# Patient Record
Sex: FEMALE | Race: WHITE | NOT HISPANIC OR LATINO | URBAN - METROPOLITAN AREA
[De-identification: names, ages, dates, MRNs, and addresses within clinical notes are randomized per-mention and may not be internally consistent; named-entity substitution may affect disease eponyms.]

---

## 2019-03-10 ENCOUNTER — INPATIENT HOSPITAL (AMBULATORY)
Dept: URBAN - METROPOLITAN AREA HOSPITAL 84 | Facility: HOSPITAL | Age: 69
End: 2019-03-10

## 2019-03-10 DIAGNOSIS — K62.5 HEMORRHAGE OF ANUS AND RECTUM: ICD-10-CM

## 2019-03-10 DIAGNOSIS — R11.2 NAUSEA WITH VOMITING, UNSPECIFIED: ICD-10-CM

## 2019-03-10 DIAGNOSIS — R19.7 DIARRHEA, UNSPECIFIED: ICD-10-CM

## 2019-03-10 DIAGNOSIS — R55 SYNCOPE AND COLLAPSE: ICD-10-CM

## 2019-03-10 DIAGNOSIS — R10.84 GENERALIZED ABDOMINAL PAIN: ICD-10-CM

## 2019-03-10 PROCEDURE — 99222 1ST HOSP IP/OBS MODERATE 55: CPT | Performed by: NURSE PRACTITIONER

## 2019-03-11 ENCOUNTER — INPATIENT HOSPITAL (AMBULATORY)
Dept: URBAN - METROPOLITAN AREA HOSPITAL 84 | Facility: HOSPITAL | Age: 69
End: 2019-03-11

## 2019-03-11 DIAGNOSIS — K58.8 OTHER IRRITABLE BOWEL SYNDROME: ICD-10-CM

## 2019-03-11 DIAGNOSIS — R11.2 NAUSEA WITH VOMITING, UNSPECIFIED: ICD-10-CM

## 2019-03-11 DIAGNOSIS — K62.5 HEMORRHAGE OF ANUS AND RECTUM: ICD-10-CM

## 2019-03-11 DIAGNOSIS — R52 PAIN, UNSPECIFIED: ICD-10-CM

## 2019-03-11 PROCEDURE — 99231 SBSQ HOSP IP/OBS SF/LOW 25: CPT | Performed by: NURSE PRACTITIONER

## 2019-04-17 ENCOUNTER — INPATIENT HOSPITAL (AMBULATORY)
Dept: URBAN - METROPOLITAN AREA HOSPITAL 84 | Facility: HOSPITAL | Age: 69
End: 2019-04-17

## 2019-04-17 DIAGNOSIS — R10.11 RIGHT UPPER QUADRANT PAIN: ICD-10-CM

## 2019-04-17 DIAGNOSIS — R07.9 CHEST PAIN, UNSPECIFIED: ICD-10-CM

## 2019-04-17 DIAGNOSIS — J44.9 CHRONIC OBSTRUCTIVE PULMONARY DISEASE, UNSPECIFIED: ICD-10-CM

## 2019-04-17 DIAGNOSIS — R52 PAIN, UNSPECIFIED: ICD-10-CM

## 2019-04-17 PROCEDURE — 99222 1ST HOSP IP/OBS MODERATE 55: CPT | Performed by: NURSE PRACTITIONER

## 2020-06-30 ENCOUNTER — APPOINTMENT (OUTPATIENT)
Dept: CT IMAGING | Facility: HOSPITAL | Age: 70
End: 2020-06-30

## 2020-06-30 ENCOUNTER — APPOINTMENT (OUTPATIENT)
Dept: MRI IMAGING | Facility: HOSPITAL | Age: 70
End: 2020-06-30

## 2020-06-30 ENCOUNTER — HOSPITAL ENCOUNTER (OUTPATIENT)
Facility: HOSPITAL | Age: 70
Discharge: NURSING, REHAB OR LONG-TERM CARE | End: 2020-07-06
Attending: HOSPITALIST | Admitting: HOSPITALIST

## 2020-06-30 DIAGNOSIS — M54.2 CERVICAL PAIN: ICD-10-CM

## 2020-06-30 DIAGNOSIS — G43.A1 INTRACTABLE CYCLICAL VOMITING ASSOCIATED WITH MIGRAINE: ICD-10-CM

## 2020-06-30 DIAGNOSIS — R11.2 INTRACTABLE VOMITING WITH NAUSEA, UNSPECIFIED VOMITING TYPE: Primary | ICD-10-CM

## 2020-06-30 DIAGNOSIS — G43.001 MIGRAINE WITHOUT AURA AND WITH STATUS MIGRAINOSUS, NOT INTRACTABLE: Chronic | ICD-10-CM

## 2020-06-30 PROBLEM — R11.10 INTRACTABLE VOMITING: Status: ACTIVE | Noted: 2020-06-30

## 2020-06-30 LAB
ALBUMIN SERPL-MCNC: 4.1 G/DL (ref 3.5–5.2)
ALBUMIN/GLOB SERPL: 1.6 G/DL
ALP SERPL-CCNC: 132 U/L (ref 39–117)
ALT SERPL W P-5'-P-CCNC: 16 U/L (ref 1–33)
ANION GAP SERPL CALCULATED.3IONS-SCNC: 14 MMOL/L (ref 5–15)
AST SERPL-CCNC: 27 U/L (ref 1–32)
BACTERIA UR QL AUTO: ABNORMAL /HPF
BASOPHILS # BLD AUTO: 0.1 10*3/MM3 (ref 0–0.2)
BASOPHILS NFR BLD AUTO: 1.1 % (ref 0–1.5)
BILIRUB SERPL-MCNC: 0.2 MG/DL (ref 0.2–1.2)
BILIRUB UR QL STRIP: ABNORMAL
BUN SERPL-MCNC: 15 MG/DL (ref 8–23)
BUN SERPL-MCNC: ABNORMAL MG/DL
BUN/CREAT SERPL: ABNORMAL
CALCIUM SPEC-SCNC: 9.5 MG/DL (ref 8.6–10.5)
CHLORIDE SERPL-SCNC: 107 MMOL/L (ref 98–107)
CLARITY UR: CLEAR
CO2 SERPL-SCNC: 24 MMOL/L (ref 22–29)
COLOR UR: ABNORMAL
CREAT SERPL-MCNC: 0.68 MG/DL (ref 0.57–1)
DEPRECATED RDW RBC AUTO: 43.3 FL (ref 37–54)
EOSINOPHIL # BLD AUTO: 0 10*3/MM3 (ref 0–0.4)
EOSINOPHIL NFR BLD AUTO: 0.4 % (ref 0.3–6.2)
ERYTHROCYTE [DISTWIDTH] IN BLOOD BY AUTOMATED COUNT: 12.9 % (ref 12.3–15.4)
GFR SERPL CREATININE-BSD FRML MDRD: 86 ML/MIN/1.73
GLOBULIN UR ELPH-MCNC: 2.6 GM/DL
GLUCOSE SERPL-MCNC: 137 MG/DL (ref 65–99)
GLUCOSE UR STRIP-MCNC: NEGATIVE MG/DL
HCT VFR BLD AUTO: 45 % (ref 34–46.6)
HGB BLD-MCNC: 15.3 G/DL (ref 12–15.9)
HGB UR QL STRIP.AUTO: ABNORMAL
HYALINE CASTS UR QL AUTO: ABNORMAL /LPF
KETONES UR QL STRIP: ABNORMAL
LEUKOCYTE ESTERASE UR QL STRIP.AUTO: NEGATIVE
LIPASE SERPL-CCNC: 29 U/L (ref 13–60)
LYMPHOCYTES # BLD AUTO: 1.5 10*3/MM3 (ref 0.7–3.1)
LYMPHOCYTES NFR BLD AUTO: 20.2 % (ref 19.6–45.3)
MCH RBC QN AUTO: 32.5 PG (ref 26.6–33)
MCHC RBC AUTO-ENTMCNC: 34 G/DL (ref 31.5–35.7)
MCV RBC AUTO: 95.6 FL (ref 79–97)
MONOCYTES # BLD AUTO: 0.6 10*3/MM3 (ref 0.1–0.9)
MONOCYTES NFR BLD AUTO: 8.6 % (ref 5–12)
NEUTROPHILS NFR BLD AUTO: 5 10*3/MM3 (ref 1.7–7)
NEUTROPHILS NFR BLD AUTO: 69.7 % (ref 42.7–76)
NITRITE UR QL STRIP: NEGATIVE
NRBC BLD AUTO-RTO: 0 /100 WBC (ref 0–0.2)
PH UR STRIP.AUTO: 5.5 [PH] (ref 5–8)
PLATELET # BLD AUTO: 303 10*3/MM3 (ref 140–450)
PMV BLD AUTO: 7.4 FL (ref 6–12)
POTASSIUM SERPL-SCNC: 4 MMOL/L (ref 3.5–5.2)
PROT SERPL-MCNC: 6.7 G/DL (ref 6–8.5)
PROT UR QL STRIP: ABNORMAL
RBC # BLD AUTO: 4.71 10*6/MM3 (ref 3.77–5.28)
RBC # UR: ABNORMAL /HPF
REF LAB TEST METHOD: ABNORMAL
SODIUM SERPL-SCNC: 145 MMOL/L (ref 136–145)
SP GR UR STRIP: >=1.03 (ref 1–1.03)
SQUAMOUS #/AREA URNS HPF: ABNORMAL /HPF
UROBILINOGEN UR QL STRIP: ABNORMAL
WBC # BLD AUTO: 7.2 10*3/MM3 (ref 3.4–10.8)
WBC UR QL AUTO: ABNORMAL /HPF

## 2020-06-30 PROCEDURE — 80053 COMPREHEN METABOLIC PANEL: CPT | Performed by: PHYSICIAN ASSISTANT

## 2020-06-30 PROCEDURE — 94799 UNLISTED PULMONARY SVC/PX: CPT

## 2020-06-30 PROCEDURE — P9612 CATHETERIZE FOR URINE SPEC: HCPCS

## 2020-06-30 PROCEDURE — 25010000002 ONDANSETRON PER 1 MG: Performed by: PHYSICIAN ASSISTANT

## 2020-06-30 PROCEDURE — 0 IOPAMIDOL PER 1 ML: Performed by: PHYSICIAN ASSISTANT

## 2020-06-30 PROCEDURE — 74177 CT ABD & PELVIS W/CONTRAST: CPT

## 2020-06-30 PROCEDURE — G0378 HOSPITAL OBSERVATION PER HR: HCPCS

## 2020-06-30 PROCEDURE — 63710000001 PROMETHAZINE PER 25 MG: Performed by: PHYSICIAN ASSISTANT

## 2020-06-30 PROCEDURE — 72141 MRI NECK SPINE W/O DYE: CPT

## 2020-06-30 PROCEDURE — 99285 EMERGENCY DEPT VISIT HI MDM: CPT

## 2020-06-30 PROCEDURE — 72125 CT NECK SPINE W/O DYE: CPT

## 2020-06-30 PROCEDURE — 81001 URINALYSIS AUTO W/SCOPE: CPT | Performed by: PHYSICIAN ASSISTANT

## 2020-06-30 PROCEDURE — 25010000002 HYDROMORPHONE PER 4 MG: Performed by: EMERGENCY MEDICINE

## 2020-06-30 PROCEDURE — 83690 ASSAY OF LIPASE: CPT | Performed by: PHYSICIAN ASSISTANT

## 2020-06-30 PROCEDURE — 96375 TX/PRO/DX INJ NEW DRUG ADDON: CPT

## 2020-06-30 PROCEDURE — 85025 COMPLETE CBC W/AUTO DIFF WBC: CPT | Performed by: PHYSICIAN ASSISTANT

## 2020-06-30 PROCEDURE — 94640 AIRWAY INHALATION TREATMENT: CPT

## 2020-06-30 PROCEDURE — 96374 THER/PROPH/DIAG INJ IV PUSH: CPT

## 2020-06-30 RX ORDER — PROMETHAZINE HYDROCHLORIDE 25 MG/1
50 SUPPOSITORY RECTAL EVERY 6 HOURS PRN
COMMUNITY
End: 2020-07-06 | Stop reason: HOSPADM

## 2020-06-30 RX ORDER — HYDROCODONE BITARTRATE AND ACETAMINOPHEN 5; 325 MG/1; MG/1
1 TABLET ORAL EVERY 8 HOURS PRN
Status: DISCONTINUED | OUTPATIENT
Start: 2020-06-30 | End: 2020-07-06 | Stop reason: HOSPADM

## 2020-06-30 RX ORDER — PROMETHAZINE HYDROCHLORIDE 25 MG/1
25 TABLET ORAL EVERY 6 HOURS PRN
COMMUNITY
End: 2020-07-06 | Stop reason: HOSPADM

## 2020-06-30 RX ORDER — OXCARBAZEPINE 150 MG/1
300 TABLET, FILM COATED ORAL 3 TIMES DAILY
Status: DISCONTINUED | OUTPATIENT
Start: 2020-06-30 | End: 2020-07-06 | Stop reason: HOSPADM

## 2020-06-30 RX ORDER — LEVOTHYROXINE SODIUM 0.05 MG/1
50 TABLET ORAL
COMMUNITY

## 2020-06-30 RX ORDER — ALUMINA, MAGNESIA, AND SIMETHICONE 2400; 2400; 240 MG/30ML; MG/30ML; MG/30ML
15 SUSPENSION ORAL EVERY 6 HOURS PRN
Status: DISCONTINUED | OUTPATIENT
Start: 2020-06-30 | End: 2020-07-06 | Stop reason: HOSPADM

## 2020-06-30 RX ORDER — SUMATRIPTAN 50 MG/1
50 TABLET, FILM COATED ORAL DAILY PRN
Status: DISCONTINUED | OUTPATIENT
Start: 2020-06-30 | End: 2020-07-06 | Stop reason: HOSPADM

## 2020-06-30 RX ORDER — AMITRIPTYLINE HYDROCHLORIDE 25 MG/1
25 TABLET, FILM COATED ORAL NIGHTLY
COMMUNITY
End: 2020-07-06 | Stop reason: HOSPADM

## 2020-06-30 RX ORDER — CYCLOSPORINE 0.5 MG/ML
1 EMULSION OPHTHALMIC 2 TIMES DAILY
Status: DISCONTINUED | OUTPATIENT
Start: 2020-06-30 | End: 2020-07-06 | Stop reason: HOSPADM

## 2020-06-30 RX ORDER — SODIUM CHLORIDE 9 MG/ML
100 INJECTION, SOLUTION INTRAVENOUS CONTINUOUS
Status: DISCONTINUED | OUTPATIENT
Start: 2020-06-30 | End: 2020-07-06

## 2020-06-30 RX ORDER — TOPIRAMATE 100 MG/1
100 TABLET, FILM COATED ORAL 3 TIMES DAILY
Status: ON HOLD | COMMUNITY
End: 2022-09-30 | Stop reason: SDUPTHER

## 2020-06-30 RX ORDER — MONTELUKAST SODIUM 10 MG/1
10 TABLET ORAL NIGHTLY
COMMUNITY
End: 2020-07-06 | Stop reason: HOSPADM

## 2020-06-30 RX ORDER — GABAPENTIN 800 MG/1
800 TABLET ORAL 4 TIMES DAILY
Status: ON HOLD | COMMUNITY
End: 2020-07-03 | Stop reason: SDUPTHER

## 2020-06-30 RX ORDER — PROPRANOLOL HYDROCHLORIDE 40 MG/1
40 TABLET ORAL 2 TIMES DAILY
COMMUNITY
End: 2020-07-06 | Stop reason: HOSPADM

## 2020-06-30 RX ORDER — ACETAMINOPHEN 160 MG/5ML
650 SOLUTION ORAL EVERY 4 HOURS PRN
Status: DISCONTINUED | OUTPATIENT
Start: 2020-06-30 | End: 2020-07-06 | Stop reason: HOSPADM

## 2020-06-30 RX ORDER — HYDROMORPHONE HCL 110MG/55ML
0.5 PATIENT CONTROLLED ANALGESIA SYRINGE INTRAVENOUS ONCE
Status: COMPLETED | OUTPATIENT
Start: 2020-06-30 | End: 2020-06-30

## 2020-06-30 RX ORDER — BUDESONIDE 0.5 MG/2ML
0.5 INHALANT ORAL
Status: DISCONTINUED | OUTPATIENT
Start: 2020-06-30 | End: 2020-07-06 | Stop reason: HOSPADM

## 2020-06-30 RX ORDER — SUCRALFATE 1 G/1
1 TABLET ORAL 2 TIMES DAILY
COMMUNITY

## 2020-06-30 RX ORDER — BISACODYL 10 MG
10 SUPPOSITORY, RECTAL RECTAL DAILY PRN
Status: DISCONTINUED | OUTPATIENT
Start: 2020-06-30 | End: 2020-07-06 | Stop reason: HOSPADM

## 2020-06-30 RX ORDER — NITROGLYCERIN 0.4 MG/1
0.4 TABLET SUBLINGUAL
Status: DISCONTINUED | OUTPATIENT
Start: 2020-06-30 | End: 2020-07-06 | Stop reason: HOSPADM

## 2020-06-30 RX ORDER — MONTELUKAST SODIUM 10 MG/1
10 TABLET ORAL NIGHTLY
Status: DISCONTINUED | OUTPATIENT
Start: 2020-06-30 | End: 2020-07-06 | Stop reason: HOSPADM

## 2020-06-30 RX ORDER — CHOLECALCIFEROL (VITAMIN D3) 125 MCG
5 CAPSULE ORAL NIGHTLY PRN
Status: DISCONTINUED | OUTPATIENT
Start: 2020-06-30 | End: 2020-07-06 | Stop reason: HOSPADM

## 2020-06-30 RX ORDER — ESTRADIOL 1 MG/1
1.5 TABLET ORAL DAILY
Status: DISCONTINUED | OUTPATIENT
Start: 2020-07-01 | End: 2020-07-06 | Stop reason: HOSPADM

## 2020-06-30 RX ORDER — ACETAMINOPHEN 650 MG/1
650 SUPPOSITORY RECTAL EVERY 4 HOURS PRN
Status: DISCONTINUED | OUTPATIENT
Start: 2020-06-30 | End: 2020-07-06 | Stop reason: HOSPADM

## 2020-06-30 RX ORDER — OXYCODONE HYDROCHLORIDE 5 MG/1
10 TABLET ORAL EVERY 4 HOURS PRN
Status: DISCONTINUED | OUTPATIENT
Start: 2020-06-30 | End: 2020-07-06 | Stop reason: HOSPADM

## 2020-06-30 RX ORDER — KETOTIFEN FUMARATE 0.35 MG/ML
1 SOLUTION/ DROPS OPHTHALMIC 2 TIMES DAILY
Status: DISCONTINUED | OUTPATIENT
Start: 2020-06-30 | End: 2020-07-06 | Stop reason: HOSPADM

## 2020-06-30 RX ORDER — ESCITALOPRAM OXALATE 20 MG/1
20 TABLET ORAL DAILY
COMMUNITY
End: 2020-07-06 | Stop reason: HOSPADM

## 2020-06-30 RX ORDER — ALBUTEROL SULFATE 2.5 MG/3ML
2.5 SOLUTION RESPIRATORY (INHALATION) EVERY 4 HOURS PRN
Status: DISCONTINUED | OUTPATIENT
Start: 2020-06-30 | End: 2020-07-06 | Stop reason: HOSPADM

## 2020-06-30 RX ORDER — ESTRADIOL 1 MG/1
1 TABLET ORAL DAILY
COMMUNITY

## 2020-06-30 RX ORDER — SODIUM CHLORIDE 0.9 % (FLUSH) 0.9 %
10 SYRINGE (ML) INJECTION EVERY 12 HOURS SCHEDULED
Status: DISCONTINUED | OUTPATIENT
Start: 2020-06-30 | End: 2020-07-06 | Stop reason: HOSPADM

## 2020-06-30 RX ORDER — MAGNESIUM SULFATE HEPTAHYDRATE 40 MG/ML
2 INJECTION, SOLUTION INTRAVENOUS AS NEEDED
Status: DISCONTINUED | OUTPATIENT
Start: 2020-06-30 | End: 2020-07-06 | Stop reason: HOSPADM

## 2020-06-30 RX ORDER — RIZATRIPTAN BENZOATE 10 MG/1
10 TABLET, ORALLY DISINTEGRATING ORAL ONCE AS NEEDED
COMMUNITY

## 2020-06-30 RX ORDER — SUCRALFATE 1 G/1
1 TABLET ORAL
Status: DISCONTINUED | OUTPATIENT
Start: 2020-06-30 | End: 2020-07-06 | Stop reason: HOSPADM

## 2020-06-30 RX ORDER — MAGNESIUM SULFATE 1 G/100ML
1 INJECTION INTRAVENOUS AS NEEDED
Status: DISCONTINUED | OUTPATIENT
Start: 2020-06-30 | End: 2020-07-06 | Stop reason: HOSPADM

## 2020-06-30 RX ORDER — BUTALBITAL, ACETAMINOPHEN AND CAFFEINE 50; 325; 40 MG/1; MG/1; MG/1
1-2 TABLET ORAL EVERY 4 HOURS PRN
COMMUNITY

## 2020-06-30 RX ORDER — HYDROCODONE BITARTRATE AND ACETAMINOPHEN 5; 325 MG/1; MG/1
1 TABLET ORAL EVERY 8 HOURS PRN
COMMUNITY
End: 2020-07-06 | Stop reason: HOSPADM

## 2020-06-30 RX ORDER — ONDANSETRON 2 MG/ML
4 INJECTION INTRAMUSCULAR; INTRAVENOUS EVERY 6 HOURS PRN
Status: DISCONTINUED | OUTPATIENT
Start: 2020-06-30 | End: 2020-07-06 | Stop reason: HOSPADM

## 2020-06-30 RX ORDER — ONDANSETRON 4 MG/1
4 TABLET, FILM COATED ORAL EVERY 6 HOURS PRN
Status: DISCONTINUED | OUTPATIENT
Start: 2020-06-30 | End: 2020-07-06 | Stop reason: HOSPADM

## 2020-06-30 RX ORDER — SODIUM CHLORIDE 0.9 % (FLUSH) 0.9 %
10 SYRINGE (ML) INJECTION AS NEEDED
Status: DISCONTINUED | OUTPATIENT
Start: 2020-06-30 | End: 2020-07-06 | Stop reason: HOSPADM

## 2020-06-30 RX ORDER — AMITRIPTYLINE HYDROCHLORIDE 25 MG/1
25 TABLET, FILM COATED ORAL NIGHTLY
Status: DISCONTINUED | OUTPATIENT
Start: 2020-06-30 | End: 2020-07-06 | Stop reason: HOSPADM

## 2020-06-30 RX ORDER — IPRATROPIUM BROMIDE AND ALBUTEROL SULFATE 2.5; .5 MG/3ML; MG/3ML
3 SOLUTION RESPIRATORY (INHALATION)
Status: DISCONTINUED | OUTPATIENT
Start: 2020-06-30 | End: 2020-07-06 | Stop reason: HOSPADM

## 2020-06-30 RX ORDER — ONDANSETRON 2 MG/ML
4 INJECTION INTRAMUSCULAR; INTRAVENOUS ONCE
Status: COMPLETED | OUTPATIENT
Start: 2020-06-30 | End: 2020-06-30

## 2020-06-30 RX ORDER — PROPRANOLOL HYDROCHLORIDE 10 MG/1
40 TABLET ORAL 2 TIMES DAILY
Status: DISCONTINUED | OUTPATIENT
Start: 2020-06-30 | End: 2020-07-06 | Stop reason: HOSPADM

## 2020-06-30 RX ORDER — TOPIRAMATE 100 MG/1
100 TABLET, FILM COATED ORAL 4 TIMES DAILY
Status: DISCONTINUED | OUTPATIENT
Start: 2020-06-30 | End: 2020-07-06 | Stop reason: HOSPADM

## 2020-06-30 RX ORDER — ESCITALOPRAM OXALATE 10 MG/1
20 TABLET ORAL DAILY
Status: DISCONTINUED | OUTPATIENT
Start: 2020-07-01 | End: 2020-07-06 | Stop reason: HOSPADM

## 2020-06-30 RX ORDER — GABAPENTIN 400 MG/1
800 CAPSULE ORAL 4 TIMES DAILY
Status: DISCONTINUED | OUTPATIENT
Start: 2020-06-30 | End: 2020-07-06 | Stop reason: HOSPADM

## 2020-06-30 RX ORDER — OMEPRAZOLE 40 MG/1
40 CAPSULE, DELAYED RELEASE ORAL 2 TIMES DAILY
COMMUNITY
End: 2022-01-21

## 2020-06-30 RX ORDER — BUTALBITAL, ACETAMINOPHEN AND CAFFEINE 50; 325; 40 MG/1; MG/1; MG/1
1 TABLET ORAL EVERY 4 HOURS PRN
Status: DISCONTINUED | OUTPATIENT
Start: 2020-06-30 | End: 2020-07-06 | Stop reason: HOSPADM

## 2020-06-30 RX ORDER — PROMETHAZINE HYDROCHLORIDE 25 MG/1
25 TABLET ORAL ONCE
Status: COMPLETED | OUTPATIENT
Start: 2020-06-30 | End: 2020-06-30

## 2020-06-30 RX ORDER — AZELASTINE HYDROCHLORIDE 0.5 MG/ML
1 SOLUTION/ DROPS OPHTHALMIC 2 TIMES DAILY
COMMUNITY
End: 2022-09-27

## 2020-06-30 RX ORDER — OXYCODONE AND ACETAMINOPHEN 10; 325 MG/1; MG/1
1 TABLET ORAL EVERY 6 HOURS PRN
Status: ON HOLD | COMMUNITY
End: 2020-07-03 | Stop reason: SDUPTHER

## 2020-06-30 RX ORDER — PANTOPRAZOLE SODIUM 40 MG/1
40 TABLET, DELAYED RELEASE ORAL EVERY MORNING
Status: DISCONTINUED | OUTPATIENT
Start: 2020-07-01 | End: 2020-07-06 | Stop reason: HOSPADM

## 2020-06-30 RX ORDER — ONDANSETRON 4 MG/1
4 TABLET, FILM COATED ORAL EVERY 4 HOURS PRN
COMMUNITY
End: 2020-07-06 | Stop reason: HOSPADM

## 2020-06-30 RX ORDER — POTASSIUM CHLORIDE 20 MEQ/1
40 TABLET, EXTENDED RELEASE ORAL AS NEEDED
Status: DISCONTINUED | OUTPATIENT
Start: 2020-06-30 | End: 2020-07-06 | Stop reason: HOSPADM

## 2020-06-30 RX ORDER — OXCARBAZEPINE 300 MG/1
300 TABLET, FILM COATED ORAL 3 TIMES DAILY
COMMUNITY
End: 2020-07-06 | Stop reason: HOSPADM

## 2020-06-30 RX ORDER — VITAMIN E 268 MG
400 CAPSULE ORAL DAILY
COMMUNITY
End: 2022-09-30 | Stop reason: HOSPADM

## 2020-06-30 RX ORDER — ALBUTEROL SULFATE 90 UG/1
2 AEROSOL, METERED RESPIRATORY (INHALATION) EVERY 4 HOURS PRN
COMMUNITY

## 2020-06-30 RX ORDER — LEVOTHYROXINE SODIUM 0.03 MG/1
25 TABLET ORAL DAILY
Status: DISCONTINUED | OUTPATIENT
Start: 2020-07-01 | End: 2020-07-06 | Stop reason: HOSPADM

## 2020-06-30 RX ORDER — ACETAMINOPHEN 325 MG/1
650 TABLET ORAL EVERY 4 HOURS PRN
Status: DISCONTINUED | OUTPATIENT
Start: 2020-06-30 | End: 2020-07-06 | Stop reason: HOSPADM

## 2020-06-30 RX ORDER — ONDANSETRON 4 MG/1
4 TABLET, FILM COATED ORAL EVERY 4 HOURS PRN
Status: DISCONTINUED | OUTPATIENT
Start: 2020-06-30 | End: 2020-06-30

## 2020-06-30 RX ORDER — LANSOPRAZOLE 30 MG/1
30 CAPSULE, DELAYED RELEASE ORAL DAILY
COMMUNITY
End: 2020-07-06 | Stop reason: HOSPADM

## 2020-06-30 RX ORDER — VITAMIN E 268 MG
400 CAPSULE ORAL DAILY
Status: DISCONTINUED | OUTPATIENT
Start: 2020-07-01 | End: 2020-07-06 | Stop reason: HOSPADM

## 2020-06-30 RX ORDER — CYCLOSPORINE 0.5 MG/ML
1 EMULSION OPHTHALMIC 2 TIMES DAILY
COMMUNITY

## 2020-06-30 RX ADMIN — IOPAMIDOL 100 ML: 755 INJECTION, SOLUTION INTRAVENOUS at 13:55

## 2020-06-30 RX ADMIN — MONTELUKAST SODIUM 10 MG: 10 TABLET, FILM COATED ORAL at 21:40

## 2020-06-30 RX ADMIN — OXCARBAZEPINE 300 MG: 150 TABLET, FILM COATED ORAL at 21:58

## 2020-06-30 RX ADMIN — SUCRALFATE 1 G: 1 TABLET ORAL at 21:40

## 2020-06-30 RX ADMIN — AMITRIPTYLINE HYDROCHLORIDE 25 MG: 25 TABLET, FILM COATED ORAL at 21:40

## 2020-06-30 RX ADMIN — CYCLOSPORINE 1 DROP: 0.5 EMULSION OPHTHALMIC at 21:41

## 2020-06-30 RX ADMIN — PROPRANOLOL HYDROCHLORIDE 40 MG: 10 TABLET ORAL at 21:41

## 2020-06-30 RX ADMIN — PROMETHAZINE HYDROCHLORIDE 25 MG: 25 TABLET ORAL at 13:57

## 2020-06-30 RX ADMIN — HYDROMORPHONE HYDROCHLORIDE 0.5 MG: 2 INJECTION, SOLUTION INTRAMUSCULAR; INTRAVENOUS; SUBCUTANEOUS at 12:29

## 2020-06-30 RX ADMIN — SODIUM CHLORIDE 500 ML: 900 INJECTION, SOLUTION INTRAVENOUS at 10:56

## 2020-06-30 RX ADMIN — SODIUM CHLORIDE 100 ML/HR: 900 INJECTION, SOLUTION INTRAVENOUS at 21:46

## 2020-06-30 RX ADMIN — BUDESONIDE 0.5 MG: 0.5 INHALANT RESPIRATORY (INHALATION) at 19:38

## 2020-06-30 RX ADMIN — HYDROCODONE BITARTRATE AND ACETAMINOPHEN 1 TABLET: 5; 325 TABLET ORAL at 21:41

## 2020-06-30 RX ADMIN — ONDANSETRON 4 MG: 2 INJECTION INTRAMUSCULAR; INTRAVENOUS at 10:56

## 2020-06-30 RX ADMIN — OXYCODONE HYDROCHLORIDE 10 MG: 5 TABLET ORAL at 17:46

## 2020-06-30 RX ADMIN — TOPIRAMATE 100 MG: 100 TABLET, FILM COATED ORAL at 21:41

## 2020-06-30 RX ADMIN — GABAPENTIN 800 MG: 400 CAPSULE ORAL at 21:40

## 2020-06-30 RX ADMIN — Medication 10 ML: at 21:54

## 2020-06-30 RX ADMIN — KETOTIFEN FUMARATE 1 DROP: 0.25 SOLUTION OPHTHALMIC at 22:31

## 2020-06-30 RX ADMIN — MELATONIN TAB 5 MG 5 MG: 5 TAB at 21:40

## 2020-07-01 ENCOUNTER — ON CAMPUS - OUTPATIENT (AMBULATORY)
Dept: URBAN - METROPOLITAN AREA HOSPITAL 85 | Facility: HOSPITAL | Age: 70
End: 2020-07-01

## 2020-07-01 DIAGNOSIS — R11.2 NAUSEA WITH VOMITING, UNSPECIFIED: ICD-10-CM

## 2020-07-01 DIAGNOSIS — R10.13 EPIGASTRIC PAIN: ICD-10-CM

## 2020-07-01 DIAGNOSIS — K92.1 MELENA: ICD-10-CM

## 2020-07-01 DIAGNOSIS — R74.8 ABNORMAL LEVELS OF OTHER SERUM ENZYMES: ICD-10-CM

## 2020-07-01 DIAGNOSIS — R19.7 DIARRHEA, UNSPECIFIED: ICD-10-CM

## 2020-07-01 DIAGNOSIS — Z90.49 ACQUIRED ABSENCE OF OTHER SPECIFIED PARTS OF DIGESTIVE TRACT: ICD-10-CM

## 2020-07-01 PROBLEM — F32.A DEPRESSION: Chronic | Status: ACTIVE | Noted: 2020-07-01

## 2020-07-01 PROBLEM — E28.39 ESTROGEN DEFICIENCY: Chronic | Status: ACTIVE | Noted: 2020-07-01

## 2020-07-01 PROBLEM — M96.0 PSEUDARTHROSIS FOLLOWING SPINAL FUSION: Status: ACTIVE | Noted: 2018-06-13

## 2020-07-01 PROBLEM — J45.909 ASTHMA: Status: ACTIVE | Noted: 2020-07-01

## 2020-07-01 PROBLEM — J44.9 CHRONIC OBSTRUCTIVE PULMONARY DISEASE: Chronic | Status: ACTIVE | Noted: 2020-07-01

## 2020-07-01 PROBLEM — G43.909 MIGRAINES: Chronic | Status: ACTIVE | Noted: 2020-07-01

## 2020-07-01 PROBLEM — G40.909 SEIZURE DISORDER (HCC): Chronic | Status: ACTIVE | Noted: 2020-07-01

## 2020-07-01 PROBLEM — G43.A1: Status: ACTIVE | Noted: 2020-06-30

## 2020-07-01 PROBLEM — K21.9 GERD WITHOUT ESOPHAGITIS: Chronic | Status: ACTIVE | Noted: 2020-07-01

## 2020-07-01 PROBLEM — E03.9 HYPOTHYROIDISM (ACQUIRED): Chronic | Status: ACTIVE | Noted: 2020-07-01

## 2020-07-01 PROBLEM — G89.4 CHRONIC PAIN SYNDROME: Chronic | Status: ACTIVE | Noted: 2020-07-01

## 2020-07-01 PROBLEM — J44.9 CHRONIC OBSTRUCTIVE PULMONARY DISEASE: Status: ACTIVE | Noted: 2020-07-01

## 2020-07-01 LAB
ANION GAP SERPL CALCULATED.3IONS-SCNC: 14 MMOL/L (ref 5–15)
BASOPHILS # BLD AUTO: 0 10*3/MM3 (ref 0–0.2)
BASOPHILS NFR BLD AUTO: 0.5 % (ref 0–1.5)
BUN SERPL-MCNC: 13 MG/DL (ref 8–23)
BUN SERPL-MCNC: ABNORMAL MG/DL
BUN/CREAT SERPL: ABNORMAL
CALCIUM SPEC-SCNC: 8.5 MG/DL (ref 8.6–10.5)
CHLORIDE SERPL-SCNC: 106 MMOL/L (ref 98–107)
CO2 SERPL-SCNC: 22 MMOL/L (ref 22–29)
CREAT SERPL-MCNC: 0.52 MG/DL (ref 0.57–1)
DEPRECATED RDW RBC AUTO: 45.1 FL (ref 37–54)
EOSINOPHIL # BLD AUTO: 0.1 10*3/MM3 (ref 0–0.4)
EOSINOPHIL NFR BLD AUTO: 1.3 % (ref 0.3–6.2)
ERYTHROCYTE [DISTWIDTH] IN BLOOD BY AUTOMATED COUNT: 13.1 % (ref 12.3–15.4)
GFR SERPL CREATININE-BSD FRML MDRD: 117 ML/MIN/1.73
GGT SERPL-CCNC: 23 U/L (ref 5–36)
GLUCOSE SERPL-MCNC: 110 MG/DL (ref 65–99)
HCT VFR BLD AUTO: 35.1 % (ref 34–46.6)
HGB BLD-MCNC: 12 G/DL (ref 12–15.9)
LYMPHOCYTES # BLD AUTO: 2.3 10*3/MM3 (ref 0.7–3.1)
LYMPHOCYTES NFR BLD AUTO: 41.6 % (ref 19.6–45.3)
MAGNESIUM SERPL-MCNC: 2 MG/DL (ref 1.6–2.4)
MCH RBC QN AUTO: 33.4 PG (ref 26.6–33)
MCHC RBC AUTO-ENTMCNC: 34.2 G/DL (ref 31.5–35.7)
MCV RBC AUTO: 97.5 FL (ref 79–97)
MONOCYTES # BLD AUTO: 0.7 10*3/MM3 (ref 0.1–0.9)
MONOCYTES NFR BLD AUTO: 12.4 % (ref 5–12)
NEUTROPHILS NFR BLD AUTO: 2.4 10*3/MM3 (ref 1.7–7)
NEUTROPHILS NFR BLD AUTO: 44.2 % (ref 42.7–76)
NRBC BLD AUTO-RTO: 0.1 /100 WBC (ref 0–0.2)
PLATELET # BLD AUTO: 216 10*3/MM3 (ref 140–450)
PMV BLD AUTO: 7.6 FL (ref 6–12)
POTASSIUM SERPL-SCNC: 4 MMOL/L (ref 3.5–5.2)
RBC # BLD AUTO: 3.6 10*6/MM3 (ref 3.77–5.28)
SODIUM SERPL-SCNC: 142 MMOL/L (ref 136–145)
WBC # BLD AUTO: 5.5 10*3/MM3 (ref 3.4–10.8)

## 2020-07-01 PROCEDURE — 97162 PT EVAL MOD COMPLEX 30 MIN: CPT

## 2020-07-01 PROCEDURE — 63710000001 ONDANSETRON PER 8 MG: Performed by: PHYSICIAN ASSISTANT

## 2020-07-01 PROCEDURE — 83735 ASSAY OF MAGNESIUM: CPT | Performed by: PHYSICIAN ASSISTANT

## 2020-07-01 PROCEDURE — 99213 OFFICE O/P EST LOW 20 MIN: CPT | Performed by: NURSE PRACTITIONER

## 2020-07-01 PROCEDURE — 94799 UNLISTED PULMONARY SVC/PX: CPT

## 2020-07-01 PROCEDURE — 85025 COMPLETE CBC W/AUTO DIFF WBC: CPT | Performed by: PHYSICIAN ASSISTANT

## 2020-07-01 PROCEDURE — 82977 ASSAY OF GGT: CPT | Performed by: NURSE PRACTITIONER

## 2020-07-01 PROCEDURE — G0378 HOSPITAL OBSERVATION PER HR: HCPCS

## 2020-07-01 PROCEDURE — 99225 PR SBSQ OBSERVATION CARE/DAY 25 MINUTES: CPT | Performed by: HOSPITALIST

## 2020-07-01 PROCEDURE — 80048 BASIC METABOLIC PNL TOTAL CA: CPT | Performed by: PHYSICIAN ASSISTANT

## 2020-07-01 RX ADMIN — HYDROCODONE BITARTRATE AND ACETAMINOPHEN 1 TABLET: 5; 325 TABLET ORAL at 05:57

## 2020-07-01 RX ADMIN — KETOTIFEN FUMARATE 1 DROP: 0.25 SOLUTION OPHTHALMIC at 09:34

## 2020-07-01 RX ADMIN — IPRATROPIUM BROMIDE AND ALBUTEROL SULFATE 3 ML: .5; 3 SOLUTION RESPIRATORY (INHALATION) at 08:05

## 2020-07-01 RX ADMIN — ESCITALOPRAM OXALATE 20 MG: 10 TABLET ORAL at 08:04

## 2020-07-01 RX ADMIN — TOPIRAMATE 100 MG: 100 TABLET, FILM COATED ORAL at 08:05

## 2020-07-01 RX ADMIN — PANTOPRAZOLE SODIUM 40 MG: 40 TABLET, DELAYED RELEASE ORAL at 05:57

## 2020-07-01 RX ADMIN — LEVOTHYROXINE SODIUM 25 MCG: 25 TABLET ORAL at 05:57

## 2020-07-01 RX ADMIN — OXCARBAZEPINE 300 MG: 150 TABLET, FILM COATED ORAL at 21:05

## 2020-07-01 RX ADMIN — BUDESONIDE 0.5 MG: 0.5 INHALANT RESPIRATORY (INHALATION) at 20:49

## 2020-07-01 RX ADMIN — Medication 10 ML: at 08:09

## 2020-07-01 RX ADMIN — ESTRADIOL 1.5 MG: 1 TABLET ORAL at 08:04

## 2020-07-01 RX ADMIN — TOPIRAMATE 100 MG: 100 TABLET, FILM COATED ORAL at 11:57

## 2020-07-01 RX ADMIN — PROPRANOLOL HYDROCHLORIDE 40 MG: 10 TABLET ORAL at 21:05

## 2020-07-01 RX ADMIN — SUCRALFATE 1 G: 1 TABLET ORAL at 11:57

## 2020-07-01 RX ADMIN — TOPIRAMATE 100 MG: 100 TABLET, FILM COATED ORAL at 21:05

## 2020-07-01 RX ADMIN — IPRATROPIUM BROMIDE AND ALBUTEROL SULFATE 3 ML: .5; 3 SOLUTION RESPIRATORY (INHALATION) at 20:49

## 2020-07-01 RX ADMIN — MONTELUKAST SODIUM 10 MG: 10 TABLET, FILM COATED ORAL at 21:05

## 2020-07-01 RX ADMIN — SUCRALFATE 1 G: 1 TABLET ORAL at 21:05

## 2020-07-01 RX ADMIN — Medication 10 ML: at 21:05

## 2020-07-01 RX ADMIN — SUCRALFATE 1 G: 1 TABLET ORAL at 08:05

## 2020-07-01 RX ADMIN — OXYCODONE HYDROCHLORIDE 10 MG: 5 TABLET ORAL at 08:04

## 2020-07-01 RX ADMIN — OXYCODONE HYDROCHLORIDE 10 MG: 5 TABLET ORAL at 21:05

## 2020-07-01 RX ADMIN — Medication 400 UNITS: at 08:04

## 2020-07-01 RX ADMIN — OXCARBAZEPINE 300 MG: 150 TABLET, FILM COATED ORAL at 08:05

## 2020-07-01 RX ADMIN — CYCLOSPORINE 1 DROP: 0.5 EMULSION OPHTHALMIC at 21:07

## 2020-07-01 RX ADMIN — AMITRIPTYLINE HYDROCHLORIDE 25 MG: 25 TABLET, FILM COATED ORAL at 21:05

## 2020-07-01 RX ADMIN — PANTOPRAZOLE SODIUM 40 MG: 40 TABLET, DELAYED RELEASE ORAL at 08:16

## 2020-07-01 RX ADMIN — KETOTIFEN FUMARATE 1 DROP: 0.25 SOLUTION OPHTHALMIC at 21:06

## 2020-07-01 RX ADMIN — ONDANSETRON HYDROCHLORIDE 4 MG: 4 TABLET, FILM COATED ORAL at 08:04

## 2020-07-01 RX ADMIN — OXYCODONE HYDROCHLORIDE 10 MG: 5 TABLET ORAL at 11:57

## 2020-07-01 RX ADMIN — OXYCODONE HYDROCHLORIDE 10 MG: 5 TABLET ORAL at 02:59

## 2020-07-01 RX ADMIN — LEVOTHYROXINE SODIUM 25 MCG: 25 TABLET ORAL at 08:16

## 2020-07-01 RX ADMIN — GABAPENTIN 800 MG: 400 CAPSULE ORAL at 08:04

## 2020-07-01 RX ADMIN — OXYCODONE HYDROCHLORIDE 10 MG: 5 TABLET ORAL at 16:35

## 2020-07-01 RX ADMIN — TOPIRAMATE 100 MG: 100 TABLET, FILM COATED ORAL at 16:35

## 2020-07-01 RX ADMIN — GABAPENTIN 800 MG: 400 CAPSULE ORAL at 21:05

## 2020-07-01 RX ADMIN — PROPRANOLOL HYDROCHLORIDE 40 MG: 10 TABLET ORAL at 08:04

## 2020-07-01 RX ADMIN — SUCRALFATE 1 G: 1 TABLET ORAL at 16:35

## 2020-07-01 RX ADMIN — OXCARBAZEPINE 300 MG: 150 TABLET, FILM COATED ORAL at 16:35

## 2020-07-01 RX ADMIN — BUDESONIDE 0.5 MG: 0.5 INHALANT RESPIRATORY (INHALATION) at 08:19

## 2020-07-01 RX ADMIN — GABAPENTIN 800 MG: 400 CAPSULE ORAL at 11:57

## 2020-07-01 RX ADMIN — CYCLOSPORINE 1 DROP: 0.5 EMULSION OPHTHALMIC at 08:05

## 2020-07-01 RX ADMIN — GABAPENTIN 800 MG: 400 CAPSULE ORAL at 16:35

## 2020-07-01 NOTE — PLAN OF CARE
"  Problem: Patient Care Overview  Goal: Plan of Care Review  Outcome: Ongoing (interventions implemented as appropriate)  Flowsheets  Taken 7/1/2020 1244  Progress: no change  Taken 7/1/2020 1207  Plan of Care Reviewed With: patient  Outcome Summary: Pt is a 69 y/o F who presented to [facility] with intactable N/V x 2 days of unclear etiology (CT abdomen (-), UA (+)) and s/p recent C3-4 and C5-6 fusion 06/09/2020 (repeat imaging C-spine (-)). Pt appears to be functioning below baseline with very poor activity tolerance/generalized weakness and c/o \"feeling like my legs are going to give out\" during brief gait training bout. Pt completed sit to supine transfer requiring SBA, sit to/from stand transfers requiring CGA, and required MIN assist for gait training bout 25ft (plus R HHA) with unsteadiness/weakness throughout bout. Recommending IP rehab at d/c, as pt is not safe to return home alone and is at increased risk for falls. Plan to see 3x/week at [facility] for progression of mobility and assess transfers/gait with RW at next session. PPE used: mask, face shield, gloves     "

## 2020-07-01 NOTE — PROGRESS NOTES
[facility] Daily Progress Note      Hospitalist Team  LOS 0 days      Patient Care Team:  Zoey Bah MD as PCP - General  Zoey Bah MD as PCP - Family Medicine    Patient Location: 373/1      Subjective   Subjective     Chief Complaint / Subjective  Chief Complaint   Patient presents with   • Abdominal Pain     onset 2 days ago with N/V         Brief Synopsis of Hospital Course/HPI    Ms. Lin is a 70 y.o. female presents to [facility] complaining of nausea and vomiting for the past 2 days.  Patient states that she had posterior cervical neck fusion in the beginning of June.  Patient has been on pain meds ever since but states her pain is worsening as well as worsening abdominal pain.  Patient states she has had decreased oral intake over the past several days due to feeling nauseous.  Patient reports her last bowel movement was 3 days ago.  Patient also states that she has decreased urine output as she reports that she is not drinking enough fluids.  Patient also reports some hematemesis in which she is noticed some red streaks in her emesis.     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  Previous notes from [facility] state that patient has chronic nausea.  Patient with history of cholecystectomy back in 1996.  Patient also has a history of appendectomy.  Patient postop note shows patient was treated with clindamycin.  Patient has a history of C. difficile colitis and chronic diarrhea as well.     In the ED, CMP largely unremarkable.  Lipase normal, CBC unremarkable.  UA significant for 2+, 1+ protein, 2+ bilirubin, 3-5 RBCs, 0-2 WBCs, +1 bacteria. CT abdomen pelvis showed no acute findings in the abdomen or pelvis.  No CT explanation for patient's upper abdomen pain.  Surgical changes of partial gastrectomy with gastrojejunostomy.  Cholecystectomy.  Hysterectomy.  Lumbar spinal fusion.   "Stable intrahepatic next hepatic biliary ductal dilatation since 3/9/2019, may represent increased capacity from previous cholecystectomy.   CT of the cervical spine showed anterior C3-4 and posterior C3-6 spinal fusion.  Hardware appears intact.  No acute cervical spine findings.  No high-grade canal or foraminal stenosis is seen.  7 mm ill-defined sclerotic focus on the T1 vertebral body it is indeterminate.  Bone scan may prove helpful for further evaluation. patient is afebrile, pulse in the 80s, on room air oxygen and 97% SPO2 and blood pressure 150s over 70s.  Patient received 500 normal saline bolus, Zofran, Dilaudid, Phenergan in ED.    Date::          ROS      Objective   Objective      Vital Signs  Temp:  [98.3 °F (36.8 °C)-98.4 °F (36.9 °C)] 98.3 °F (36.8 °C)  Heart Rate:  [] 83  Resp:  [16-18] 17  BP: (128-159)/(62-85) 128/79  Oxygen Therapy  SpO2: 96 %  Pulse Oximetry Type: Intermittent  Device (Oxygen Therapy): room air  Flowsheet Rows      First Filed Value   Admission Height  157.5 cm (62\") Documented at 06/30/2020 1045   Admission Weight  59.4 kg (131 lb) Documented at 06/30/2020 1045        Intake & Output (last 3 days)       06/28 0701 - 06/29 0700 06/29 0701 - 06/30 0700 06/30 0701 - 07/01 0700 07/01 0701 - 07/02 0700    P.O.   960     Total Intake(mL/kg)   960 (16.3)     Net   +960             Urine Unmeasured Occurrence   1 x         Lines, Drains & Airways    Active LDAs     Name:   Placement date:   Placement time:   Site:   Days:    Peripheral IV 06/30/20 1344 Left Antecubital   06/30/20    1344    Antecubital   less than 1                  Physical Exam:    Physical Exam   Constitutional: She is oriented to person, place, and time. She appears well-developed and well-nourished. No distress.   HENT:   Head: Normocephalic and atraumatic.   Right Ear: External ear normal.   Left Ear: External ear normal.   Nose: Nose normal.   Mouth/Throat: Oropharynx is clear and moist. No oropharyngeal " exudate.   Eyes: Pupils are equal, round, and reactive to light. Conjunctivae and EOM are normal. Right eye exhibits no discharge. Left eye exhibits no discharge. No scleral icterus.   Neck: Normal range of motion. No JVD present. No tracheal deviation present. No thyromegaly present.   Cardiovascular: Normal rate, regular rhythm, normal heart sounds and intact distal pulses. Exam reveals no gallop and no friction rub.   No murmur heard.  Pulmonary/Chest: Effort normal and breath sounds normal. No stridor. No respiratory distress. She has no wheezes. She has no rales. She exhibits no tenderness.   Abdominal: Soft. Bowel sounds are normal. She exhibits no distension and no mass. There is no tenderness. There is no rebound and no guarding. No hernia.   Musculoskeletal: Normal range of motion. She exhibits no edema, tenderness or deformity.   Lymphadenopathy:     She has no cervical adenopathy.   Neurological: She is alert and oriented to person, place, and time. No cranial nerve deficit or sensory deficit. She exhibits normal muscle tone. Coordination normal.   Skin: Skin is warm and dry. No rash noted. She is not diaphoretic. No erythema.   Psychiatric: She has a normal mood and affect. Her behavior is normal.   Nursing note and vitals reviewed.            Procedures:              Results Review:     I reviewed the patient's new clinical results.      Lab Results (last 24 hours)     Procedure Component Value Units Date/Time    BUN [[id number]]  (Normal) Collected:  07/01/20 0335    Specimen:  Blood Updated:  07/01/20 0726     BUN 13 mg/dL     Basic Metabolic Panel [[id number]]  (Abnormal) Collected:  07/01/20 0335    Specimen:  Blood Updated:  07/01/20 0517     Glucose 110 mg/dL      BUN --     Comment: Testing performed by alternate method        Creatinine 0.52 mg/dL      Sodium 142 mmol/L      Potassium 4.0 mmol/L      Chloride 106 mmol/L      CO2 22.0 mmol/L      Calcium 8.5 mg/dL      eGFR Non African Amer 117  mL/min/1.73      BUN/Creatinine Ratio --     Comment: Testing not performed        Anion Gap 14.0 mmol/L     Narrative:       GFR Normal >60  Chronic Kidney Disease <60  Kidney Failure <15      Magnesium [[id number]]  (Normal) Collected:  07/01/20 0335    Specimen:  Blood Updated:  07/01/20 0517     Magnesium 2.0 mg/dL     CBC Auto Differential [[id number]]  (Abnormal) Collected:  07/01/20 0335    Specimen:  Blood Updated:  07/01/20 0513     WBC 5.50 10*3/mm3      RBC 3.60 10*6/mm3      Hemoglobin 12.0 g/dL      Comment: Result checked         Hematocrit 35.1 %      MCV 97.5 fL      MCH 33.4 pg      MCHC 34.2 g/dL      RDW 13.1 %      RDW-SD 45.1 fl      MPV 7.6 fL      Platelets 216 10*3/mm3      Neutrophil % 44.2 %      Lymphocyte % 41.6 %      Monocyte % 12.4 %      Eosinophil % 1.3 %      Basophil % 0.5 %      Neutrophils, Absolute 2.40 10*3/mm3      Lymphocytes, Absolute 2.30 10*3/mm3      Monocytes, Absolute 0.70 10*3/mm3      Eosinophils, Absolute 0.10 10*3/mm3      Basophils, Absolute 0.00 10*3/mm3      nRBC 0.1 /100 WBC     BUN [[id number]]  (Normal) Collected:  06/30/20 1155    Specimen:  Blood from Arm, Left Updated:  06/30/20 1231     BUN 15 mg/dL     Comprehensive Metabolic Panel [[id number]]  (Abnormal) Collected:  06/30/20 1155    Specimen:  Blood from Arm, Left Updated:  06/30/20 1226     Glucose 137 mg/dL      BUN --     Comment: Testing performed by alternate method        Creatinine 0.68 mg/dL      Sodium 145 mmol/L      Potassium 4.0 mmol/L      Chloride 107 mmol/L      CO2 24.0 mmol/L      Calcium 9.5 mg/dL      Total Protein 6.7 g/dL      Albumin 4.10 g/dL      ALT (SGPT) 16 U/L      AST (SGOT) 27 U/L      Alkaline Phosphatase 132 U/L      Total Bilirubin 0.2 mg/dL      eGFR Non African Amer 86 mL/min/1.73      Globulin 2.6 gm/dL      A/G Ratio 1.6 g/dL      BUN/Creatinine Ratio --     Comment: Testing not performed        Anion Gap 14.0 mmol/L     Narrative:       GFR Normal >60  Chronic Kidney  Disease <60  Kidney Failure <15          No results found for: HGBA1C            Lab Results   Component Value Date    LIPASE 29 06/30/2020     Lab Results   Component Value Date    CHOL 161 04/17/2018    TRIG 148 04/17/2018    HDL 45 04/17/2018    LDL 89 04/17/2018       No results found for: INTRAOP, PREDX, FINALDX, COMDX    Microbiology Results (last 10 days)     ** No results found for the last 240 hours. **          ECG/EMG Results (most recent)     None                    Ct Cervical Spine Without Contrast    Result Date: 6/30/2020   1. Anterior C3-4 and posterior C3-C6 spinal fusion. Hardware appears intact. 2. No acute cervical spine findings. No high-grade canal or foraminal stenosis is seen. 3. 7 mm ill-defined sclerotic focus in the T1 vertebral body. It is indeterminate. Bone scan may prove helpful for further evaluation.  Electronically Signed By-Dr. Velvet Maher MD On:6/30/2020 11:52 AM This report was finalized on [id number] by Dr. Velvet Maher MD.    Mri Cervical Spine Without Contrast    Result Date: 6/30/2020   1. Mild to moderate canal stenosis at C4-5.. No high-grade canal stenosis is seen. 2. No evidence of cord edema. 3. Satisfactory cervical alignment. 4. C3-4 anterior spinal fusion. C3-C6 posterior spinal fusion. 5. Cellulitic changes or edema within the mid neck soft tissues. No drainable fluid collection or abscess. 6. No epidural fluid collection or abscess is seen.   Electronically Signed By-Dr. Velvet Maher MD On:6/30/2020 3:21 PM This report was finalized on [id number] by Dr. Velvet Maher MD.    Ct Abdomen Pelvis With Contrast    Result Date: 6/30/2020   1. No acute findings in the abdomen or pelvis. No CT explanation for the patient's upper abdominal pain. 2. Surgical changes of partial gastrectomy with gastrojejunostomy. Cholecystectomy. Hysterectomy. Lumbar spinal fusion. 3. Stable intrahepatic next hepatic biliary ductal dilation since 03/09/2019, may represent  increased capacitance from previous cholecystectomy.  Electronically Signed By-Dr. Velvet Maher MD On:6/30/2020 2:09 PM This report was finalized on [id number] by Dr. Velvet Maher MD.          Xrays, labs reviewed personally by physician.    Medication Review:   I have reviewed the patient's current medication list      Scheduled Meds    amitriptyline 25 mg Oral Nightly   budesonide 0.5 mg Nebulization BID - RT   cycloSPORINE 1 drop Both Eyes BID   escitalopram 20 mg Oral Daily   estradiol 1.5 mg Oral Daily   gabapentin 800 mg Oral 4x Daily   ipratropium-albuterol 3 mL Nebulization Q8H - RT   ketotifen 1 drop Both Eyes BID   levothyroxine 25 mcg Oral Daily   montelukast 10 mg Oral Nightly   OXcarbazepine 300 mg Oral TID   pantoprazole 40 mg Oral QAM   propranolol 40 mg Oral BID   sodium chloride 10 mL Intravenous Q12H   sucralfate 1 g Oral 4x Daily AC & at Bedtime   topiramate 100 mg Oral 4x Daily   vitamin E 400 Units Oral Daily       Meds Infusions    sodium chloride 100 mL/hr Last Rate: 100 mL/hr (06/30/20 2146)       Meds PRN  •  acetaminophen **OR** acetaminophen **OR** acetaminophen  •  albuterol  •  aluminum-magnesium hydroxide-simethicone  •  bisacodyl  •  butalbital-acetaminophen-caffeine  •  HYDROcodone-acetaminophen  •  ketamine (KETALAR) IVPB **AND** Ketamine Vital Signs & Assessment  •  magnesium hydroxide  •  magnesium sulfate **OR** magnesium sulfate in D5W 1g/100mL (PREMIX)  •  melatonin  •  nitroglycerin  •  ondansetron **OR** ondansetron  •  oxyCODONE  •  potassium chloride  •  promethazine  •  [COMPLETED] Insert peripheral IV **AND** sodium chloride  •  sodium chloride  •  SUMAtriptan      Assessment/Plan   Assessment/Plan     Active Hospital Problems    Diagnosis  POA   • **Intractable vomiting [R11.10]  Yes   • Chronic obstructive pulmonary disease (CMS/HCC) [J44.9]  Yes   • Hypothyroidism (acquired) [E03.9]  Yes   • Estrogen deficiency [E28.39]  Yes   • Depression [F32.9]  Yes   •  Migraines [G43.909]  Yes   • Chronic pain syndrome [G89.4]  Yes   • GERD without esophagitis [K21.9]  Yes   • Pseudarthrosis following spinal fusion [M96.0]  Not Applicable      Resolved Hospital Problems   No resolved problems to display.       MEDICAL DECISION MAKING COMPLEXITY BY PROBLEM:     Intractable vomiting with associated abdominal pain not improved yet.  -Cause is unclear at this time, may have constipation component exacerbated by opiate use, possible pain medication seeker, patient has been worked up for multiple abdominal complaints in the past given surgical history  -CMP largely unremarkable.  Lipase normal, CBC unremarkable.    -UA significant for 2+, 1+ protein, 2+ bilirubin, 3-5 RBCs, 0-2 WBCs, +1 bacteria.   -CT abdomen pelvis showed no acute findings in the abdomen or pelvis.  No CT explanation for patient's upper abdomen pain.  Surgical changes of partial gastrectomy with gastrojejunostomy.  Cholecystectomy.  Hysterectomy.  Lumbar spinal fusion.  Stable intrahepatic next hepatic biliary ductal dilatation since 3/9/2019, may represent increased capacity from previous cholecystectomy.  -patient is afebrile, pulse in the 80s, on room air oxygen and 97% SPO2 and blood pressure 150s over 70s.    -Patient received 500 normal saline bolus, Zofran, Dilaudid, Phenergan in ED.  -Zofran, Phenergan, Carafate ordered  -IV fluids 100 mL/h normal saline  -Pain control with home Percocet, Norco, inspect verified-ketamine ordered  - Consult GI service   -Case management consult  -PT consult  -Clear liquid diet     Pseudoarthrosis of cervical spine following spinal fusion  -s/p fusion on 6/9/2020  -CT of the cervical spine showed anterior C3-4 and posterior C3-6 spinal fusion.  Hardware appears intact.  No acute cervical spine findings.  No high-grade canal or foraminal stenosis is seen.  7 mm ill-defined sclerotic focus on the T1 vertebral body it is indeterminate.  Bone scan may prove helpful for further  evaluation.     Chronic diarrhea  -Hold home viberzi     Hypothyroidism  -Continue home Synthroid     Estrogen deficiency  -Continue home estradiol     Depression  -Continue home Lexapro, amitriptyline     COPD  -Not acute exacerbation  -Continue home albuterol, Pulmicort, Singulair, Anoro     Migraines  -Continue home Fioricet, maxalt as needed  -Continue home propranolol     ?Seizure disorder  -No previous documentation of seizure disorder  -Continue home Trileptal, Topamax     Chronic pain syndrome  -Continue home pain meds     GERD  -Continue home PPI        VTE Prophylaxis -SCDs    VTE Prophylaxis -   Mechanical Order History:      Ordered        06/30/20 1625  Place Sequential Compression Device  Once         06/30/20 1625  Maintain Sequential Compression Device  Continuous                 Pharmalogical Order History:     None            Code Status -   Code Status and Medical Interventions:   Ordered at: 06/30/20 1625     Code Status:    CPR     Medical Interventions (Level of Support Prior to Arrest):    Full             Discharge Planning          Destination      Coordination has not been started for this encounter.      Durable Medical Equipment      Coordination has not been started for this encounter.      Dialysis/Infusion      Coordination has not been started for this encounter.      Home Medical Care      Coordination has not been started for this encounter.      Therapy      Coordination has not been started for this encounter.      Community Resources      Coordination has not been started for this encounter.            Electronically signed by Xiao Belle MD, 07/01/20, 12:11.  [facility] Hospitalist Team

## 2020-07-01 NOTE — PLAN OF CARE
Problem: Patient Care Overview  Goal: Plan of Care Review  Outcome: Ongoing (interventions implemented as appropriate)  Flowsheets (Taken 7/1/2020 0853)  Progress: no change  Plan of Care Reviewed With: patient  Outcome Summary: patient constantly c/o pain in neck. prn pain meds given every 4 hours per request. no complaints of nausea this shift. will continue to monitor.

## 2020-07-01 NOTE — CONSULTS
"GI CONSULT  NOTE:    Referring Provider:  Dr. Belle    Chief complaint: Nausea with vomiting    Subjective . \"I've had nausea with vomiting for day\"    History of present illness: Ashley Lin is a 70 y.o. female who has a history of cholecystectomy, COPD and chronic pain on narcotics.  She presents with a 3-day history of nausea, vomiting and abdominal bloating.  She questions hematemesis and has routine heartburn despite daily medications.  She has dysphagia with all consistencies but worse with pills but denies unintentional weight loss.  She has a history of chronic diarrhea since cholecystectomy and takes Viberzi twice daily and is typically controlled with only occasional diarrhea.  She denies any bright red blood per rectum but does report melena x1 recently.  No NSAID or anticoagulation use.  She does report a history of ulcers with last EGD with Dr. Mata approximately 6 to 8 months ago.  She is status post recent cervical spine fusion in .  She takes narcotics routinely but denies history of diabetes.      Endo History:  Patient reports EGD and colonoscopy about 6 to 8 months ago with Dr. Mata with gastric ulcers and \"inflamed colon\"    Past Medical History:  Past Medical History:   Diagnosis Date   • Chronic back pain    • Chronic obstructive pulmonary disease (CMS/HCC) 2020   • Chronic pain syndrome 2020   • Depression 2020   • Disease of thyroid gland    • Estrogen deficiency 2020   • GERD without esophagitis 2020   • Hypothyroidism (acquired) 2020   • Migraines 2020   • Vomiting 2020       Past Surgical History:  Past Surgical History:   Procedure Laterality Date   • CHOLECYSTECTOMY     • HYSTERECTOMY     • NECK SURGERY     • SPINE SURGERY  2020   EGD and colonoscopy    Social History:  Social History     Tobacco Use   • Smoking status: Former Smoker     Last attempt to quit: 2006     Years since quittin.5   • Smokeless tobacco: Never " Used   Substance Use Topics   • Alcohol use: Never     Frequency: Never   • Drug use: Never   No tobacco, alcohol or illicit drug use    Family History:  Family History   Problem Relation Age of Onset   • Heart disease Mother    No family history of esophageal, stomach or colon cancer    Medications:  Medications Prior to Admission   Medication Sig Dispense Refill Last Dose   • albuterol sulfate  (90 Base) MCG/ACT inhaler Inhale 2 puffs Every 4 (Four) Hours As Needed for Wheezing or Shortness of Air.      • amitriptyline (ELAVIL) 25 MG tablet Take 25 mg by mouth Every Night.      • azelastine (OPTIVAR) 0.05 % ophthalmic solution Administer 1 drop to both eyes 2 (Two) Times a Day.      • budesonide (PULMICORT) 180 MCG/ACT inhaler Inhale 2 puffs 2 (Two) Times a Day.      • butalbital-acetaminophen-caffeine (FIORICET, ESGIC) -40 MG per tablet Take 1-2 tablets by mouth Every 4 (Four) Hours As Needed for Headache.      • cycloSPORINE (RESTASIS) 0.05 % ophthalmic emulsion Administer 1 drop to both eyes 2 (Two) Times a Day.      • Eluxadoline (Viberzi) 100 MG tablet Take 100 tablets by mouth 2 (two) times a day.      • escitalopram (LEXAPRO) 20 MG tablet Take 20 mg by mouth Daily.      • estradiol (ESTRACE) 1 MG tablet Take 1.5 mg by mouth Daily.      • gabapentin (NEURONTIN) 800 MG tablet Take 800 mg by mouth 4 (Four) Times a Day.      • lansoprazole (PREVACID) 30 MG capsule Take 30 mg by mouth Daily.      • levothyroxine (SYNTHROID, LEVOTHROID) 25 MCG tablet Take 25 mcg by mouth Daily.      • montelukast (SINGULAIR) 10 MG tablet Take 10 mg by mouth Every Night.      • omeprazole (priLOSEC) 40 MG capsule Take 40 mg by mouth 2 (two) times a day.      • ondansetron (ZOFRAN) 4 MG tablet Take 4 mg by mouth Every 4 (Four) Hours As Needed for Nausea or Vomiting.      • OXcarbazepine (TRILEPTAL) 300 MG tablet Take 300 mg by mouth 3 (Three) Times a Day.      • oxyCODONE-acetaminophen (PERCOCET)  MG per tablet  Take 1 tablet by mouth Every 6 (Six) Hours As Needed for Moderate Pain  or Severe Pain .      • promethazine (PHENERGAN) 25 MG suppository Insert 50 mg into the rectum Every 6 (Six) Hours As Needed for Nausea or Vomiting.      • promethazine (PHENERGAN) 25 MG tablet Take 25 mg by mouth Every 6 (Six) Hours As Needed for Nausea or Vomiting.      • propranolol (INDERAL) 40 MG tablet Take 40 mg by mouth 2 (Two) Times a Day.      • rizatriptan MLT (MAXALT-MLT) 10 MG disintegrating tablet Place 10 mg on the tongue 1 (One) Time As Needed for Migraine. May repeat in 2 hours if needed      • sucralfate (CARAFATE) 1 g tablet Take 1 g by mouth 4 (Four) Times a Day.      • umeclidinium-vilanterol (Anoro Ellipta) 62.5-25 MCG/INH aerosol powder  inhaler Inhale 1 puff Daily.      • vitamin E 400 UNIT capsule Take 400 Units by mouth Daily.      • HYDROcodone-acetaminophen (NORCO) 5-325 MG per tablet Take 1 tablet by mouth Every 8 (Eight) Hours As Needed for Moderate Pain  or Severe Pain .   Unknown at Unknown time   • topiramate (TOPAMAX) 100 MG tablet Take 100 mg by mouth 4 (Four) Times a Day.   Unknown at Unknown time       Scheduled Meds:  amitriptyline 25 mg Oral Nightly   budesonide 0.5 mg Nebulization BID - RT   cycloSPORINE 1 drop Both Eyes BID   escitalopram 20 mg Oral Daily   estradiol 1.5 mg Oral Daily   gabapentin 800 mg Oral 4x Daily   ipratropium-albuterol 3 mL Nebulization Q8H - RT   ketotifen 1 drop Both Eyes BID   levothyroxine 25 mcg Oral Daily   montelukast 10 mg Oral Nightly   OXcarbazepine 300 mg Oral TID   pantoprazole 40 mg Oral QAM   propranolol 40 mg Oral BID   sodium chloride 10 mL Intravenous Q12H   sucralfate 1 g Oral 4x Daily AC & at Bedtime   topiramate 100 mg Oral 4x Daily   vitamin E 400 Units Oral Daily     Continuous Infusions:  sodium chloride 100 mL/hr Last Rate: 100 mL/hr (06/30/20 8817)     PRN Meds:.•  acetaminophen **OR** acetaminophen **OR** acetaminophen  •  albuterol  •  aluminum-magnesium  hydroxide-simethicone  •  bisacodyl  •  butalbital-acetaminophen-caffeine  •  HYDROcodone-acetaminophen  •  ketamine (KETALAR) IVPB **AND** Ketamine Vital Signs & Assessment  •  magnesium hydroxide  •  magnesium sulfate **OR** magnesium sulfate in D5W 1g/100mL (PREMIX)  •  melatonin  •  nitroglycerin  •  ondansetron **OR** ondansetron  •  oxyCODONE  •  potassium chloride  •  promethazine  •  [COMPLETED] Insert peripheral IV **AND** sodium chloride  •  sodium chloride  •  SUMAtriptan    ALLERGIES:  Penicillins    ROS:  The following systems were reviewed  Constitution:  No fevers, chills, no unintentional weight loss  Skin: no rash, no jaundice  Eyes:  No blurry vision, no eye pain  HENT:  No change in hearing or smell  Resp:  No dyspnea or cough  CV:  No chest pain or palpitations  :  No dysuria, hematuria  Musculoskeletal:  No leg cramps or arthralgias  Neuro:  No tremor, no numbness  Psych:  No depression or confusion    Objective Resting in bed, no acute distress, no family present    Vital Signs:   Vitals:    07/01/20 0817 07/01/20 0818 07/01/20 0822 07/01/20 1212   BP: 128/79   104/67   BP Location:    Left arm   Patient Position:    Sitting   Pulse:  87 83 70   Resp:  17 17 16   Temp:    97.3 °F (36.3 °C)   TempSrc:    Axillary   SpO2:  96%  95%   Weight:       Height:           Physical Exam:       General Appearance:    Awake and alert, in no acute distress   Head:    Normocephalic, without obvious abnormality, atraumatic   Throat:   No oral lesions, no thrush, oral mucosa moist   Lungs:     Respirations regular, even and unlabored   Chest Wall:    No abnormalities observed   Abdomen:     Soft, non-tender, no rebound or guarding, non-distended   Rectal:     Deferred   Extremities:   Moves all extremities, no edema, no cyanosis   Pulses:   Pulses palpable and equal bilaterally   Skin:   No rash, no jaundice, normal palpation       Neurologic:   Cranial nerves 2 - 12 grossly intact       Results Review:   I  reviewed the patient's labs and imaging.  CBC    Results from last 7 days   Lab Units 07/01/20  0335 06/30/20  1055   WBC 10*3/mm3 5.50 7.20   HEMOGLOBIN g/dL 12.0 15.3   PLATELETS 10*3/mm3 216 303     CMP Results from last 7 days   Lab Units 07/01/20  0335 06/30/20  1155 06/30/20  1055   SODIUM mmol/L 142 145  --    POTASSIUM mmol/L 4.0 4.0  --    CHLORIDE mmol/L 106 107  --    CO2 mmol/L 22.0 24.0  --    BUN  13 15  --    CREATININE mg/dL 0.52* 0.68  --    GLUCOSE mg/dL 110* 137*  --    ALBUMIN g/dL  --  4.10  --    BILIRUBIN mg/dL  --  0.2  --    ALK PHOS U/L  --  132*  --    AST (SGOT) U/L  --  27  --    ALT (SGPT) U/L  --  16  --    MAGNESIUM mg/dL 2.0  --   --    LIPASE U/L  --   --  29     Cr Clearance Estimated Creatinine Clearance: 62.6 mL/min (A) (by C-G formula based on SCr of 0.52 mg/dL (L)).  Coag     HbA1C No results found for: HGBA1C  Blood Glucose No results found for: POCGLU  Infection     UA  Results from last 7 days   Lab Units 06/30/20  1126   NITRITE UA  Negative   WBC UA /HPF 0-2*   BACTERIA UA /HPF 1+*   SQUAM EPITHEL UA /HPF 0-2     Radiology(recent) Ct Cervical Spine Without Contrast    Result Date: 6/30/2020   1. Anterior C3-4 and posterior C3-C6 spinal fusion. Hardware appears intact. 2. No acute cervical spine findings. No high-grade canal or foraminal stenosis is seen. 3. 7 mm ill-defined sclerotic focus in the T1 vertebral body. It is indeterminate. Bone scan may prove helpful for further evaluation.  Electronically Signed By-Dr. Velvet Maher MD On:6/30/2020 11:52 AM This report was finalized on [id number] by Dr. Velvet Maher MD.    Mri Cervical Spine Without Contrast    Result Date: 6/30/2020   1. Mild to moderate canal stenosis at C4-5.. No high-grade canal stenosis is seen. 2. No evidence of cord edema. 3. Satisfactory cervical alignment. 4. C3-4 anterior spinal fusion. C3-C6 posterior spinal fusion. 5. Cellulitic changes or edema within the mid neck soft tissues. No  drainable fluid collection or abscess. 6. No epidural fluid collection or abscess is seen.   Electronically Signed By-Dr. Velvet Maher MD On:6/30/2020 3:21 PM This report was finalized on [id number] by Dr. Velvet Maher MD.    Ct Abdomen Pelvis With Contrast    Result Date: 6/30/2020   1. No acute findings in the abdomen or pelvis. No CT explanation for the patient's upper abdominal pain. 2. Surgical changes of partial gastrectomy with gastrojejunostomy. Cholecystectomy. Hysterectomy. Lumbar spinal fusion. 3. Stable intrahepatic next hepatic biliary ductal dilation since 03/09/2019, may represent increased capacitance from previous cholecystectomy.  Electronically Signed By-Dr. Velvet Maher MD On:6/30/2020 2:09 PM This report was finalized on [id number] by Dr. Velvet Maher MD.         ASSESSMENT:  Nausea and vomiting with hematemesis  Melena  Dysphagia  GERD  Chronic diarrhea -unchanged  Elevated alk phos  Chronic pain on narcotics  COPD  History of cholecystectomy and partial gastrectomy    PLAN:    Patient presents with a 3-day history of nausea with vomiting and possible hematemesis and melena.  Hemoglobin 12.  Differential diagnoses include erosive esophagitis versus Dayanara-Crespo tear versus ulcer.  Continue PPI and will plan EGD evaluation in a.m. as she has had lunch today.  Monitor hemoglobin.  Consider esophageal dilation for chronic dysphagia.  Will obtain last EGD and colonoscopy by Dr. Mata for review.  Chronic diarrhea typically controlled on Viberzi twice daily.  Elevated alk phos noted with otherwise normal LFTs, check GGT.  Continue supportive care with antiemetics and analgesics as needed.  Further recommendations to follow scope findings tomorrow.  Will follow.    I discussed the patients findings and my recommendations with the patient.    We appreciate the referral    Megan Lopez, HUMZA  07/01/20  13:28

## 2020-07-01 NOTE — DISCHARGE PLACEMENT REQUEST
"Ashley Kapoor (70 y.o. Female)     Date of Birth Social Security Number Address Home Phone MRN    1950  [facility] CHRISTIANO RODRIGUEZ [address] [place] [address] [place] [address] [phone] [id number]    Spiritism Marital Status          Oriental orthodox        Admission Date Admission Type Admitting Provider Attending Provider Department, Room/Bed    20 Emergency Xiao Belle MD Jaisinghani, Salgram, MD [facility] 3C MEDICAL INPATIENT, 373/1    Discharge Date Discharge Disposition Discharge Destination                       Attending Provider:  Xiao Belle MD    Allergies:  Penicillins    Isolation:  None   Infection:  None   Code Status:  CPR    Ht:  157.5 cm (62\")   Wt:  58.9 kg (129 lb 12.8 oz)    Admission Cmt:  None   Principal Problem:  Intractable vomiting [R11.10]                 Active Insurance as of 2020     Primary Coverage     Payor Plan Insurance Group Employer/Plan Group    MEDICARE MEDICARE A & B      Payor Plan Address Payor Plan Phone Number Payor Plan Fax Number Effective Dates    [address] [phone]  1994 - None Entered    Formerly [facility] [address]       Subscriber Name Subscriber Birth Date Member ID       ASHLEY KAPOOR 1950 [id number]           Secondary Coverage     Payor Plan Insurance Group Employer/Plan Group    [place]      Payor Plan Address Payor Plan Phone Number Payor Plan Fax Number Effective Dates    [address]   2020 - None Entered    [place] [address]       Subscriber Name Subscriber Birth Date Member ID       ASHLEY KAPOOR 1950 [id number]                 Emergency Contacts      (Rel.) Home Phone Work Phone Mobile Phone    NATHLAY MARTINEZ (Father) [phone] -- --               History & Physical      Piyush Gross PA at 20 [address] [place] [address] Medicine Services      Patient Name: Ashley Kapoor  : 1950  MRN: " [id number]  Primary Care Physician: Zoey Bah MD  Date of admission: 6/30/2020    Patient Care Team:  Zoey Bah MD as PCP - General  Zoey Bah MD as PCP - Family Medicine          Subjective   History Present Illness     Chief Complaint:   Chief Complaint   Patient presents with   • Abdominal Pain     onset 2 days ago with N/V       Ms. Lin is a 70 y.o. female presents to [facility] complaining of nausea and vomiting for the past 2 days.  Patient states that she had posterior cervical neck fusion in the beginning of June.  Patient has been on pain meds ever since but states her pain is worsening as well as worsening abdominal pain.  Patient states she has had decreased oral intake over the past several days due to feeling nauseous.  Patient reports her last bowel movement was 3 days ago.  Patient also states that she has decreased urine output as she reports that she is not drinking enough fluids.  Patient also reports some hematemesis in which she is noticed some red streaks in her emesis.    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  Previous notes from [facility] state that patient has chronic nausea.  Patient with history of cholecystectomy back in 1996.  Patient also has a history of appendectomy.  Patient postop note shows patient was treated with clindamycin.  Patient has a history of C. difficile colitis and chronic diarrhea as well.    In the ED, CMP largely unremarkable.  Lipase normal, CBC unremarkable.  UA significant for 2+, 1+ protein, 2+ bilirubin, 3-5 RBCs, 0-2 WBCs, +1 bacteria. CT abdomen pelvis showed no acute findings in the abdomen or pelvis.  No CT explanation for patient's upper abdomen pain.  Surgical changes of partial gastrectomy with gastrojejunostomy.  Cholecystectomy.  Hysterectomy.  Lumbar spinal fusion.  Stable intrahepatic next hepatic biliary ductal dilatation since 3/9/2019, may represent  increased capacity from previous cholecystectomy.   CT of the cervical spine showed anterior C3-4 and posterior C3-6 spinal fusion.  Hardware appears intact.  No acute cervical spine findings.  No high-grade canal or foraminal stenosis is seen.  7 mm ill-defined sclerotic focus on the T1 vertebral body it is indeterminate.  Bone scan may prove helpful for further evaluation. patient is afebrile, pulse in the 80s, on room air oxygen and 97% SPO2 and blood pressure 150s over 70s.  Patient received 500 normal saline bolus, Zofran, Dilaudid, Phenergan in ED.    Review of Systems   Constitution: Negative. Negative for chills and fever.   HENT: Negative.    Cardiovascular: Negative.  Negative for chest pain.   Respiratory: Negative.  Negative for cough and shortness of breath.    Skin: Negative.    Musculoskeletal: Negative.    Gastrointestinal: Positive for abdominal pain (epigastric), change in bowel habit, hematemesis, nausea and vomiting. Negative for constipation, diarrhea, hematochezia and melena.   Genitourinary: Negative.         Decreased urine output   Neurological: Negative.    Psychiatric/Behavioral: Negative.            Personal History     Past Medical History:   Past Medical History:   Diagnosis Date   • Chronic back pain    • Chronic obstructive pulmonary disease (CMS/HCC) 7/1/2020   • Chronic pain syndrome 7/1/2020   • Depression 7/1/2020   • Disease of thyroid gland    • Estrogen deficiency 7/1/2020   • GERD without esophagitis 7/1/2020   • Hypothyroidism (acquired) 7/1/2020   • Migraines 7/1/2020   • Vomiting 06/30/2020       Surgical History:      Past Surgical History:   Procedure Laterality Date   • CHOLECYSTECTOMY     • HYSTERECTOMY     • NECK SURGERY     • SPINE SURGERY  06/09/2020           Family History: family history includes Heart disease in her mother. Otherwise pertinent FHx was reviewed and unremarkable.     Social History:  reports that she quit smoking about 14 years ago. She has never  used smokeless tobacco. She reports that she does not drink alcohol or use drugs.      Medications:  Prior to Admission medications    Medication Sig Start Date End Date Taking? Authorizing Provider   albuterol sulfate  (90 Base) MCG/ACT inhaler Inhale 2 puffs Every 4 (Four) Hours As Needed for Wheezing or Shortness of Air.   Yes Ramonita Cason MD   amitriptyline (ELAVIL) 25 MG tablet Take 25 mg by mouth Every Night.   Yes Ramonita Cason MD   azelastine (OPTIVAR) 0.05 % ophthalmic solution Administer 1 drop to both eyes 2 (Two) Times a Day.   Yes Ramonita Cason MD   budesonide (PULMICORT) 180 MCG/ACT inhaler Inhale 2 puffs 2 (Two) Times a Day.   Yes Ramonita Cason MD   butalbital-acetaminophen-caffeine (FIORICET, ESGIC) -40 MG per tablet Take 1-2 tablets by mouth Every 4 (Four) Hours As Needed for Headache.   Yes Ramonita Cason MD   cycloSPORINE (RESTASIS) 0.05 % ophthalmic emulsion Administer 1 drop to both eyes 2 (Two) Times a Day.   Yes Ramonita Cason MD   Eluxadoline (Viberzi) 100 MG tablet Take 100 tablets by mouth 2 (two) times a day.   Yes Ramonita Cason MD   escitalopram (LEXAPRO) 20 MG tablet Take 20 mg by mouth Daily.   Yes Ramonita Cason MD   estradiol (ESTRACE) 1 MG tablet Take 1.5 mg by mouth Daily.   Yes Ramonita Cason MD   gabapentin (NEURONTIN) 800 MG tablet Take 800 mg by mouth 4 (Four) Times a Day.   Yes Ramonita Cason MD   lansoprazole (PREVACID) 30 MG capsule Take 30 mg by mouth Daily.   Yes Ramonita Cason MD   levothyroxine (SYNTHROID, LEVOTHROID) 25 MCG tablet Take 25 mcg by mouth Daily.   Yes Ramonita Cason MD   montelukast (SINGULAIR) 10 MG tablet Take 10 mg by mouth Every Night.   Yes Ramonita Cason MD   omeprazole (priLOSEC) 40 MG capsule Take 40 mg by mouth 2 (two) times a day.   Yes Ramonita Cason MD   ondansetron (ZOFRAN) 4 MG tablet Take 4 mg by mouth Every 4 (Four) Hours As  Needed for Nausea or Vomiting.   Yes Ramonita Cason MD   OXcarbazepine (TRILEPTAL) 300 MG tablet Take 300 mg by mouth 3 (Three) Times a Day.   Yes Ramonita Cason MD   oxyCODONE-acetaminophen (PERCOCET)  MG per tablet Take 1 tablet by mouth Every 6 (Six) Hours As Needed for Moderate Pain  or Severe Pain .   Yes Ramonita Cason MD   promethazine (PHENERGAN) 25 MG suppository Insert 50 mg into the rectum Every 6 (Six) Hours As Needed for Nausea or Vomiting.   Yes Ramonita Cason MD   promethazine (PHENERGAN) 25 MG tablet Take 25 mg by mouth Every 6 (Six) Hours As Needed for Nausea or Vomiting.   Yes Ramonita Cason MD   propranolol (INDERAL) 40 MG tablet Take 40 mg by mouth 2 (Two) Times a Day.   Yes Ramonita Cason MD   rizatriptan MLT (MAXALT-MLT) 10 MG disintegrating tablet Place 10 mg on the tongue 1 (One) Time As Needed for Migraine. May repeat in 2 hours if needed   Yes Ramonita Caosn MD   sucralfate (CARAFATE) 1 g tablet Take 1 g by mouth 4 (Four) Times a Day.   Yes Ramonita Cason MD   umeclidinium-vilanterol (Anoro Ellipta) 62.5-25 MCG/INH aerosol powder  inhaler Inhale 1 puff Daily.   Yes Ramonita Cason MD   vitamin E 400 UNIT capsule Take 400 Units by mouth Daily.   Yes Ramonita Cason MD   HYDROcodone-acetaminophen (NORCO) 5-325 MG per tablet Take 1 tablet by mouth Every 8 (Eight) Hours As Needed for Moderate Pain  or Severe Pain .    Ramonita Cason MD   topiramate (TOPAMAX) 100 MG tablet Take 100 mg by mouth 4 (Four) Times a Day.    Ramonita Cason MD       Allergies:    Allergies   Allergen Reactions   • Penicillins Hives       Objective   Objective     Vital Signs  Temp:  [97.9 °F (36.6 °C)-98.4 °F (36.9 °C)] 98.4 °F (36.9 °C)  Heart Rate:  [] 78  Resp:  [18-19] 18  BP: (136-159)/(62-85) 155/71  SpO2:  [96 %-100 %] 97 %  on   ;   Device (Oxygen Therapy): room air  Body mass index is 24.27 kg/m².    Physical Exam    Constitutional: She is oriented to person, place, and time. She appears well-developed and well-nourished. No distress.   HENT:   Head: Normocephalic and atraumatic.   Nose: Nose normal.   Mouth/Throat: No oropharyngeal exudate.   Eyes: Pupils are equal, round, and reactive to light. Conjunctivae and EOM are normal.   Neck: Normal range of motion.   Cardiovascular: Normal rate, regular rhythm, normal heart sounds and intact distal pulses.   S1, S2 audible.   Pulmonary/Chest: Effort normal and breath sounds normal. No respiratory distress. She has no wheezes.   On room air.   Abdominal: Soft. Bowel sounds are normal. She exhibits no distension and no mass. There is tenderness. There is no rebound and no guarding.   Musculoskeletal: Normal range of motion. She exhibits no edema, tenderness or deformity.   Neurological: She is alert and oriented to person, place, and time. No cranial nerve deficit.   Skin: Skin is warm. No rash noted. She is not diaphoretic. No erythema.   Psychiatric: She has a normal mood and affect. Her behavior is normal.   Nursing note and vitals reviewed.      Results Review:  I have personally reviewed most recent cardiac tracings, lab results and radiology images and interpretations and agree with findings.    Results from last 7 days   Lab Units 06/30/20  1055   WBC 10*3/mm3 7.20   HEMOGLOBIN g/dL 15.3   HEMATOCRIT % 45.0   PLATELETS 10*3/mm3 303     Results from last 7 days   Lab Units 06/30/20  1155   SODIUM mmol/L 145   POTASSIUM mmol/L 4.0   CHLORIDE mmol/L 107   CO2 mmol/L 24.0   BUN  15   CREATININE mg/dL 0.68   GLUCOSE mg/dL 137*   CALCIUM mg/dL 9.5   ALT (SGPT) U/L 16   AST (SGOT) U/L 27     Estimated Creatinine Clearance: 57.5 mL/min (by C-G formula based on SCr of 0.68 mg/dL).  Brief Urine Lab Results  (Last result in the past 365 days)      Color   Clarity   Blood   Leuk Est   Nitrite   Protein   CREAT   Urine HCG        06/30/20 1126 Dark Yellow Clear Trace Negative Negative 30  mg/dL (1+)               Microbiology Results (last 10 days)     ** No results found for the last 240 hours. **          ECG/EMG Results (most recent)     None                    Ct Cervical Spine Without Contrast    Result Date: 6/30/2020   1. Anterior C3-4 and posterior C3-C6 spinal fusion. Hardware appears intact. 2. No acute cervical spine findings. No high-grade canal or foraminal stenosis is seen. 3. 7 mm ill-defined sclerotic focus in the T1 vertebral body. It is indeterminate. Bone scan may prove helpful for further evaluation.  Electronically Signed By-Dr. Velvet Maher MD On:6/30/2020 11:52 AM This report was finalized on [id number] by Dr. Velvet Maher MD.    Mri Cervical Spine Without Contrast    Result Date: 6/30/2020   1. Mild to moderate canal stenosis at C4-5.. No high-grade canal stenosis is seen. 2. No evidence of cord edema. 3. Satisfactory cervical alignment. 4. C3-4 anterior spinal fusion. C3-C6 posterior spinal fusion. 5. Cellulitic changes or edema within the mid neck soft tissues. No drainable fluid collection or abscess. 6. No epidural fluid collection or abscess is seen.   Electronically Signed By-Dr. Velvet Maher MD On:6/30/2020 3:21 PM This report was finalized on [id number] by Dr. Velvet Maher MD.    Ct Abdomen Pelvis With Contrast    Result Date: 6/30/2020   1. No acute findings in the abdomen or pelvis. No CT explanation for the patient's upper abdominal pain. 2. Surgical changes of partial gastrectomy with gastrojejunostomy. Cholecystectomy. Hysterectomy. Lumbar spinal fusion. 3. Stable intrahepatic next hepatic biliary ductal dilation since 03/09/2019, may represent increased capacitance from previous cholecystectomy.  Electronically Signed By-Dr. Velvet Maher MD On:6/30/2020 2:09 PM This report was finalized on [id number] by Dr. Velvet Maher MD.        Estimated Creatinine Clearance: 57.5 mL/min (by C-G formula based on SCr of 0.68 mg/dL).    Assessment/Plan    Assessment/Plan       Active Hospital Problems    Diagnosis  POA   • **Intractable vomiting [R11.10]  Yes   • Chronic obstructive pulmonary disease (CMS/HCC) [J44.9]  Yes   • Hypothyroidism (acquired) [E03.9]  Yes   • Estrogen deficiency [E28.39]  Yes   • Depression [F32.9]  Yes   • Migraines [G43.909]  Yes   • Chronic pain syndrome [G89.4]  Yes   • GERD without esophagitis [K21.9]  Yes   • Pseudarthrosis following spinal fusion [M96.0]  Not Applicable      Resolved Hospital Problems   No resolved problems to display.       Intractable vomiting with associated abdominal pain  -Cause is unclear at this time, may have constipation component exacerbated by opiate use, possible pain medication seeker, patient has been worked up for multiple abdominal complaints in the past given surgical history  -CMP largely unremarkable.  Lipase normal, CBC unremarkable.    -UA significant for 2+, 1+ protein, 2+ bilirubin, 3-5 RBCs, 0-2 WBCs, +1 bacteria.   -CT abdomen pelvis showed no acute findings in the abdomen or pelvis.  No CT explanation for patient's upper abdomen pain.  Surgical changes of partial gastrectomy with gastrojejunostomy.  Cholecystectomy.  Hysterectomy.  Lumbar spinal fusion.  Stable intrahepatic next hepatic biliary ductal dilatation since 3/9/2019, may represent increased capacity from previous cholecystectomy.  -patient is afebrile, pulse in the 80s, on room air oxygen and 97% SPO2 and blood pressure 150s over 70s.    -Patient received 500 normal saline bolus, Zofran, Dilaudid, Phenergan in ED.  -Zofran, Phenergan, Carafate ordered  -IV fluids 100 mL/h normal saline  -Pain control with home Percocet, Norco, inspect verified-ketamine ordered  -Consider GI consult  -Case management consult  -PT consult  -Clear liquid diet    Pseudoarthrosis of cervical spine following spinal fusion  -s/p fusion on 6/9/2020  -CT of the cervical spine showed anterior C3-4 and posterior C3-6 spinal fusion.  Hardware appears  intact.  No acute cervical spine findings.  No high-grade canal or foraminal stenosis is seen.  7 mm ill-defined sclerotic focus on the T1 vertebral body it is indeterminate.  Bone scan may prove helpful for further evaluation.    Chronic diarrhea  -Hold home viberzi    Hypothyroidism  -Continue home Synthroid    Estrogen deficiency  -Continue home estradiol    Depression  -Continue home Lexapro, amitriptyline    COPD  -Not acute exacerbation  -Continue home albuterol, Pulmicort, Singulair, Anoro    Migraines  -Continue home Fioricet, maxalt as needed  -Continue home propranolol    ?Seizure disorder  -No previous documentation of seizure disorder  -Continue home Trileptal, Topamax    Chronic pain syndrome  -Continue home pain meds    GERD  -Continue home PPI      VTE Prophylaxis -SCDs  Mechanical Order History:     None      Pharmalogical Order History:     None          CODE STATUS:    Code Status and Medical Interventions:   Ordered at: 06/30/20 8141     Code Status:    CPR     Medical Interventions (Level of Support Prior to Arrest):    Full       This patient has been examined wearing appropriate Personal Protective Equipment and discussed with hospital infection control department. 07/01/20      I discussed the patient's findings and my recommendations with patient.        Electronically signed by YAMILET Desai, 06/30/20, 2:58 PM.  [facility] Hospitalist Team          Electronically signed by Xiao Belle MD at 07/01/20 1211       {Outbreak/Travel/Exposure Documentation......;  Question Available Choices Patient Response   Outbreak Screen: Do you currently have the following symptoms?        Fever, Cough, Runny nose, Congestion, Diarrhea, Nausea/Vomiting,Shortness of breath, Recent sudden loss of taste or smell, chills, sore throat, Muscle aches, New onset headache, No, Unknown  No (06/30/20 1205)   Outbreak Screen: In the last 14 days, have you had contact with anyone who is ill, has show any of  the symptoms listed above and/or has been diagnosis with the 2019 Novel Coronavirus? This includes any immediate household members but excludes any patients with whom you have been in contact within your normal work duties wearing proper PPE, if you are a healthcare worker.  Yes, No, Unknown              No (06/30/20 1607)   Outbreak Screen: Who was notified?    Free text  (not recorded)   Travel Screen: Have you traveled in the last month? If so, to what country have you traveled? If [place] what state? Yes, No, Unknown  List of all countries  List of all States No (06/30/20 1607)  (not recorded)  (not recorded)   Infection Risk: Do you currently have the following symptoms?  (If cough is selected, the Tuberculosis Screen is performed.) Cough, Fever, Rash, No No (06/30/20 1607)   Tuberculosis Screen: Do you have any of the following Tuberculosis Risks?  · Have you lived or spent time with anyone who had or may have TB?  · Have you lived in or visited any of the following areas for more than one month: [place], [place], [place], [place] or [place], [place] or [place]?  · Do you have HIV/AIDS?  · Have you lived in or worked in a nursing home, homeless shelter, correctional facility, or substance abuse treatment facility?   · No    If Yes do you have any of the following symptoms? Yes responses display to the right    If Yes, symptoms listed are:  Cough greater than or equal to 3 weeks, Loss of appetite, Unexplained weight loss, Night sweats, Bloody sputum or hemoptysis, Hoarseness, Fever, Fatigue, Chest pain, No (not recorded)  (not recorded)   Exposure Screen: Have you been exposed to any of these contagious diseases in the last month? Measles, Chickenpox, Meningitis, Pertussis, Whooping Cough, No No (06/30/20 1607)         Current Facility-Administered Medications   Medication Dose Route Frequency Provider Last Rate Last Dose   • acetaminophen (TYLENOL) tablet 650 mg  650 mg Oral Q4H PRN Piyush Gross PA         Or   • acetaminophen (TYLENOL) 160 MG/5ML solution 650 mg  650 mg Oral Q4H PRN Piyush Gross PA        Or   • acetaminophen (TYLENOL) suppository 650 mg  650 mg Rectal Q4H PRN Piyush Gross PA       • albuterol (PROVENTIL) nebulizer solution 0.083% 2.5 mg/3mL  2.5 mg Nebulization Q4H PRN Piyush Gross PA       • aluminum-magnesium hydroxide-simethicone (MAALOX MAX) 400-400-40 MG/5ML suspension 15 mL  15 mL Oral Q6H PRN Piyush Gross PA       • amitriptyline (ELAVIL) tablet 25 mg  25 mg Oral Nightly Piyush Gross PA   25 mg at 06/30/20 2140   • bisacodyl (DULCOLAX) suppository 10 mg  10 mg Rectal Daily PRN Piyush Gross PA       • budesonide (PULMICORT) nebulizer solution 0.5 mg  0.5 mg Nebulization BID - RT Piyush Gross PA   0.5 mg at 07/01/20 0819   • butalbital-acetaminophen-caffeine (FIORICET, ESGIC) -40 MG per tablet 1 tablet  1 tablet Oral Q4H PRN Piyush Gross PA       • cycloSPORINE (RESTASIS) 0.05 % ophthalmic emulsion 1 drop  1 drop Both Eyes BID Piyush Gross PA   1 drop at 07/01/20 0805   • escitalopram (LEXAPRO) tablet 20 mg  20 mg Oral Daily Piyush Gross PA   20 mg at 07/01/20 0804   • estradiol (ESTRACE) tablet 1.5 mg  1.5 mg Oral Daily Piyush Gross PA   1.5 mg at 07/01/20 0804   • gabapentin (NEURONTIN) capsule 800 mg  800 mg Oral 4x Daily Piyush Gross PA   800 mg at 07/01/20 1157   • HYDROcodone-acetaminophen (NORCO) 5-325 MG per tablet 1 tablet  1 tablet Oral Q8H PRN Piyush Gross PA   1 tablet at 07/01/20 0557   • ipratropium-albuterol (DUO-NEB) nebulizer solution 3 mL  3 mL Nebulization Q8H - RT Piyush Gross PA   3 mL at 07/01/20 0805   • ketamine (KETALAR) 18 mg in sodium chloride 0.9 % 100 mL infusion  0.3 mg/kg Intravenous Q6H PRN Piyush Gross PA       • ketotifen (ZADITOR) 0.025 % ophthalmic solution 1 drop  1 drop Both Eyes BID Piyush Gross PA   1 drop at 07/01/20 0934   • levothyroxine (SYNTHROID, LEVOTHROID) tablet 25 mcg  25 mcg Oral Daily Piyush Gorss PA   25 mcg at 07/01/20 0816   • magnesium hydroxide  (MILK OF MAGNESIA) suspension 2400 mg/10mL 10 mL  10 mL Oral Daily PRN Piyush Gross PA       • Magnesium Sulfate 2 gram infusion - Mg less than or equal to 1.5 mg/dL  2 g Intravenous PRN Piyush Gross PA        Or   • Magnesium Sulfate 1 gram infusion - Mg 1.6-1.9 mg/dL  1 g Intravenous PRN Piyush Gross PA       • melatonin tablet 5 mg  5 mg Oral Nightly PRN Piyush Gross PA   5 mg at 06/30/20 2140   • montelukast (SINGULAIR) tablet 10 mg  10 mg Oral Nightly Piyush Gross PA   10 mg at 06/30/20 2140   • nitroglycerin (NITROSTAT) SL tablet 0.4 mg  0.4 mg Sublingual Q5 Min PRN Piyush Gross PA       • ondansetron (ZOFRAN) tablet 4 mg  4 mg Oral Q6H PRN Piyush Gross PA   4 mg at 07/01/20 0804    Or   • ondansetron (ZOFRAN) injection 4 mg  4 mg Intravenous Q6H PRN Piyush Gross PA       • OXcarbazepine (TRILEPTAL) tablet 300 mg  300 mg Oral TID Piyush Gross PA   300 mg at 07/01/20 0805   • oxyCODONE (ROXICODONE) immediate release tablet 10 mg  10 mg Oral Q4H PRN Piyush Gross PA   10 mg at 07/01/20 1157   • pantoprazole (PROTONIX) EC tablet 40 mg  40 mg Oral QAM Piyush Gross PA   40 mg at 07/01/20 0816   • potassium chloride (K-DUR,KLOR-CON) CR tablet 40 mEq  40 mEq Oral PRN Piyush Gross PA       • promethazine (PHENERGAN) IVPB 12.5 mg  12.5 mg Intravenous Q6H PRN Piyush Gross PA       • propranolol (INDERAL) tablet 40 mg  40 mg Oral BID Piyush Gross PA   40 mg at 07/01/20 0804   • sodium chloride 0.9 % flush 10 mL  10 mL Intravenous PRN Piyush Gross PA       • sodium chloride 0.9 % flush 10 mL  10 mL Intravenous Q12H Piyush Gross PA   10 mL at 07/01/20 0809   • sodium chloride 0.9 % flush 10 mL  10 mL Intravenous PRN Piyush Gross PA       • sodium chloride 0.9 % infusion  100 mL/hr Intravenous Continuous Piyush Gross  mL/hr at 06/30/20 2146 100 mL/hr at 06/30/20 2146   • sucralfate (CARAFATE) tablet 1 g  1 g Oral 4x Daily AC & at Bedtime Piyush Gross PA   1 g at 07/01/20 1157   • SUMAtriptan (IMITREX) tablet 50 mg  50 mg Oral Daily PRN  Piyush Gross PA       • topiramate (TOPAMAX) tablet 100 mg  100 mg Oral 4x Daily Piyush Gross PA   100 mg at 07/01/20 1157   • vitamin E capsule 400 Units  400 Units Oral Daily Piyush Gross PA   400 Units at 07/01/20 0804        Physician Progress Notes (most recent note)      Xiao Belle MD at 07/01/20 1211                [facility] Medicine Services Daily Progress Note      Hospitalist Team  LOS 0 days      Patient Care Team:  Zoey Bah MD as PCP - General  Zoey Bah MD as PCP - Family Medicine    Patient Location: [facility]/1      Subjective   Subjective     Chief Complaint / Subjective  Chief Complaint   Patient presents with   • Abdominal Pain     onset 2 days ago with N/V         Brief Synopsis of Hospital Course/HPI    Ms. Lin is a 70 y.o. female [facility] [facility] complaining of nausea and vomiting for the past 2 days.  Patient states that she had posterior cervical neck fusion in the beginning of June.  Patient has been on pain meds ever since but states her pain is worsening as well as worsening abdominal pain.  Patient states she has had decreased oral intake over the past several days due to feeling nauseous.  Patient reports her last bowel movement was 3 days ago.  Patient also states that she has decreased urine output as she reports that she is not drinking enough fluids.  Patient also reports some hematemesis in which she is noticed some red streaks in her emesis.     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  Previous notes from [facility] state that patient has chronic nausea.  Patient with history of cholecystectomy back in 1996.  Patient also has a history of appendectomy.  Patient postop note shows patient was treated with clindamycin.  Patient has a history of C. difficile colitis and chronic diarrhea as well.     In the ED, CMP largely unremarkable.  Lipase normal, CBC unremarkable.   "UA significant for 2+, 1+ protein, 2+ bilirubin, 3-5 RBCs, 0-2 WBCs, +1 bacteria. CT abdomen pelvis showed no acute findings in the abdomen or pelvis.  No CT explanation for patient's upper abdomen pain.  Surgical changes of partial gastrectomy with gastrojejunostomy.  Cholecystectomy.  Hysterectomy.  Lumbar spinal fusion.  Stable intrahepatic next hepatic biliary ductal dilatation since 3/9/2019, may represent increased capacity from previous cholecystectomy.   CT of the cervical spine showed anterior C3-4 and posterior C3-6 spinal fusion.  Hardware appears intact.  No acute cervical spine findings.  No high-grade canal or foraminal stenosis is seen.  7 mm ill-defined sclerotic focus on the T1 vertebral body it is indeterminate.  Bone scan may prove helpful for further evaluation. patient is afebrile, pulse in the 80s, on room air oxygen and 97% SPO2 and blood pressure 150s over 70s.  Patient received 500 normal saline bolus, Zofran, Dilaudid, Phenergan in ED.    Date::          ROS      Objective   Objective      Vital Signs  Temp:  [98.3 °F (36.8 °C)-98.4 °F (36.9 °C)] 98.3 °F (36.8 °C)  Heart Rate:  [] 83  Resp:  [16-18] 17  BP: (128-159)/(62-85) 128/79  Oxygen Therapy  SpO2: 96 %  Pulse Oximetry Type: Intermittent  Device (Oxygen Therapy): room air  Flowsheet Rows      First Filed Value   Admission Height  157.5 cm (62\") Documented at 06/30/2020 1045   Admission Weight  59.4 kg (131 lb) Documented at 06/30/2020 1045        Intake & Output (last 3 days)       06/28 0701 - 06/29 0700 06/29 0701 - 06/30 0700 06/30 0701 - 07/01 0700 07/01 0701 - 07/02 0700    P.O.   960     Total Intake(mL/kg)   960 (16.3)     Net   +960             Urine Unmeasured Occurrence   1 x         Lines, Drains & Airways    Active LDAs     Name:   Placement date:   Placement time:   Site:   Days:    Peripheral IV 06/30/20 1344 Left Antecubital   06/30/20    1344    Antecubital   less than 1                  Physical " Exam:    Physical Exam   Constitutional: She is oriented to person, place, and time. She appears well-developed and well-nourished. No distress.   HENT:   Head: Normocephalic and atraumatic.   Right Ear: External ear normal.   Left Ear: External ear normal.   Nose: Nose normal.   Mouth/Throat: Oropharynx is clear and moist. No oropharyngeal exudate.   Eyes: Pupils are equal, round, and reactive to light. Conjunctivae and EOM are normal. Right eye exhibits no discharge. Left eye exhibits no discharge. No scleral icterus.   Neck: Normal range of motion. No JVD present. No tracheal deviation present. No thyromegaly present.   Cardiovascular: Normal rate, regular rhythm, normal heart sounds and intact distal pulses. Exam reveals no gallop and no friction rub.   No murmur heard.  Pulmonary/Chest: Effort normal and breath sounds normal. No stridor. No respiratory distress. She has no wheezes. She has no rales. She exhibits no tenderness.   Abdominal: Soft. Bowel sounds are normal. She exhibits no distension and no mass. There is no tenderness. There is no rebound and no guarding. No hernia.   Musculoskeletal: Normal range of motion. She exhibits no edema, tenderness or deformity.   Lymphadenopathy:     She has no cervical adenopathy.   Neurological: She is alert and oriented to person, place, and time. No cranial nerve deficit or sensory deficit. She exhibits normal muscle tone. Coordination normal.   Skin: Skin is warm and dry. No rash noted. She is not diaphoretic. No erythema.   Psychiatric: She has a normal mood and affect. Her behavior is normal.   Nursing note and vitals reviewed.            Procedures:              Results Review:     I reviewed the patient's new clinical results.      Lab Results (last 24 hours)     Procedure Component Value Units Date/Time    BUN [[id number]]  (Normal) Collected:  07/01/20 0335    Specimen:  Blood Updated:  07/01/20 0726     BUN 13 mg/dL     Basic Metabolic Panel [[id number]]   (Abnormal) Collected:  07/01/20 0335    Specimen:  Blood Updated:  07/01/20 0517     Glucose 110 mg/dL      BUN --     Comment: Testing performed by alternate method        Creatinine 0.52 mg/dL      Sodium 142 mmol/L      Potassium 4.0 mmol/L      Chloride 106 mmol/L      CO2 22.0 mmol/L      Calcium 8.5 mg/dL      eGFR Non African Amer 117 mL/min/1.73      BUN/Creatinine Ratio --     Comment: Testing not performed        Anion Gap 14.0 mmol/L     Narrative:       GFR Normal >60  Chronic Kidney Disease <60  Kidney Failure <15      Magnesium [[id number]]  (Normal) Collected:  07/01/20 0335    Specimen:  Blood Updated:  07/01/20 0517     Magnesium 2.0 mg/dL     CBC Auto Differential [[id number]]  (Abnormal) Collected:  07/01/20 0335    Specimen:  Blood Updated:  07/01/20 0513     WBC 5.50 10*3/mm3      RBC 3.60 10*6/mm3      Hemoglobin 12.0 g/dL      Comment: Result checked         Hematocrit 35.1 %      MCV 97.5 fL      MCH 33.4 pg      MCHC 34.2 g/dL      RDW 13.1 %      RDW-SD 45.1 fl      MPV 7.6 fL      Platelets 216 10*3/mm3      Neutrophil % 44.2 %      Lymphocyte % 41.6 %      Monocyte % 12.4 %      Eosinophil % 1.3 %      Basophil % 0.5 %      Neutrophils, Absolute 2.40 10*3/mm3      Lymphocytes, Absolute 2.30 10*3/mm3      Monocytes, Absolute 0.70 10*3/mm3      Eosinophils, Absolute 0.10 10*3/mm3      Basophils, Absolute 0.00 10*3/mm3      nRBC 0.1 /100 WBC     BUN [[id number]]  (Normal) Collected:  06/30/20 1155    Specimen:  Blood from Arm, Left Updated:  06/30/20 1231     BUN 15 mg/dL     Comprehensive Metabolic Panel [[id number]]  (Abnormal) Collected:  06/30/20 1155    Specimen:  Blood from Arm, Left Updated:  06/30/20 1226     Glucose 137 mg/dL      BUN --     Comment: Testing performed by alternate method        Creatinine 0.68 mg/dL      Sodium 145 mmol/L      Potassium 4.0 mmol/L      Chloride 107 mmol/L      CO2 24.0 mmol/L      Calcium 9.5 mg/dL      Total Protein 6.7 g/dL      Albumin 4.10 g/dL       ALT (SGPT) 16 U/L      AST (SGOT) 27 U/L      Alkaline Phosphatase 132 U/L      Total Bilirubin 0.2 mg/dL      eGFR Non African Amer 86 mL/min/1.73      Globulin 2.6 gm/dL      A/G Ratio 1.6 g/dL      BUN/Creatinine Ratio --     Comment: Testing not performed        Anion Gap 14.0 mmol/L     Narrative:       GFR Normal >60  Chronic Kidney Disease <60  Kidney Failure <15          No results found for: HGBA1C            Lab Results   Component Value Date    LIPASE 29 06/30/2020     Lab Results   Component Value Date    CHOL 161 04/17/2018    TRIG 148 04/17/2018    HDL 45 04/17/2018    LDL 89 04/17/2018       No results found for: INTRAOP, PREDX, FINALDX, COMDX    Microbiology Results (last 10 days)     ** No results found for the last 240 hours. **          ECG/EMG Results (most recent)     None                    Ct Cervical Spine Without Contrast    Result Date: 6/30/2020   1. Anterior C3-4 and posterior C3-C6 spinal fusion. Hardware appears intact. 2. No acute cervical spine findings. No high-grade canal or foraminal stenosis is seen. 3. 7 mm ill-defined sclerotic focus in the T1 vertebral body. It is indeterminate. Bone scan may prove helpful for further evaluation.  Electronically Signed By-Dr. Velvet Maher MD On:6/30/2020 11:52 AM This report was finalized on [id number] by Dr. Velvet Maher MD.    Mri Cervical Spine Without Contrast    Result Date: 6/30/2020   1. Mild to moderate canal stenosis at C4-5.. No high-grade canal stenosis is seen. 2. No evidence of cord edema. 3. Satisfactory cervical alignment. 4. C3-4 anterior spinal fusion. C3-C6 posterior spinal fusion. 5. Cellulitic changes or edema within the mid neck soft tissues. No drainable fluid collection or abscess. 6. No epidural fluid collection or abscess is seen.   Electronically Signed By-Dr. Velvet Maher MD On:6/30/2020 3:21 PM This report was finalized on [id number] by Dr. Velvet Maher MD.    Ct Abdomen Pelvis With Contrast    Result  Date: 6/30/2020   1. No acute findings in the abdomen or pelvis. No CT explanation for the patient's upper abdominal pain. 2. Surgical changes of partial gastrectomy with gastrojejunostomy. Cholecystectomy. Hysterectomy. Lumbar spinal fusion. 3. Stable intrahepatic next hepatic biliary ductal dilation since 03/09/2019, may represent increased capacitance from previous cholecystectomy.  Electronically Signed By-Dr. Velvet Maher MD On:6/30/2020 2:09 PM This report was finalized on [id number] by Dr. Velvet Maher MD.          Xrays, labs reviewed personally by physician.    Medication Review:   I have reviewed the patient's current medication list      Scheduled Meds    amitriptyline 25 mg Oral Nightly   budesonide 0.5 mg Nebulization BID - RT   cycloSPORINE 1 drop Both Eyes BID   escitalopram 20 mg Oral Daily   estradiol 1.5 mg Oral Daily   gabapentin 800 mg Oral 4x Daily   ipratropium-albuterol 3 mL Nebulization Q8H - RT   ketotifen 1 drop Both Eyes BID   levothyroxine 25 mcg Oral Daily   montelukast 10 mg Oral Nightly   OXcarbazepine 300 mg Oral TID   pantoprazole 40 mg Oral QAM   propranolol 40 mg Oral BID   sodium chloride 10 mL Intravenous Q12H   sucralfate 1 g Oral 4x Daily AC & at Bedtime   topiramate 100 mg Oral 4x Daily   vitamin E 400 Units Oral Daily       Meds Infusions    sodium chloride 100 mL/hr Last Rate: 100 mL/hr (06/30/20 9390)       Meds PRN  •  acetaminophen **OR** acetaminophen **OR** acetaminophen  •  albuterol  •  aluminum-magnesium hydroxide-simethicone  •  bisacodyl  •  butalbital-acetaminophen-caffeine  •  HYDROcodone-acetaminophen  •  ketamine (KETALAR) IVPB **AND** Ketamine Vital Signs & Assessment  •  magnesium hydroxide  •  magnesium sulfate **OR** magnesium sulfate in D5W 1g/100mL (PREMIX)  •  melatonin  •  nitroglycerin  •  ondansetron **OR** ondansetron  •  oxyCODONE  •  potassium chloride  •  promethazine  •  [COMPLETED] Insert peripheral IV **AND** sodium chloride  •  sodium  chloride  •  SUMAtriptan      Assessment/Plan   Assessment/Plan     Active Hospital Problems    Diagnosis  POA   • **Intractable vomiting [R11.10]  Yes   • Chronic obstructive pulmonary disease (CMS/HCC) [J44.9]  Yes   • Hypothyroidism (acquired) [E03.9]  Yes   • Estrogen deficiency [E28.39]  Yes   • Depression [F32.9]  Yes   • Migraines [G43.909]  Yes   • Chronic pain syndrome [G89.4]  Yes   • GERD without esophagitis [K21.9]  Yes   • Pseudarthrosis following spinal fusion [M96.0]  Not Applicable      Resolved Hospital Problems   No resolved problems to display.       MEDICAL DECISION MAKING COMPLEXITY BY PROBLEM:     Intractable vomiting with associated abdominal pain not improved yet.  -Cause is unclear at this time, may have constipation component exacerbated by opiate use, possible pain medication seeker, patient has been worked up for multiple abdominal complaints in the past given surgical history  -CMP largely unremarkable.  Lipase normal, CBC unremarkable.    -UA significant for 2+, 1+ protein, 2+ bilirubin, 3-5 RBCs, 0-2 WBCs, +1 bacteria.   -CT abdomen pelvis showed no acute findings in the abdomen or pelvis.  No CT explanation for patient's upper abdomen pain.  Surgical changes of partial gastrectomy with gastrojejunostomy.  Cholecystectomy.  Hysterectomy.  Lumbar spinal fusion.  Stable intrahepatic next hepatic biliary ductal dilatation since 3/9/2019, may represent increased capacity from previous cholecystectomy.  -patient is afebrile, pulse in the 80s, on room air oxygen and 97% SPO2 and blood pressure 150s over 70s.    -Patient received 500 normal saline bolus, Zofran, Dilaudid, Phenergan in ED.  -Zofran, Phenergan, Carafate ordered  -IV fluids 100 mL/h normal saline  -Pain control with home Percocet, Norco, inspect verified-ketamine ordered  - Consult GI service   -Case management consult  -PT consult  -Clear liquid diet     Pseudoarthrosis of cervical spine following spinal fusion  -s/p fusion on  6/9/2020  -CT of the cervical spine showed anterior C3-4 and posterior C3-6 spinal fusion.  Hardware appears intact.  No acute cervical spine findings.  No high-grade canal or foraminal stenosis is seen.  7 mm ill-defined sclerotic focus on the T1 vertebral body it is indeterminate.  Bone scan may prove helpful for further evaluation.     Chronic diarrhea  -Hold home viberzi     Hypothyroidism  -Continue home Synthroid     Estrogen deficiency  -Continue home estradiol     Depression  -Continue home Lexapro, amitriptyline     COPD  -Not acute exacerbation  -Continue home albuterol, Pulmicort, Singulair, Anoro     Migraines  -Continue home Fioricet, maxalt as needed  -Continue home propranolol     ?Seizure disorder  -No previous documentation of seizure disorder  -Continue home Trileptal, Topamax     Chronic pain syndrome  -Continue home pain meds     GERD  -Continue home PPI        VTE Prophylaxis -SCDs    VTE Prophylaxis -   Mechanical Order History:      Ordered        06/30/20 1625  Place Sequential Compression Device  Once         06/30/20 1625  Maintain Sequential Compression Device  Continuous                 Pharmalogical Order History:     None            Code Status -   Code Status and Medical Interventions:   Ordered at: 06/30/20 1625     Code Status:    CPR     Medical Interventions (Level of Support Prior to Arrest):    Full             Discharge Planning          Destination      Coordination has not been started for this encounter.      Durable Medical Equipment      Coordination has not been started for this encounter.      Dialysis/Infusion      Coordination has not been started for this encounter.      Home Medical Care      Coordination has not been started for this encounter.      Therapy      Coordination has not been started for this encounter.      Community Resources      Coordination has not been started for this encounter.            Electronically signed by Xiao Belle MD, 07/01/20,  "12:11.  [facility] Hospitalist Team        Electronically signed by Xiao Belle MD at 07/01/20 1213          Consult Notes (most recent note)      Megan Lopez, APRN at 07/01/20 1328      Consult Orders    1. Inpatient Gastroenterology Consult [[id number]] ordered by Xiao Belle MD at 07/01/20 1214                GI CONSULT  NOTE:    Referring Provider:  Dr. Belle    Chief complaint: Nausea with vomiting    Subjective . \"I've had nausea with vomiting for day\"    History of present illness: Ashley Lin is a 70 y.o. female who has a history of cholecystectomy, COPD and chronic pain on narcotics.  She presents with a 3-day history of nausea, vomiting and abdominal bloating.  She questions hematemesis and has routine heartburn despite daily medications.  She has dysphagia with all consistencies but worse with pills but denies unintentional weight loss.  She has a history of chronic diarrhea since cholecystectomy and takes Viberzi twice daily and is typically controlled with only occasional diarrhea.  She denies any bright red blood per rectum but does report melena x1 recently.  No NSAID or anticoagulation use.  She does report a history of ulcers with last EGD with Dr. Mata approximately 6 to 8 months ago.  She is status post recent cervical spine fusion in June.  She takes narcotics routinely but denies history of diabetes.      Endo History:  Patient reports EGD and colonoscopy about 6 to 8 months ago with Dr. Mata with gastric ulcers and \"inflamed colon\"    Past Medical History:  Past Medical History:   Diagnosis Date   • Chronic back pain    • Chronic obstructive pulmonary disease (CMS/HCC) 7/1/2020   • Chronic pain syndrome 7/1/2020   • Depression 7/1/2020   • Disease of thyroid gland    • Estrogen deficiency 7/1/2020   • GERD without esophagitis 7/1/2020   • Hypothyroidism (acquired) 7/1/2020   • Migraines 7/1/2020   • Vomiting 06/30/2020       Past Surgical " History:  Past Surgical History:   Procedure Laterality Date   • CHOLECYSTECTOMY     • HYSTERECTOMY     • NECK SURGERY     • SPINE SURGERY  2020   EGD and colonoscopy    Social History:  Social History     Tobacco Use   • Smoking status: Former Smoker     Last attempt to quit: 2006     Years since quittin.5   • Smokeless tobacco: Never Used   Substance Use Topics   • Alcohol use: Never     Frequency: Never   • Drug use: Never   No tobacco, alcohol or illicit drug use    Family History:  Family History   Problem Relation Age of Onset   • Heart disease Mother    No family history of esophageal, stomach or colon cancer    Medications:  Medications Prior to Admission   Medication Sig Dispense Refill Last Dose   • albuterol sulfate  (90 Base) MCG/ACT inhaler Inhale 2 puffs Every 4 (Four) Hours As Needed for Wheezing or Shortness of Air.      • amitriptyline (ELAVIL) 25 MG tablet Take 25 mg by mouth Every Night.      • azelastine (OPTIVAR) 0.05 % ophthalmic solution Administer 1 drop to both eyes 2 (Two) Times a Day.      • budesonide (PULMICORT) 180 MCG/ACT inhaler Inhale 2 puffs 2 (Two) Times a Day.      • butalbital-acetaminophen-caffeine (FIORICET, ESGIC) -40 MG per tablet Take 1-2 tablets by mouth Every 4 (Four) Hours As Needed for Headache.      • cycloSPORINE (RESTASIS) 0.05 % ophthalmic emulsion Administer 1 drop to both eyes 2 (Two) Times a Day.      • Eluxadoline (Viberzi) 100 MG tablet Take 100 tablets by mouth 2 (two) times a day.      • escitalopram (LEXAPRO) 20 MG tablet Take 20 mg by mouth Daily.      • estradiol (ESTRACE) 1 MG tablet Take 1.5 mg by mouth Daily.      • gabapentin (NEURONTIN) 800 MG tablet Take 800 mg by mouth 4 (Four) Times a Day.      • lansoprazole (PREVACID) 30 MG capsule Take 30 mg by mouth Daily.      • levothyroxine (SYNTHROID, LEVOTHROID) 25 MCG tablet Take 25 mcg by mouth Daily.      • montelukast (SINGULAIR) 10 MG tablet Take 10 mg by mouth Every Night.       • omeprazole (priLOSEC) 40 MG capsule Take 40 mg by mouth 2 (two) times a day.      • ondansetron (ZOFRAN) 4 MG tablet Take 4 mg by mouth Every 4 (Four) Hours As Needed for Nausea or Vomiting.      • OXcarbazepine (TRILEPTAL) 300 MG tablet Take 300 mg by mouth 3 (Three) Times a Day.      • oxyCODONE-acetaminophen (PERCOCET)  MG per tablet Take 1 tablet by mouth Every 6 (Six) Hours As Needed for Moderate Pain  or Severe Pain .      • promethazine (PHENERGAN) 25 MG suppository Insert 50 mg into the rectum Every 6 (Six) Hours As Needed for Nausea or Vomiting.      • promethazine (PHENERGAN) 25 MG tablet Take 25 mg by mouth Every 6 (Six) Hours As Needed for Nausea or Vomiting.      • propranolol (INDERAL) 40 MG tablet Take 40 mg by mouth 2 (Two) Times a Day.      • rizatriptan MLT (MAXALT-MLT) 10 MG disintegrating tablet Place 10 mg on the tongue 1 (One) Time As Needed for Migraine. May repeat in 2 hours if needed      • sucralfate (CARAFATE) 1 g tablet Take 1 g by mouth 4 (Four) Times a Day.      • umeclidinium-vilanterol (Anoro Ellipta) 62.5-25 MCG/INH aerosol powder  inhaler Inhale 1 puff Daily.      • vitamin E 400 UNIT capsule Take 400 Units by mouth Daily.      • HYDROcodone-acetaminophen (NORCO) 5-325 MG per tablet Take 1 tablet by mouth Every 8 (Eight) Hours As Needed for Moderate Pain  or Severe Pain .   Unknown at Unknown time   • topiramate (TOPAMAX) 100 MG tablet Take 100 mg by mouth 4 (Four) Times a Day.   Unknown at Unknown time       Scheduled Meds:  amitriptyline 25 mg Oral Nightly   budesonide 0.5 mg Nebulization BID - RT   cycloSPORINE 1 drop Both Eyes BID   escitalopram 20 mg Oral Daily   estradiol 1.5 mg Oral Daily   gabapentin 800 mg Oral 4x Daily   ipratropium-albuterol 3 mL Nebulization Q8H - RT   ketotifen 1 drop Both Eyes BID   levothyroxine 25 mcg Oral Daily   montelukast 10 mg Oral Nightly   OXcarbazepine 300 mg Oral TID   pantoprazole 40 mg Oral QAM   propranolol 40 mg Oral BID    sodium chloride 10 mL Intravenous Q12H   sucralfate 1 g Oral 4x Daily AC & at Bedtime   topiramate 100 mg Oral 4x Daily   vitamin E 400 Units Oral Daily     Continuous Infusions:  sodium chloride 100 mL/hr Last Rate: 100 mL/hr (06/30/20 7803)     PRN Meds:.•  acetaminophen **OR** acetaminophen **OR** acetaminophen  •  albuterol  •  aluminum-magnesium hydroxide-simethicone  •  bisacodyl  •  butalbital-acetaminophen-caffeine  •  HYDROcodone-acetaminophen  •  ketamine (KETALAR) IVPB **AND** Ketamine Vital Signs & Assessment  •  magnesium hydroxide  •  magnesium sulfate **OR** magnesium sulfate in D5W 1g/100mL (PREMIX)  •  melatonin  •  nitroglycerin  •  ondansetron **OR** ondansetron  •  oxyCODONE  •  potassium chloride  •  promethazine  •  [COMPLETED] Insert peripheral IV **AND** sodium chloride  •  sodium chloride  •  SUMAtriptan    ALLERGIES:  Penicillins    ROS:  The following systems were reviewed  Constitution:  No fevers, chills, no unintentional weight loss  Skin: no rash, no jaundice  Eyes:  No blurry vision, no eye pain  HENT:  No change in hearing or smell  Resp:  No dyspnea or cough  CV:  No chest pain or palpitations  :  No dysuria, hematuria  Musculoskeletal:  No leg cramps or arthralgias  Neuro:  No tremor, no numbness  Psych:  No depression or confusion    Objective Resting in bed, no acute distress, no family present    Vital Signs:   Vitals:    07/01/20 0817 07/01/20 0818 07/01/20 0822 07/01/20 1212   BP: 128/79   104/67   BP Location:    Left arm   Patient Position:    Sitting   Pulse:  87 83 70   Resp:  17 17 16   Temp:    97.3 °F (36.3 °C)   TempSrc:    Axillary   SpO2:  96%  95%   Weight:       Height:           Physical Exam:       General Appearance:    Awake and alert, in no acute distress   Head:    Normocephalic, without obvious abnormality, atraumatic   Throat:   No oral lesions, no thrush, oral mucosa moist   Lungs:     Respirations regular, even and unlabored   Chest Wall:    No  abnormalities observed   Abdomen:     Soft, non-tender, no rebound or guarding, non-distended   Rectal:     Deferred   Extremities:   Moves all extremities, no edema, no cyanosis   Pulses:   Pulses palpable and equal bilaterally   Skin:   No rash, no jaundice, normal palpation       Neurologic:   Cranial nerves 2 - 12 grossly intact       Results Review:   I reviewed the patient's labs and imaging.  CBC    Results from last 7 days   Lab Units 07/01/20  0335 06/30/20  1055   WBC 10*3/mm3 5.50 7.20   HEMOGLOBIN g/dL 12.0 15.3   PLATELETS 10*3/mm3 216 303     CMP Results from last 7 days   Lab Units 07/01/20  0335 06/30/20  1155 06/30/20  1055   SODIUM mmol/L 142 145  --    POTASSIUM mmol/L 4.0 4.0  --    CHLORIDE mmol/L 106 107  --    CO2 mmol/L 22.0 24.0  --    BUN  13 15  --    CREATININE mg/dL 0.52* 0.68  --    GLUCOSE mg/dL 110* 137*  --    ALBUMIN g/dL  --  4.10  --    BILIRUBIN mg/dL  --  0.2  --    ALK PHOS U/L  --  132*  --    AST (SGOT) U/L  --  27  --    ALT (SGPT) U/L  --  16  --    MAGNESIUM mg/dL 2.0  --   --    LIPASE U/L  --   --  29     Cr Clearance Estimated Creatinine Clearance: 62.6 mL/min (A) (by C-G formula based on SCr of 0.52 mg/dL (L)).  Coag     HbA1C No results found for: HGBA1C  Blood Glucose No results found for: POCGLU  Infection     UA  Results from last 7 days   Lab Units 06/30/20  1126   NITRITE UA  Negative   WBC UA /HPF 0-2*   BACTERIA UA /HPF 1+*   SQUAM EPITHEL UA /HPF 0-2     Radiology(recent) Ct Cervical Spine Without Contrast    Result Date: 6/30/2020   1. Anterior C3-4 and posterior C3-C6 spinal fusion. Hardware appears intact. 2. No acute cervical spine findings. No high-grade canal or foraminal stenosis is seen. 3. 7 mm ill-defined sclerotic focus in the T1 vertebral body. It is indeterminate. Bone scan may prove helpful for further evaluation.  Electronically Signed By-Dr. Velvet Maher MD On:6/30/2020 11:52 AM This report was finalized on [id number] by Dr. Verdin  MD Gunnar.    Mri Cervical Spine Without Contrast    Result Date: 6/30/2020   1. Mild to moderate canal stenosis at C4-5.. No high-grade canal stenosis is seen. 2. No evidence of cord edema. 3. Satisfactory cervical alignment. 4. C3-4 anterior spinal fusion. C3-C6 posterior spinal fusion. 5. Cellulitic changes or edema within the mid neck soft tissues. No drainable fluid collection or abscess. 6. No epidural fluid collection or abscess is seen.   Electronically Signed By-Dr. Velvet Maher MD On:6/30/2020 3:21 PM This report was finalized on [id number] by Dr. Velvet Maher MD.    Ct Abdomen Pelvis With Contrast    Result Date: 6/30/2020   1. No acute findings in the abdomen or pelvis. No CT explanation for the patient's upper abdominal pain. 2. Surgical changes of partial gastrectomy with gastrojejunostomy. Cholecystectomy. Hysterectomy. Lumbar spinal fusion. 3. Stable intrahepatic next hepatic biliary ductal dilation since 03/09/2019, may represent increased capacitance from previous cholecystectomy.  Electronically Signed By-Dr. Velvet Maher MD On:6/30/2020 2:09 PM This report was finalized on [id number] by Dr. Velvet Maher MD.         ASSESSMENT:  Nausea and vomiting with hematemesis  Melena  Dysphagia  GERD  Chronic diarrhea -unchanged  Elevated alk phos  Chronic pain on narcotics  COPD  History of cholecystectomy    PLAN:    Patient presents with a 3-day history of nausea with vomiting and possible hematemesis and melena.  Hemoglobin 12.  Differential diagnoses include erosive esophagitis versus Dayanara-Crespo tear versus ulcer.  Continue PPI and will plan EGD evaluation in a.m. as she has had lunch today.  Monitor hemoglobin.  Consider esophageal dilation for chronic dysphagia.  Will obtain last EGD and colonoscopy by Dr. Mata for review.  Chronic diarrhea typically controlled on Viberzi twice daily.  Elevated alk phos noted with otherwise normal LFTs, check GGT.  Continue supportive care with  antiemetics and analgesics as needed.  Further recommendations to follow scope findings tomorrow.  Will follow.    I discussed the patients findings and my recommendations with the patient.    We appreciate the referral    HUMZA Mitchell  20  13:28              Electronically signed by Megan Lopez APRN at 20 4044          Physical Therapy Notes (most recent note)      Devorah Dey, PT at 20 1246  Version 1 of 1         Patient Name: Ashley Lin  : 1950    MRN: [id number]                              Today's Date: 2020       Admit Date: 2020    Visit Dx:     ICD-10-CM ICD-9-CM   1. Intractable vomiting with nausea, unspecified vomiting type R11.2 536.2   2. Cervical pain M54.2 723.1     Patient Active Problem List   Diagnosis   • Intractable vomiting   • Asthma   • Cervical disc herniation   • Cervical stenosis of spine   • Chronic obstructive pulmonary disease (CMS/HCC)   • Low back pain   • Pseudarthrosis following spinal fusion   • History of lumbar fusion   • S/P cervical spinal fusion   • Hypothyroidism (acquired)   • Estrogen deficiency   • Depression   • Migraines   • Chronic pain syndrome   • GERD without esophagitis     Past Medical History:   Diagnosis Date   • Chronic back pain    • Chronic obstructive pulmonary disease (CMS/HCC) 2020   • Chronic pain syndrome 2020   • Depression 2020   • Disease of thyroid gland    • Estrogen deficiency 2020   • GERD without esophagitis 2020   • Hypothyroidism (acquired) 2020   • Migraines 2020   • Vomiting 2020     Past Surgical History:   Procedure Laterality Date   • CHOLECYSTECTOMY     • HYSTERECTOMY     • NECK SURGERY     • SPINE SURGERY  2020     General Information     Row Name 20 1207          PT Evaluation Time/Intention    Document Type  evaluation  -AO     Mode of Treatment  physical therapy  -AO     Row Name 20 1207          General Information     "Patient Profile Reviewed?  yes  -AO     Prior Level of Function  -- Previously independent w/ household mobility with no AD, reports she was \"confined to a w/c for 6 months last year\"; has cane/RW that she was using prior to admit 2*/2 onset of weakness/N/V  -AO     Existing Precautions/Restrictions  fall;spinal C-spine sx 6/9/2020, per pt, she has C-collar at home but it did not make it to the hospital  -AO     Barriers to Rehab  none identified  -AO     Row Name 07/01/20 1207          Relationship/Environment    Lives With  alone Pt lives alone but reports her father helps with grocery shopping and driving  -AO     Row Name 07/01/20 1207          Resource/Environmental Concerns    Current Living Arrangements  home/apartment/condo  -AO     Row Name 07/01/20 1207          Home Main Entrance    Number of Stairs, Main Entrance  none  -AO     Row Name 07/01/20 1207          Stairs Within Home, Primary    Number of Stairs, Within Home, Primary  none  -AO     Row Name 07/01/20 1207          Cognitive Assessment/Intervention- PT/OT    Orientation Status (Cognition)  oriented x 3  -AO     Row Name 07/01/20 1207          Safety Issues, Functional Mobility    Impairments Affecting Function (Mobility)  balance;endurance/activity tolerance;pain;sensation/sensory awareness;strength  -AO       User Key  (r) = Recorded By, (t) = Taken By, (c) = Cosigned By    Initials Name Provider Type    AO Devorah Dey, PT Physical Therapist        Mobility     Row Name 07/01/20 1207          Bed Mobility Assessment/Treatment    Bed Mobility Assessment/Treatment  sit-supine  -AO     Sit-Supine [place] (Bed Mobility)  supervision log roll technique  -AO     Assistive Device (Bed Mobility)  bed rails  -AO     Row Name 07/01/20 1207          Sit-Stand Transfer    Sit-Stand [place] (Transfers)  contact guard  -AO     Assistive Device (Sit-Stand Transfers)  -- IV pole for support  -AO     Row Name 07/01/20 1207          Gait/Stairs " Assessment/Training    Gait/Stairs Assessment/Training  distance ambulated  -AO     [place] Level (Gait)  minimum assist (75% patient effort) RUE HHA  -AO     Assistive Device (Gait)  -- IV pole  -AO     Distance in Feet (Gait)  25 ft  -AO     Pattern (Gait)  step-to  -AO     Deviations/Abnormal Patterns (Gait)  base of support, narrow;erin decreased;gait speed decreased;festinating/shuffling;stride length decreased  -AO     Bilateral Gait Deviations  heel strike decreased  -AO     Comment (Gait/Stairs)  Pt very unsteady with c/o feeling like her LEs were going to give out 1/2 through gait training bout, decreased B knee extension during stance phase of gait  -AO       User Key  (r) = Recorded By, (t) = Taken By, (c) = Cosigned By    Initials Name Provider Type    AO Devorah Dey, PT Physical Therapist        Obj/Interventions     Row Name 07/01/20 1207          General ROM    GENERAL ROM COMMENTS  BLE AROM WFL  -AO     Row Name 07/01/20 1207          MMT (Manual Muscle Testing)    General MMT Comments  BLEs: 4-/5  -AO     Row Name 07/01/20 1207          Static Sitting Balance    Level of [place] (Unsupported Sitting, Static Balance)  conditional independence  -AO     Sitting Position (Unsupported Sitting, Static Balance)  sitting on edge of bed  -AO     Time Able to Maintain Position (Unsupported Sitting, Static Balance)  more than 5 minutes  -AO     Row Name 07/01/20 1207          Dynamic Sitting Balance    Level of [place], Reaches Outside Midline (Sitting, Dynamic Balance)  supervision  -AO     Sitting Position, Reaches Outside Midline (Sitting, Dynamic Balance)  sitting on edge of bed  -AO     Row Name 07/01/20 1207          Static Standing Balance    Level of [place] (Supported Standing, Static Balance)  contact guard assist  -AO     Time Able to Maintain Position (Supported Standing, Static Balance)  45 to 60 seconds  -AO     Assistive Device Utilized (Supported Standing, Static  "Balance)  -- IV pole  -AO     Row Name 07/01/20 1207          Dynamic Standing Balance    Level of [place], Reaches Outside Midline (Standing, Dynamic Balance)  minimal assist, 75% patient effort RUE HHA  -AO     Time Able to Maintain Position, Reaches Outside Midline (Standing, Dynamic Balance)  45 to 60 seconds  -AO     Assistive Device Utilized (Supported Standing, Dynamic Balance)  -- IV pole  -AO     Row Name 07/01/20 1207          Sensory Assessment/Intervention    Sensory General Assessment  -- Pt reports BLE \"heaviness\"  -AO       User Key  (r) = Recorded By, (t) = Taken By, (c) = Cosigned By    Initials Name Provider Type    AO Devorah Dey R, PT Physical Therapist        Goals/Plan     Row Name 07/01/20 1207          Bed Mobility Goal 1 (PT)    Activity/Assistive Device (Bed Mobility Goal 1, PT)  bed mobility activities, all  -AO     [place] Level/Cues Needed (Bed Mobility Goal 1, PT)  conditional independence  -AO     Time Frame (Bed Mobility Goal 1, PT)  2 weeks;long term goal (LTG)  -AO     Progress/Outcomes (Bed Mobility Goal 1, PT)  goal not met  -AO     Row Name 07/01/20 1207          Transfer Goal 1 (PT)    Activity/Assistive Device (Transfer Goal 1, PT)  transfers, all;walker, rolling  -AO     [place] Level/Cues Needed (Transfer Goal 1, PT)  supervision required  -AO     Time Frame (Transfer Goal 1, PT)  2 weeks;long term goal (LTG)  -AO     Progress/Outcome (Transfer Goal 1, PT)  goal not met  -AO     Row Name 07/01/20 1207          Gait Training Goal 1 (PT)    Activity/Assistive Device (Gait Training Goal 1, PT)  gait (walking locomotion);walker, rolling  -AO     [place] Level (Gait Training Goal 1, PT)  contact guard assist  -AO     Distance (Gait Goal 1, PT)  100 ft  -AO     Time Frame (Gait Training Goal 1, PT)  2 weeks;long term goal (LTG)  -AO     Progress/Outcome (Gait Training Goal 1, PT)  goal not met  -AO       User Key  (r) = Recorded By, (t) = Taken By, (c) = " "Cosigned By    Initials Name Provider Type    Devorah Gamboa, PT Physical Therapist        Clinical Impression     Row Name 07/01/20 1207          Pain Assessment    Additional Documentation  Pain Scale: Numbers Pre/Post-Treatment (Group)  -AO     Row Name 07/01/20 1207          Pain Scale: Numbers Pre/Post-Treatment    Pain Scale: Numbers, Pretreatment  6/10  -AO     Pain Scale: Numbers, Post-Treatment  6/10  -AO     Pain Location  neck  -AO     Row Name 07/01/20 1207          Plan of Care Review    Plan of Care Reviewed With  patient  -AO     Progress  no change  -AO     Outcome Summary  Pt is a 71 y/o F who presented to [facility] with intactable N/V x 2 days of unclear etiology (CT abdomen (-), UA (+)) and s/p recent C3-4 and C5-6 fusion 06/09/2020 (repeat imaging C-spine (-)). Pt appears to be functioning below baseline with very poor activity tolerance/generalized weakness and c/o \"feeling like my legs are going to give out\" during brief gait training bout. Pt completed sit to supine transfer requiring SBA, sit to/from stand transfers requiring CGA, and required MIN assist for gait training bout 25ft (plus R HHA) with unsteadiness/weakness throughout bout. Recommending IP rehab at d/c, as pt is not safe to return home alone and is at increased risk for falls. Plan to see 3x/week at [facility] for progression of mobility and assess transfers/gait with RW at next session. PPE used: mask, face shield, gloves  -AO     Row Name 07/01/20 1207          Physical Therapy Clinical Impression    Criteria for Skilled Interventions Met (PT Clinical Impression)  yes;treatment indicated  -AO     Rehab Potential (PT Clinical Summary)  good, to achieve stated therapy goals  -AO     Row Name 07/01/20 1207          Vital Signs    Recovery Time  Vitals stable and WNL throughout session: BP in sitting EOB: 104/67 mmHg, HR: 67bpm, Sp02: 95% on RA  -AO     Row Name 07/01/20 1207          Positioning and Restraints    Pre-Treatment Position  " sitting in chair/recliner  -AO     Post Treatment Position  bed  -AO     In Bed  supine;call light within reach;encouraged to call for assist;exit alarm on  -AO       User Key  (r) = Recorded By, (t) = Taken By, (c) = Cosigned By    Initials Name Provider Type    Devorah Gamboa, PT Physical Therapist        Outcome Measures    No documentation.       Physical Therapy Education                 Title: PT OT SLP Therapies (Done)     Topic: Physical Therapy (Done)     Point: Mobility training (Done)     Description:   Instruct learner(s) on safety and technique for assisting patient out of bed, chair or wheelchair.  Instruct in the proper use of assistive devices, such as walker, crutches, cane or brace.              Patient Friendly Description:   It's important to get you on your feet again, but we need to do so in a way that is safe for you. Falling has serious consequences, and your personal safety is the most important thing of all.        When it's time to get out of bed, one of us or a family member will sit next to you on the bed to give you support.     If your doctor or nurse tells you to use a walker, crutches, a cane, or a brace, be sure you use it every time you get out of bed, even if you think you don't need it.    Learning Progress Summary           Patient Acceptance, E,TB, VU by AO at 7/1/2020 1243    Comment:  Educated pt on spinal precautions, on safe bed mobility/transfers/gait training, and on POC with anticipated physical therapy needs.                   Point: Precautions (Done)     Description:   Instruct learner(s) on prescribed precautions during mobility and gait tasks              Learning Progress Summary           Patient Acceptance, E,TB, VU by AO at 7/1/2020 1243    Comment:  Educated pt on spinal precautions, on safe bed mobility/transfers/gait training, and on POC with anticipated physical therapy needs.                               User Key     Initials Effective Dates Name  "Provider Type Discipline    AO 03/01/19 -  Devorah Dey, PT Physical Therapist PT              PT Recommendation and Plan  Planned Therapy Interventions (PT Eval): balance training, bed mobility training, gait training, neuromuscular re-education, patient/family education, strengthening, transfer training  Outcome Summary/Treatment Plan (PT)  Anticipated Discharge Disposition (PT): inpatient rehabilitation facility  Plan of Care Reviewed With: patient  Progress: no change  Outcome Summary: Pt is a 69 y/o F who presented to [facility] with intactable N/V x 2 days of unclear etiology (CT abdomen (-), UA (+)) and s/p recent C3-4 and C5-6 fusion 06/09/2020 (repeat imaging C-spine (-)). Pt appears to be functioning below baseline with very poor activity tolerance/generalized weakness and c/o \"feeling like my legs are going to give out\" during brief gait training bout. Pt completed sit to supine transfer requiring SBA, sit to/from stand transfers requiring CGA, and required MIN assist for gait training bout 25ft (plus R HHA) with unsteadiness/weakness throughout bout. Recommending IP rehab at d/c, as pt is not safe to return home alone and is at increased risk for falls. Plan to see 3x/week at [facility] for progression of mobility and assess transfers/gait with RW at next session. PPE used: mask, face shield, gloves     Time Calculation:   PT Charges     Row Name 07/01/20 1244             Time Calculation    Start Time  1207  -AO      Stop Time  1223  -AO      Time Calculation (min)  16 min  -AO      PT Received On  07/01/20  -AO      PT - Next Appointment  07/02/20  -AO      PT Goal Re-Cert Due Date  07/15/20  -AO         Time Calculation- PT    Total Timed Code Minutes- PT  0 minute(s)  -AO        User Key  (r) = Recorded By, (t) = Taken By, (c) = Cosigned By    Initials Name Provider Type    AO Devorah Dey, PT Physical Therapist        Therapy Charges for Today     Code Description Service Date Service Provider Modifiers " Qty    [id number]  PT EVAL MOD COMPLEXITY 3 7/1/2020 Devorah Dey, PT GP 1               Devorah Dey, PT  7/1/2020         Electronically signed by Devorah Dey, PT at 07/01/20 1246       Occupational Therapy Notes (most recent note)    No notes exist for this encounter.

## 2020-07-01 NOTE — PLAN OF CARE
Problem: Patient Care Overview  Goal: Plan of Care Review  Outcome: Ongoing (interventions implemented as appropriate)  Flowsheets (Taken 7/1/2020 9419)  Progress: no change  Plan of Care Reviewed With: patient  Outcome Summary: Patient c/o pain throughout the night, but was able to sleep comfortably.  Patient stated she did not eat dinner.  Patient took medications with apple sauce and was convinced to drink some milk and eat crackers.  This helped to resolve nausea which patient complained of while taking medication.  Explained to patient that medications on an empty stomach might be making her feel ill.  Will continue to monitor.

## 2020-07-01 NOTE — PROGRESS NOTES
Continued Stay Note  [facility]     Patient Name: Ashley Lin  MRN: [id number]  Today's Date: 7/1/2020      Discharge Plan     Row Name 07/01/20 2113       Plan    Plan  [facility] at d/c pending pre-cert. Pre-cert will be started 7/2. (New [facility] Medicare policy effective 7/1). SW will complete PASRR prior to d/c.     Plan Comments  Per [facility] liashaquille, pt has been clinically accepted. Liaison will start pre-cert first thing in the morning on 7/2.         Discharge Plan     Row Name 07/01/20 1646       Plan    Plan  [facility] referral pending. Will require pre-cert for inpt rehab. ([facility] Medicare policy effective 7/1). SW will complete PASRR prior to d/c.     Plan Comments  Per [facility] mindy, pt's Medicare policy is no longer effective. Per [facility] liaison, pt transitioned to [facility] [employer] effective 7/1, and [facility] is out of network with their facility. Pt does not have [facility] benefits for their facility. SW made referral to pt's 2nd choice, [facility], via Epic and text message.     Row Name 07/01/20 1356       Plan    Plan  [facility] referral pending. No PASRR or pre-cert needed. 2nd choice is [facility] if needed.     Plan Comments  Referral made to [facility] per CM conversation with patient. Referral made via text and Epic.         Rebeka Ryan, MSSW, LSW  PRN   Phone: [phone]

## 2020-07-01 NOTE — THERAPY EVALUATION
"Patient Name: Ashley Lin  : 1950    MRN: [id number]                              Today's Date: 2020       Admit Date: 2020    Visit Dx:     ICD-10-CM ICD-9-CM   1. Intractable vomiting with nausea, unspecified vomiting type R11.2 536.2   2. Cervical pain M54.2 723.1     Patient Active Problem List   Diagnosis   • Intractable vomiting   • Asthma   • Cervical disc herniation   • Cervical stenosis of spine   • Chronic obstructive pulmonary disease (CMS/HCC)   • Low back pain   • Pseudarthrosis following spinal fusion   • History of lumbar fusion   • S/P cervical spinal fusion   • Hypothyroidism (acquired)   • Estrogen deficiency   • Depression   • Migraines   • Chronic pain syndrome   • GERD without esophagitis     Past Medical History:   Diagnosis Date   • Chronic back pain    • Chronic obstructive pulmonary disease (CMS/HCC) 2020   • Chronic pain syndrome 2020   • Depression 2020   • Disease of thyroid gland    • Estrogen deficiency 2020   • GERD without esophagitis 2020   • Hypothyroidism (acquired) 2020   • Migraines 2020   • Vomiting 2020     Past Surgical History:   Procedure Laterality Date   • CHOLECYSTECTOMY     • HYSTERECTOMY     • NECK SURGERY     • SPINE SURGERY  2020     General Information     Row Name 20 1207          PT Evaluation Time/Intention    Document Type  evaluation  -AO     Mode of Treatment  physical therapy  -AO     Row Name 20 120          General Information    Patient Profile Reviewed?  yes  -AO     Prior Level of Function  -- Previously independent w/ household mobility with no AD, reports she was \"confined to a w/c for 6 months last year\"; has cane/RW that she was using prior to admit 2*/2 onset of weakness/N/V  -AO     Existing Precautions/Restrictions  fall;spinal C-spine sx 2020, per pt, she has C-collar at home but it did not make it to the hospital  -AO     Barriers to Rehab  none identified  -AO     " Row Name 07/01/20 1207          Relationship/Environment    Lives With  alone Pt lives alone but reports her father helps with grocery shopping and driving  -AO     Row Name 07/01/20 1207          Resource/Environmental Concerns    Current Living Arrangements  home/apartment/condo  -AO     Row Name 07/01/20 1207          Home Main Entrance    Number of Stairs, Main Entrance  none  -AO     Row Name 07/01/20 1207          Stairs Within Home, Primary    Number of Stairs, Within Home, Primary  none  -AO     Row Name 07/01/20 1207          Cognitive Assessment/Intervention- PT/OT    Orientation Status (Cognition)  oriented x 3  -AO     Row Name 07/01/20 1207          Safety Issues, Functional Mobility    Impairments Affecting Function (Mobility)  balance;endurance/activity tolerance;pain;sensation/sensory awareness;strength  -AO       User Key  (r) = Recorded By, (t) = Taken By, (c) = Cosigned By    Initials Name Provider Type    AO Devorah Dey, PT Physical Therapist        Mobility     Row Name 07/01/20 1207          Bed Mobility Assessment/Treatment    Bed Mobility Assessment/Treatment  sit-supine  -AO     Sit-Supine [place] (Bed Mobility)  supervision log roll technique  -AO     Assistive Device (Bed Mobility)  bed rails  -AO     Row Name 07/01/20 1207          Sit-Stand Transfer    Sit-Stand [place] (Transfers)  contact guard  -AO     Assistive Device (Sit-Stand Transfers)  -- IV pole for support  -AO     Row Name 07/01/20 1207          Gait/Stairs Assessment/Training    Gait/Stairs Assessment/Training  distance ambulated  -AO     [place] Level (Gait)  minimum assist (75% patient effort) JUDY DILLARDA  -AO     Assistive Device (Gait)  -- IV pole  -AO     Distance in Feet (Gait)  25 ft  -AO     Pattern (Gait)  step-to  -AO     Deviations/Abnormal Patterns (Gait)  base of support, narrow;erin decreased;gait speed decreased;festinating/shuffling;stride length decreased  -AO     Bilateral Gait Deviations   heel strike decreased  -AO     Comment (Gait/Stairs)  Pt very unsteady with c/o feeling like her LEs were going to give out 1/2 through gait training bout, decreased B knee extension during stance phase of gait  -AO       User Key  (r) = Recorded By, (t) = Taken By, (c) = Cosigned By    Initials Name Provider Type    AO Devorah Dey, PT Physical Therapist        Obj/Interventions     Row Name 07/01/20 1207          General ROM    GENERAL ROM COMMENTS  BLE AROM WFL  -AO     Row Name 07/01/20 1207          MMT (Manual Muscle Testing)    General MMT Comments  BLEs: 4-/5  -AO     Row Name 07/01/20 1207          Static Sitting Balance    Level of [place] (Unsupported Sitting, Static Balance)  conditional independence  -AO     Sitting Position (Unsupported Sitting, Static Balance)  sitting on edge of bed  -AO     Time Able to Maintain Position (Unsupported Sitting, Static Balance)  more than 5 minutes  -AO     Row Name 07/01/20 1207          Dynamic Sitting Balance    Level of [place], Reaches Outside Midline (Sitting, Dynamic Balance)  supervision  -AO     Sitting Position, Reaches Outside Midline (Sitting, Dynamic Balance)  sitting on edge of bed  -AO     Row Name 07/01/20 1207          Static Standing Balance    Level of [place] (Supported Standing, Static Balance)  contact guard assist  -AO     Time Able to Maintain Position (Supported Standing, Static Balance)  45 to 60 seconds  -AO     Assistive Device Utilized (Supported Standing, Static Balance)  -- IV pole  -AO     Row Name 07/01/20 1207          Dynamic Standing Balance    Level of [place], Reaches Outside Midline (Standing, Dynamic Balance)  minimal assist, 75% patient effort RUE HHA  -AO     Time Able to Maintain Position, Reaches Outside Midline (Standing, Dynamic Balance)  45 to 60 seconds  -AO     Assistive Device Utilized (Supported Standing, Dynamic Balance)  -- IV pole  -AO     Row Name 07/01/20 1207          Sensory  "Assessment/Intervention    Sensory General Assessment  -- Pt reports BLE \"heaviness\"  -AO       User Key  (r) = Recorded By, (t) = Taken By, (c) = Cosigned By    Initials Name Provider Type    Devorah Gamboa PT Physical Therapist        Goals/Plan     Row Name 07/01/20 [email or web address]          Bed Mobility Goal 1 (PT)    Activity/Assistive Device (Bed Mobility Goal 1, PT)  bed mobility activities, all  -AO     [place] Level/Cues Needed (Bed Mobility Goal 1, PT)  conditional independence  -AO     Time Frame (Bed Mobility Goal 1, PT)  2 weeks;long term goal (LTG)  -AO     Progress/Outcomes (Bed Mobility Goal 1, PT)  goal not met  -AO     Row Name 07/01/20 [email or web address]          Transfer Goal 1 (PT)    Activity/Assistive Device (Transfer Goal 1, PT)  transfers, all;walker, rolling  -AO     [place] Level/Cues Needed (Transfer Goal 1, PT)  supervision required  -AO     Time Frame (Transfer Goal 1, PT)  2 weeks;long term goal (LTG)  -AO     Progress/Outcome (Transfer Goal 1, PT)  goal not met  -AO     Row Name 07/01/20 [email or web address]          Gait Training Goal 1 (PT)    Activity/Assistive Device (Gait Training Goal 1, PT)  gait (walking locomotion);walker, rolling  -AO     [place] Level (Gait Training Goal 1, PT)  contact guard assist  -AO     Distance (Gait Goal 1, PT)  100 ft  -AO     Time Frame (Gait Training Goal 1, PT)  2 weeks;long term goal (LTG)  -AO     Progress/Outcome (Gait Training Goal 1, PT)  goal not met  -AO       User Key  (r) = Recorded By, (t) = Taken By, (c) = Cosigned By    Initials Name Provider Type    Devorah Gamboa PT Physical Therapist        Clinical Impression     Row Name 07/01/20 [email or web address]          Pain Assessment    Additional Documentation  Pain Scale: Numbers Pre/Post-Treatment (Group)  -AO     [facility] Name 07/01/20 [email or web address]          Pain Scale: Numbers Pre/Post-Treatment    Pain Scale: Numbers, Pretreatment  6/10  -AO     Pain Scale: Numbers, Post-Treatment  6/10  -AO     Pain Location  neck  -AO     " "Row Name 07/01/20 1207          Plan of Care Review    Plan of Care Reviewed With  patient  -AO     Progress  no change  -AO     Outcome Summary  Pt is a 69 y/o F who presented to [facility] with intactable N/V x 2 days of unclear etiology (CT abdomen (-), UA (+)) and s/p recent C3-4 and C5-6 fusion 06/09/2020 (repeat imaging C-spine (-)). Pt appears to be functioning below baseline with very poor activity tolerance/generalized weakness and c/o \"feeling like my legs are going to give out\" during brief gait training bout. Pt completed sit to supine transfer requiring SBA, sit to/from stand transfers requiring CGA, and required MIN assist for gait training bout 25ft (plus R HHA) with unsteadiness/weakness throughout bout. Recommending IP rehab at d/c, as pt is not safe to return home alone and is at increased risk for falls. Plan to see 3x/week at [facility] for progression of mobility and assess transfers/gait with RW at next session. PPE used: mask, face shield, gloves  -AO     Row Name 07/01/20 1207          Physical Therapy Clinical Impression    Criteria for Skilled Interventions Met (PT Clinical Impression)  yes;treatment indicated  -AO     Rehab Potential (PT Clinical Summary)  good, to achieve stated therapy goals  -AO     Row Name 07/01/20 1207          Vital Signs    Recovery Time  Vitals stable and WNL throughout session: BP in sitting EOB: 104/67 mmHg, HR: 67bpm, Sp02: 95% on RA  -AO     Row Name 07/01/20 1207          Positioning and Restraints    Pre-Treatment Position  sitting in chair/recliner  -AO     Post Treatment Position  bed  -AO     In Bed  supine;call light within reach;encouraged to call for assist;exit alarm on  -AO       User Key  (r) = Recorded By, (t) = Taken By, (c) = Cosigned By    Initials Name Provider Type    Devorah Gamboa, PT Physical Therapist        Outcome Measures    No documentation.       Physical Therapy Education                 Title: PT OT SLP Therapies (Done)     Topic: Physical " Therapy (Done)     Point: Mobility training (Done)     Description:   Instruct learner(s) on safety and technique for assisting patient out of bed, chair or wheelchair.  Instruct in the proper use of assistive devices, such as walker, crutches, cane or brace.              Patient Friendly Description:   It's important to get you on your feet again, but we need to do so in a way that is safe for you. Falling has serious consequences, and your personal safety is the most important thing of all.        When it's time to get out of bed, one of us or a family member will sit next to you on the bed to give you support.     If your doctor or nurse tells you to use a walker, crutches, a cane, or a brace, be sure you use it every time you get out of bed, even if you think you don't need it.    Learning Progress Summary           Patient Acceptance, E,TB, VU by AO at 7/1/2020 1243    Comment:  Educated pt on spinal precautions, on safe bed mobility/transfers/gait training, and on POC with anticipated physical therapy needs.                   Point: Precautions (Done)     Description:   Instruct learner(s) on prescribed precautions during mobility and gait tasks              Learning Progress Summary           Patient Acceptance, E,TB, VU by AO at 7/1/2020 1243    Comment:  Educated pt on spinal precautions, on safe bed mobility/transfers/gait training, and on POC with anticipated physical therapy needs.                               User Key     Initials Effective Dates Name Provider Type Discipline    AO 03/01/19 -  Devorah Dey, PT Physical Therapist PT              PT Recommendation and Plan  Planned Therapy Interventions (PT Eval): balance training, bed mobility training, gait training, neuromuscular re-education, patient/family education, strengthening, transfer training  Outcome Summary/Treatment Plan (PT)  Anticipated Discharge Disposition (PT): inpatient rehabilitation facility  Plan of Care Reviewed With:  "patient  Progress: no change  Outcome Summary: Pt is a 71 y/o F who presented to [facility] with intactable N/V x 2 days of unclear etiology (CT abdomen (-), UA (+)) and s/p recent C3-4 and C5-6 fusion 06/09/2020 (repeat imaging C-spine (-)). Pt appears to be functioning below baseline with very poor activity tolerance/generalized weakness and c/o \"feeling like my legs are going to give out\" during brief gait training bout. Pt completed sit to supine transfer requiring SBA, sit to/from stand transfers requiring CGA, and required MIN assist for gait training bout 25ft (plus R HHA) with unsteadiness/weakness throughout bout. Recommending IP rehab at d/c, as pt is not safe to return home alone and is at increased risk for falls. Plan to see 3x/week at [facility] for progression of mobility and assess transfers/gait with RW at next session. PPE used: mask, face shield, gloves     Time Calculation:   PT Charges     Row Name 07/01/20 1244             Time Calculation    Start Time  1207  -AO      Stop Time  1223  -AO      Time Calculation (min)  16 min  -AO      PT Received On  07/01/20  -AO      PT - Next Appointment  07/02/20  -AO      PT Goal Re-Cert Due Date  07/15/20  -AO         Time Calculation- PT    Total Timed Code Minutes- PT  0 minute(s)  -AO        User Key  (r) = Recorded By, (t) = Taken By, (c) = Cosigned By    Initials Name Provider Type    AO Devorah Dey, PT Physical Therapist        Therapy Charges for Today     Code Description Service Date Service Provider Modifiers Qty    [id number]  PT EVAL MOD COMPLEXITY 3 7/1/2020 Devorah Dey, PT GP 1               Devorah Dey, PT  7/1/2020       "

## 2020-07-01 NOTE — PROGRESS NOTES
Discharge Planning Assessment   [facility]     Patient Name: Ashley Lin  MRN: [id number]  Today's Date: 7/1/2020    Admit Date: 6/30/2020    Discharge Needs Assessment     Row Name 07/01/20 1053       Living Environment    Lives With  alone    Primary Care Provided by  self    Provides Primary Care For  no one, unable/limited ability to care for self    Family Caregiver if Needed  parent(s)    Able to Return to Prior Arrangements  yes       Resource/Environmental Concerns    Transportation Concerns  car, none       Discharge Needs Assessment    Readmission Within the Last 30 Days  no previous admission in last 30 days    Concerns to be Addressed  discharge planning    Anticipated Changes Related to Illness  none    Equipment Needed After Discharge  none    Outpatient/Agency/Support Group Needs  homecare agency    Current Discharge Risk  lives alone        Discharge Plan     Row Name 07/01/20 1058       Plan    Plan  D/C Plan: Pending PT yunior.  Pt states she has Home Health through [facility] - agency not verified.    Patient/Family in Agreement with Plan  yes    Plan Comments  Due to Covid-19 called and spoke to pt in room.  Pt said she lives alone, uses walker and shower chair, and has help from her father.  She is independent with ADL's, but she states since her neck surgery 3 weeks ago, she is in need of help around the house.  She said she has home health care arranged through agency called [facility] that is starting next week.  Pt said home health care coordinator is Tawanna Jaime.  Pt PCP is Dr. Shirley Fairchild in [place].  Pharmacy verified.  No discharge needs identified at this time.  D/C Barriers: IVF's.                Expected Discharge Date and Time     Expected Discharge Date Expected Discharge Time    Jul 2, 2020         Demographic Summary     Row Name 07/01/20 1051       General Information    Admission Type  observation    Arrived From  emergency department    Referral Source   admission list    Reason for Consult  discharge planning    Preferred Language  English     Used During This Interaction  no        Functional Status     Row Name 07/01/20 1053       Functional Status    Usual Activity Tolerance  good    Current Activity Tolerance  good       Functional Status, IADL    Medications  independent    Meal Preparation  independent    Housekeeping  independent    Laundry  independent    Shopping  independent       Mental Status Summary    Recent Changes in Mental Status/Cognitive Functioning  no changes                   Katharina Nguyen RN

## 2020-07-01 NOTE — PROGRESS NOTES
Continued Stay Note  [facility]     Patient Name: Ashley Lin  MRN: [id number]  Today's Date: 7/1/2020    Admit Date: 6/30/2020    Discharge Plan     Row Name 07/01/20 1356       Plan    Plan  [facility] referral pending. No PASRR or pre-cert needed. 2nd choice is [facility] if needed.     Row Name 07/01/20 9977       Plan    Plan Comments  Spoke with pt via phone, made aware that PT is recommending inpt rehab.  She is requesting referral made to [facility] and [facility].  MSW notified.               Expected Discharge Date and Time     Expected Discharge Date Expected Discharge Time    Jul 2, 2020             Katharina Nguyen RN

## 2020-07-02 ENCOUNTER — ANESTHESIA (OUTPATIENT)
Dept: GASTROENTEROLOGY | Facility: HOSPITAL | Age: 70
End: 2020-07-02

## 2020-07-02 ENCOUNTER — ON CAMPUS - OUTPATIENT (AMBULATORY)
Dept: URBAN - METROPOLITAN AREA HOSPITAL 85 | Facility: HOSPITAL | Age: 70
End: 2020-07-02

## 2020-07-02 ENCOUNTER — ANESTHESIA EVENT (OUTPATIENT)
Dept: GASTROENTEROLOGY | Facility: HOSPITAL | Age: 70
End: 2020-07-02

## 2020-07-02 DIAGNOSIS — Z98.890 OTHER SPECIFIED POSTPROCEDURAL STATES: ICD-10-CM

## 2020-07-02 DIAGNOSIS — G43.A1 CYCLICAL VOMITING, IN MIGRAINE, INTRACTABLE: ICD-10-CM

## 2020-07-02 DIAGNOSIS — K31.84 GASTROPARESIS: ICD-10-CM

## 2020-07-02 LAB
ALBUMIN SERPL-MCNC: 3.7 G/DL (ref 3.5–5.2)
ALBUMIN/GLOB SERPL: 1.9 G/DL
ALP SERPL-CCNC: 104 U/L (ref 39–117)
ALT SERPL W P-5'-P-CCNC: 11 U/L (ref 1–33)
ANION GAP SERPL CALCULATED.3IONS-SCNC: 12 MMOL/L (ref 5–15)
AST SERPL-CCNC: 15 U/L (ref 1–32)
BASOPHILS # BLD AUTO: 0 10*3/MM3 (ref 0–0.2)
BASOPHILS NFR BLD AUTO: 0.6 % (ref 0–1.5)
BILIRUB SERPL-MCNC: <0.2 MG/DL (ref 0.2–1.2)
BUN SERPL-MCNC: 9 MG/DL (ref 8–23)
BUN SERPL-MCNC: ABNORMAL MG/DL
BUN/CREAT SERPL: ABNORMAL
CALCIUM SPEC-SCNC: 8.4 MG/DL (ref 8.6–10.5)
CHLORIDE SERPL-SCNC: 108 MMOL/L (ref 98–107)
CO2 SERPL-SCNC: 23 MMOL/L (ref 22–29)
CREAT SERPL-MCNC: 0.73 MG/DL (ref 0.57–1)
DEPRECATED RDW RBC AUTO: 45.5 FL (ref 37–54)
EOSINOPHIL # BLD AUTO: 0.1 10*3/MM3 (ref 0–0.4)
EOSINOPHIL NFR BLD AUTO: 2.1 % (ref 0.3–6.2)
ERYTHROCYTE [DISTWIDTH] IN BLOOD BY AUTOMATED COUNT: 13.2 % (ref 12.3–15.4)
GFR SERPL CREATININE-BSD FRML MDRD: 79 ML/MIN/1.73
GLOBULIN UR ELPH-MCNC: 1.9 GM/DL
GLUCOSE SERPL-MCNC: 114 MG/DL (ref 65–99)
HCT VFR BLD AUTO: 35.8 % (ref 34–46.6)
HGB BLD-MCNC: 12 G/DL (ref 12–15.9)
INR PPP: 1.06 (ref 0.9–1.1)
LYMPHOCYTES # BLD AUTO: 2.2 10*3/MM3 (ref 0.7–3.1)
LYMPHOCYTES NFR BLD AUTO: 39.1 % (ref 19.6–45.3)
MAGNESIUM SERPL-MCNC: 1.7 MG/DL (ref 1.6–2.4)
MCH RBC QN AUTO: 32.7 PG (ref 26.6–33)
MCHC RBC AUTO-ENTMCNC: 33.4 G/DL (ref 31.5–35.7)
MCV RBC AUTO: 97.8 FL (ref 79–97)
MONOCYTES # BLD AUTO: 0.6 10*3/MM3 (ref 0.1–0.9)
MONOCYTES NFR BLD AUTO: 11.1 % (ref 5–12)
NEUTROPHILS NFR BLD AUTO: 2.7 10*3/MM3 (ref 1.7–7)
NEUTROPHILS NFR BLD AUTO: 47.1 % (ref 42.7–76)
NRBC BLD AUTO-RTO: 0.1 /100 WBC (ref 0–0.2)
PLATELET # BLD AUTO: 209 10*3/MM3 (ref 140–450)
PMV BLD AUTO: 7.6 FL (ref 6–12)
POTASSIUM SERPL-SCNC: 4.4 MMOL/L (ref 3.5–5.2)
PROT SERPL-MCNC: 5.6 G/DL (ref 6–8.5)
PROTHROMBIN TIME: 11 SECONDS (ref 9.6–11.7)
RBC # BLD AUTO: 3.66 10*6/MM3 (ref 3.77–5.28)
SODIUM SERPL-SCNC: 143 MMOL/L (ref 136–145)
WBC # BLD AUTO: 5.6 10*3/MM3 (ref 3.4–10.8)

## 2020-07-02 PROCEDURE — A9270 NON-COVERED ITEM OR SERVICE: HCPCS | Performed by: INTERNAL MEDICINE

## 2020-07-02 PROCEDURE — 63710000001 OXCARBAZEPINE 150 MG TABLET: Performed by: INTERNAL MEDICINE

## 2020-07-02 PROCEDURE — 43450 DILATE ESOPHAGUS 1/MULT PASS: CPT | Performed by: INTERNAL MEDICINE

## 2020-07-02 PROCEDURE — 85610 PROTHROMBIN TIME: CPT | Performed by: NURSE PRACTITIONER

## 2020-07-02 PROCEDURE — 83735 ASSAY OF MAGNESIUM: CPT | Performed by: PHYSICIAN ASSISTANT

## 2020-07-02 PROCEDURE — 43235 EGD DIAGNOSTIC BRUSH WASH: CPT | Performed by: INTERNAL MEDICINE

## 2020-07-02 PROCEDURE — 25010000002 PROPOFOL 10 MG/ML EMULSION: Performed by: ANESTHESIOLOGY

## 2020-07-02 PROCEDURE — 63710000001 CYCLOSPORINE 0.05 % EMULSION: Performed by: INTERNAL MEDICINE

## 2020-07-02 PROCEDURE — 97530 THERAPEUTIC ACTIVITIES: CPT

## 2020-07-02 PROCEDURE — 63710000001 AMITRIPTYLINE 25 MG TABLET: Performed by: INTERNAL MEDICINE

## 2020-07-02 PROCEDURE — 63710000001 SUCRALFATE 1 G TABLET: Performed by: INTERNAL MEDICINE

## 2020-07-02 PROCEDURE — 99225 PR SBSQ OBSERVATION CARE/DAY 25 MINUTES: CPT | Performed by: HOSPITALIST

## 2020-07-02 PROCEDURE — G0378 HOSPITAL OBSERVATION PER HR: HCPCS

## 2020-07-02 PROCEDURE — 94799 UNLISTED PULMONARY SVC/PX: CPT

## 2020-07-02 PROCEDURE — 63710000001 TOPIRAMATE 100 MG TABLET: Performed by: INTERNAL MEDICINE

## 2020-07-02 PROCEDURE — 80053 COMPREHEN METABOLIC PANEL: CPT | Performed by: NURSE PRACTITIONER

## 2020-07-02 PROCEDURE — 63710000001 METOCLOPRAMIDE 10 MG TABLET: Performed by: INTERNAL MEDICINE

## 2020-07-02 PROCEDURE — 97110 THERAPEUTIC EXERCISES: CPT

## 2020-07-02 PROCEDURE — 63710000001 PROPRANOLOL 10 MG TABLET: Performed by: INTERNAL MEDICINE

## 2020-07-02 PROCEDURE — 85025 COMPLETE CBC W/AUTO DIFF WBC: CPT | Performed by: NURSE PRACTITIONER

## 2020-07-02 PROCEDURE — 63710000001 OXYCODONE 5 MG TABLET: Performed by: INTERNAL MEDICINE

## 2020-07-02 PROCEDURE — 63710000001 GABAPENTIN 400 MG CAPSULE: Performed by: INTERNAL MEDICINE

## 2020-07-02 PROCEDURE — 63710000001 MONTELUKAST 10 MG TABLET: Performed by: INTERNAL MEDICINE

## 2020-07-02 RX ORDER — PROMETHAZINE HYDROCHLORIDE 25 MG/ML
6.25 INJECTION, SOLUTION INTRAMUSCULAR; INTRAVENOUS ONCE AS NEEDED
Status: DISCONTINUED | OUTPATIENT
Start: 2020-07-02 | End: 2020-07-02 | Stop reason: HOSPADM

## 2020-07-02 RX ORDER — METOCLOPRAMIDE 10 MG/1
10 TABLET ORAL
Status: DISCONTINUED | OUTPATIENT
Start: 2020-07-02 | End: 2020-07-06 | Stop reason: HOSPADM

## 2020-07-02 RX ORDER — PROMETHAZINE HYDROCHLORIDE 25 MG/1
25 TABLET ORAL ONCE AS NEEDED
Status: DISCONTINUED | OUTPATIENT
Start: 2020-07-02 | End: 2020-07-02 | Stop reason: HOSPADM

## 2020-07-02 RX ORDER — ONDANSETRON 2 MG/ML
4 INJECTION INTRAMUSCULAR; INTRAVENOUS EVERY 6 HOURS PRN
Status: DISCONTINUED | OUTPATIENT
Start: 2020-07-02 | End: 2020-07-02 | Stop reason: SDUPTHER

## 2020-07-02 RX ORDER — LIDOCAINE HYDROCHLORIDE 10 MG/ML
INJECTION, SOLUTION EPIDURAL; INFILTRATION; INTRACAUDAL; PERINEURAL AS NEEDED
Status: DISCONTINUED | OUTPATIENT
Start: 2020-07-02 | End: 2020-07-02 | Stop reason: SURG

## 2020-07-02 RX ORDER — SODIUM CHLORIDE 0.9 % (FLUSH) 0.9 %
3 SYRINGE (ML) INJECTION EVERY 12 HOURS SCHEDULED
Status: DISCONTINUED | OUTPATIENT
Start: 2020-07-02 | End: 2020-07-02 | Stop reason: HOSPADM

## 2020-07-02 RX ORDER — HYDRALAZINE HYDROCHLORIDE 20 MG/ML
5 INJECTION INTRAMUSCULAR; INTRAVENOUS
Status: DISCONTINUED | OUTPATIENT
Start: 2020-07-02 | End: 2020-07-02 | Stop reason: HOSPADM

## 2020-07-02 RX ORDER — LABETALOL HYDROCHLORIDE 5 MG/ML
5 INJECTION, SOLUTION INTRAVENOUS
Status: DISCONTINUED | OUTPATIENT
Start: 2020-07-02 | End: 2020-07-02 | Stop reason: HOSPADM

## 2020-07-02 RX ORDER — PROMETHAZINE HYDROCHLORIDE 25 MG/1
25 SUPPOSITORY RECTAL ONCE AS NEEDED
Status: DISCONTINUED | OUTPATIENT
Start: 2020-07-02 | End: 2020-07-02 | Stop reason: HOSPADM

## 2020-07-02 RX ORDER — PROPOFOL 10 MG/ML
VIAL (ML) INTRAVENOUS AS NEEDED
Status: DISCONTINUED | OUTPATIENT
Start: 2020-07-02 | End: 2020-07-02 | Stop reason: SURG

## 2020-07-02 RX ORDER — SODIUM CHLORIDE 0.9 % (FLUSH) 0.9 %
10 SYRINGE (ML) INJECTION AS NEEDED
Status: DISCONTINUED | OUTPATIENT
Start: 2020-07-02 | End: 2020-07-02 | Stop reason: HOSPADM

## 2020-07-02 RX ORDER — ONDANSETRON 4 MG/1
4 TABLET, FILM COATED ORAL EVERY 6 HOURS PRN
Status: DISCONTINUED | OUTPATIENT
Start: 2020-07-02 | End: 2020-07-02 | Stop reason: SDUPTHER

## 2020-07-02 RX ORDER — MEPERIDINE HYDROCHLORIDE 25 MG/ML
12.5 INJECTION INTRAMUSCULAR; INTRAVENOUS; SUBCUTANEOUS
Status: DISCONTINUED | OUTPATIENT
Start: 2020-07-02 | End: 2020-07-02 | Stop reason: HOSPADM

## 2020-07-02 RX ORDER — ONDANSETRON 2 MG/ML
4 INJECTION INTRAMUSCULAR; INTRAVENOUS ONCE AS NEEDED
Status: DISCONTINUED | OUTPATIENT
Start: 2020-07-02 | End: 2020-07-02 | Stop reason: HOSPADM

## 2020-07-02 RX ORDER — PHENYLEPHRINE HCL IN 0.9% NACL 0.5 MG/5ML
.5-3 SYRINGE (ML) INTRAVENOUS
Status: DISCONTINUED | OUTPATIENT
Start: 2020-07-02 | End: 2020-07-02 | Stop reason: HOSPADM

## 2020-07-02 RX ADMIN — IPRATROPIUM BROMIDE AND ALBUTEROL SULFATE 3 ML: .5; 3 SOLUTION RESPIRATORY (INHALATION) at 15:42

## 2020-07-02 RX ADMIN — Medication 400 UNITS: at 08:34

## 2020-07-02 RX ADMIN — OXYCODONE HYDROCHLORIDE 10 MG: 5 TABLET ORAL at 03:57

## 2020-07-02 RX ADMIN — TOPIRAMATE 100 MG: 100 TABLET, FILM COATED ORAL at 08:34

## 2020-07-02 RX ADMIN — BUDESONIDE 0.5 MG: 0.5 INHALANT RESPIRATORY (INHALATION) at 23:31

## 2020-07-02 RX ADMIN — METOCLOPRAMIDE 10 MG: 10 TABLET ORAL at 17:02

## 2020-07-02 RX ADMIN — AMITRIPTYLINE HYDROCHLORIDE 25 MG: 25 TABLET, FILM COATED ORAL at 21:59

## 2020-07-02 RX ADMIN — IPRATROPIUM BROMIDE AND ALBUTEROL SULFATE 3 ML: .5; 3 SOLUTION RESPIRATORY (INHALATION) at 23:31

## 2020-07-02 RX ADMIN — OXYCODONE HYDROCHLORIDE 10 MG: 5 TABLET ORAL at 21:59

## 2020-07-02 RX ADMIN — TOPIRAMATE 100 MG: 100 TABLET, FILM COATED ORAL at 17:01

## 2020-07-02 RX ADMIN — OXYCODONE HYDROCHLORIDE 10 MG: 5 TABLET ORAL at 17:46

## 2020-07-02 RX ADMIN — OXYCODONE HYDROCHLORIDE 10 MG: 5 TABLET ORAL at 13:44

## 2020-07-02 RX ADMIN — BUDESONIDE 0.5 MG: 0.5 INHALANT RESPIRATORY (INHALATION) at 07:13

## 2020-07-02 RX ADMIN — OXYCODONE HYDROCHLORIDE 10 MG: 5 TABLET ORAL at 08:31

## 2020-07-02 RX ADMIN — Medication 10 ML: at 08:33

## 2020-07-02 RX ADMIN — SUCRALFATE 1 G: 1 TABLET ORAL at 11:27

## 2020-07-02 RX ADMIN — METOCLOPRAMIDE 10 MG: 10 TABLET ORAL at 11:27

## 2020-07-02 RX ADMIN — OXCARBAZEPINE 300 MG: 150 TABLET, FILM COATED ORAL at 08:34

## 2020-07-02 RX ADMIN — CYCLOSPORINE 1 DROP: 0.5 EMULSION OPHTHALMIC at 21:59

## 2020-07-02 RX ADMIN — MONTELUKAST SODIUM 10 MG: 10 TABLET, FILM COATED ORAL at 21:59

## 2020-07-02 RX ADMIN — IPRATROPIUM BROMIDE AND ALBUTEROL SULFATE 3 ML: .5; 3 SOLUTION RESPIRATORY (INHALATION) at 07:10

## 2020-07-02 RX ADMIN — GABAPENTIN 800 MG: 400 CAPSULE ORAL at 11:27

## 2020-07-02 RX ADMIN — PROPRANOLOL HYDROCHLORIDE 40 MG: 10 TABLET ORAL at 08:33

## 2020-07-02 RX ADMIN — PROPOFOL 50 MG: 10 INJECTION, EMULSION INTRAVENOUS at 10:07

## 2020-07-02 RX ADMIN — TOPIRAMATE 100 MG: 100 TABLET, FILM COATED ORAL at 11:27

## 2020-07-02 RX ADMIN — LEVOTHYROXINE SODIUM 25 MCG: 25 TABLET ORAL at 05:49

## 2020-07-02 RX ADMIN — KETOTIFEN FUMARATE 1 DROP: 0.25 SOLUTION OPHTHALMIC at 08:37

## 2020-07-02 RX ADMIN — GABAPENTIN 800 MG: 400 CAPSULE ORAL at 21:58

## 2020-07-02 RX ADMIN — LIDOCAINE HYDROCHLORIDE 50 MG: 10 INJECTION, SOLUTION EPIDURAL; INFILTRATION; INTRACAUDAL; PERINEURAL at 10:07

## 2020-07-02 RX ADMIN — ESCITALOPRAM OXALATE 20 MG: 10 TABLET ORAL at 08:34

## 2020-07-02 RX ADMIN — SUCRALFATE 1 G: 1 TABLET ORAL at 17:01

## 2020-07-02 RX ADMIN — PROPRANOLOL HYDROCHLORIDE 40 MG: 10 TABLET ORAL at 21:58

## 2020-07-02 RX ADMIN — SUCRALFATE 1 G: 1 TABLET ORAL at 08:34

## 2020-07-02 RX ADMIN — TOPIRAMATE 100 MG: 100 TABLET, FILM COATED ORAL at 21:59

## 2020-07-02 RX ADMIN — ESTRADIOL 1.5 MG: 1 TABLET ORAL at 08:33

## 2020-07-02 RX ADMIN — SUCRALFATE 1 G: 1 TABLET ORAL at 21:59

## 2020-07-02 RX ADMIN — GABAPENTIN 800 MG: 400 CAPSULE ORAL at 08:34

## 2020-07-02 RX ADMIN — GABAPENTIN 800 MG: 400 CAPSULE ORAL at 17:01

## 2020-07-02 RX ADMIN — SODIUM CHLORIDE 100 ML/HR: 900 INJECTION, SOLUTION INTRAVENOUS at 11:27

## 2020-07-02 RX ADMIN — Medication 10 ML: at 22:00

## 2020-07-02 RX ADMIN — Medication 3 ML: at 09:47

## 2020-07-02 RX ADMIN — OXCARBAZEPINE 300 MG: 150 TABLET, FILM COATED ORAL at 21:59

## 2020-07-02 RX ADMIN — LIDOCAINE HYDROCHLORIDE 30 MG: 10 INJECTION, SOLUTION EPIDURAL; INFILTRATION; INTRACAUDAL; PERINEURAL at 10:15

## 2020-07-02 RX ADMIN — PANTOPRAZOLE SODIUM 40 MG: 40 TABLET, DELAYED RELEASE ORAL at 05:49

## 2020-07-02 RX ADMIN — OXCARBAZEPINE 300 MG: 150 TABLET, FILM COATED ORAL at 17:01

## 2020-07-02 NOTE — PLAN OF CARE
"  Problem: Patient Care Overview  Goal: Plan of Care Review  Outcome: Ongoing (interventions implemented as appropriate)  Flowsheets  Taken 7/2/2020 1239  Progress: declining  Taken 7/2/2020 1153  Plan of Care Reviewed With: patient  Outcome Summary: Pt demonstrated decline in mobility this session and was unable to tolerate gait training secondary to BLE weakness and impaired balance in static standing. Pt with possible psychiatric/malingering symptoms (difficult to discern), as pt was able to adequately mobilize/use BLEs during bed mobility and supine ther-ex, completing exercises with ease, but was unable to fully WB in standing with B knee buckling and continuously repeats \"Oh my legs are jelly. They feel like they did when I was in a wheelchair all that time.\" Pt unable to extend B knees with verbal and tactile cues during standing and unsafe to attempt weight shifting or gait training this session. Recommending IP rehab at d/c, as pt is not safe for home and high risk for falls. Continue seeing 3x/week at [facility] for progression of mobility.  PPE used: gloves, mask, faceshield     "

## 2020-07-02 NOTE — OP NOTE
ESOPHAGOGASTRODUODENOSCOPY Procedure Report    Patient Name:  Ashley Lin  YOB: 1950    Date of Surgery:  7/2/2020     Pre-Op Diagnosis:  Intractable cyclical vomiting associated with migraine [G43.A1]       Post-Op Diagnosis Codes:  Normal esophagus dilated 54 French without resistance or trauma  Gastroparesis  Evidence of prior partial gastrectomy      Procedure/CPT® Codes:      Procedure(s):  ESOPHAGOGASTRODUODENOSCOPY WITH DILATATION (BOUGIE #[id number])    Staff:  Surgeon(s):  Benjamin Mike MD      Anesthesia: Monitored Anesthesia Care    Description of Procedure:  A description of the procedure as well as risks, benefits and alternative methods were explained to the patient who voiced understanding and signed the corresponding consent form. A physical exam was performed and vital signs were monitored throughout the procedure.    An upper GI endoscope was placed into the mouth and proceeded through the esophagus, stomach and second portion of the duodenum without difficulty. The scope was then retroflexed and the fundus was visualized. The procedure was not difficult and there were no immediate complications.    Findings:  Normal esophagus dilated 54 French without resistance or trauma  Gastroparesis  Evidence of prior partial gastrectomy    Impression:  Nausea and vomiting likely due to gastroparesis  Dysphagia status post dilation    Recommendations:  Continue pantoprazole  Start Reglan 10 mg 3 times daily  Gastroparesis diet  We will see patient as needed in the hospital and she can follow-up in the office      Benjamin Mike MD     Date: 7/2/2020    Time: 10:21

## 2020-07-02 NOTE — PLAN OF CARE
Problem: Patient Care Overview  Goal: Plan of Care Review  Outcome: Ongoing (interventions implemented as appropriate)  Flowsheets  Taken 7/2/2020 1513 by Diana Dempsye LPN  Progress: no change  Outcome Summary: Pt c/o pain this morning. Pt had EGD done. Pt resting comfortably. Pre cert to [facility]. Will continue to monitor.  Taken 7/2/2020 1153 by Devorah Dey, PT  Plan of Care Reviewed With: patient

## 2020-07-02 NOTE — THERAPY TREATMENT NOTE
Patient Name: Ashley Lin  : 1950    MRN: [id number]                              Today's Date: 2020       Admit Date: 2020    Visit Dx:     ICD-10-CM ICD-9-CM   1. Intractable vomiting with nausea, unspecified vomiting type R11.2 536.2   2. Cervical pain M54.2 723.1   3. Intractable cyclical vomiting associated with migraine G43.A1 346.21     Patient Active Problem List   Diagnosis   • Intractable vomiting   • Asthma   • Cervical disc herniation   • Cervical stenosis of spine   • Chronic obstructive pulmonary disease (CMS/HCC)   • Low back pain   • Pseudarthrosis following spinal fusion   • History of lumbar fusion   • S/P cervical spinal fusion   • Hypothyroidism (acquired)   • Estrogen deficiency   • Depression   • Migraines   • Chronic pain syndrome   • GERD without esophagitis   • Intractable cyclical vomiting associated with migraine     Past Medical History:   Diagnosis Date   • Chronic back pain    • Chronic obstructive pulmonary disease (CMS/HCC) 2020   • Chronic pain syndrome 2020   • Depression 2020   • Disease of thyroid gland    • Estrogen deficiency 2020   • GERD without esophagitis 2020   • Hypothyroidism (acquired) 2020   • Migraines 2020   • Vomiting 2020     Past Surgical History:   Procedure Laterality Date   • CHOLECYSTECTOMY     • HYSTERECTOMY     • NECK SURGERY     • SPINE SURGERY  2020     General Information     Row Name 20 1153          PT Evaluation Time/Intention    Document Type  therapy note (daily note)  -AO     Mode of Treatment  physical therapy  -AO     Row Name 20 1154          General Information    Patient Profile Reviewed?  yes  -AO     Existing Precautions/Restrictions  fall;spinal C-spine sx 2020, per pt, she has C-collar at home but it did not make it to the hospital  -AO     Barriers to Rehab  none identified  -AO     Row Name 20 0846          Cognitive Assessment/Intervention- PT/OT     Orientation Status (Cognition)  oriented x 3  -AO     Row Name 07/02/20 1153          Safety Issues, Functional Mobility    Impairments Affecting Function (Mobility)  balance;endurance/activity tolerance;pain;sensation/sensory awareness;strength  -AO       User Key  (r) = Recorded By, (t) = Taken By, (c) = Cosigned By    Initials Name Provider Type    Devorah Gamboa PT Physical Therapist        Mobility     Row Name 07/02/20 1153          Bed Mobility Assessment/Treatment    Bed Mobility Assessment/Treatment  supine-sit-supine  -AO     Sit-Supine [place] (Bed Mobility)  supervision;other (see comments) log roll technique  -AO     Assistive Device (Bed Mobility)  bed rails  -AO     Row Name 07/02/20 1153          Transfer Assessment/Treatment    Comment (Transfers)  Pt completed two sit to/from stand transfers this session with RW and unable to extend B knees 2*/2 c/o weakness, unable to weight shift or initiate gait training this session  -AO     Row Name 07/02/20 1153          Bed-Chair Transfer    Bed-Chair [place] (Transfers)  unable to assess  -AO     Row Name 07/02/20 1153          Sit-Stand Transfer    Sit-Stand [place] (Transfers)  minimum assist (75% patient effort)  -AO     Assistive Device (Sit-Stand Transfers)  walker, front-wheeled  -AO     Row Name 07/02/20 1153          Gait/Stairs Assessment/Training    Comment (Gait/Stairs)  DNT secondary to safety concerns  -AO       User Key  (r) = Recorded By, (t) = Taken By, (c) = Cosigned By    Initials Name Provider Type    Devorah Gamboa PT Physical Therapist        Obj/Interventions     Row Name 07/02/20 1213          Therapeutic Exercise    Comment (Therapeutic Exercise)  Pt completed the following supine BLE exercises for 1x10 reps each: 1) Ankle pumps 2) Heel slides 3) Hip abduction/adduction 4) Glute bridges  -AO     Row Name 07/02/20 1213          Static Sitting Balance    Level of [place] (Unsupported Sitting, Static  "Balance)  supervision  -AO     Sitting Position (Unsupported Sitting, Static Balance)  sitting on edge of bed  -AO     Time Able to Maintain Position (Unsupported Sitting, Static Balance)  more than 5 minutes  -AO     Row Name 07/02/20 1213          Dynamic Sitting Balance    Level of [place], Reaches Outside Midline (Sitting, Dynamic Balance)  supervision  -AO     Sitting Position, Reaches Outside Midline (Sitting, Dynamic Balance)  sitting on edge of bed  -AO     Row Name 07/02/20 1213          Static Standing Balance    Level of [place] (Supported Standing, Static Balance)  minimal assist, 75% patient effort  -AO     Time Able to Maintain Position (Supported Standing, Static Balance)  15 to 30 seconds  -AO     Assistive Device Utilized (Supported Standing, Static Balance)  walker, rolling  -AO     Row Name 07/02/20 1213          Dynamic Standing Balance    Level of [place], Reaches Outside Midline (Standing, Dynamic Balance)  unable to perform activity  -AO     Row Name 07/02/20 1213          Sensory Assessment/Intervention    Sensory General Assessment  -- BLE \"heaviness\"  -AO       User Key  (r) = Recorded By, (t) = Taken By, (c) = Cosigned By    Initials Name Provider Type    AO Devorah Dey, PT Physical Therapist        Goals/Plan    No documentation.       Clinical Impression     Row Name 07/02/20 1153          Pain Scale: Numbers Pre/Post-Treatment    Pain Scale: Numbers, Pretreatment  6/10  -AO     Pain Scale: Numbers, Post-Treatment  6/10  -AO     Pain Location  back  -AO     Pre/Post Treatment Pain Comment  Neck and back pain as well as abdominal discomfort  -AO     Row Name 07/02/20 1151          Plan of Care Review    Plan of Care Reviewed With  patient  -AO     Progress  declining  -AO     Outcome Summary  Pt demonstrated decline in mobility this session and was unable to tolerate gait training secondary to BLE weakness and impaired balance in static standing. Pt with possible " "psychiatric/malingering symptoms (difficult to discern), as pt was able to adequately mobilize/use BLEs during bed mobility and supine ther-ex, completing exercises with ease, but was unable to fully WB in standing with B knee buckling and continuously repeats \"Oh my legs are jelly. They feel like they did when I was in a wheelchair all that time.\" Pt unable to extend B knees with verbal and tactile cues during standing and unsafe to attempt weight shifting or gait training this session. Recommending IP rehab at d/c, as pt is not safe for home and high risk for falls. Continue seeing 3x/week at [facility] for progression of mobility.  PPE used: gloves, mask, faceshield  -AO     Row Name 07/02/20 1153          Physical Therapy Clinical Impression    Criteria for Skilled Interventions Met (PT Clinical Impression)  yes;treatment indicated  -AO     Rehab Potential (PT Clinical Summary)  fair, will monitor progress closely  -AO     Row Name 07/02/20 1153          Vital Signs    Recovery Time  Vitals stable throughout session on RA  -AO     Row Name 07/02/20 1153          Positioning and Restraints    Pre-Treatment Position  in bed  -AO     Post Treatment Position  bed  -AO     In Bed  supine;call light within reach;exit alarm on;encouraged to call for assist  -AO       User Key  (r) = Recorded By, (t) = Taken By, (c) = Cosigned By    Initials Name Provider Type    Devorah Gamboa, PT Physical Therapist        Outcome Measures    No documentation.       Physical Therapy Education                 Title: PT OT SLP Therapies (In Progress)     Topic: Physical Therapy (In Progress)     Point: Mobility training (In Progress)     Description:   Instruct learner(s) on safety and technique for assisting patient out of bed, chair or wheelchair.  Instruct in the proper use of assistive devices, such as walker, crutches, cane or brace.              Patient Friendly Description:   It's important to get you on your feet again, but we need " to do so in a way that is safe for you. Falling has serious consequences, and your personal safety is the most important thing of all.        When it's time to get out of bed, one of us or a family member will sit next to you on the bed to give you support.     If your doctor or nurse tells you to use a walker, crutches, a cane, or a brace, be sure you use it every time you get out of bed, even if you think you don't need it.    Learning Progress Summary           Patient Acceptance, E,TB, NR by AO at 7/2/2020 1238    Comment:  Educated pt on spinal precautions and safe bed mobility/transfers as well supine ther-ex.    Acceptance, E,TB, VU by AO at 7/1/2020 1243    Comment:  Educated pt on spinal precautions, on safe bed mobility/transfers/gait training, and on POC with anticipated physical therapy needs.                   Point: Precautions (In Progress)     Description:   Instruct learner(s) on prescribed precautions during mobility and gait tasks              Learning Progress Summary           Patient Acceptance, E,TB, NR by AO at 7/2/2020 1238    Comment:  Educated pt on spinal precautions and safe bed mobility/transfers as well supine ther-ex.    Acceptance, E,TB, VU by AO at 7/1/2020 1243    Comment:  Educated pt on spinal precautions, on safe bed mobility/transfers/gait training, and on POC with anticipated physical therapy needs.                               User Key     Initials Effective Dates Name Provider Type Discipline    AO 03/01/19 -  Devorah Dey, PT Physical Therapist PT              PT Recommendation and Plan  Planned Therapy Interventions (PT Eval): balance training, bed mobility training, gait training, neuromuscular re-education, patient/family education, strengthening, transfer training  Outcome Summary/Treatment Plan (PT)  Anticipated Discharge Disposition (PT): inpatient rehabilitation facility  Plan of Care Reviewed With: patient  Progress: declining  Outcome Summary: Pt demonstrated  "decline in mobility this session and was unable to tolerate gait training secondary to BLE weakness and impaired balance in static standing. Pt with possible psychiatric/malingering symptoms (difficult to discern), as pt was able to adequately mobilize/use BLEs during bed mobility and supine ther-ex, completing exercises with ease, but was unable to fully WB in standing with B knee buckling and continuously repeats \"Oh my legs are jelly. They feel like they did when I was in a wheelchair all that time.\" Pt unable to extend B knees with verbal and tactile cues during standing and unsafe to attempt weight shifting or gait training this session. Recommending IP rehab at d/c, as pt is not safe for home and high risk for falls. Continue seeing 3x/week at [facility] for progression of mobility.  PPE used: gloves, mask, faceshield     Time Calculation:   PT Charges     Row Name 07/02/20 1240             Time Calculation    Start Time  1153  -AO      Stop Time  1220  -AO      Time Calculation (min)  27 min  -AO         Time Calculation- PT    Total Timed Code Minutes- PT  27 minute(s)  -AO        User Key  (r) = Recorded By, (t) = Taken By, (c) = Cosigned By    Initials Name Provider Type    AO Devorah Dey, PT Physical Therapist        Therapy Charges for Today     Code Description Service Date Service Provider Modifiers Qty    12969024078  PT EVAL MOD COMPLEXITY 3 7/1/2020 Devorah Dey, PT GP 1    46219724204 HC PT THERAPEUTIC ACT EA 15 MIN 7/2/2020 Devorah Dey, PT GP 1    [id number]  PT THER PROC EA 15 MIN 7/2/2020 Devorah Dey, PT GP 1               Devorah Dey PT  7/2/2020       "

## 2020-07-02 NOTE — PROGRESS NOTES
[facility] Medicine Services Daily Progress Note      Hospitalist Team  LOS 0 days      Patient Care Team:  Zoey Bah MD as PCP - General  Zoey Bah MD as PCP - Family Medicine    Patient Location: 373/1      Subjective   Subjective   Just came back from EGD, denies for any new complaint.   Chief Complaint / Subjective  Chief Complaint   Patient presents with   • Abdominal Pain     onset 2 days ago with N/V         Brief Synopsis of Hospital Course/HPI    Ms. Lin is a 70 y.o. female presents to [facility] complaining of nausea and vomiting for the past 2 days.  Patient states that she had posterior cervical neck fusion in the beginning of June.  Patient has been on pain meds ever since but states her pain is worsening as well as worsening abdominal pain.  Patient states she has had decreased oral intake over the past several days due to feeling nauseous.  Patient reports her last bowel movement was 3 days ago.  Patient also states that she has decreased urine output as she reports that she is not drinking enough fluids.  Patient also reports some hematemesis in which she is noticed some red streaks in her emesis.     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  Previous notes from [facility] state that patient has chronic nausea.  Patient with history of cholecystectomy back in 1996.  Patient also has a history of appendectomy.  Patient postop note shows patient was treated with clindamycin.  Patient has a history of C. difficile colitis and chronic diarrhea as well.     In the ED, CMP largely unremarkable.  Lipase normal, CBC unremarkable.  UA significant for 2+, 1+ protein, 2+ bilirubin, 3-5 RBCs, 0-2 WBCs, +1 bacteria. CT abdomen pelvis showed no acute findings in the abdomen or pelvis.  No CT explanation for patient's upper abdomen pain.  Surgical changes of partial gastrectomy with gastrojejunostomy.   "Cholecystectomy.  Hysterectomy.  Lumbar spinal fusion.  Stable intrahepatic next hepatic biliary ductal dilatation since 3/9/2019, may represent increased capacity from previous cholecystectomy.   CT of the cervical spine showed anterior C3-4 and posterior C3-6 spinal fusion.  Hardware appears intact.  No acute cervical spine findings.  No high-grade canal or foraminal stenosis is seen.  7 mm ill-defined sclerotic focus on the T1 vertebral body it is indeterminate.  Bone scan may prove helpful for further evaluation. patient is afebrile, pulse in the 80s, on room air oxygen and 97% SPO2 and blood pressure 150s over 70s.  Patient received 500 normal saline bolus, Zofran, Dilaudid, Phenergan in ED.    Date::          ROS      Objective   Objective      Vital Signs  Temp:  [97.3 °F (36.3 °C)-98.2 °F (36.8 °C)] 98.2 °F (36.8 °C)  Heart Rate:  [70-98] 73  Resp:  [12-19] 14  BP: (104-127)/(54-78) 121/65  Oxygen Therapy  SpO2: 94 %  Pulse Oximetry Type: Continuous  Device (Oxygen Therapy): room air  Flow (L/min): 10  ETCO2 (mmHg): 23 mmHg  Flowsheet Rows      First Filed Value   Admission Height  157.5 cm (62\") Documented at 06/30/2020 1045   Admission Weight  59.4 kg (131 lb) Documented at 06/30/2020 1045        Intake & Output (last 3 days)       06/29 0701 - 06/30 0700 06/30 0701 - 07/01 0700 07/01 0701 - 07/02 0700 07/02 0701 - 07/03 0700    P.O.  960 960     I.V. (mL/kg)   1041 (17.9) 1000 (17.2)    Total Intake(mL/kg)  960 (16.3) 2001 (34.3) 1000 (17.2)    Net  +960 +2001 +1000            Urine Unmeasured Occurrence  1 x 2 x 1 x        Lines, Drains & Airways    Active LDAs     Name:   Placement date:   Placement time:   Site:   Days:    Peripheral IV 06/30/20 1344 Left Antecubital   06/30/20    1344    Antecubital   less than 1                  Physical Exam:    Physical Exam   Constitutional: She is oriented to person, place, and time. She appears well-developed and well-nourished. No distress.   HENT:   Head: " Normocephalic and atraumatic.   Right Ear: External ear normal.   Left Ear: External ear normal.   Nose: Nose normal.   Mouth/Throat: Oropharynx is clear and moist. No oropharyngeal exudate.   Eyes: Pupils are equal, round, and reactive to light. Conjunctivae and EOM are normal. Right eye exhibits no discharge. Left eye exhibits no discharge. No scleral icterus.   Neck: Normal range of motion. No JVD present. No tracheal deviation present. No thyromegaly present.   Cardiovascular: Normal rate, regular rhythm, normal heart sounds and intact distal pulses. Exam reveals no gallop and no friction rub.   No murmur heard.  Pulmonary/Chest: Effort normal and breath sounds normal. No stridor. No respiratory distress. She has no wheezes. She has no rales. She exhibits no tenderness.   Abdominal: Soft. Bowel sounds are normal. She exhibits no distension and no mass. There is no tenderness. There is no rebound and no guarding. No hernia.   Musculoskeletal: Normal range of motion. She exhibits no edema, tenderness or deformity.   Lymphadenopathy:     She has no cervical adenopathy.   Neurological: She is alert and oriented to person, place, and time. No cranial nerve deficit or sensory deficit. She exhibits normal muscle tone. Coordination normal.   Skin: Skin is warm and dry. No rash noted. She is not diaphoretic. No erythema.   Psychiatric: She has a normal mood and affect. Her behavior is normal.   Nursing note and vitals reviewed.            Procedures:              Results Review:     I reviewed the patient's new clinical results.      Lab Results (last 24 hours)     Procedure Component Value Units Date/Time    BUN [[id number]]  (Normal) Collected:  07/02/20 0456    Specimen:  Blood Updated:  07/02/20 1049     BUN 9 mg/dL     Magnesium [[id number]]  (Normal) Collected:  07/02/20 0456    Specimen:  Blood Updated:  07/02/20 0611     Magnesium 1.7 mg/dL     Comprehensive Metabolic Panel [[id number]]  (Abnormal) Collected:   07/02/20 0456    Specimen:  Blood Updated:  07/02/20 0611     Glucose 114 mg/dL      BUN --     Comment: Testing performed by alternate method        Creatinine 0.73 mg/dL      Sodium 143 mmol/L      Potassium 4.4 mmol/L      Chloride 108 mmol/L      CO2 23.0 mmol/L      Calcium 8.4 mg/dL      Total Protein 5.6 g/dL      Albumin 3.70 g/dL      ALT (SGPT) 11 U/L      AST (SGOT) 15 U/L      Alkaline Phosphatase 104 U/L      Total Bilirubin <0.2 mg/dL      eGFR Non African Amer 79 mL/min/1.73      Globulin 1.9 gm/dL      A/G Ratio 1.9 g/dL      BUN/Creatinine Ratio --     Comment: Testing not performed        Anion Gap 12.0 mmol/L     Narrative:       GFR Normal >60  Chronic Kidney Disease <60  Kidney Failure <15      Protime-INR [[id number]]  (Normal) Collected:  07/02/20 0456    Specimen:  Blood Updated:  07/02/20 0534     Protime 11.0 Seconds      INR 1.06    CBC & Differential [[id number]] Collected:  07/02/20 0456    Specimen:  Blood Updated:  07/02/20 0532    Narrative:       The following orders were created for panel order CBC & Differential.  Procedure                               Abnormality         Status                     ---------                               -----------         ------                     CBC Auto Differential[[id number]]        Abnormal            Final result                 Please view results for these tests on the individual orders.    CBC Auto Differential [[id number]]  (Abnormal) Collected:  07/02/20 0456    Specimen:  Blood Updated:  07/02/20 0532     WBC 5.60 10*3/mm3      RBC 3.66 10*6/mm3      Hemoglobin 12.0 g/dL      Hematocrit 35.8 %      MCV 97.8 fL      MCH 32.7 pg      MCHC 33.4 g/dL      RDW 13.2 %      RDW-SD 45.5 fl      MPV 7.6 fL      Platelets 209 10*3/mm3      Neutrophil % 47.1 %      Lymphocyte % 39.1 %      Monocyte % 11.1 %      Eosinophil % 2.1 %      Basophil % 0.6 %      Neutrophils, Absolute 2.70 10*3/mm3      Lymphocytes, Absolute 2.20 10*3/mm3       Monocytes, Absolute 0.60 10*3/mm3      Eosinophils, Absolute 0.10 10*3/mm3      Basophils, Absolute 0.00 10*3/mm3      nRBC 0.1 /100 WBC     Gamma GT [[id number]]  (Normal) Collected:  07/01/20 0335    Specimen:  Blood Updated:  07/01/20 1407     GGT 23 U/L         No results found for: HGBA1C  Results from last 7 days   Lab Units 07/02/20  0456   INR  1.06           Lab Results   Component Value Date    LIPASE 29 06/30/2020     Lab Results   Component Value Date    CHOL 161 04/17/2018    TRIG 148 04/17/2018    HDL 45 04/17/2018    LDL 89 04/17/2018       No results found for: INTRAOP, PREDX, FINALDX, COMDX    Microbiology Results (last 10 days)     ** No results found for the last 240 hours. **          ECG/EMG Results (most recent)     None                    Ct Cervical Spine Without Contrast    Result Date: 6/30/2020   1. Anterior C3-4 and posterior C3-C6 spinal fusion. Hardware appears intact. 2. No acute cervical spine findings. No high-grade canal or foraminal stenosis is seen. 3. 7 mm ill-defined sclerotic focus in the T1 vertebral body. It is indeterminate. Bone scan may prove helpful for further evaluation.  Electronically Signed By-Dr. Velvet Maher MD On:6/30/2020 11:52 AM This report was finalized on [id number] by Dr. Velvet Maher MD.    Mri Cervical Spine Without Contrast    Result Date: 6/30/2020   1. Mild to moderate canal stenosis at C4-5.. No high-grade canal stenosis is seen. 2. No evidence of cord edema. 3. Satisfactory cervical alignment. 4. C3-4 anterior spinal fusion. C3-C6 posterior spinal fusion. 5. Cellulitic changes or edema within the mid neck soft tissues. No drainable fluid collection or abscess. 6. No epidural fluid collection or abscess is seen.   Electronically Signed By-Dr. Velvet Maher MD On:6/30/2020 3:21 PM This report was finalized on [id number] by Dr. Velvet Maher MD.    Ct Abdomen Pelvis With Contrast    Result Date: 6/30/2020   1. No acute findings in the  abdomen or pelvis. No CT explanation for the patient's upper abdominal pain. 2. Surgical changes of partial gastrectomy with gastrojejunostomy. Cholecystectomy. Hysterectomy. Lumbar spinal fusion. 3. Stable intrahepatic next hepatic biliary ductal dilation since 03/09/2019, may represent increased capacitance from previous cholecystectomy.  Electronically Signed By-Dr. Velvet Maher MD On:6/30/2020 2:09 PM This report was finalized on [id number] by Dr. Velvet Maher MD.          Xrays, labs reviewed personally by physician.    Medication Review:   I have reviewed the patient's current medication list      Scheduled Meds    amitriptyline 25 mg Oral Nightly   budesonide 0.5 mg Nebulization BID - RT   cycloSPORINE 1 drop Both Eyes BID   escitalopram 20 mg Oral Daily   estradiol 1.5 mg Oral Daily   gabapentin 800 mg Oral 4x Daily   ipratropium-albuterol 3 mL Nebulization Q8H - RT   ketotifen 1 drop Both Eyes BID   levothyroxine 25 mcg Oral Daily   metoclopramide 10 mg Oral TID AC   montelukast 10 mg Oral Nightly   OXcarbazepine 300 mg Oral TID   pantoprazole 40 mg Oral QAM   propranolol 40 mg Oral BID   sodium chloride 10 mL Intravenous Q12H   sucralfate 1 g Oral 4x Daily AC & at Bedtime   topiramate 100 mg Oral 4x Daily   vitamin E 400 Units Oral Daily       Meds Infusions    sodium chloride 100 mL/hr Last Rate: 100 mL/hr (07/02/20 1127)       Meds PRN  •  acetaminophen **OR** acetaminophen **OR** acetaminophen  •  albuterol  •  aluminum-magnesium hydroxide-simethicone  •  bisacodyl  •  butalbital-acetaminophen-caffeine  •  HYDROcodone-acetaminophen  •  ketamine (KETALAR) IVPB **AND** Ketamine Vital Signs & Assessment  •  magnesium hydroxide  •  magnesium sulfate **OR** magnesium sulfate in D5W 1g/100mL (PREMIX)  •  melatonin  •  nitroglycerin  •  ondansetron **OR** ondansetron  •  oxyCODONE  •  potassium chloride  •  promethazine  •  [COMPLETED] Insert peripheral IV **AND** sodium chloride  •  sodium chloride  •   SUMAtriptan      Assessment/Plan   Assessment/Plan     Active Hospital Problems    Diagnosis  POA   • **Intractable vomiting [R11.10]  Yes   • Chronic obstructive pulmonary disease (CMS/HCC) [J44.9]  Yes   • Hypothyroidism (acquired) [E03.9]  Yes   • Estrogen deficiency [E28.39]  Yes   • Depression [F32.9]  Yes   • Migraines [G43.909]  Yes   • Chronic pain syndrome [G89.4]  Yes   • GERD without esophagitis [K21.9]  Yes   • Intractable cyclical vomiting associated with migraine [G43.A1]  Unknown   • Pseudarthrosis following spinal fusion [M96.0]  Not Applicable      Resolved Hospital Problems   No resolved problems to display.       MEDICAL DECISION MAKING COMPLEXITY BY PROBLEM:     Intractable vomiting with associated abdominal pain status post EGD revealing  Normal esophagus dilated 54 [place] without resistance or trauma  Gastroparesis  Evidence of prior partial gastrectomy-  -CT abdomen pelvis showed no acute findings in the abdomen or pelvis.  No CT explanation for patient's upper abdomen pain.  Surgical changes of partial gastrectomy with gastrojejunostomy.  Cholecystectomy.  Hysterectomy.  Lumbar spinal fusion.  Stable intrahepatic next hepatic biliary ductal dilatation since 3/9/2019, may represent increased capacity from previous cholecystectomy.  -patient is afebrile, pulse in the 80s, on room air oxygen and 97% SPO2 and blood pressure 150s over 70s.    -Patient received 500 normal saline bolus, Zofran, Dilaudid, Phenergan in ED.  -Zofran, Phenergan, Carafate ordered  - d/c iv fluids once tolerating regular diet.  -Pain control with home Percocet, Norco, inspect verified-ketamine ordered  - Consult GI service   -Case management consult  -PT consulted ... Advise rehab.      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  Hardware appears intact.  No acute cervical spine findings.  No  high-grade canal or foraminal stenosis is seen.  7 mm ill-defined sclerotic focus on the T1 vertebral body it is indeterminate.  Bone scan may prove helpful for further evaluation.     Chronic diarrhea  -Hold home viberzi     Hypothyroidism  -Continue home Synthroid     Estrogen deficiency  -Continue home estradiol     Depression  -Continue home Lexapro, amitriptyline     COPD  -Not acute exacerbation  -Continue home albuterol, Pulmicort, Singulair, Anoro     Migraines  -Continue home Fioricet, maxalt as needed  -Continue home propranolol     ?Seizure disorder  -No previous documentation of seizure disorder  -Continue home Trileptal, Topamax     Chronic pain syndrome  -Continue home pain meds     GERD  -Continue home PPI        VTE Prophylaxis -SCDs    VTE Prophylaxis -   Mechanical Order History:      Ordered        06/30/20 1625  Place Sequential Compression Device  Once         06/30/20 1625  Maintain Sequential Compression Device  Continuous                 Pharmalogical Order History:     None            Code Status -   Code Status and Medical Interventions:   Ordered at: 06/30/20 1625     Code Status:    CPR     Medical Interventions (Level of Support Prior to Arrest):    Full             Discharge Planning      SLP OP Goals     Row Name 07/01/20 1244          Time Calculation    PT Goal Re-Cert Due Date  07/15/20  -AO       User Key  (r) = Recorded By, (t) = Taken By, (c) = Cosigned By    Initials Name Provider Type    Devorah Gamboa, PT Physical Therapist          Destination      Coordination has not been started for this encounter.      Durable Medical Equipment      Coordination has not been started for this encounter.      Dialysis/Infusion      Coordination has not been started for this encounter.      Home Medical Care      Coordination has not been started for this encounter.      Therapy      Coordination has not been started for this encounter.      Community Resources      Coordination has not  been started for this encounter.            Electronically signed by Xiao Belle MD, 07/02/20, 11:56.  [facility] Hospitalist Team

## 2020-07-02 NOTE — ANESTHESIA POSTPROCEDURE EVALUATION
Patient: Ashley Lin    Procedure Summary     Date:  07/02/20 Room / Location:  [facility] ENDOSCOPY 2 / [facility] ENDOSCOPY    Anesthesia Start:  1002 Anesthesia Stop:  1019    Procedure:  ESOPHAGOGASTRODUODENOSCOPY WITH DILATATION (BOUGIE #54) (N/A ) Diagnosis:       Intractable cyclical vomiting associated with migraine      (Intractable cyclical vomiting associated with migraine [G43.A1])    Surgeon:  Benjamin Mike MD Provider:  Cleopatra Sewell MD    Anesthesia Type:  MAC ASA Status:  3          Anesthesia Type: MAC    Vitals  No vitals data found for the desired time range.          Post Anesthesia Care and Evaluation    Patient location during evaluation: PACU  Patient participation: complete - patient participated  Level of consciousness: awake  Pain management: adequate  Airway patency: patent  Anesthetic complications: No anesthetic complications  PONV Status: controlled  Cardiovascular status: acceptable  Respiratory status: acceptable  Hydration status: acceptable

## 2020-07-02 NOTE — ANESTHESIA PREPROCEDURE EVALUATION
Anesthesia Evaluation     Patient summary reviewed and Nursing notes reviewed   no history of anesthetic complications:  NPO Solid Status: > 8 hours  NPO Liquid Status: > 8 hours           Airway   Mallampati: II  TM distance: >3 FB  Neck ROM: full  No difficulty expected  Dental      Pulmonary     breath sounds clear to auscultation  (+) COPD moderate, asthma,  Cardiovascular - negative cardio ROS    ECG reviewed  Rhythm: regular  Rate: normal        Neuro/Psych  (+) headaches, psychiatric history Anxiety and Depression,     GI/Hepatic/Renal/Endo    (+)  GERD,      Musculoskeletal (-) negative ROS    Abdominal     Abdomen: soft.   Substance History - negative use     OB/GYN negative ob/gyn ROS         Other - negative ROS                       Anesthesia Plan    ASA 3     MAC     intravenous induction     Anesthetic plan, all risks, benefits, and alternatives have been provided, discussed and informed consent has been obtained with: patient.

## 2020-07-02 NOTE — PLAN OF CARE
Problem: Patient Care Overview  Goal: Plan of Care Review  Outcome: Ongoing (interventions implemented as appropriate)  Flowsheets (Taken 7/2/2020 0102)  Progress: no change  Plan of Care Reviewed With: patient  Outcome Summary: Pt c/o pain in neck to back regularly. PRN pain meds given as needed, tolerated well. Pt NPO this AM, awaiting EGD this morning.

## 2020-07-02 NOTE — THERAPY TREATMENT NOTE
Patient Name: Ashely Lin  : 1950    MRN: [id number]                              Today's Date: 2020       Admit Date: 2020    Visit Dx:     ICD-10-CM ICD-9-CM   1. Intractable vomiting with nausea, unspecified vomiting type R11.2 536.2   2. Cervical pain M54.2 723.1   3. Intractable cyclical vomiting associated with migraine G43.A1 346.21     Patient Active Problem List   Diagnosis   • Intractable vomiting   • Asthma   • Cervical disc herniation   • Cervical stenosis of spine   • Chronic obstructive pulmonary disease (CMS/HCC)   • Low back pain   • Pseudarthrosis following spinal fusion   • History of lumbar fusion   • S/P cervical spinal fusion   • Hypothyroidism (acquired)   • Estrogen deficiency   • Depression   • Migraines   • Chronic pain syndrome   • GERD without esophagitis   • Intractable cyclical vomiting associated with migraine     Past Medical History:   Diagnosis Date   • Chronic back pain    • Chronic obstructive pulmonary disease (CMS/HCC) 2020   • Chronic pain syndrome 2020   • Depression 2020   • Disease of thyroid gland    • Estrogen deficiency 2020   • GERD without esophagitis 2020   • Hypothyroidism (acquired) 2020   • Migraines 2020   • Vomiting 2020     Past Surgical History:   Procedure Laterality Date   • CHOLECYSTECTOMY     • HYSTERECTOMY     • NECK SURGERY     • SPINE SURGERY  2020     General Information     Row Name 20 1153          PT Evaluation Time/Intention    Document Type  therapy note (daily note)  -AO     Mode of Treatment  physical therapy  -AO     Row Name 20 1154          General Information    Patient Profile Reviewed?  yes  -AO     Existing Precautions/Restrictions  fall;spinal C-spine sx 2020, per pt, she has C-collar at home but it did not make it to the hospital  -AO     Barriers to Rehab  none identified  -AO     Row Name 20 5091          Cognitive Assessment/Intervention- PT/OT     Orientation Status (Cognition)  oriented x 3  -AO     Row Name 07/02/20 1153          Safety Issues, Functional Mobility    Impairments Affecting Function (Mobility)  balance;endurance/activity tolerance;pain;sensation/sensory awareness;strength  -AO       User Key  (r) = Recorded By, (t) = Taken By, (c) = Cosigned By    Initials Name Provider Type    Devorah Gamboa PT Physical Therapist        Mobility     Row Name 07/02/20 1153          Bed Mobility Assessment/Treatment    Bed Mobility Assessment/Treatment  supine-sit-supine  -AO     Sit-Supine [place] (Bed Mobility)  supervision;other (see comments) log roll technique  -AO     Assistive Device (Bed Mobility)  bed rails  -AO     Row Name 07/02/20 1153          Transfer Assessment/Treatment    Comment (Transfers)  Pt completed two sit to/from stand transfers this session with RW and unable to extend B knees 2*/2 c/o weakness, unable to weight shift or initiate gait training this session  -AO     Row Name 07/02/20 1153          Bed-Chair Transfer    Bed-Chair [place] (Transfers)  unable to assess  -AO     Row Name 07/02/20 1153          Sit-Stand Transfer    Sit-Stand [place] (Transfers)  minimum assist (75% patient effort)  -AO     Assistive Device (Sit-Stand Transfers)  walker, front-wheeled  -AO     Row Name 07/02/20 1153          Gait/Stairs Assessment/Training    Comment (Gait/Stairs)  DNT secondary to safety concerns  -AO       User Key  (r) = Recorded By, (t) = Taken By, (c) = Cosigned By    Initials Name Provider Type    Devorah Gamboa PT Physical Therapist        Obj/Interventions     Row Name 07/02/20 1213          Therapeutic Exercise    Comment (Therapeutic Exercise)  Pt completed the following supine BLE exercises for 1x10 reps each: 1) Ankle pumps 2) Heel slides 3) Hip abduction/adduction 4) Glute bridges  -AO     Row Name 07/02/20 1213          Static Sitting Balance    Level of [place] (Unsupported Sitting, Static  "Balance)  supervision  -AO     Sitting Position (Unsupported Sitting, Static Balance)  sitting on edge of bed  -AO     Time Able to Maintain Position (Unsupported Sitting, Static Balance)  more than 5 minutes  -AO     Row Name 07/02/20 1213          Dynamic Sitting Balance    Level of [place], Reaches Outside Midline (Sitting, Dynamic Balance)  supervision  -AO     Sitting Position, Reaches Outside Midline (Sitting, Dynamic Balance)  sitting on edge of bed  -AO     Row Name 07/02/20 1213          Static Standing Balance    Level of [place] (Supported Standing, Static Balance)  minimal assist, 75% patient effort  -AO     Time Able to Maintain Position (Supported Standing, Static Balance)  15 to 30 seconds  -AO     Assistive Device Utilized (Supported Standing, Static Balance)  walker, rolling  -AO     Row Name 07/02/20 1213          Dynamic Standing Balance    Level of [place], Reaches Outside Midline (Standing, Dynamic Balance)  unable to perform activity  -AO     Row Name 07/02/20 1213          Sensory Assessment/Intervention    Sensory General Assessment  -- BLE \"heaviness\"  -AO       User Key  (r) = Recorded By, (t) = Taken By, (c) = Cosigned By    Initials Name Provider Type    AO Devorah Dey, PT Physical Therapist        Goals/Plan    No documentation.       Clinical Impression     Row Name 07/02/20 1153          Pain Scale: Numbers Pre/Post-Treatment    Pain Scale: Numbers, Pretreatment  6/10  -AO     Pain Scale: Numbers, Post-Treatment  6/10  -AO     Pain Location  back  -AO     Pre/Post Treatment Pain Comment  Neck and back pain as well as abdominal discomfort  -AO     Row Name 07/02/20 1151          Plan of Care Review    Plan of Care Reviewed With  patient  -AO     Progress  declining  -AO     Outcome Summary  Pt demonstrated decline in mobility this session and was unable to tolerate gait training secondary to BLE weakness and impaired balance in static standing. Pt with possible " "psychiatric/malingering symptoms (difficult to discern), as pt was able to adequately mobilize/use BLEs during bed mobility and supine ther-ex, completing exercises with ease, but was unable to fully WB in standing with B knee buckling and continuously repeats \"Oh my legs are jelly. They feel like they did when I was in a wheelchair all that time.\" Pt unable to extend B knees with verbal and tactile cues during standing and unsafe to attempt weight shifting or gait training this session. Recommending IP rehab at d/c, as pt is not safe for home and high risk for falls. Continue seeing 3x/week at [facility] for progression of mobility.  PPE used: gloves, mask, faceshield  -AO     Row Name 07/02/20 1153          Physical Therapy Clinical Impression    Criteria for Skilled Interventions Met (PT Clinical Impression)  yes;treatment indicated  -AO     Rehab Potential (PT Clinical Summary)  fair, will monitor progress closely  -AO     Row Name 07/02/20 1153          Vital Signs    Recovery Time  Vitals stable throughout session on RA  -AO     Row Name 07/02/20 1153          Positioning and Restraints    Pre-Treatment Position  in bed  -AO     Post Treatment Position  bed  -AO     In Bed  supine;call light within reach;exit alarm on;encouraged to call for assist  -AO       User Key  (r) = Recorded By, (t) = Taken By, (c) = Cosigned By    Initials Name Provider Type    Devorah Gamboa, PT Physical Therapist        Outcome Measures    No documentation.       Physical Therapy Education                 Title: PT OT SLP Therapies (In Progress)     Topic: Physical Therapy (In Progress)     Point: Mobility training (In Progress)     Description:   Instruct learner(s) on safety and technique for assisting patient out of bed, chair or wheelchair.  Instruct in the proper use of assistive devices, such as walker, crutches, cane or brace.              Patient Friendly Description:   It's important to get you on your feet again, but we need " to do so in a way that is safe for you. Falling has serious consequences, and your personal safety is the most important thing of all.        When it's time to get out of bed, one of us or a family member will sit next to you on the bed to give you support.     If your doctor or nurse tells you to use a walker, crutches, a cane, or a brace, be sure you use it every time you get out of bed, even if you think you don't need it.    Learning Progress Summary           Patient Acceptance, E,TB, NR by AO at 7/2/2020 1238    Comment:  Educated pt on spinal precautions and safe bed mobility/transfers as well supine ther-ex.    Acceptance, E,TB, VU by AO at 7/1/2020 1243    Comment:  Educated pt on spinal precautions, on safe bed mobility/transfers/gait training, and on POC with anticipated physical therapy needs.                   Point: Precautions (In Progress)     Description:   Instruct learner(s) on prescribed precautions during mobility and gait tasks              Learning Progress Summary           Patient Acceptance, E,TB, NR by AO at 7/2/2020 1238    Comment:  Educated pt on spinal precautions and safe bed mobility/transfers as well supine ther-ex.    Acceptance, E,TB, VU by AO at 7/1/2020 1243    Comment:  Educated pt on spinal precautions, on safe bed mobility/transfers/gait training, and on POC with anticipated physical therapy needs.                               User Key     Initials Effective Dates Name Provider Type Discipline    AO 03/01/19 -  Devorah Dey, PT Physical Therapist PT              PT Recommendation and Plan  Planned Therapy Interventions (PT Eval): balance training, bed mobility training, gait training, neuromuscular re-education, patient/family education, strengthening, transfer training  Outcome Summary/Treatment Plan (PT)  Anticipated Discharge Disposition (PT): inpatient rehabilitation facility  Plan of Care Reviewed With: patient  Progress: declining  Outcome Summary: Pt demonstrated  "decline in mobility this session and was unable to tolerate gait training secondary to BLE weakness and impaired balance in static standing. Pt with possible psychiatric/malingering symptoms (difficult to discern), as pt was able to adequately mobilize/use BLEs during bed mobility and supine ther-ex, completing exercises with ease, but was unable to fully WB in standing with B knee buckling and continuously repeats \"Oh my legs are jelly. They feel like they did when I was in a wheelchair all that time.\" Pt unable to extend B knees with verbal and tactile cues during standing and unsafe to attempt weight shifting or gait training this session. Recommending IP rehab at d/c, as pt is not safe for home and high risk for falls. Continue seeing 3x/week at [facility] for progression of mobility.  PPE used: gloves, mask, faceshield     Time Calculation:   PT Charges     Row Name 07/02/20 1240             Time Calculation    Start Time  1153  -AO      Stop Time  1220  -AO      Time Calculation (min)  27 min  -AO         Time Calculation- PT    Total Timed Code Minutes- PT  27 minute(s)  -AO        User Key  (r) = Recorded By, (t) = Taken By, (c) = Cosigned By    Initials Name Provider Type    AO Devorah Dey, PT Physical Therapist        Therapy Charges for Today     Code Description Service Date Service Provider Modifiers Qty    22054129868  PT EVAL MOD COMPLEXITY 3 7/1/2020 Devorah Dey, PT GP 1    25115074251 HC PT THERAPEUTIC ACT EA 15 MIN 7/2/2020 Devorah Dey, PT GP 1    [id number]  PT THER PROC EA 15 MIN 7/2/2020 Devorah Dey, PT GP 1               Devorah Dey PT  7/2/2020       "

## 2020-07-02 NOTE — PROGRESS NOTES
Continued Stay Note  VANESSA Mayfield     Patient Name: Ashley Lin  MRN: [id number]  Today's Date: 7/2/2020    Admit Date: 6/30/2020    Discharge Plan     Row Name 07/02/20 1633       Plan    Plan  DC Plan: [facility] at d/c pending pre-cert. Pre-cert started 7/2 (will need new [id number] per liaison). Will need PASRR.     Plan Comments  SW sent updated information of pt's [employer] Medicare policy after obtaining from financial counselors. Liaison requested [id number], message sent to therapy for request information.      ERIC Tang    Phone: [phone]  Cell: [phone]  Fax: [phone]  [email or web address]

## 2020-07-03 LAB
MAGNESIUM SERPL-MCNC: 1.9 MG/DL (ref 1.6–2.4)
POTASSIUM SERPL-SCNC: 3.9 MMOL/L (ref 3.5–5.2)

## 2020-07-03 PROCEDURE — 63710000001 MONTELUKAST 10 MG TABLET: Performed by: INTERNAL MEDICINE

## 2020-07-03 PROCEDURE — A9270 NON-COVERED ITEM OR SERVICE: HCPCS | Performed by: INTERNAL MEDICINE

## 2020-07-03 PROCEDURE — 63710000001 OXCARBAZEPINE 150 MG TABLET: Performed by: INTERNAL MEDICINE

## 2020-07-03 PROCEDURE — 63710000001 CYCLOSPORINE 0.05 % EMULSION: Performed by: INTERNAL MEDICINE

## 2020-07-03 PROCEDURE — 94799 UNLISTED PULMONARY SVC/PX: CPT

## 2020-07-03 PROCEDURE — 63710000001 OXYCODONE 5 MG TABLET: Performed by: INTERNAL MEDICINE

## 2020-07-03 PROCEDURE — 84132 ASSAY OF SERUM POTASSIUM: CPT | Performed by: INTERNAL MEDICINE

## 2020-07-03 PROCEDURE — 99225 PR SBSQ OBSERVATION CARE/DAY 25 MINUTES: CPT | Performed by: HOSPITALIST

## 2020-07-03 PROCEDURE — 63710000001 ESTRADIOL 1 MG TABLET: Performed by: INTERNAL MEDICINE

## 2020-07-03 PROCEDURE — 63710000001 SUCRALFATE 1 G TABLET: Performed by: INTERNAL MEDICINE

## 2020-07-03 PROCEDURE — 63710000001 HYDROCODONE-ACETAMINOPHEN 5-325 MG TABLET: Performed by: INTERNAL MEDICINE

## 2020-07-03 PROCEDURE — 63710000001 PANTOPRAZOLE 40 MG TABLET DELAYED-RELEASE: Performed by: INTERNAL MEDICINE

## 2020-07-03 PROCEDURE — 63710000001 AMITRIPTYLINE 25 MG TABLET: Performed by: INTERNAL MEDICINE

## 2020-07-03 PROCEDURE — 97530 THERAPEUTIC ACTIVITIES: CPT

## 2020-07-03 PROCEDURE — 63710000001 VITAMIN E 400 UNIT CAPSULE: Performed by: INTERNAL MEDICINE

## 2020-07-03 PROCEDURE — 63710000001 PROPRANOLOL 10 MG TABLET: Performed by: INTERNAL MEDICINE

## 2020-07-03 PROCEDURE — 63710000001 LEVOTHYROXINE 25 MCG TABLET: Performed by: INTERNAL MEDICINE

## 2020-07-03 PROCEDURE — 63710000001 METOCLOPRAMIDE 10 MG TABLET: Performed by: INTERNAL MEDICINE

## 2020-07-03 PROCEDURE — 97110 THERAPEUTIC EXERCISES: CPT

## 2020-07-03 PROCEDURE — G0378 HOSPITAL OBSERVATION PER HR: HCPCS

## 2020-07-03 PROCEDURE — 63710000001 ESCITALOPRAM 10 MG TABLET: Performed by: INTERNAL MEDICINE

## 2020-07-03 PROCEDURE — 63710000001 TOPIRAMATE 100 MG TABLET: Performed by: INTERNAL MEDICINE

## 2020-07-03 PROCEDURE — 63710000001 GABAPENTIN 400 MG CAPSULE: Performed by: INTERNAL MEDICINE

## 2020-07-03 PROCEDURE — 83735 ASSAY OF MAGNESIUM: CPT | Performed by: INTERNAL MEDICINE

## 2020-07-03 RX ORDER — GABAPENTIN 800 MG/1
800 TABLET ORAL 2 TIMES DAILY
Qty: 30 TABLET | Refills: 0 | Status: SHIPPED | OUTPATIENT
Start: 2020-07-03 | End: 2022-09-27

## 2020-07-03 RX ORDER — OXYCODONE AND ACETAMINOPHEN 10; 325 MG/1; MG/1
1 TABLET ORAL EVERY 6 HOURS PRN
Qty: 10 TABLET | Refills: 0 | Status: SHIPPED | OUTPATIENT
Start: 2020-07-03 | End: 2020-07-06 | Stop reason: SDUPTHER

## 2020-07-03 RX ADMIN — GABAPENTIN 800 MG: 400 CAPSULE ORAL at 17:12

## 2020-07-03 RX ADMIN — Medication 10 ML: at 08:26

## 2020-07-03 RX ADMIN — GABAPENTIN 800 MG: 400 CAPSULE ORAL at 08:24

## 2020-07-03 RX ADMIN — SUCRALFATE 1 G: 1 TABLET ORAL at 16:51

## 2020-07-03 RX ADMIN — TOPIRAMATE 100 MG: 100 TABLET, FILM COATED ORAL at 20:42

## 2020-07-03 RX ADMIN — BUDESONIDE 0.5 MG: 0.5 INHALANT RESPIRATORY (INHALATION) at 18:46

## 2020-07-03 RX ADMIN — LEVOTHYROXINE SODIUM 25 MCG: 25 TABLET ORAL at 08:40

## 2020-07-03 RX ADMIN — PROPRANOLOL HYDROCHLORIDE 40 MG: 10 TABLET ORAL at 08:25

## 2020-07-03 RX ADMIN — OXCARBAZEPINE 300 MG: 150 TABLET, FILM COATED ORAL at 16:51

## 2020-07-03 RX ADMIN — OXYCODONE HYDROCHLORIDE 10 MG: 5 TABLET ORAL at 20:43

## 2020-07-03 RX ADMIN — SUCRALFATE 1 G: 1 TABLET ORAL at 08:26

## 2020-07-03 RX ADMIN — PROPRANOLOL HYDROCHLORIDE 40 MG: 10 TABLET ORAL at 20:43

## 2020-07-03 RX ADMIN — METOCLOPRAMIDE 10 MG: 10 TABLET ORAL at 16:51

## 2020-07-03 RX ADMIN — OXYCODONE HYDROCHLORIDE 10 MG: 5 TABLET ORAL at 11:57

## 2020-07-03 RX ADMIN — HYDROCODONE BITARTRATE AND ACETAMINOPHEN 1 TABLET: 5; 325 TABLET ORAL at 01:02

## 2020-07-03 RX ADMIN — AMITRIPTYLINE HYDROCHLORIDE 25 MG: 25 TABLET, FILM COATED ORAL at 20:42

## 2020-07-03 RX ADMIN — OXYCODONE HYDROCHLORIDE 10 MG: 5 TABLET ORAL at 16:51

## 2020-07-03 RX ADMIN — IPRATROPIUM BROMIDE AND ALBUTEROL SULFATE 3 ML: .5; 3 SOLUTION RESPIRATORY (INHALATION) at 06:35

## 2020-07-03 RX ADMIN — TOPIRAMATE 100 MG: 100 TABLET, FILM COATED ORAL at 11:16

## 2020-07-03 RX ADMIN — OXYCODONE HYDROCHLORIDE 10 MG: 5 TABLET ORAL at 03:05

## 2020-07-03 RX ADMIN — SUCRALFATE 1 G: 1 TABLET ORAL at 20:42

## 2020-07-03 RX ADMIN — GABAPENTIN 800 MG: 400 CAPSULE ORAL at 20:43

## 2020-07-03 RX ADMIN — IPRATROPIUM BROMIDE AND ALBUTEROL SULFATE 3 ML: .5; 3 SOLUTION RESPIRATORY (INHALATION) at 18:46

## 2020-07-03 RX ADMIN — BUDESONIDE 0.5 MG: 0.5 INHALANT RESPIRATORY (INHALATION) at 06:35

## 2020-07-03 RX ADMIN — SUCRALFATE 1 G: 1 TABLET ORAL at 11:16

## 2020-07-03 RX ADMIN — ESTRADIOL 1.5 MG: 1 TABLET ORAL at 08:25

## 2020-07-03 RX ADMIN — Medication 10 ML: at 20:43

## 2020-07-03 RX ADMIN — OXYCODONE HYDROCHLORIDE 10 MG: 5 TABLET ORAL at 07:14

## 2020-07-03 RX ADMIN — KETOTIFEN FUMARATE 1 DROP: 0.25 SOLUTION OPHTHALMIC at 20:43

## 2020-07-03 RX ADMIN — OXCARBAZEPINE 300 MG: 150 TABLET, FILM COATED ORAL at 08:28

## 2020-07-03 RX ADMIN — KETOTIFEN FUMARATE 1 DROP: 0.25 SOLUTION OPHTHALMIC at 08:40

## 2020-07-03 RX ADMIN — CYCLOSPORINE 1 DROP: 0.5 EMULSION OPHTHALMIC at 20:42

## 2020-07-03 RX ADMIN — METOCLOPRAMIDE 10 MG: 10 TABLET ORAL at 11:16

## 2020-07-03 RX ADMIN — HYDROCODONE BITARTRATE AND ACETAMINOPHEN 1 TABLET: 5; 325 TABLET ORAL at 10:34

## 2020-07-03 RX ADMIN — Medication 400 UNITS: at 08:26

## 2020-07-03 RX ADMIN — GABAPENTIN 800 MG: 400 CAPSULE ORAL at 11:57

## 2020-07-03 RX ADMIN — TOPIRAMATE 100 MG: 100 TABLET, FILM COATED ORAL at 17:12

## 2020-07-03 RX ADMIN — MONTELUKAST SODIUM 10 MG: 10 TABLET, FILM COATED ORAL at 20:43

## 2020-07-03 RX ADMIN — ESCITALOPRAM OXALATE 20 MG: 10 TABLET ORAL at 08:24

## 2020-07-03 RX ADMIN — METOCLOPRAMIDE 10 MG: 10 TABLET ORAL at 08:26

## 2020-07-03 RX ADMIN — CYCLOSPORINE 1 DROP: 0.5 EMULSION OPHTHALMIC at 08:24

## 2020-07-03 RX ADMIN — TOPIRAMATE 100 MG: 100 TABLET, FILM COATED ORAL at 08:26

## 2020-07-03 RX ADMIN — OXCARBAZEPINE 300 MG: 150 TABLET, FILM COATED ORAL at 20:43

## 2020-07-03 RX ADMIN — PANTOPRAZOLE SODIUM 40 MG: 40 TABLET, DELAYED RELEASE ORAL at 03:05

## 2020-07-03 NOTE — THERAPY TREATMENT NOTE
Patient Name: Ashley Lin  : 1950    MRN: [id number]                              Today's Date: 7/3/2020       Admit Date: 2020    Visit Dx:     ICD-10-CM ICD-9-CM   1. Intractable vomiting with nausea, unspecified vomiting type R11.2 536.2   2. Cervical pain M54.2 723.1   3. Intractable cyclical vomiting associated with migraine G43.A1 346.21   4. Migraine without aura and with status migrainosus, not intractable G43.001 346.12     Patient Active Problem List   Diagnosis   • Intractable vomiting   • Asthma   • Cervical disc herniation   • Cervical stenosis of spine   • Chronic obstructive pulmonary disease (CMS/HCC)   • Low back pain   • Pseudarthrosis following spinal fusion   • History of lumbar fusion   • S/P cervical spinal fusion   • Hypothyroidism (acquired)   • Estrogen deficiency   • Depression   • Migraines   • Chronic pain syndrome   • GERD without esophagitis   • Intractable cyclical vomiting associated with migraine     Past Medical History:   Diagnosis Date   • Chronic back pain    • Chronic obstructive pulmonary disease (CMS/HCC) 2020   • Chronic pain syndrome 2020   • Depression 2020   • Disease of thyroid gland    • Estrogen deficiency 2020   • GERD without esophagitis 2020   • Hypothyroidism (acquired) 2020   • Migraines 2020   • Vomiting 2020     Past Surgical History:   Procedure Laterality Date   • CHOLECYSTECTOMY     • HYSTERECTOMY     • NECK SURGERY     • SPINE SURGERY  2020     General Information     Row Name 20          PT Evaluation Time/Intention    Document Type  therapy note (daily note)  -JR     Mode of Treatment  physical therapy  -JR     Row Name 20          General Information    Patient Profile Reviewed?  yes  -JR     Existing Precautions/Restrictions  fall;spinal  -JR     Row Name 20          Relationship/Environment    Lives With  alone  -JR     Row Name 20          Cognitive  Assessment/Intervention- PT/OT    Orientation Status (Cognition)  oriented x 3  -     Row Name 07/03/20 0925          Safety Issues, Functional Mobility    Impairments Affecting Function (Mobility)  balance;postural/trunk control;endurance/activity tolerance;strength  -       User Key  (r) = Recorded By, (t) = Taken By, (c) = Cosigned By    Initials Name Provider Type    JR Jessica De La Rosa, PT Physical Therapist        Mobility     Row Name 07/03/20 0925          Bed Mobility Assessment/Treatment    Bed Mobility Assessment/Treatment  bed mobility (all) activities  -     [place] Level (Bed Mobility)  minimum assist (75% patient effort)  -JR     Sit-Supine [place] (Bed Mobility)  minimum assist (75% patient effort)  -JR     Supine-Sit-Supine [place] (Bed Mobility)  minimum assist (75% patient effort)  -JR     Comment (Bed Mobility)  needed more assisst today 2' to fatigue from bath at bedside and frequent stands with PT  -     Row Name 07/03/20 0925          Bed-Chair Transfer    Bed-Chair [place] (Transfers)  unable to assess  -     Row Name 07/03/20 0925          Sit-Stand Transfer    Sit-Stand [place] (Transfers)  minimum assist (75% patient effort)  -     Assistive Device (Sit-Stand Transfers)  walker, front-wheeled  -     Row Name 07/03/20 0925          Gait/Stairs Assessment/Training    [place] Level (Gait)  unable to assess  -       User Key  (r) = Recorded By, (t) = Taken By, (c) = Cosigned By    Initials Name Provider Type    JR Jessica De La Rosa, PT Physical Therapist        Obj/Interventions     Row Name 07/03/20 0927          General ROM    GENERAL ROM COMMENTS  LE WFL in supine  -     Row Name 07/03/20 0927          Therapeutic Exercise    Lower Extremity Range of Motion (Therapeutic Exercise)  knee flexion/extension, bilateral;ankle dorsiflexion/plantar flexion, bilateral  -     Exercise Type (Therapeutic Exercise)  AROM (active range of motion)  -JR      Comment (Therapeutic Exercise)  Pt performed 8 -10 of each exercise but was fatigued from recent bath  -JR     Row Name 07/03/20 0927          Static Sitting Balance    Level of [place] (Unsupported Sitting, Static Balance)  supervision  -JR     Sitting Position (Unsupported Sitting, Static Balance)  sitting on edge of bed  -JR     Time Able to Maintain Position (Unsupported Sitting, Static Balance)  more than 5 minutes  -JR     Row Name 07/03/20 0927          Dynamic Sitting Balance    Level of [place], Reaches Outside Midline (Sitting, Dynamic Balance)  supervision  -JR     Sitting Position, Reaches Outside Midline (Sitting, Dynamic Balance)  sitting in chair  -JR     Comment, Reaches Outside Midline (Sitting, Dynamic Balance)  reaches to basin and is able to wash upper legs and abdoment with supervision = needs A at shoulder to wash lower legs  -JR     Row Name 07/03/20 0927          Static Standing Balance    Level of [place] (Supported Standing, Static Balance)  moderate assist, 50 to 74% patient effort  -JR     Time Able to Maintain Position (Supported Standing, Static Balance)  30 to 45 seconds  -JR     Assistive Device Utilized (Supported Standing, Static Balance)  walker, rolling  -JR       User Key  (r) = Recorded By, (t) = Taken By, (c) = Cosigned By    Initials Name Provider Type    JR Jessica De La Rosa, PT Physical Therapist        Goals/Plan     Row Name 07/03/20 0935          Bed Mobility Goal 1 (PT)    Activity/Assistive Device (Bed Mobility Goal 1, PT)  bed mobility activities, all  -JR     [place] Level/Cues Needed (Bed Mobility Goal 1, PT)  minimum assist (75% or more patient effort)  -JR     Time Frame (Bed Mobility Goal 1, PT)  2 weeks  -JR     Progress/Outcomes (Bed Mobility Goal 1, PT)  continuing progress toward goal  -JR     Row Name 07/03/20 0935          Transfer Goal 1 (PT)    Activity/Assistive Device (Transfer Goal 1, PT)  transfers, all  -JR     [place]  Level/Cues Needed (Transfer Goal 1, PT)  moderate assist (50-74% patient effort)  -     Time Frame (Transfer Goal 1, PT)  2 weeks  -JR     Progress/Outcome (Transfer Goal 1, PT)  progress slower than expected  -     Row Name 07/03/20 0935          Gait Training Goal 1 (PT)    Distance (Gait Goal 1, PT)  not assessed this date  -     Barriers (Gait Training Goal 1, PT)  pain and fatigue  -       User Key  (r) = Recorded By, (t) = Taken By, (c) = Cosigned By    Initials Name Provider Type    JR Jessica De La Rosa, PT Physical Therapist        Clinical Impression     Row Name 07/03/20 0929          Pain Assessment    Additional Documentation  Pain Scale: Numbers Pre/Post-Treatment (Group)  -     Row Name 07/03/20 0929          Pain Scale: Numbers Pre/Post-Treatment    Pain Scale: Numbers, Pretreatment  7/10  -     Pain Scale: Numbers, Post-Treatment  7/10  -JR     Pain Location  back  -     Pre/Post Treatment Pain Comment  states pain is worse today and more consistent  -     Row Name 07/03/20 0929          Plan of Care Review    Plan of Care Reviewed With  patient  -     Outcome Summary  patient agreed to bath with basin at bedside. Assisted with bath.  She is easily fatigued and complains of increased back pain with movement.  She sits bedside for approx 10 min to wash and dress.  Fatigued after but agreeable to perform exercises., needed frequent VC to stay on task.  She comes to stand 5 times this date - but needs mod a to come to stand and maintain standing with rwx.  Knees stay flexed during stance and she can straighten them slightly when requested.  She requests not to get into chair (back is too painful) and refuses ambulation this date for same reason.  She is expected to go to IP rehab at [place] and this is appropriate 2' to decline in function, pain levels, limited mobility and living alone.  -     Row Name 07/03/20 0934          Physical Therapy Clinical Impression    Criteria for Skilled  Interventions Met (PT Clinical Impression)  yes  -JR     Rehab Potential (PT Clinical Summary)  fair, will monitor progress closely  -JR       User Key  (r) = Recorded By, (t) = Taken By, (c) = Cosigned By    Initials Name Provider Type    Jessica Salazar PT Physical Therapist        Outcome Measures     Row Name 07/03/20 0936          How much help from another person do you currently need...    Turning from your back to your side while in flat bed without using bedrails?  3  -JR     Moving from lying on back to sitting on the side of a flat bed without bedrails?  3  -JR     Moving to and from a bed to a chair (including a wheelchair)?  2  -JR     Standing up from a chair using your arms (e.g., wheelchair, bedside chair)?  3  -JR     Climbing 3-5 steps with a railing?  2  -JR     To walk in hospital room?  3  -JR     AM-PAC 6 Clicks Score (PT)  16  -JR     Row Name 07/03/20 0936          Functional Assessment    Outcome Measure Options  AM-PAC 6 Clicks Basic Mobility (PT)  -       User Key  (r) = Recorded By, (t) = Taken By, (c) = Cosigned By    Initials Name Provider Type    Jessica Salazar PT Physical Therapist        Physical Therapy Education                 Title: PT OT SLP Therapies (Done)     Topic: Physical Therapy (Done)     Point: Mobility training (Done)     Description:   Instruct learner(s) on safety and technique for assisting patient out of bed, chair or wheelchair.  Instruct in the proper use of assistive devices, such as walker, crutches, cane or brace.              Patient Friendly Description:   It's important to get you on your feet again, but we need to do so in a way that is safe for you. Falling has serious consequences, and your personal safety is the most important thing of all.        When it's time to get out of bed, one of us or a family member will sit next to you on the bed to give you support.     If your doctor or nurse tells you to use a walker, crutches, a cane, or a  brace, be sure you use it every time you get out of bed, even if you think you don't need it.    Learning Progress Summary           Patient Acceptance, E,TB,D, VU,DU,NR by  at 7/3/2020 0424    Acceptance, E,TB, NR by  at 7/2/2020 1238    Comment:  Educated pt on spinal precautions and safe bed mobility/transfers as well supine ther-ex.    Acceptance, E,TB, VU by AO at 7/1/2020 1243    Comment:  Educated pt on spinal precautions, on safe bed mobility/transfers/gait training, and on POC with anticipated physical therapy needs.                   Point: Precautions (Done)     Description:   Instruct learner(s) on prescribed precautions during mobility and gait tasks              Learning Progress Summary           Patient Acceptance, E,TB,D, VU,DU,NR by  at 7/3/2020 0424    Acceptance, E,TB, NR by  at 7/2/2020 1238    Comment:  Educated pt on spinal precautions and safe bed mobility/transfers as well supine ther-ex.    Acceptance, E,TB, VU by AO at 7/1/2020 1243    Comment:  Educated pt on spinal precautions, on safe bed mobility/transfers/gait training, and on POC with anticipated physical therapy needs.                               User Key     Initials Effective Dates Name Provider Type Discipline     03/01/19 -  Devorah Dey, PT Physical Therapist PT     06/24/19 -  Shubham Chan LPN Licensed Nurse Nurse              PT Recommendation and Plan     Outcome Summary/Treatment Plan (PT)  Anticipated Discharge Disposition (PT): inpatient rehabilitation facility  Plan of Care Reviewed With: patient  Outcome Summary: patient agreed to bath with basin at bedside. Assisted with bath.  She is easily fatigued and complains of increased back pain with movement.  She sits bedside for approx 10 min to wash and dress.  Fatigued after but agreeable to perform exercises., needed frequent VC to stay on task.  She comes to stand 5 times this date - but needs mod a to come to stand and maintain standing with  rwx.  Knees stay flexed during stance and she can straighten them slightly when requested.  She requests not to get into chair (back is too painful) and refuses ambulation this date for same reason.  She is expected to go to IP rehab at [place] and this is appropriate 2' to decline in function, pain levels, limited mobility and living alone.     Time Calculation:   PT Charges     Row Name 07/03/20 0938             Time Calculation    Start Time  0900  -JR      Stop Time  0921  -      Time Calculation (min)  21 min  -JR      PT Received On  07/03/20  -      PT - Next Appointment  07/06/20  -      PT Goal Re-Cert Due Date  07/15/20  -         Time Calculation- PT    Total Timed Code Minutes- PT  17 minute(s)  -        User Key  (r) = Recorded By, (t) = Taken By, (c) = Cosigned By    Initials Name Provider Type    Jessica Salazar, PT Physical Therapist        Therapy Charges for Today     Code Description Service Date Service Provider Modifiers Qty    83016258641 HC PT THER PROC EA 15 MIN 7/3/2020 Jessica De La Rosa, PT GP 1    37202984640 HC PT THERAPEUTIC ACT EA 15 MIN 7/3/2020 Jessica De La Rosa, PT GP 1          PT G-Codes  Outcome Measure Options: AM-PAC 6 Clicks Basic Mobility (PT)  AM-PAC 6 Clicks Score (PT): 16    Jessica De La Rosa PT  7/3/2020

## 2020-07-03 NOTE — PLAN OF CARE
Patient c/o chronic pain. Takes pain meds as allowed. Waiting on Passr to [facility]. Will continue to monitor.

## 2020-07-03 NOTE — PLAN OF CARE
Problem: Patient Care Overview  Goal: Plan of Care Review  Flowsheets  Taken 7/3/2020 0918  Plan of Care Reviewed With: patient  Taken 7/3/2020 0901  Outcome Summary: patient agreed to bath with basin at bedside. Assisted with bath.  She is easily fatigued and complains of increased back pain with movement.  She sits bedside for approx 10 min to wash and dress.  Fatigued after but agreeable to perform exercises., needed frequent VC to stay on task.  She comes to stand 5 times this date - but needs mod a to come to stand and maintain standing with rwx.  Knees stay flexed during stance and she can straighten them slightly when requested.  She requests not to get into chair (back is too painful) and refuses ambulation this date for same reason.  She is expected to go to IP rehab at [place] and this is appropriate 2' to decline in function, pain levels, limited mobility and living alone.  PPE - face mask, face shield and gloves

## 2020-07-03 NOTE — DISCHARGE SUMMARY
[facility] Medicine Services  DISCHARGE SUMMARY        Prepared For PCP:  Zoey Bah MD    Patient Name: Ashley Lin  : 1950  MRN: [id number]      Date of Admission:   2020    Date of Discharge:  7/3/2020    Length of stay:  LOS: 0 days     Hospital Course     Presenting Problem:   Cervical pain [M54.2]  Intractable vomiting with nausea, unspecified vomiting type [R11.2]      Active Hospital Problems    Diagnosis  POA   • **Intractable vomiting [R11.10]  Yes   • Chronic obstructive pulmonary disease (CMS/HCC) [J44.9]  Yes   • Hypothyroidism (acquired) [E03.9]  Yes   • Estrogen deficiency [E28.39]  Yes   • Depression [F32.9]  Yes   • Migraines [G43.909]  Yes   • Chronic pain syndrome [G89.4]  Yes   • GERD without esophagitis [K21.9]  Yes   • Intractable cyclical vomiting associated with migraine [G43.A1]  Unknown   • Pseudarthrosis following spinal fusion [M96.0]  Not Applicable      Resolved Hospital Problems   No resolved problems to display.           Hospital Course by problem list.      Intractable vomiting with associated abdominal pain status post EGD revealing  Normal esophagus dilated 54 [place] without resistance or trauma  Gastroparesis  Evidence of prior partial gastrectomy-  -CT abdomen pelvis showed no acute findings in the abdomen or pelvis.  No CT explanation for patient's upper abdomen pain.  Surgical changes of partial gastrectomy with gastrojejunostomy.  Cholecystectomy.  Hysterectomy.  Lumbar spinal fusion.  Stable intrahepatic next hepatic biliary ductal dilatation since 3/9/2019, may represent increased capacity from previous cholecystectomy.  -patient is afebrile, pulse in the 80s, on room air oxygen and 97% SPO2 and blood pressure 150s over 70s.    -Patient received 500 normal saline bolus, Zofran, Dilaudid, Phenergan in ED.  -Zofran, Phenergan, Carafate ordered  - d/c iv fluids once tolerating regular diet.  -Pain control with home Percocet,  Norco, inspect verified-ketamine ordered  - Consult GI service   -Case management consult  -PT consulted ... Advise rehab.... Once arranged .. Pt will be discharged.      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  Hardware appears intact.  No acute cervical spine findings.  No high-grade canal or foraminal stenosis is seen.  7 mm ill-defined sclerotic focus on the T1 vertebral body it is indeterminate.  Bone scan may prove helpful for further evaluation.     Chronic diarrhea  -Hold home viberzi     Hypothyroidism  -Continue home Synthroid     Estrogen deficiency  -Continue home estradiol     Depression  -Continue home Lexapro, amitriptyline     COPD  -Not acute exacerbation  -Continue home albuterol, Pulmicort, Singulair, Anoro     Migraines  -Continue home Fioricet, maxalt as needed  -Continue home propranolol     ?Seizure disorder  -No previous documentation of seizure disorder  -Continue home Trileptal, Topamax     Chronic pain syndrome  -Continue home pain meds     GERD  -Continue home PPI           Recommendation for Outpatient Providers:     Follow up with family physician in a week time.  Follow up with GI service in a week time.         Reasons For Change In Medications and Indications for New Medications:        Day of Discharge     HPI:       Vital Signs:   Temp:  [98 °F (36.7 °C)-98.5 °F (36.9 °C)] 98 °F (36.7 °C)  Heart Rate:  [67-84] 71  Resp:  [12-17] 16  BP: (108-149)/(54-80) 149/80     Physical Exam:  Physical Exam   Constitutional: She is oriented to person, place, and time. She appears well-developed and well-nourished. No distress.   HENT:   Head: Normocephalic and atraumatic.   Right Ear: External ear normal.   Left Ear: External ear normal.   Nose: Nose normal.   Mouth/Throat: Oropharynx is clear and moist. No oropharyngeal exudate.   Eyes: Pupils are equal, round, and  reactive to light. Conjunctivae and EOM are normal. Right eye exhibits no discharge. Left eye exhibits no discharge. No scleral icterus.   Neck: Normal range of motion. No JVD present. No tracheal deviation present. No thyromegaly present.   Cardiovascular: Normal rate, regular rhythm, normal heart sounds and intact distal pulses. Exam reveals no gallop and no friction rub.   No murmur heard.  Pulmonary/Chest: Effort normal and breath sounds normal. No stridor. No respiratory distress. She has no wheezes. She has no rales. She exhibits no tenderness.   Abdominal: Soft. Bowel sounds are normal. She exhibits no distension and no mass. There is no tenderness. There is no rebound and no guarding. No hernia.   Musculoskeletal: Normal range of motion. She exhibits no edema, tenderness or deformity.   Lymphadenopathy:     She has no cervical adenopathy.   Neurological: She is alert and oriented to person, place, and time. No cranial nerve deficit or sensory deficit. She exhibits normal muscle tone. Coordination normal.   Skin: Skin is warm and dry. No rash noted. She is not diaphoretic. No erythema.   Psychiatric: She has a normal mood and affect. Her behavior is normal.   Nursing note and vitals reviewed.      Pertinent  and/or Most Recent Results     Results from last 7 days   Lab Units 07/03/20  0531 07/02/20  0456 07/01/20  0335 06/30/20  1155 06/30/20  1055   WBC 10*3/mm3  --  5.60 5.50  --  7.20   HEMOGLOBIN g/dL  --  12.0 12.0  --  15.3   HEMATOCRIT %  --  35.8 35.1  --  45.0   PLATELETS 10*3/mm3  --  209 216  --  303   SODIUM mmol/L  --  143 142 145  --    POTASSIUM mmol/L 3.9 4.4 4.0 4.0  --    CHLORIDE mmol/L  --  108* 106 107  --    CO2 mmol/L  --  23.0 22.0 24.0  --    BUN   --  9 13 15  --    CREATININE mg/dL  --  0.73 0.52* 0.68  --    GLUCOSE mg/dL  --  114* 110* 137*  --    CALCIUM mg/dL  --  8.4* 8.5* 9.5  --      Results from last 7 days   Lab Units 07/02/20  0456 06/30/20  1155   BILIRUBIN mg/dL <0.2*  0.2   ALK PHOS U/L 104 132*   ALT (SGPT) U/L 11 16   AST (SGOT) U/L 15 27   PROTIME Seconds 11.0  --    INR  1.06  --            Invalid input(s): TG, LDLCALC, LDLREALC        Brief Urine Lab Results  (Last result in the past 365 days)      Color   Clarity   Blood   Leuk Est   Nitrite   Protein   CREAT   Urine HCG        06/30/20 1126 Dark Yellow Clear Trace Negative Negative 30 mg/dL (1+)               Microbiology Results Abnormal     None          Ct Cervical Spine Without Contrast    Result Date: 6/30/2020  Impression:  1. Anterior C3-4 and posterior C3-C6 spinal fusion. Hardware appears intact. 2. No acute cervical spine findings. No high-grade canal or foraminal stenosis is seen. 3. 7 mm ill-defined sclerotic focus in the T1 vertebral body. It is indeterminate. Bone scan may prove helpful for further evaluation.  Electronically Signed By-Dr. Velvet Maher MD On:6/30/2020 11:52 AM This report was finalized on [id number] by Dr. Velvet Maher MD.    Mri Cervical Spine Without Contrast    Result Date: 6/30/2020  Impression:  1. Mild to moderate canal stenosis at C4-5.. No high-grade canal stenosis is seen. 2. No evidence of cord edema. 3. Satisfactory cervical alignment. 4. C3-4 anterior spinal fusion. C3-C6 posterior spinal fusion. 5. Cellulitic changes or edema within the mid neck soft tissues. No drainable fluid collection or abscess. 6. No epidural fluid collection or abscess is seen.   Electronically Signed By-Dr. Velvet Maher MD On:6/30/2020 3:21 PM This report was finalized on [id number] by Dr. Velvet Maher MD.    Ct Abdomen Pelvis With Contrast    Result Date: 6/30/2020  Impression:  1. No acute findings in the abdomen or pelvis. No CT explanation for the patient's upper abdominal pain. 2. Surgical changes of partial gastrectomy with gastrojejunostomy. Cholecystectomy. Hysterectomy. Lumbar spinal fusion. 3. Stable intrahepatic next hepatic biliary ductal dilation since 03/09/2019, may  represent increased capacitance from previous cholecystectomy.  Electronically Signed By-Dr. Velvet Maher MD On:6/30/2020 2:09 PM This report was finalized on [id number] by Dr. Velvet Maher MD.                             Test Results Pending at Discharge        Procedures Performed  Procedure(s):  ESOPHAGOGASTRODUODENOSCOPY WITH DILATATION (BOUGIE #[id number])         Consults:   Consults     Date and Time Order Name Status Description    7/1/2020 1214 Inpatient Gastroenterology Consult Completed     6/30/2020 1422 Hospitalist (on-call MD unless specified) Completed             Discharge Details        Discharge Medications      Changes to Medications      Instructions Start Date   gabapentin 800 MG tablet  Commonly known as:  NEURONTIN  What changed:  when to take this   800 mg, Oral, 2 Times Daily         Continue These Medications      Instructions Start Date   albuterol sulfate  (90 Base) MCG/ACT inhaler  Commonly known as:  PROVENTIL HFA;VENTOLIN HFA;PROAIR HFA   2 puffs, Inhalation, Every 4 Hours PRN      Anoro Ellipta 62.5-25 MCG/INH aerosol powder  inhaler  Generic drug:  umeclidinium-vilanterol   1 puff, Inhalation, Daily - RT      azelastine 0.05 % ophthalmic solution  Commonly known as:  OPTIVAR   1 drop, Both Eyes, 2 Times Daily      budesonide 180 MCG/ACT inhaler  Commonly known as:  PULMICORT   2 puffs, Inhalation, 2 Times Daily - RT      butalbital-acetaminophen-caffeine -40 MG per tablet  Commonly known as:  FIORICET, ESGIC   1-2 tablets, Oral, Every 4 Hours PRN      cycloSPORINE 0.05 % ophthalmic emulsion  Commonly known as:  RESTASIS   1 drop, Both Eyes, 2 Times Daily      estradiol 1 MG tablet  Commonly known as:  ESTRACE   1.5 mg, Oral, Daily      levothyroxine 25 MCG tablet  Commonly known as:  SYNTHROID, LEVOTHROID   25 mcg, Oral, Daily      omeprazole 40 MG capsule  Commonly known as:  priLOSEC   40 mg, Oral, 2 times daily      oxyCODONE-acetaminophen  MG per  tablet  Commonly known as:  PERCOCET   1 tablet, Oral, Every 6 Hours PRN      rizatriptan MLT 10 MG disintegrating tablet  Commonly known as:  MAXALT-MLT   10 mg, Translingual, Once As Needed, May repeat in 2 hours if needed       sucralfate 1 g tablet  Commonly known as:  CARAFATE   1 g, Oral, 4 Times Daily      topiramate 100 MG tablet  Commonly known as:  TOPAMAX   100 mg, Oral, 4 Times Daily      vitamin E 400 UNIT capsule   400 Units, Oral, Daily         Stop These Medications    amitriptyline 25 MG tablet  Commonly known as:  ELAVIL     escitalopram 20 MG tablet  Commonly known as:  LEXAPRO     HYDROcodone-acetaminophen 5-325 MG per tablet  Commonly known as:  NORCO     lansoprazole 30 MG capsule  Commonly known as:  PREVACID     montelukast 10 MG tablet  Commonly known as:  SINGULAIR     ondansetron 4 MG tablet  Commonly known as:  ZOFRAN     OXcarbazepine 300 MG tablet  Commonly known as:  TRILEPTAL     promethazine 25 MG suppository  Commonly known as:  PHENERGAN     promethazine 25 MG tablet  Commonly known as:  PHENERGAN     propranolol 40 MG tablet  Commonly known as:  INDERAL     Viberzi 100 MG tablet  Generic drug:  Eluxadoline            Allergies   Allergen Reactions   • Penicillins Hives         Discharge Disposition:  Skilled Nursing Facility (DC - External)    Diet:  Hospital:  Diet Order   Procedures   • Diet Regular, Gastrointestinal; Low Residue         Discharge Activity:         CODE STATUS:    Code Status and Medical Interventions:   Ordered at: 06/30/20 1625     Code Status:    CPR     Medical Interventions (Level of Support Prior to Arrest):    Full         Follow-up Appointments  No future appointments.          Condition on Discharge:      Stable        Electronically signed by Xiao Belle MD, 07/03/20, 9:21 AM.      Time: I spent  33  minutes on this discharge activity which included face-to-face encounter with the patient/reviewing the data in the system/coordination of the  care with the nursing staff as well as consultants/documentation/entering orders.

## 2020-07-03 NOTE — PLAN OF CARE
Problem: Patient Care Overview  Goal: Plan of Care Review  Flowsheets  Taken 7/2/2020 1513 by Diana Dempsey LPN  Progress: no change  Taken 7/2/2020 1938 by Shubham Chan LPN  Plan of Care Reviewed With: patient  Taken 7/3/2020 0421 by Shubham Chan LPN  Outcome Summary: low red GI diet, ., c/o pain and prn given , post EGD alert and awake, plan for IP Rehab at [facility] , Proper PPE , cont with plan of care     Problem: Fall Risk (Adult)  Goal: Identify Related Risk Factors and Signs and Symptoms  Flowsheets (Taken 7/3/2020 0421)  Related Risk Factors (Fall Risk): age-related changes; fatigue/slow reaction; gait/mobility problems; inadequate lighting; slippery/uneven surfaces; environment unfamiliar  Goal: Absence of Fall  Flowsheets (Taken 7/3/2020 0421)  Absence of Fall: making progress toward outcome     Problem: Pain, Chronic (Adult)  Goal: Identify Related Risk Factors and Signs and Symptoms  Flowsheets (Taken 7/3/2020 0421)  Related Risk Factors (Chronic Pain): disease process; coping ineffective; knowledge deficit  Signs and Symptoms (Chronic Pain): sleep pattern change  Goal: Acceptable Pain/Comfort Level and Functional Ability  Flowsheets (Taken 7/3/2020 0421)  Acceptable Pain/Comfort Level and Functional Ability: making progress toward outcome     Problem: Nausea/Vomiting (Adult)  Goal: Identify Related Risk Factors and Signs and Symptoms  Flowsheets (Taken 7/3/2020 0421)  Related Risk Factors (Nausea/Vomiting): gastrointestinal infection; gastrointestinal dysfunction  Goal: Symptom Relief  Flowsheets (Taken 7/3/2020 0421)  Symptom Relief: making progress toward outcome  Goal: Adequate Hydration  Flowsheets (Taken 7/3/2020 0421)  Adequate Hydration: making progress toward outcome

## 2020-07-03 NOTE — PROGRESS NOTES
Continued Stay Note  [facility]     Patient Name: Ashley Lin  MRN: [id number]  Today's Date: 7/3/2020    Admit Date: 6/30/2020    Discharge Plan     Row Name 07/03/20 1205       Plan    Plan  DC Plan: [facility] at d/c pending pre-cert. Pre-cert started 7/2 (had to attempt [id number] submission, however pt score 16 per PT which is outside floor to SNF approval range), pt will require traditional pre-cert = pending. [facility] queued for review 7/2 d/t payor source.     Plan Comments  SW informed that traditional pre-cert was started today (7/3) following attempt of [facility] placement. However, pt scored 16 on [id number] which is outside range of acceptance (8-15) for floor to SNF. Pre-cert pending at this time for placement.      ERIC Tang    Phone: [phone]  Cell: [phone]  Fax: [phone]  [email or web address]

## 2020-07-03 NOTE — PROGRESS NOTES
Discharge Planning Assessment   [facility]     Patient Name: Ashley iLn  MRN: [id number]  Today's Date: 7/3/2020    Admit Date: 6/30/2020          Plan    Plan Comments  barriers to discharge is pending authorization from insurance for rehab placement       Carol naegele rn  Case management  Office number [phone]  Cell phone [phone]

## 2020-07-04 LAB
MAGNESIUM SERPL-MCNC: 1.9 MG/DL (ref 1.6–2.4)
POTASSIUM SERPL-SCNC: 3.8 MMOL/L (ref 3.5–5.2)

## 2020-07-04 PROCEDURE — 63710000001 CYCLOSPORINE 0.05 % EMULSION: Performed by: INTERNAL MEDICINE

## 2020-07-04 PROCEDURE — 63710000001 TOPIRAMATE 100 MG TABLET: Performed by: INTERNAL MEDICINE

## 2020-07-04 PROCEDURE — 63710000001 AMITRIPTYLINE 25 MG TABLET: Performed by: INTERNAL MEDICINE

## 2020-07-04 PROCEDURE — A9270 NON-COVERED ITEM OR SERVICE: HCPCS | Performed by: INTERNAL MEDICINE

## 2020-07-04 PROCEDURE — 99225 PR SBSQ OBSERVATION CARE/DAY 25 MINUTES: CPT | Performed by: HOSPITALIST

## 2020-07-04 PROCEDURE — 63710000001 ESCITALOPRAM 10 MG TABLET: Performed by: INTERNAL MEDICINE

## 2020-07-04 PROCEDURE — 63710000001 PROPRANOLOL 10 MG TABLET: Performed by: INTERNAL MEDICINE

## 2020-07-04 PROCEDURE — 83735 ASSAY OF MAGNESIUM: CPT | Performed by: INTERNAL MEDICINE

## 2020-07-04 PROCEDURE — 63710000001 PANTOPRAZOLE 40 MG TABLET DELAYED-RELEASE: Performed by: INTERNAL MEDICINE

## 2020-07-04 PROCEDURE — 63710000001 ESTRADIOL 1 MG TABLET: Performed by: INTERNAL MEDICINE

## 2020-07-04 PROCEDURE — 63710000001 GABAPENTIN 400 MG CAPSULE: Performed by: INTERNAL MEDICINE

## 2020-07-04 PROCEDURE — G0378 HOSPITAL OBSERVATION PER HR: HCPCS

## 2020-07-04 PROCEDURE — 63710000001 HYDROCODONE-ACETAMINOPHEN 5-325 MG TABLET: Performed by: INTERNAL MEDICINE

## 2020-07-04 PROCEDURE — 63710000001 METOCLOPRAMIDE 10 MG TABLET: Performed by: INTERNAL MEDICINE

## 2020-07-04 PROCEDURE — 63710000001 SUCRALFATE 1 G TABLET: Performed by: INTERNAL MEDICINE

## 2020-07-04 PROCEDURE — 63710000001 MONTELUKAST 10 MG TABLET: Performed by: INTERNAL MEDICINE

## 2020-07-04 PROCEDURE — 63710000001 MELATONIN 5 MG TABLET: Performed by: INTERNAL MEDICINE

## 2020-07-04 PROCEDURE — 63710000001 OXCARBAZEPINE 150 MG TABLET: Performed by: INTERNAL MEDICINE

## 2020-07-04 PROCEDURE — 84132 ASSAY OF SERUM POTASSIUM: CPT | Performed by: INTERNAL MEDICINE

## 2020-07-04 PROCEDURE — 94799 UNLISTED PULMONARY SVC/PX: CPT

## 2020-07-04 PROCEDURE — 63710000001 LEVOTHYROXINE 25 MCG TABLET: Performed by: INTERNAL MEDICINE

## 2020-07-04 PROCEDURE — 63710000001 VITAMIN E 400 UNIT CAPSULE: Performed by: INTERNAL MEDICINE

## 2020-07-04 PROCEDURE — 63710000001 OXYCODONE 5 MG TABLET: Performed by: INTERNAL MEDICINE

## 2020-07-04 RX ADMIN — HYDROCODONE BITARTRATE AND ACETAMINOPHEN 1 TABLET: 5; 325 TABLET ORAL at 04:00

## 2020-07-04 RX ADMIN — BUDESONIDE 0.5 MG: 0.5 INHALANT RESPIRATORY (INHALATION) at 06:37

## 2020-07-04 RX ADMIN — CYCLOSPORINE 1 DROP: 0.5 EMULSION OPHTHALMIC at 20:00

## 2020-07-04 RX ADMIN — ESCITALOPRAM OXALATE 20 MG: 10 TABLET ORAL at 08:53

## 2020-07-04 RX ADMIN — SUCRALFATE 1 G: 1 TABLET ORAL at 08:52

## 2020-07-04 RX ADMIN — PANTOPRAZOLE SODIUM 40 MG: 40 TABLET, DELAYED RELEASE ORAL at 06:00

## 2020-07-04 RX ADMIN — OXYCODONE HYDROCHLORIDE 10 MG: 5 TABLET ORAL at 22:50

## 2020-07-04 RX ADMIN — Medication 10 ML: at 08:52

## 2020-07-04 RX ADMIN — PROPRANOLOL HYDROCHLORIDE 40 MG: 10 TABLET ORAL at 08:52

## 2020-07-04 RX ADMIN — OXYCODONE HYDROCHLORIDE 10 MG: 5 TABLET ORAL at 14:33

## 2020-07-04 RX ADMIN — OXCARBAZEPINE 300 MG: 150 TABLET, FILM COATED ORAL at 20:00

## 2020-07-04 RX ADMIN — TOPIRAMATE 100 MG: 100 TABLET, FILM COATED ORAL at 20:00

## 2020-07-04 RX ADMIN — OXYCODONE HYDROCHLORIDE 10 MG: 5 TABLET ORAL at 10:06

## 2020-07-04 RX ADMIN — SODIUM CHLORIDE 100 ML/HR: 900 INJECTION, SOLUTION INTRAVENOUS at 04:04

## 2020-07-04 RX ADMIN — CYCLOSPORINE 1 DROP: 0.5 EMULSION OPHTHALMIC at 08:53

## 2020-07-04 RX ADMIN — OXYCODONE HYDROCHLORIDE 10 MG: 5 TABLET ORAL at 06:00

## 2020-07-04 RX ADMIN — SUCRALFATE 1 G: 1 TABLET ORAL at 20:00

## 2020-07-04 RX ADMIN — KETOTIFEN FUMARATE 1 DROP: 0.25 SOLUTION OPHTHALMIC at 08:52

## 2020-07-04 RX ADMIN — GABAPENTIN 800 MG: 400 CAPSULE ORAL at 13:08

## 2020-07-04 RX ADMIN — SODIUM CHLORIDE 100 ML/HR: 900 INJECTION, SOLUTION INTRAVENOUS at 13:56

## 2020-07-04 RX ADMIN — TOPIRAMATE 100 MG: 100 TABLET, FILM COATED ORAL at 08:52

## 2020-07-04 RX ADMIN — OXYCODONE HYDROCHLORIDE 10 MG: 5 TABLET ORAL at 18:45

## 2020-07-04 RX ADMIN — ESTRADIOL 1.5 MG: 1 TABLET ORAL at 08:53

## 2020-07-04 RX ADMIN — GABAPENTIN 800 MG: 400 CAPSULE ORAL at 20:00

## 2020-07-04 RX ADMIN — SUCRALFATE 1 G: 1 TABLET ORAL at 13:09

## 2020-07-04 RX ADMIN — BUDESONIDE 0.5 MG: 0.5 INHALANT RESPIRATORY (INHALATION) at 18:16

## 2020-07-04 RX ADMIN — KETOTIFEN FUMARATE 1 DROP: 0.25 SOLUTION OPHTHALMIC at 20:00

## 2020-07-04 RX ADMIN — IPRATROPIUM BROMIDE AND ALBUTEROL SULFATE 3 ML: .5; 3 SOLUTION RESPIRATORY (INHALATION) at 06:37

## 2020-07-04 RX ADMIN — Medication 400 UNITS: at 08:53

## 2020-07-04 RX ADMIN — IPRATROPIUM BROMIDE AND ALBUTEROL SULFATE 3 ML: .5; 3 SOLUTION RESPIRATORY (INHALATION) at 18:16

## 2020-07-04 RX ADMIN — PROPRANOLOL HYDROCHLORIDE 40 MG: 10 TABLET ORAL at 20:00

## 2020-07-04 RX ADMIN — TOPIRAMATE 100 MG: 100 TABLET, FILM COATED ORAL at 18:00

## 2020-07-04 RX ADMIN — GABAPENTIN 800 MG: 400 CAPSULE ORAL at 08:52

## 2020-07-04 RX ADMIN — AMITRIPTYLINE HYDROCHLORIDE 25 MG: 25 TABLET, FILM COATED ORAL at 20:00

## 2020-07-04 RX ADMIN — Medication 10 ML: at 20:01

## 2020-07-04 RX ADMIN — TOPIRAMATE 100 MG: 100 TABLET, FILM COATED ORAL at 13:09

## 2020-07-04 RX ADMIN — OXCARBAZEPINE 300 MG: 150 TABLET, FILM COATED ORAL at 08:52

## 2020-07-04 RX ADMIN — GABAPENTIN 800 MG: 400 CAPSULE ORAL at 17:59

## 2020-07-04 RX ADMIN — OXCARBAZEPINE 300 MG: 150 TABLET, FILM COATED ORAL at 15:45

## 2020-07-04 RX ADMIN — MONTELUKAST SODIUM 10 MG: 10 TABLET, FILM COATED ORAL at 20:00

## 2020-07-04 RX ADMIN — METOCLOPRAMIDE 10 MG: 10 TABLET ORAL at 17:59

## 2020-07-04 RX ADMIN — SUCRALFATE 1 G: 1 TABLET ORAL at 17:59

## 2020-07-04 RX ADMIN — METOCLOPRAMIDE 10 MG: 10 TABLET ORAL at 13:09

## 2020-07-04 RX ADMIN — SODIUM CHLORIDE 100 ML/HR: 900 INJECTION, SOLUTION INTRAVENOUS at 22:53

## 2020-07-04 RX ADMIN — MELATONIN TAB 5 MG 5 MG: 5 TAB at 22:52

## 2020-07-04 RX ADMIN — LEVOTHYROXINE SODIUM 25 MCG: 25 TABLET ORAL at 06:00

## 2020-07-04 RX ADMIN — IPRATROPIUM BROMIDE AND ALBUTEROL SULFATE 3 ML: .5; 3 SOLUTION RESPIRATORY (INHALATION) at 14:54

## 2020-07-04 RX ADMIN — METOCLOPRAMIDE 10 MG: 10 TABLET ORAL at 08:52

## 2020-07-04 NOTE — PROGRESS NOTES
[facility] Medicine Services Daily Progress Note      Hospitalist Team  LOS 0 days      Patient Care Team:  Zoey Bah MD as PCP - General  Zoey Bah MD as PCP - Family Medicine    Patient Location: 373/1      Subjective   Subjective   Denies for any new complaint, no nausea or vomiting.   Chief Complaint / Subjective  Chief Complaint   Patient presents with   • Abdominal Pain     onset 2 days ago with N/V         Brief Synopsis of Hospital Course/HPI    Ms. Lin is a 70 y.o. female presents to [facility] complaining of nausea and vomiting for the past 2 days.  Patient states that she had posterior cervical neck fusion in the beginning of June.  Patient has been on pain meds ever since but states her pain is worsening as well as worsening abdominal pain.  Patient states she has had decreased oral intake over the past several days due to feeling nauseous.  Patient reports her last bowel movement was 3 days ago.  Patient also states that she has decreased urine output as she reports that she is not drinking enough fluids.  Patient also reports some hematemesis in which she is noticed some red streaks in her emesis.     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  Previous notes from [facility] state that patient has chronic nausea.  Patient with history of cholecystectomy back in 1996.  Patient also has a history of appendectomy.  Patient postop note shows patient was treated with clindamycin.  Patient has a history of C. difficile colitis and chronic diarrhea as well.     In the ED, CMP largely unremarkable.  Lipase normal, CBC unremarkable.  UA significant for 2+, 1+ protein, 2+ bilirubin, 3-5 RBCs, 0-2 WBCs, +1 bacteria. CT abdomen pelvis showed no acute findings in the abdomen or pelvis.  No CT explanation for patient's upper abdomen pain.  Surgical changes of partial gastrectomy with gastrojejunostomy.   "Cholecystectomy.  Hysterectomy.  Lumbar spinal fusion.  Stable intrahepatic next hepatic biliary ductal dilatation since 3/9/2019, may represent increased capacity from previous cholecystectomy.   CT of the cervical spine showed anterior C3-4 and posterior C3-6 spinal fusion.  Hardware appears intact.  No acute cervical spine findings.  No high-grade canal or foraminal stenosis is seen.  7 mm ill-defined sclerotic focus on the T1 vertebral body it is indeterminate.  Bone scan may prove helpful for further evaluation. patient is afebrile, pulse in the 80s, on room air oxygen and 97% SPO2 and blood pressure 150s over 70s.  Patient received 500 normal saline bolus, Zofran, Dilaudid, Phenergan in ED.    Date::          ROS      Objective   Objective      Vital Signs  Temp:  [97.7 °F (36.5 °C)-98.5 °F (36.9 °C)] 98.3 °F (36.8 °C)  Heart Rate:  [70-79] 71  Resp:  [16-19] 16  BP: (120-145)/(77-86) 145/86  Oxygen Therapy  SpO2: 94 %  Pulse Oximetry Type: Intermittent  Device (Oxygen Therapy): room air  Flow (L/min): 10  ETCO2 (mmHg): 23 mmHg  Flowsheet Rows      First Filed Value   Admission Height  157.5 cm (62\") Documented at 06/30/2020 1045   Admission Weight  59.4 kg (131 lb) Documented at 06/30/2020 1045        Intake & Output (last 3 days)       07/01 0701 - 07/02 0700 07/02 0701 - 07/03 0700 07/03 0701 - 07/04 0700 07/04 0701 - 07/05 0700    P.O. 960 240 50     I.V. (mL/kg) 1041 (17.9) 1000 (16.5)      Total Intake(mL/kg) 2001 (34.3) 1240 (20.5) 50 (0.8)     Urine (mL/kg/hr)  700 (0.5) 700 (0.5) 800 (2.1)    Total Output  700 700 800    Net +2001 +540 -650 -800            Urine Unmeasured Occurrence 2 x 2 x          Lines, Drains & Airways    Active LDAs     Name:   Placement date:   Placement time:   Site:   Days:    Peripheral IV 06/30/20 1344 Left Antecubital   06/30/20    1344    Antecubital   less than 1                  Physical Exam:    Physical Exam   Constitutional: She is oriented to person, place, and " time. She appears well-developed and well-nourished. No distress.   HENT:   Head: Normocephalic and atraumatic.   Right Ear: External ear normal.   Left Ear: External ear normal.   Nose: Nose normal.   Mouth/Throat: Oropharynx is clear and moist. No oropharyngeal exudate.   Eyes: Pupils are equal, round, and reactive to light. Conjunctivae and EOM are normal. Right eye exhibits no discharge. Left eye exhibits no discharge. No scleral icterus.   Neck: Normal range of motion. No JVD present. No tracheal deviation present. No thyromegaly present.   Cardiovascular: Normal rate, regular rhythm, normal heart sounds and intact distal pulses. Exam reveals no gallop and no friction rub.   No murmur heard.  Pulmonary/Chest: Effort normal and breath sounds normal. No stridor. No respiratory distress. She has no wheezes. She has no rales. She exhibits no tenderness.   Abdominal: Soft. Bowel sounds are normal. She exhibits no distension and no mass. There is no tenderness. There is no rebound and no guarding. No hernia.   Musculoskeletal: Normal range of motion. She exhibits no edema, tenderness or deformity.   Lymphadenopathy:     She has no cervical adenopathy.   Neurological: She is alert and oriented to person, place, and time. No cranial nerve deficit or sensory deficit. She exhibits normal muscle tone. Coordination normal.   Skin: Skin is warm and dry. No rash noted. She is not diaphoretic. No erythema.   Psychiatric: She has a normal mood and affect. Her behavior is normal.   Nursing note and vitals reviewed.           Wounds (last 24 hours)      LDA Wound     Row Name 07/04/20 0701 07/03/20 2046          Wound 07/02/20 2005 midline cervical spine Incision    Wound - Properties Group Date first assessed: 07/02/20  [facility] Time first assessed: 2005  - Present on Hospital Admission: Y  - Orientation: midline  -AH Location: cervical spine  -AH Primary Wound Type: Incision  -AH Additional Comments: surgial incison , healing ,  scant serious sang drainage noted , wound edges approx   -AH Wound Outcome: Other  -AH    Dressing Appearance  dry;intact;moist drainage  -JJ  dry;intact  -AV     Closure  None  -JJ  --     Base  pink  -JJ  --     Drainage Characteristics/Odor  serosanguineous  -JJ  --     Drainage Amount  small  -JJ  --       User Key  (r) = Recorded By, (t) = Taken By, (c) = Cosigned By    Initials Name Provider Type    Jennie Peralta, RN Registered Nurse    Shubham Garcia LPN Licensed Nurse    Alverto Harp RN Registered Nurse          Procedures:              Results Review:     I reviewed the patient's new clinical results.      Lab Results (last 24 hours)     Procedure Component Value Units Date/Time    Potassium [[id number]]  (Normal) Collected:  07/04/20 0352    Specimen:  Blood Updated:  07/04/20 0529     Potassium 3.8 mmol/L     Magnesium [[id number]]  (Normal) Collected:  07/04/20 0352    Specimen:  Blood Updated:  07/04/20 0529     Magnesium 1.9 mg/dL         No results found for: HGBA1C  Results from last 7 days   Lab Units 07/02/20  0456   INR  1.06           Lab Results   Component Value Date    LIPASE 29 06/30/2020     Lab Results   Component Value Date    CHOL 161 04/17/2018    TRIG 148 04/17/2018    HDL 45 04/17/2018    LDL 89 04/17/2018       No results found for: INTRAOP, PREDX, FINALDX, COMDX    Microbiology Results (last 10 days)     ** No results found for the last 240 hours. **          ECG/EMG Results (most recent)     None                    Ct Cervical Spine Without Contrast    Result Date: 6/30/2020   1. Anterior C3-4 and posterior C3-C6 spinal fusion. Hardware appears intact. 2. No acute cervical spine findings. No high-grade canal or foraminal stenosis is seen. 3. 7 mm ill-defined sclerotic focus in the T1 vertebral body. It is indeterminate. Bone scan may prove helpful for further evaluation.  Electronically Signed By-Dr. Velvet Maher MD On:6/30/2020 11:52 AM This report was  finalized on [id number] by Dr. Velvet Maher MD.    Mri Cervical Spine Without Contrast    Result Date: 6/30/2020   1. Mild to moderate canal stenosis at C4-5.. No high-grade canal stenosis is seen. 2. No evidence of cord edema. 3. Satisfactory cervical alignment. 4. C3-4 anterior spinal fusion. C3-C6 posterior spinal fusion. 5. Cellulitic changes or edema within the mid neck soft tissues. No drainable fluid collection or abscess. 6. No epidural fluid collection or abscess is seen.   Electronically Signed By-Dr. Velvet Maher MD On:6/30/2020 3:21 PM This report was finalized on [id number] by Dr. Velvet Maher MD.    Ct Abdomen Pelvis With Contrast    Result Date: 6/30/2020   1. No acute findings in the abdomen or pelvis. No CT explanation for the patient's upper abdominal pain. 2. Surgical changes of partial gastrectomy with gastrojejunostomy. Cholecystectomy. Hysterectomy. Lumbar spinal fusion. 3. Stable intrahepatic next hepatic biliary ductal dilation since 03/09/2019, may represent increased capacitance from previous cholecystectomy.  Electronically Signed By-Dr. Velvet Maher MD On:6/30/2020 2:09 PM This report was finalized on [id number] by Dr. Velvet Maher MD.          Xrays, labs reviewed personally by physician.    Medication Review:   I have reviewed the patient's current medication list      Scheduled Meds    amitriptyline 25 mg Oral Nightly   budesonide 0.5 mg Nebulization BID - RT   cycloSPORINE 1 drop Both Eyes BID   escitalopram 20 mg Oral Daily   estradiol 1.5 mg Oral Daily   gabapentin 800 mg Oral 4x Daily   ipratropium-albuterol 3 mL Nebulization Q8H - RT   ketotifen 1 drop Both Eyes BID   levothyroxine 25 mcg Oral Daily   metoclopramide 10 mg Oral TID AC   montelukast 10 mg Oral Nightly   OXcarbazepine 300 mg Oral TID   pantoprazole 40 mg Oral QAM   propranolol 40 mg Oral BID   sodium chloride 10 mL Intravenous Q12H   sucralfate 1 g Oral 4x Daily AC & at Bedtime   topiramate 100  mg Oral 4x Daily   vitamin E 400 Units Oral Daily       Meds Infusions    sodium chloride 100 mL/hr Last Rate: 100 mL/hr (07/04/20 1310)       Meds PRN  •  acetaminophen **OR** acetaminophen **OR** acetaminophen  •  albuterol  •  aluminum-magnesium hydroxide-simethicone  •  bisacodyl  •  butalbital-acetaminophen-caffeine  •  HYDROcodone-acetaminophen  •  ketamine (KETALAR) IVPB **AND** Ketamine Vital Signs & Assessment  •  magnesium hydroxide  •  magnesium sulfate **OR** magnesium sulfate in D5W 1g/100mL (PREMIX)  •  melatonin  •  nitroglycerin  •  ondansetron **OR** ondansetron  •  oxyCODONE  •  potassium chloride  •  promethazine  •  [COMPLETED] Insert peripheral IV **AND** sodium chloride  •  sodium chloride  •  SUMAtriptan      Assessment/Plan   Assessment/Plan     Active Hospital Problems    Diagnosis  POA   • **Intractable vomiting [R11.10]  Yes   • Chronic obstructive pulmonary disease (CMS/HCC) [J44.9]  Yes   • Hypothyroidism (acquired) [E03.9]  Yes   • Estrogen deficiency [E28.39]  Yes   • Depression [F32.9]  Yes   • Migraines [G43.909]  Yes   • Chronic pain syndrome [G89.4]  Yes   • GERD without esophagitis [K21.9]  Yes   • Intractable cyclical vomiting associated with migraine [G43.A1]  Unknown   • Pseudarthrosis following spinal fusion [M96.0]  Not Applicable      Resolved Hospital Problems   No resolved problems to display.       MEDICAL DECISION MAKING COMPLEXITY BY PROBLEM:     Intractable vomiting with associated abdominal pain status post EGD revealing  Normal esophagus dilated 54 Chinese without resistance or trauma  Gastroparesis  Evidence of prior partial gastrectomy-  -CT abdomen pelvis showed no acute findings in the abdomen or pelvis.  No CT explanation for patient's upper abdomen pain.  Surgical changes of partial gastrectomy with gastrojejunostomy.  Cholecystectomy.  Hysterectomy.  Lumbar spinal fusion.  Stable intrahepatic next hepatic biliary ductal dilatation since 3/9/2019, may represent  increased capacity from previous cholecystectomy.  -patient is afebrile, pulse in the 80s, on room air oxygen and 97% SPO2 and blood pressure 150s over 70s.    -Patient received 500 normal saline bolus, Zofran, Dilaudid, Phenergan in ED.  -Zofran, Phenergan, Carafate ordered  - d/c iv fluids once tolerating regular diet.  -Pain control with home Percocet, Norco, inspect verified-ketamine ordered  - Consult GI service   -Case management consult  -PT consulted ... Advise rehab.      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  Hardware appears intact.  No acute cervical spine findings.  No high-grade canal or foraminal stenosis is seen.  7 mm ill-defined sclerotic focus on the T1 vertebral body it is indeterminate.  Bone scan may prove helpful for further evaluation.     Chronic diarrhea  -Hold home viberzi     Hypothyroidism  -Continue home Synthroid     Estrogen deficiency  -Continue home estradiol     Depression  -Continue home Lexapro, amitriptyline     COPD  -Not acute exacerbation  -Continue home albuterol, Pulmicort, Singulair, Anoro     Migraines  -Continue home Fioricet, maxalt as needed  -Continue home propranolol     ?Seizure disorder  -No previous documentation of seizure disorder  -Continue home Trileptal, Topamax     Chronic pain syndrome  -Continue home pain meds     GERD  -Continue home PPI        VTE Prophylaxis -SCDs    VTE Prophylaxis -   Mechanical Order History:      Ordered        06/30/20 1625  Place Sequential Compression Device  Once         06/30/20 1625  Maintain Sequential Compression Device  Continuous                 Pharmalogical Order History:     None            Code Status -   Code Status and Medical Interventions:   Ordered at: 06/30/20 1625     Code Status:    CPR     Medical Interventions (Level of Support Prior to Arrest):    Full             Discharge  Planning          Destination      Coordination has not been started for this encounter.      Durable Medical Equipment      Coordination has not been started for this encounter.      Dialysis/Infusion      Coordination has not been started for this encounter.      Home Medical Care      Coordination has not been started for this encounter.      Therapy      Coordination has not been started for this encounter.      Community Resources      Coordination has not been started for this encounter.            Electronically signed by Xiao Belle MD, 07/04/20, 13:14.  [facility] Hospitalist Team

## 2020-07-04 NOTE — PLAN OF CARE
Problem: Patient Care Overview  Goal: Plan of Care Review  Outcome: Ongoing (interventions implemented as appropriate)  Goal: Individualization and Mutuality  Outcome: Ongoing (interventions implemented as appropriate)  Goal: Discharge Needs Assessment  Outcome: Ongoing (interventions implemented as appropriate)  Goal: Interprofessional Rounds/Family Conf  Outcome: Ongoing (interventions implemented as appropriate)     Problem: Fall Risk (Adult)  Goal: Identify Related Risk Factors and Signs and Symptoms  Outcome: Ongoing (interventions implemented as appropriate)  Goal: Absence of Fall  Outcome: Ongoing (interventions implemented as appropriate)     Problem: Pain, Chronic (Adult)  Goal: Identify Related Risk Factors and Signs and Symptoms  Outcome: Ongoing (interventions implemented as appropriate)  Goal: Acceptable Pain/Comfort Level and Functional Ability  Outcome: Ongoing (interventions implemented as appropriate)     Problem: Nausea/Vomiting (Adult)  Goal: Identify Related Risk Factors and Signs and Symptoms  Outcome: Ongoing (interventions implemented as appropriate)  Goal: Symptom Relief  Outcome: Ongoing (interventions implemented as appropriate)  Goal: Adequate Hydration  Outcome: Ongoing (interventions implemented as appropriate)

## 2020-07-04 NOTE — PLAN OF CARE
Problem: Patient Care Overview  Goal: Plan of Care Review  Outcome: Ongoing (interventions implemented as appropriate)  Flowsheets (Taken 7/4/2020 8428)  Plan of Care Reviewed With: patient  Note:   Patient able to rest for most of the shift. Patient has no complaints of nausea or vomiting but continues to have back pain. PO medications given for pain control. Patient will discharge to [facility] after receiving PASSR. Patient has no complaints at this time. Will continue to monitor.

## 2020-07-05 LAB
MAGNESIUM SERPL-MCNC: 1.9 MG/DL (ref 1.6–2.4)
POTASSIUM SERPL-SCNC: 4.3 MMOL/L (ref 3.5–5.2)

## 2020-07-05 PROCEDURE — 84132 ASSAY OF SERUM POTASSIUM: CPT | Performed by: INTERNAL MEDICINE

## 2020-07-05 PROCEDURE — A9270 NON-COVERED ITEM OR SERVICE: HCPCS | Performed by: INTERNAL MEDICINE

## 2020-07-05 PROCEDURE — 63710000001 LEVOTHYROXINE 25 MCG TABLET: Performed by: INTERNAL MEDICINE

## 2020-07-05 PROCEDURE — 99225 PR SBSQ OBSERVATION CARE/DAY 25 MINUTES: CPT | Performed by: HOSPITALIST

## 2020-07-05 PROCEDURE — 63710000001 OXCARBAZEPINE 150 MG TABLET: Performed by: INTERNAL MEDICINE

## 2020-07-05 PROCEDURE — 94799 UNLISTED PULMONARY SVC/PX: CPT

## 2020-07-05 PROCEDURE — G0378 HOSPITAL OBSERVATION PER HR: HCPCS

## 2020-07-05 PROCEDURE — 63710000001 OXYCODONE 5 MG TABLET: Performed by: INTERNAL MEDICINE

## 2020-07-05 PROCEDURE — 63710000001 CYCLOSPORINE 0.05 % EMULSION: Performed by: INTERNAL MEDICINE

## 2020-07-05 PROCEDURE — 83735 ASSAY OF MAGNESIUM: CPT | Performed by: INTERNAL MEDICINE

## 2020-07-05 PROCEDURE — 63710000001 PANTOPRAZOLE 40 MG TABLET DELAYED-RELEASE: Performed by: INTERNAL MEDICINE

## 2020-07-05 PROCEDURE — 63710000001 ESCITALOPRAM 10 MG TABLET: Performed by: INTERNAL MEDICINE

## 2020-07-05 PROCEDURE — 63710000001 GABAPENTIN 400 MG CAPSULE: Performed by: INTERNAL MEDICINE

## 2020-07-05 PROCEDURE — 63710000001 ESTRADIOL 1 MG TABLET: Performed by: INTERNAL MEDICINE

## 2020-07-05 PROCEDURE — 63710000001 METOCLOPRAMIDE 10 MG TABLET: Performed by: INTERNAL MEDICINE

## 2020-07-05 PROCEDURE — 63710000001 PROPRANOLOL 10 MG TABLET: Performed by: INTERNAL MEDICINE

## 2020-07-05 PROCEDURE — 63710000001 VITAMIN E 400 UNIT CAPSULE: Performed by: INTERNAL MEDICINE

## 2020-07-05 PROCEDURE — 63710000001 MONTELUKAST 10 MG TABLET: Performed by: INTERNAL MEDICINE

## 2020-07-05 PROCEDURE — 63710000001 SUCRALFATE 1 G TABLET: Performed by: INTERNAL MEDICINE

## 2020-07-05 PROCEDURE — 63710000001 AMITRIPTYLINE 25 MG TABLET: Performed by: INTERNAL MEDICINE

## 2020-07-05 PROCEDURE — 63710000001 TOPIRAMATE 100 MG TABLET: Performed by: INTERNAL MEDICINE

## 2020-07-05 RX ADMIN — OXYCODONE HYDROCHLORIDE 10 MG: 5 TABLET ORAL at 03:48

## 2020-07-05 RX ADMIN — SUCRALFATE 1 G: 1 TABLET ORAL at 20:07

## 2020-07-05 RX ADMIN — IPRATROPIUM BROMIDE AND ALBUTEROL SULFATE 3 ML: .5; 3 SOLUTION RESPIRATORY (INHALATION) at 10:56

## 2020-07-05 RX ADMIN — KETOTIFEN FUMARATE 1 DROP: 0.25 SOLUTION OPHTHALMIC at 20:07

## 2020-07-05 RX ADMIN — GABAPENTIN 800 MG: 400 CAPSULE ORAL at 11:50

## 2020-07-05 RX ADMIN — PROPRANOLOL HYDROCHLORIDE 40 MG: 10 TABLET ORAL at 09:01

## 2020-07-05 RX ADMIN — TOPIRAMATE 100 MG: 100 TABLET, FILM COATED ORAL at 11:50

## 2020-07-05 RX ADMIN — GABAPENTIN 800 MG: 400 CAPSULE ORAL at 20:07

## 2020-07-05 RX ADMIN — CYCLOSPORINE 1 DROP: 0.5 EMULSION OPHTHALMIC at 09:02

## 2020-07-05 RX ADMIN — PROPRANOLOL HYDROCHLORIDE 40 MG: 10 TABLET ORAL at 20:07

## 2020-07-05 RX ADMIN — TOPIRAMATE 100 MG: 100 TABLET, FILM COATED ORAL at 20:07

## 2020-07-05 RX ADMIN — TOPIRAMATE 100 MG: 100 TABLET, FILM COATED ORAL at 17:40

## 2020-07-05 RX ADMIN — SUCRALFATE 1 G: 1 TABLET ORAL at 07:35

## 2020-07-05 RX ADMIN — OXCARBAZEPINE 300 MG: 150 TABLET, FILM COATED ORAL at 20:07

## 2020-07-05 RX ADMIN — CYCLOSPORINE 1 DROP: 0.5 EMULSION OPHTHALMIC at 20:06

## 2020-07-05 RX ADMIN — TOPIRAMATE 100 MG: 100 TABLET, FILM COATED ORAL at 07:35

## 2020-07-05 RX ADMIN — ESCITALOPRAM OXALATE 20 MG: 10 TABLET ORAL at 09:02

## 2020-07-05 RX ADMIN — PANTOPRAZOLE SODIUM 40 MG: 40 TABLET, DELAYED RELEASE ORAL at 07:35

## 2020-07-05 RX ADMIN — OXYCODONE HYDROCHLORIDE 10 MG: 5 TABLET ORAL at 11:50

## 2020-07-05 RX ADMIN — LEVOTHYROXINE SODIUM 25 MCG: 25 TABLET ORAL at 07:35

## 2020-07-05 RX ADMIN — SODIUM CHLORIDE 100 ML/HR: 900 INJECTION, SOLUTION INTRAVENOUS at 09:11

## 2020-07-05 RX ADMIN — MONTELUKAST SODIUM 10 MG: 10 TABLET, FILM COATED ORAL at 20:07

## 2020-07-05 RX ADMIN — OXYCODONE HYDROCHLORIDE 10 MG: 5 TABLET ORAL at 15:51

## 2020-07-05 RX ADMIN — SODIUM CHLORIDE 100 ML/HR: 900 INJECTION, SOLUTION INTRAVENOUS at 22:14

## 2020-07-05 RX ADMIN — Medication 10 ML: at 20:07

## 2020-07-05 RX ADMIN — BUDESONIDE 0.5 MG: 0.5 INHALANT RESPIRATORY (INHALATION) at 19:02

## 2020-07-05 RX ADMIN — Medication 400 UNITS: at 09:02

## 2020-07-05 RX ADMIN — METOCLOPRAMIDE 10 MG: 10 TABLET ORAL at 11:50

## 2020-07-05 RX ADMIN — OXYCODONE HYDROCHLORIDE 10 MG: 5 TABLET ORAL at 20:07

## 2020-07-05 RX ADMIN — SUCRALFATE 1 G: 1 TABLET ORAL at 17:40

## 2020-07-05 RX ADMIN — AMITRIPTYLINE HYDROCHLORIDE 25 MG: 25 TABLET, FILM COATED ORAL at 20:07

## 2020-07-05 RX ADMIN — ESTRADIOL 1.5 MG: 1 TABLET ORAL at 09:02

## 2020-07-05 RX ADMIN — KETOTIFEN FUMARATE 1 DROP: 0.25 SOLUTION OPHTHALMIC at 09:03

## 2020-07-05 RX ADMIN — SUCRALFATE 1 G: 1 TABLET ORAL at 11:50

## 2020-07-05 RX ADMIN — METOCLOPRAMIDE 10 MG: 10 TABLET ORAL at 17:40

## 2020-07-05 RX ADMIN — METOCLOPRAMIDE 10 MG: 10 TABLET ORAL at 07:35

## 2020-07-05 RX ADMIN — OXCARBAZEPINE 300 MG: 150 TABLET, FILM COATED ORAL at 15:50

## 2020-07-05 RX ADMIN — GABAPENTIN 800 MG: 400 CAPSULE ORAL at 07:35

## 2020-07-05 RX ADMIN — GABAPENTIN 800 MG: 400 CAPSULE ORAL at 17:40

## 2020-07-05 RX ADMIN — OXCARBAZEPINE 300 MG: 150 TABLET, FILM COATED ORAL at 09:01

## 2020-07-05 RX ADMIN — Medication 10 ML: at 09:02

## 2020-07-05 RX ADMIN — OXYCODONE HYDROCHLORIDE 10 MG: 5 TABLET ORAL at 07:50

## 2020-07-05 NOTE — PLAN OF CARE
Medicated as needed for pain. Plan to go to inpatient rehab tomorrow. Encourage reposition every 2 hours for pressure relief. Continue current plan of care. Will monitor.

## 2020-07-05 NOTE — PLAN OF CARE
Problem: Patient Care Overview  Goal: Plan of Care Review  Outcome: Ongoing (interventions implemented as appropriate)  Flowsheets (Taken 7/5/2020 6176)  Progress: improving  Note:   Pt resting throughout shift. Pain controlled with PO pain meds. Patient discharge pending PASSR. No complaints at this time. Will continue to monitor.

## 2020-07-05 NOTE — PROGRESS NOTES
[facility] Medicine Services Daily Progress Note      Hospitalist Team  LOS 0 days      Patient Care Team:  Zoey Bah MD as PCP - General  Zoey Bah MD as PCP - Family Medicine    Patient Location: 373/1      Subjective   Subjective   Tolerating diet, denies for any nausea or vomiting.     Chief Complaint / Subjective  Chief Complaint   Patient presents with   • Abdominal Pain     onset 2 days ago with N/V         Brief Synopsis of Hospital Course/HPI    Ms. Lin is a 70 y.o. female presents to [facility] complaining of nausea and vomiting for the past 2 days.  Patient states that she had posterior cervical neck fusion in the beginning of June.  Patient has been on pain meds ever since but states her pain is worsening as well as worsening abdominal pain.  Patient states she has had decreased oral intake over the past several days due to feeling nauseous.  Patient reports her last bowel movement was 3 days ago.  Patient also states that she has decreased urine output as she reports that she is not drinking enough fluids.  Patient also reports some hematemesis in which she is noticed some red streaks in her emesis.     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  Previous notes from [facility] state that patient has chronic nausea.  Patient with history of cholecystectomy back in 1996.  Patient also has a history of appendectomy.  Patient postop note shows patient was treated with clindamycin.  Patient has a history of C. difficile colitis and chronic diarrhea as well.     In the ED, CMP largely unremarkable.  Lipase normal, CBC unremarkable.  UA significant for 2+, 1+ protein, 2+ bilirubin, 3-5 RBCs, 0-2 WBCs, +1 bacteria. CT abdomen pelvis showed no acute findings in the abdomen or pelvis.  No CT explanation for patient's upper abdomen pain.  Surgical changes of partial gastrectomy with gastrojejunostomy.   "Cholecystectomy.  Hysterectomy.  Lumbar spinal fusion.  Stable intrahepatic next hepatic biliary ductal dilatation since 3/9/2019, may represent increased capacity from previous cholecystectomy.   CT of the cervical spine showed anterior C3-4 and posterior C3-6 spinal fusion.  Hardware appears intact.  No acute cervical spine findings.  No high-grade canal or foraminal stenosis is seen.  7 mm ill-defined sclerotic focus on the T1 vertebral body it is indeterminate.  Bone scan may prove helpful for further evaluation. patient is afebrile, pulse in the 80s, on room air oxygen and 97% SPO2 and blood pressure 150s over 70s.  Patient received 500 normal saline bolus, Zofran, Dilaudid, Phenergan in ED.    Date::          ROS      Objective   Objective      Vital Signs  Temp:  [97.7 °F (36.5 °C)-98.2 °F (36.8 °C)] 98.2 °F (36.8 °C)  Heart Rate:  [66-80] 66  Resp:  [16-18] 16  BP: (117-137)/(51-84) 129/84  Oxygen Therapy  SpO2: 95 %  Pulse Oximetry Type: Intermittent  Device (Oxygen Therapy): room air  Flow (L/min): 10  ETCO2 (mmHg): 23 mmHg  Flowsheet Rows      First Filed Value   Admission Height  157.5 cm (62\") Documented at 06/30/2020 1045   Admission Weight  59.4 kg (131 lb) Documented at 06/30/2020 1045        Intake & Output (last 3 days)       07/02 0701 - 07/03 0700 07/03 0701 - 07/04 0700 07/04 0701 - 07/05 0700 07/05 0701 - 07/06 0700    P.O. 240 50      I.V. (mL/kg) 1000 (16.5)  1000 (16.9) 1000 (16.9)    Total Intake(mL/kg) 1240 (20.5) 50 (0.8) 1000 (16.9) 1000 (16.9)    Urine (mL/kg/hr) 700 (0.5) 700 (0.5) 3600 (2.5) 2800 (6.1)    Total Output  2800    Net +540 -650 -2600 -1800            Urine Unmeasured Occurrence 2 x           Lines, Drains & Airways    Active LDAs     Name:   Placement date:   Placement time:   Site:   Days:    Peripheral IV 06/30/20 1344 Left Antecubital   06/30/20    1344    Antecubital   less than 1                  Physical Exam:    Physical Exam   Constitutional: She is " oriented to person, place, and time. She appears well-developed and well-nourished. No distress.   HENT:   Head: Normocephalic and atraumatic.   Right Ear: External ear normal.   Left Ear: External ear normal.   Nose: Nose normal.   Mouth/Throat: Oropharynx is clear and moist. No oropharyngeal exudate.   Eyes: Pupils are equal, round, and reactive to light. Conjunctivae and EOM are normal. Right eye exhibits no discharge. Left eye exhibits no discharge. No scleral icterus.   Neck: Normal range of motion. No JVD present. No tracheal deviation present. No thyromegaly present.   Cardiovascular: Normal rate, regular rhythm, normal heart sounds and intact distal pulses. Exam reveals no gallop and no friction rub.   No murmur heard.  Pulmonary/Chest: Effort normal and breath sounds normal. No stridor. No respiratory distress. She has no wheezes. She has no rales. She exhibits no tenderness.   Abdominal: Soft. Bowel sounds are normal. She exhibits no distension and no mass. There is no tenderness. There is no rebound and no guarding. No hernia.   Musculoskeletal: Normal range of motion. She exhibits no edema, tenderness or deformity.   Lymphadenopathy:     She has no cervical adenopathy.   Neurological: She is alert and oriented to person, place, and time. No cranial nerve deficit or sensory deficit. She exhibits normal muscle tone. Coordination normal.   Skin: Skin is warm and dry. No rash noted. She is not diaphoretic. No erythema.   Psychiatric: She has a normal mood and affect. Her behavior is normal.   Nursing note and vitals reviewed.           Wounds (last 24 hours)      LDA Wound     Row Name 07/05/20 0730 07/04/20 1950          Wound 07/02/20 2005 midline cervical spine Incision    Wound - Properties Group Date first assessed: 07/02/20  - Time first assessed: 2005  - Present on Hospital Admission: Y  - Orientation: midline  -AH Location: cervical spine  -AH Primary Wound Type: Incision  -AH Additional  Comments: surgial incison , healing , scant serious sang drainage noted , wound edges approx   -AH Wound Outcome: Other  -AH    Dressing Appearance  dry;intact  -LS  dry;intact  -SS     Closure  None  -LS  None  -SS     Base  pink  -LS  pink  -SS       User Key  (r) = Recorded By, (t) = Taken By, (c) = Cosigned By    Initials Name Provider Type    Rebeka Duggan RN Registered Nurse    Shubham Garcia LPN Licensed Nurse    Soheila Higginbotham RN Registered Nurse          Procedures:              Results Review:     I reviewed the patient's new clinical results.      Lab Results (last 24 hours)     Procedure Component Value Units Date/Time    Potassium [[id number]]  (Normal) Collected:  07/05/20 0340    Specimen:  Blood Updated:  07/05/20 0519     Potassium 4.3 mmol/L     Magnesium [[id number]]  (Normal) Collected:  07/05/20 0340    Specimen:  Blood Updated:  07/05/20 0519     Magnesium 1.9 mg/dL         No results found for: HGBA1C  Results from last 7 days   Lab Units 07/02/20  0456   INR  1.06           Lab Results   Component Value Date    LIPASE 29 06/30/2020     Lab Results   Component Value Date    CHOL 161 04/17/2018    TRIG 148 04/17/2018    HDL 45 04/17/2018    LDL 89 04/17/2018       No results found for: INTRAOP, PREDX, FINALDX, COMDX    Microbiology Results (last 10 days)     ** No results found for the last 240 hours. **          ECG/EMG Results (most recent)     None                    Ct Cervical Spine Without Contrast    Result Date: 6/30/2020   1. Anterior C3-4 and posterior C3-C6 spinal fusion. Hardware appears intact. 2. No acute cervical spine findings. No high-grade canal or foraminal stenosis is seen. 3. 7 mm ill-defined sclerotic focus in the T1 vertebral body. It is indeterminate. Bone scan may prove helpful for further evaluation.  Electronically Signed By-Dr. Velvet Maher MD On:6/30/2020 11:52 AM This report was finalized on [id number] by Dr. Velvet Maher MD.    Mri  Cervical Spine Without Contrast    Result Date: 6/30/2020   1. Mild to moderate canal stenosis at C4-5.. No high-grade canal stenosis is seen. 2. No evidence of cord edema. 3. Satisfactory cervical alignment. 4. C3-4 anterior spinal fusion. C3-C6 posterior spinal fusion. 5. Cellulitic changes or edema within the mid neck soft tissues. No drainable fluid collection or abscess. 6. No epidural fluid collection or abscess is seen.   Electronically Signed By-Dr. Velvet Maher MD On:6/30/2020 3:21 PM This report was finalized on [id number] by Dr. Velvet Maher MD.    Ct Abdomen Pelvis With Contrast    Result Date: 6/30/2020   1. No acute findings in the abdomen or pelvis. No CT explanation for the patient's upper abdominal pain. 2. Surgical changes of partial gastrectomy with gastrojejunostomy. Cholecystectomy. Hysterectomy. Lumbar spinal fusion. 3. Stable intrahepatic next hepatic biliary ductal dilation since 03/09/2019, may represent increased capacitance from previous cholecystectomy.  Electronically Signed By-Dr. Velvet Maher MD On:6/30/2020 2:09 PM This report was finalized on [id number] by Dr. Velvet Maher MD.          Xrays, labs reviewed personally by physician.    Medication Review:   I have reviewed the patient's current medication list      Scheduled Meds    amitriptyline 25 mg Oral Nightly   budesonide 0.5 mg Nebulization BID - RT   cycloSPORINE 1 drop Both Eyes BID   escitalopram 20 mg Oral Daily   estradiol 1.5 mg Oral Daily   gabapentin 800 mg Oral 4x Daily   ipratropium-albuterol 3 mL Nebulization Q8H - RT   ketotifen 1 drop Both Eyes BID   levothyroxine 25 mcg Oral Daily   metoclopramide 10 mg Oral TID AC   montelukast 10 mg Oral Nightly   OXcarbazepine 300 mg Oral TID   pantoprazole 40 mg Oral QAM   propranolol 40 mg Oral BID   sodium chloride 10 mL Intravenous Q12H   sucralfate 1 g Oral 4x Daily AC & at Bedtime   topiramate 100 mg Oral 4x Daily   vitamin E 400 Units Oral Daily       Meds  Infusions    sodium chloride 100 mL/hr Last Rate: 100 mL/hr (07/05/20 1151)       Meds PRN  •  acetaminophen **OR** acetaminophen **OR** acetaminophen  •  albuterol  •  aluminum-magnesium hydroxide-simethicone  •  bisacodyl  •  butalbital-acetaminophen-caffeine  •  HYDROcodone-acetaminophen  •  ketamine (KETALAR) IVPB **AND** Ketamine Vital Signs & Assessment  •  magnesium hydroxide  •  magnesium sulfate **OR** magnesium sulfate in D5W 1g/100mL (PREMIX)  •  melatonin  •  nitroglycerin  •  ondansetron **OR** ondansetron  •  oxyCODONE  •  potassium chloride  •  promethazine  •  [COMPLETED] Insert peripheral IV **AND** sodium chloride  •  sodium chloride  •  SUMAtriptan      Assessment/Plan   Assessment/Plan     Active Hospital Problems    Diagnosis  POA   • **Intractable vomiting [R11.10]  Yes   • Chronic obstructive pulmonary disease (CMS/HCC) [J44.9]  Yes   • Hypothyroidism (acquired) [E03.9]  Yes   • Estrogen deficiency [E28.39]  Yes   • Depression [F32.9]  Yes   • Migraines [G43.909]  Yes   • Chronic pain syndrome [G89.4]  Yes   • GERD without esophagitis [K21.9]  Yes   • Intractable cyclical vomiting associated with migraine [G43.A1]  Unknown   • Pseudarthrosis following spinal fusion [M96.0]  Not Applicable      Resolved Hospital Problems   No resolved problems to display.       MEDICAL DECISION MAKING COMPLEXITY BY PROBLEM:     Intractable vomiting with associated abdominal pain status post EGD revealing  Normal esophagus dilated 54 [place] without resistance or trauma  Gastroparesis  Evidence of prior partial gastrectomy-  -CT abdomen pelvis showed no acute findings in the abdomen or pelvis.  No CT explanation for patient's upper abdomen pain.  Surgical changes of partial gastrectomy with gastrojejunostomy.  Cholecystectomy.  Hysterectomy.  Lumbar spinal fusion.  Stable intrahepatic next hepatic biliary ductal dilatation since 3/9/2019, may represent increased capacity from previous cholecystectomy.  -patient  is afebrile, pulse in the 80s, on room air oxygen and 97% SPO2 and blood pressure 150s over 70s.    -Patient received 500 normal saline bolus, Zofran, Dilaudid, Phenergan in ED.  -Zofran, Phenergan, Carafate ordered  - d/c iv fluids once tolerating regular diet.  -Pain control with home Percocet, Norco, inspect verified-ketamine ordered  - Consult GI service   -Case management consult  -PT consulted ... Advise rehab.      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  Hardware appears intact.  No acute cervical spine findings.  No high-grade canal or foraminal stenosis is seen.  7 mm ill-defined sclerotic focus on the T1 vertebral body it is indeterminate.  Bone scan may prove helpful for further evaluation.     Chronic diarrhea  -Hold home viberzi     Hypothyroidism  -Continue home Synthroid     Estrogen deficiency  -Continue home estradiol     Depression  -Continue home Lexapro, amitriptyline     COPD  -Not acute exacerbation  -Continue home albuterol, Pulmicort, Singulair, Anoro     Migraines  -Continue home Fioricet, maxalt as needed  -Continue home propranolol     ?Seizure disorder  -No previous documentation of seizure disorder  -Continue home Trileptal, Topamax     Chronic pain syndrome  -Continue home pain meds     GERD  -Continue home PPI        VTE Prophylaxis -SCDs    Waiting on Pre-cert .... Once done ok to discharge.    VTE Prophylaxis -   Mechanical Order History:      Ordered        06/30/20 1625  Place Sequential Compression Device  Once         06/30/20 1625  Maintain Sequential Compression Device  Continuous                 Pharmalogical Order History:     None            Code Status -   Code Status and Medical Interventions:   Ordered at: 06/30/20 1625     Code Status:    CPR     Medical Interventions (Level of Support Prior to Arrest):    Full             Discharge  Planning          Destination      Coordination has not been started for this encounter.      Durable Medical Equipment      Coordination has not been started for this encounter.      Dialysis/Infusion      Coordination has not been started for this encounter.      Home Medical Care      Coordination has not been started for this encounter.      Therapy      Coordination has not been started for this encounter.      Community Resources      Coordination has not been started for this encounter.            Electronically signed by Xiao Belle MD, 07/05/20, 14:45.  [facility] Hospitalist Team

## 2020-07-06 VITALS
BODY MASS INDEX: 24.42 KG/M2 | DIASTOLIC BLOOD PRESSURE: 65 MMHG | HEART RATE: 70 BPM | OXYGEN SATURATION: 95 % | HEIGHT: 62 IN | TEMPERATURE: 98.2 F | WEIGHT: 132.72 LBS | SYSTOLIC BLOOD PRESSURE: 149 MMHG | RESPIRATION RATE: 16 BRPM

## 2020-07-06 LAB
MAGNESIUM SERPL-MCNC: 1.9 MG/DL (ref 1.6–2.4)
POTASSIUM SERPL-SCNC: 3.6 MMOL/L (ref 3.5–5.2)

## 2020-07-06 PROCEDURE — A9270 NON-COVERED ITEM OR SERVICE: HCPCS | Performed by: INTERNAL MEDICINE

## 2020-07-06 PROCEDURE — 94799 UNLISTED PULMONARY SVC/PX: CPT

## 2020-07-06 PROCEDURE — 63710000001 PANTOPRAZOLE 40 MG TABLET DELAYED-RELEASE: Performed by: INTERNAL MEDICINE

## 2020-07-06 PROCEDURE — 63710000001 ESCITALOPRAM 10 MG TABLET: Performed by: INTERNAL MEDICINE

## 2020-07-06 PROCEDURE — 63710000001 VITAMIN E 400 UNIT CAPSULE: Performed by: INTERNAL MEDICINE

## 2020-07-06 PROCEDURE — 63710000001 GABAPENTIN 400 MG CAPSULE: Performed by: INTERNAL MEDICINE

## 2020-07-06 PROCEDURE — 83735 ASSAY OF MAGNESIUM: CPT | Performed by: INTERNAL MEDICINE

## 2020-07-06 PROCEDURE — 63710000001 PROPRANOLOL 10 MG TABLET: Performed by: INTERNAL MEDICINE

## 2020-07-06 PROCEDURE — 63710000001 SUCRALFATE 1 G TABLET: Performed by: INTERNAL MEDICINE

## 2020-07-06 PROCEDURE — G0378 HOSPITAL OBSERVATION PER HR: HCPCS

## 2020-07-06 PROCEDURE — 63710000001 CYCLOSPORINE 0.05 % EMULSION: Performed by: INTERNAL MEDICINE

## 2020-07-06 PROCEDURE — 63710000001 TOPIRAMATE 100 MG TABLET: Performed by: INTERNAL MEDICINE

## 2020-07-06 PROCEDURE — 63710000001 LEVOTHYROXINE 25 MCG TABLET: Performed by: INTERNAL MEDICINE

## 2020-07-06 PROCEDURE — 63710000001 OXYCODONE 5 MG TABLET: Performed by: INTERNAL MEDICINE

## 2020-07-06 PROCEDURE — 84132 ASSAY OF SERUM POTASSIUM: CPT | Performed by: INTERNAL MEDICINE

## 2020-07-06 PROCEDURE — 97530 THERAPEUTIC ACTIVITIES: CPT

## 2020-07-06 PROCEDURE — 99217 PR OBSERVATION CARE DISCHARGE MANAGEMENT: CPT | Performed by: FAMILY MEDICINE

## 2020-07-06 PROCEDURE — 63710000001 ESTRADIOL 1 MG TABLET: Performed by: INTERNAL MEDICINE

## 2020-07-06 PROCEDURE — 63710000001 METOCLOPRAMIDE 10 MG TABLET: Performed by: INTERNAL MEDICINE

## 2020-07-06 PROCEDURE — 97116 GAIT TRAINING THERAPY: CPT

## 2020-07-06 PROCEDURE — 63710000001 OXCARBAZEPINE 150 MG TABLET: Performed by: INTERNAL MEDICINE

## 2020-07-06 RX ORDER — OXYCODONE AND ACETAMINOPHEN 10; 325 MG/1; MG/1
1 TABLET ORAL EVERY 6 HOURS PRN
Qty: 10 TABLET | Refills: 0 | Status: SHIPPED | OUTPATIENT
Start: 2020-07-06 | End: 2022-09-27

## 2020-07-06 RX ADMIN — METOCLOPRAMIDE 10 MG: 10 TABLET ORAL at 11:15

## 2020-07-06 RX ADMIN — SUCRALFATE 1 G: 1 TABLET ORAL at 17:22

## 2020-07-06 RX ADMIN — TOPIRAMATE 100 MG: 100 TABLET, FILM COATED ORAL at 11:15

## 2020-07-06 RX ADMIN — SUCRALFATE 1 G: 1 TABLET ORAL at 08:13

## 2020-07-06 RX ADMIN — METOCLOPRAMIDE 10 MG: 10 TABLET ORAL at 17:22

## 2020-07-06 RX ADMIN — GABAPENTIN 800 MG: 400 CAPSULE ORAL at 11:15

## 2020-07-06 RX ADMIN — OXCARBAZEPINE 300 MG: 150 TABLET, FILM COATED ORAL at 17:22

## 2020-07-06 RX ADMIN — GABAPENTIN 800 MG: 400 CAPSULE ORAL at 17:22

## 2020-07-06 RX ADMIN — TOPIRAMATE 100 MG: 100 TABLET, FILM COATED ORAL at 08:13

## 2020-07-06 RX ADMIN — CYCLOSPORINE 1 DROP: 0.5 EMULSION OPHTHALMIC at 08:13

## 2020-07-06 RX ADMIN — PROPRANOLOL HYDROCHLORIDE 40 MG: 10 TABLET ORAL at 08:13

## 2020-07-06 RX ADMIN — Medication 400 UNITS: at 08:13

## 2020-07-06 RX ADMIN — BUDESONIDE 0.5 MG: 0.5 INHALANT RESPIRATORY (INHALATION) at 06:25

## 2020-07-06 RX ADMIN — IPRATROPIUM BROMIDE AND ALBUTEROL SULFATE 3 ML: .5; 3 SOLUTION RESPIRATORY (INHALATION) at 06:25

## 2020-07-06 RX ADMIN — OXYCODONE HYDROCHLORIDE 10 MG: 5 TABLET ORAL at 17:22

## 2020-07-06 RX ADMIN — IPRATROPIUM BROMIDE AND ALBUTEROL SULFATE 3 ML: .5; 3 SOLUTION RESPIRATORY (INHALATION) at 14:00

## 2020-07-06 RX ADMIN — OXYCODONE HYDROCHLORIDE 10 MG: 5 TABLET ORAL at 06:10

## 2020-07-06 RX ADMIN — PANTOPRAZOLE SODIUM 40 MG: 40 TABLET, DELAYED RELEASE ORAL at 06:10

## 2020-07-06 RX ADMIN — OXYCODONE HYDROCHLORIDE 10 MG: 5 TABLET ORAL at 10:21

## 2020-07-06 RX ADMIN — SUCRALFATE 1 G: 1 TABLET ORAL at 11:15

## 2020-07-06 RX ADMIN — OXYCODONE HYDROCHLORIDE 10 MG: 5 TABLET ORAL at 00:47

## 2020-07-06 RX ADMIN — Medication 10 ML: at 08:15

## 2020-07-06 RX ADMIN — ESTRADIOL 1.5 MG: 1 TABLET ORAL at 08:13

## 2020-07-06 RX ADMIN — KETOTIFEN FUMARATE 1 DROP: 0.25 SOLUTION OPHTHALMIC at 08:15

## 2020-07-06 RX ADMIN — TOPIRAMATE 100 MG: 100 TABLET, FILM COATED ORAL at 17:22

## 2020-07-06 RX ADMIN — LEVOTHYROXINE SODIUM 25 MCG: 25 TABLET ORAL at 06:10

## 2020-07-06 RX ADMIN — GABAPENTIN 800 MG: 400 CAPSULE ORAL at 08:12

## 2020-07-06 RX ADMIN — OXCARBAZEPINE 300 MG: 150 TABLET, FILM COATED ORAL at 08:13

## 2020-07-06 RX ADMIN — METOCLOPRAMIDE 10 MG: 10 TABLET ORAL at 08:13

## 2020-07-06 RX ADMIN — ESCITALOPRAM OXALATE 20 MG: 10 TABLET ORAL at 08:13

## 2020-07-06 NOTE — PROGRESS NOTES
[facility] Daily Progress Note      Hospitalist Team  LOS 0 days      Patient Care Team:  Zoey Bah MD as PCP - General  Zoey Bah MD as PCP - Family Medicine    Patient Location: [facility]      Subjective   Subjective     Chief Complaint / Subjective  Chief Complaint   Patient presents with   • Abdominal Pain     onset 2 days ago with N/V     Patient with still mild residual nausea and abdominal pain.  No emesis.  Patient reports not having a bowel movement last 4 days.  Awaiting rehab pre-CERT.    Brief Synopsis of Hospital Course/HPI   Delia is a 70 y.o. female presents to [facility] complaining of nausea and vomiting for the past 2 days.  Patient states that she had posterior cervical neck fusion in the beginning of June.  Patient has been on pain meds ever since but states her pain is worsening as well as worsening abdominal pain.  Patient states she has had decreased oral intake over the past several days due to feeling nauseous.  Patient reports her last bowel movement was 3 days ago.  Patient also states that she has decreased urine output as she reports that she is not drinking enough fluids.  Patient also reports some hematemesis in which she is noticed some red streaks in her emesis.     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  Previous notes from [facility] state that patient has chronic nausea.  Patient with history of cholecystectomy back in 1996.  Patient also has a history of appendectomy.  Patient postop note shows patient was treated with clindamycin.  Patient has a history of C. difficile colitis and chronic diarrhea as well.     In the ED, CMP largely unremarkable.  Lipase normal, CBC unremarkable.  UA significant for 2+, 1+ protein, 2+ bilirubin, 3-5 RBCs, 0-2 WBCs, +1 bacteria. CT abdomen pelvis showed no acute findings in the abdomen or pelvis.  No CT explanation for  "patient's upper abdomen pain.  Surgical changes of partial gastrectomy with gastrojejunostomy.  Cholecystectomy.  Hysterectomy.  Lumbar spinal fusion.  Stable intrahepatic next hepatic biliary ductal dilatation since 3/9/2019, may represent increased capacity from previous cholecystectomy.   CT of the cervical spine showed anterior C3-4 and posterior C3-6 spinal fusion.  Hardware appears intact.  No acute cervical spine findings.  No high-grade canal or foraminal stenosis is seen.  7 mm ill-defined sclerotic focus on the T1 vertebral body it is indeterminate.  Bone scan may prove helpful for further evaluation. patient is afebrile, pulse in the 80s, on room air oxygen and 97% SPO2 and blood pressure 150s over 70s.  Patient received 500 normal saline bolus, Zofran, Dilaudid, Phenergan in ED.         Date::          Review of Systems   Constitution: Negative for chills and fever.   HENT: Negative for hoarse voice and sore throat.    Eyes: Negative for double vision and photophobia.   Cardiovascular: Negative for chest pain and syncope.   Respiratory: Negative for cough and shortness of breath.    Musculoskeletal: Negative for falls and stiffness.   Gastrointestinal: Positive for bloating, abdominal pain and nausea. Negative for vomiting.   Genitourinary: Negative for dysuria and flank pain.   Neurological: Negative for dizziness and focal weakness.   Psychiatric/Behavioral: Negative for altered mental status. The patient is not nervous/anxious.          Objective   Objective      Vital Signs  Temp:  [97.9 °F (36.6 °C)-98.2 °F (36.8 °C)] 98.2 °F (36.8 °C)  Heart Rate:  [63-81] 72  Resp:  [16-17] 17  BP: (136-158)/(65-90) 149/65  Oxygen Therapy  SpO2: 95 %  Pulse Oximetry Type: Intermittent  Device (Oxygen Therapy): room air  Flow (L/min): 10  ETCO2 (mmHg): 23 mmHg  Flowsheet Rows      First Filed Value   Admission Height  157.5 cm (62\") Documented at 06/30/2020 1045   Admission Weight  59.4 kg (131 lb) Documented at " 06/30/2020 1045        Intake & Output (last 3 days)       07/03 0701 - 07/04 0700 07/04 0701 - 07/05 0700 07/05 0701 - 07/06 0700 07/06 0701 - 07/07 0700    P.O. 50   960    I.V. (mL/kg)  1000 (16.9) 1000 (16.6)     Total Intake(mL/kg) 50 (0.8) 1000 (16.9) 1000 (16.6) 960 (15.9)    Urine (mL/kg/hr) 700 (0.5) 3600 (2.5) 6300 (4.4)     Total Output 700 3600 6300     Net -650 -2600 -5300 +960            Urine Unmeasured Occurrence    2 x        Lines, Drains & Airways    Active LDAs     Name:   Placement date:   Placement time:   Site:   Days:    Peripheral IV 07/03/20 2055 Posterior;Right Forearm   07/03/20 2055    Forearm   2                  Physical Exam:    Physical Exam    General: Elderly female lying in bed breathing comfortably on room air in no acute distress  HEENT: NC/AT, EOMI, mucosa moist  Heart: Regular, rate controlled  Chest: CTAB, no w/r/r, normal respiratory effort  Abdominal: Firm, distended, bowel sounds present minimal tenderness  Musculoskeletal: Normal ROM.  No edema. No calf tenderness.  Neurological: Awake and alert, no focal deficits  Skin: Skin is warm and dry. No rash  Psychiatric: Normal mood and affect.         Wounds (last 24 hours)      LDA Wound     Row Name 07/06/20 0730 07/06/20 0300 07/05/20 2006       Wound 07/02/20 2005 midline cervical spine Incision    Wound - Properties Group Date first assessed: 07/02/20  - Time first assessed: 2005  - Present on Hospital Admission: Y  - Orientation: midline  - Location: cervical spine  - Primary Wound Type: Incision  -AH Additional Comments: surgial incison , healing , scant serious sang drainage noted , wound edges approx   -AH Wound Outcome: Other  -    Dressing Appearance  dry;intact  -HJ  dry;intact  -JA  --    Closure  Adhesive bandage  -HJ  Adhesive bandage  -JA  --    Base  pink  -HJ  --  --    Drainage Amount  none  -HJ  --  --    Dressing Care, Wound  --  --  dressing changed  -JA    Row Name 07/05/20 2455              Wound 07/02/20 2005 midline cervical spine Incision    Wound - Properties Group Date first assessed: 07/02/20  -AH Time first assessed: 2005  - Present on Hospital Admission: Y  - Orientation: midline  - Location: cervical spine  -AH Primary Wound Type: Incision  -AH Additional Comments: surgial incison , healing , scant serious sang drainage noted , wound edges approx   -AH Wound Outcome: Other  -AH    Dressing Appearance  dried drainage;intact  -JA      Drainage Amount  scant  -JA        User Key  (r) = Recorded By, (t) = Taken By, (c) = Cosigned By    Initials Name Provider Type    Rosa Real LPN Licensed Nurse    Shubham Garcia LPN Licensed Nurse    Susannah Hylton, RN Registered Nurse          Procedures:    Procedure(s):  ESOPHAGOGASTRODUODENOSCOPY WITH DILATATION ([facility] #54)          Results Review:     I reviewed the patient's new clinical results.      Lab Results (last 24 hours)     Procedure Component Value Units Date/Time    Potassium [[id number]]  (Normal) Collected:  07/06/20 0358    Specimen:  Blood Updated:  07/06/20 0506     Potassium 3.6 mmol/L     Magnesium [[id number]]  (Normal) Collected:  07/06/20 0358    Specimen:  Blood Updated:  07/06/20 0506     Magnesium 1.9 mg/dL         No results found for: HGBA1C  Results from last 7 days   Lab Units 07/02/20  0456   INR  1.06           Lab Results   Component Value Date    LIPASE 29 06/30/2020     Lab Results   Component Value Date    CHOL 161 04/17/2018    TRIG 148 04/17/2018    HDL 45 04/17/2018    LDL 89 04/17/2018       No results found for: INTRAOP, PREDX, FINALDX, COMDX    Microbiology Results (last 10 days)     ** No results found for the last 240 hours. **          ECG/EMG Results (most recent)     None                    Ct Cervical Spine Without Contrast    Result Date: 6/30/2020   1. Anterior C3-4 and posterior C3-C6 spinal fusion. Hardware appears intact. 2. No acute cervical spine findings. No high-grade canal or  foraminal stenosis is seen. 3. 7 mm ill-defined sclerotic focus in the T1 vertebral body. It is indeterminate. Bone scan may prove helpful for further evaluation.  Electronically Signed By-Dr. Velvet Maher MD On:6/30/2020 11:52 AM This report was finalized on [id number] by Dr. Velvet Maher MD.    Mri Cervical Spine Without Contrast    Result Date: 6/30/2020   1. Mild to moderate canal stenosis at C4-5.. No high-grade canal stenosis is seen. 2. No evidence of cord edema. 3. Satisfactory cervical alignment. 4. C3-4 anterior spinal fusion. C3-C6 posterior spinal fusion. 5. Cellulitic changes or edema within the mid neck soft tissues. No drainable fluid collection or abscess. 6. No epidural fluid collection or abscess is seen.   Electronically Signed By-Dr. Velvet Maher MD On:6/30/2020 3:21 PM This report was finalized on [id number] by Dr. Velvet Maher MD.    Ct Abdomen Pelvis With Contrast    Result Date: 6/30/2020   1. No acute findings in the abdomen or pelvis. No CT explanation for the patient's upper abdominal pain. 2. Surgical changes of partial gastrectomy with gastrojejunostomy. Cholecystectomy. Hysterectomy. Lumbar spinal fusion. 3. Stable intrahepatic next hepatic biliary ductal dilation since 03/09/2019, may represent increased capacitance from previous cholecystectomy.  Electronically Signed By-Dr. Velvet Maher MD On:6/30/2020 2:09 PM This report was finalized on [id number] by Dr. Velvet Maher MD.          Xrays, labs reviewed personally by physician.    Medication Review:   I have reviewed the patient's current medication list      Scheduled Meds    amitriptyline 25 mg Oral Nightly   budesonide 0.5 mg Nebulization BID - RT   cycloSPORINE 1 drop Both Eyes BID   escitalopram 20 mg Oral Daily   estradiol 1.5 mg Oral Daily   gabapentin 800 mg Oral 4x Daily   ipratropium-albuterol 3 mL Nebulization Q8H - RT   ketotifen 1 drop Both Eyes BID   levothyroxine 25 mcg Oral Daily    metoclopramide 10 mg Oral TID AC   montelukast 10 mg Oral Nightly   OXcarbazepine 300 mg Oral TID   pantoprazole 40 mg Oral QAM   propranolol 40 mg Oral BID   sodium chloride 10 mL Intravenous Q12H   sucralfate 1 g Oral 4x Daily AC & at Bedtime   topiramate 100 mg Oral 4x Daily   vitamin E 400 Units Oral Daily       Meds Infusions    sodium chloride 100 mL/hr Last Rate: 100 mL/hr (07/06/20 1003)       Meds PRN  •  acetaminophen **OR** acetaminophen **OR** acetaminophen  •  albuterol  •  aluminum-magnesium hydroxide-simethicone  •  bisacodyl  •  butalbital-acetaminophen-caffeine  •  HYDROcodone-acetaminophen  •  ketamine (KETALAR) IVPB **AND** Ketamine Vital Signs & Assessment  •  magnesium hydroxide  •  magnesium sulfate **OR** magnesium sulfate in D5W 1g/100mL (PREMIX)  •  melatonin  •  nitroglycerin  •  ondansetron **OR** ondansetron  •  oxyCODONE  •  potassium chloride  •  promethazine  •  [COMPLETED] Insert peripheral IV **AND** sodium chloride  •  sodium chloride  •  SUMAtriptan      Assessment/Plan   Assessment/Plan     Active Hospital Problems    Diagnosis  POA   • **Intractable vomiting [R11.10]  Yes   • Chronic obstructive pulmonary disease (CMS/HCC) [J44.9]  Yes   • Hypothyroidism (acquired) [E03.9]  Yes   • Estrogen deficiency [E28.39]  Yes   • Depression [F32.9]  Yes   • Migraines [G43.909]  Yes   • Chronic pain syndrome [G89.4]  Yes   • GERD without esophagitis [K21.9]  Yes   • Intractable cyclical vomiting associated with migraine [G43.A1]  Unknown   • Pseudarthrosis following spinal fusion [M96.0]  Not Applicable      Resolved Hospital Problems   No resolved problems to display.       MEDICAL DECISION MAKING COMPLEXITY BY PROBLEM:     Nausea/vomiting -with epigastric pain.  Recent EGD showing no significant pathology.  Consider functional abdominal pain/gastroparesis  -Anti-medics  -Bowel regimen  -PPI  -Starting Movantik  -Trial of prokinetics  -On antidepressants    COPD -not in acute  exacerbation  -Continue maintenance meds    Migraines -currently controlled  -Continue propranolol    Depression -on multiple maintenance medication  -Resume    Hypothyroidism/GERD/hormone replacement -chronic in nature  -Resume home meds as clinically appropriate    VTE Prophylaxis -   Mechanical Order History:      Ordered        06/30/20 1625  Place Sequential Compression Device  Once         06/30/20 1625  Maintain Sequential Compression Device  Continuous                 Pharmalogical Order History:     None            Code Status -   Code Status and Medical Interventions:   Ordered at: 06/30/20 1625     Code Status:    CPR     Medical Interventions (Level of Support Prior to Arrest):    Full           Discharge Planning          Destination      Service Provider Request Status Selected Services Address Phone Number Fax Number    [facility] [place] Pending - Request Sent N/A [address] [place] [address] [phone]    [facility] Declined  Patient Insurance Not Accepted N/A [address] [place] [address] [phone]      Durable Medical Equipment      Coordination has not been started for this encounter.      Dialysis/Infusion      Coordination has not been started for this encounter.      Home Medical Care      Coordination has not been started for this encounter.      Therapy      Coordination has not been started for this encounter.      Community Resources      Coordination has not been started for this encounter.            Electronically signed by Dharmesh Lazaro MD, 07/06/20, 14:25.  [facility] Hospitalist Team

## 2020-07-06 NOTE — PLAN OF CARE
Problem: Patient Care Overview  Goal: Plan of Care Review  Outcome: Ongoing (interventions implemented as appropriate)  Flowsheets (Taken 7/6/2020 6276)  Progress: improving  Plan of Care Reviewed With: patient  Outcome Summary: pt with fair tolerance for PT tx session but was very cooperative.  pt still requires min assist (25%) for safe transfers.  pt has very poor gait quality.  pt was painful at end of tx session.  pt does have potential to cont to improve with continued physical therapy.  pt is not safe to return home alone.  Recommend IP rehab at d/c to allow pt to recover her mobility.  PPE used:  mask, eyeshield and gloves

## 2020-07-06 NOTE — PLAN OF CARE
Patient resting abed, no complaints of nausea voiced. Patient with complaints of back/neck pain, PRN pain medication given and effective. She is hoping to be discharged today.

## 2020-07-06 NOTE — DISCHARGE SUMMARY
Date of Admission: 6/30/2020    Date of Discharge:  7/6/2020    Length of stay:  LOS: 0 days     Discharge Diagnosis:     Gastroparesis    Presenting Problem List:    Nausea/vomiting -with epigastric pain.  Recent EGD showing no significant pathology.  Consider functional abdominal pain/gastroparesis  -Anti-medics  -Bowel regimen  -PPI  -Starting Movantik  -Trial of prokinetics  -On antidepressants     COPD -not in acute exacerbation  -Continue maintenance meds     Migraines -currently controlled  -Continue propranolol     Depression -on multiple maintenance medication  -Resume     Hypothyroidism/GERD/hormone replacement -chronic in nature  -Resume home meds as clinically appropriate    HPI/Hospital Course:   Delia is a 70 y.o. female presents to [facility] complaining of nausea and vomiting for the past 2 days.  Patient states that she had posterior cervical neck fusion in the beginning of June.  Patient has been on pain meds ever since but states her pain is worsening as well as worsening abdominal pain.  Patient states she has had decreased oral intake over the past several days due to feeling nauseous.  Patient reports her last bowel movement was 3 days ago.  Patient also states that she has decreased urine output as she reports that she is not drinking enough fluids.  Patient also reports some hematemesis in which she is noticed some red streaks in her emesis.     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  Previous notes from [facility] state that patient has chronic nausea.  Patient with history of cholecystectomy back in 1996.  Patient also has a history of appendectomy.  Patient postop note shows patient was treated with clindamycin.  Patient has a history of C. difficile colitis and chronic diarrhea as well.     In the ED, CMP largely unremarkable.  Lipase normal, CBC unremarkable.  UA significant for 2+, 1+ protein, 2+ bilirubin, 3-5  RBCs, 0-2 WBCs, +1 bacteria. CT abdomen pelvis showed no acute findings in the abdomen or pelvis.  No CT explanation for patient's upper abdomen pain.  Surgical changes of partial gastrectomy with gastrojejunostomy.  Cholecystectomy.  Hysterectomy.  Lumbar spinal fusion.  Stable intrahepatic next hepatic biliary ductal dilatation since 3/9/2019, may represent increased capacity from previous cholecystectomy.   CT of the cervical spine showed anterior C3-4 and posterior C3-6 spinal fusion.  Hardware appears intact.  No acute cervical spine findings.  No high-grade canal or foraminal stenosis is seen.  7 mm ill-defined sclerotic focus on the T1 vertebral body it is indeterminate.  Bone scan may prove helpful for further evaluation. patient is afebrile, pulse in the 80s, on room air oxygen and 97% SPO2 and blood pressure 150s over 70s.  Patient received 500 normal saline bolus, Zofran, Dilaudid, Phenergan in ED.    Patient hemodynamically stable on hospital floor.  GI consulted and performed EGD saw no tissue abnormality but concern for gastroparesis.  Patient started on PPI and prokinetics as well as antiemetics with improvement in symptoms.  Patient still had some constipation over hospital course likely due to opiate use and had bowel regimen advance.  Physical therapy evaluated and recommended inpatient rehab.  Patient had placement and able to be discharged to rehab in good condition with follow-up organized PCP and GI.  Strict return precautions given.    Procedures Performed    Procedure(s):  ESOPHAGOGASTRODUODENOSCOPY WITH DILATATION ([facility]IE #54)  -------------------       Consults:   Consults     Date and Time Order Name Status Description    7/1/2020 1214 Inpatient Gastroenterology Consult Completed     6/30/2020 1422 Hospitalist (on-call MD unless specified) Completed           Pertinent Test Results:     Lab Results (last 24 hours)     Procedure Component Value Units Date/Time    Potassium [[id number]]   (Normal) Collected:  07/06/20 0358    Specimen:  Blood Updated:  07/06/20 0506     Potassium 3.6 mmol/L     Magnesium [[id number]]  (Normal) Collected:  07/06/20 0358    Specimen:  Blood Updated:  07/06/20 0506     Magnesium 1.9 mg/dL           Microbiology Results Abnormal     None        No radiology results for the last day           Condition on Discharge:  Good    Vital Signs  Temp:  [97.9 °F (36.6 °C)-98.2 °F (36.8 °C)] 98.2 °F (36.8 °C)  Heart Rate:  [63-81] 70  Resp:  [16-17] 16  BP: (136-158)/(65-90) 149/65    Physical Exam:  General: Elderly female lying in bed breathing comfortably on room air in no acute distress  HEENT: NC/AT, EOMI, mucosa moist  Heart: Regular, rate controlled  Chest: CTAB, no w/r/r, normal respiratory effort  Abdominal: Firm, distended, bowel sounds present minimal tenderness  Musculoskeletal: Normal ROM.  No edema. No calf tenderness.  Neurological: Awake and alert, no focal deficits  Skin: Skin is warm and dry. No rash  Psychiatric: Normal mood and affect.    Discharge Disposition  Skilled Nursing Facility (DC - External) [3]    Discharge Medications     Discharge Medications      Changes to Medications      Instructions Start Date   gabapentin 800 MG tablet  Commonly known as:  NEURONTIN  What changed:  when to take this   800 mg, Oral, 2 Times Daily         Continue These Medications      Instructions Start Date   albuterol sulfate  (90 Base) MCG/ACT inhaler  Commonly known as:  PROVENTIL HFA;VENTOLIN HFA;PROAIR HFA   2 puffs, Inhalation, Every 4 Hours PRN      Anoro Ellipta 62.5-25 MCG/INH aerosol powder  inhaler  Generic drug:  umeclidinium-vilanterol   1 puff, Inhalation, Daily - RT      azelastine 0.05 % ophthalmic solution  Commonly known as:  OPTIVAR   1 drop, Both Eyes, 2 Times Daily      budesonide 180 MCG/ACT inhaler  Commonly known as:  PULMICORT   2 puffs, Inhalation, 2 Times Daily - RT      butalbital-acetaminophen-caffeine -40 MG per tablet  Commonly known  as:  FIORICET, ESGIC   1-2 tablets, Oral, Every 4 Hours PRN      cycloSPORINE 0.05 % ophthalmic emulsion  Commonly known as:  RESTASIS   1 drop, Both Eyes, 2 Times Daily      estradiol 1 MG tablet  Commonly known as:  ESTRACE   1.5 mg, Oral, Daily      levothyroxine 25 MCG tablet  Commonly known as:  SYNTHROID, LEVOTHROID   25 mcg, Oral, Daily      omeprazole 40 MG capsule  Commonly known as:  priLOSEC   40 mg, Oral, 2 times daily      oxyCODONE-acetaminophen  MG per tablet  Commonly known as:  PERCOCET   1 tablet, Oral, Every 6 Hours PRN      rizatriptan MLT 10 MG disintegrating tablet  Commonly known as:  MAXALT-MLT   10 mg, Translingual, Once As Needed, May repeat in 2 hours if needed       sucralfate 1 g tablet  Commonly known as:  CARAFATE   1 g, Oral, 4 Times Daily      topiramate 100 MG tablet  Commonly known as:  TOPAMAX   100 mg, Oral, 4 Times Daily      vitamin E 400 UNIT capsule   400 Units, Oral, Daily         Stop These Medications    amitriptyline 25 MG tablet  Commonly known as:  ELAVIL     escitalopram 20 MG tablet  Commonly known as:  LEXAPRO     HYDROcodone-acetaminophen 5-325 MG per tablet  Commonly known as:  NORCO     lansoprazole 30 MG capsule  Commonly known as:  PREVACID     montelukast 10 MG tablet  Commonly known as:  SINGULAIR     ondansetron 4 MG tablet  Commonly known as:  ZOFRAN     OXcarbazepine 300 MG tablet  Commonly known as:  TRILEPTAL     promethazine 25 MG suppository  Commonly known as:  PHENERGAN     promethazine 25 MG tablet  Commonly known as:  PHENERGAN     propranolol 40 MG tablet  Commonly known as:  INDERAL     Viberzi 100 MG tablet  Generic drug:  Eluxadoline            Discharge Diet:       Activity at Discharge:       Follow-up Appointments  No future appointments.      Test Results Pending at Discharge       Risk for Readmission (LACE) Score: 6 (7/6/2020  6:00 AM)        Dharmesh Lazaro MD  07/06/20  17:06      Time: Discharge 25 min total time spent  with face-to-face history/exam, writing all prescriptions, preparing medication reconciliation, and documenting discharge data including chart review of the full admission, care co-ordination with patient, family, , and , etc., and follow-up appointments with PCP and specialists as recommended.

## 2020-07-06 NOTE — PROGRESS NOTES
Continued Stay Note   [facility]     Patient Name: Ashley Lin  MRN: [id number]  Today's Date: 7/6/2020    Admit Date: 6/30/2020    Discharge Plan     Row Name 07/06/20 1115       Plan    Plan  [facility] at d/c pending pre-cert. Pre-cert started 7/2. Lev requested updated thearpy notes on 7/6. PASRR approved.     Plan Comments  BALDO contacted [facility] liaison to request update regarding status of pt's pre-cert. Per liaisonLev Medicare has requested updated therapy notes. BALDO sent message to PT assigned to pt today to request therapy notes as soon as possible.         Rebeka Ryan, PANCHOW, LSW  PRN   Phone: [phone]

## 2020-07-06 NOTE — PROGRESS NOTES
Continued Stay Note   [facility]     Patient Name: Ashley Lin  MRN: [id number]  Today's Date: 7/6/2020    Admit Date: 6/30/2020    Discharge Plan     Row Name 07/06/20 1637       Plan    [facility] 7/[facility]. Pre-cert expires 7/8. PASRR approved.     Plan Comments  Pt's pre-cert has been approved and is valid for 48 hours. Okay to transfer to [facility] today. SW notified pt's RN via phone call and also notified MD via [employer] Secure Chat.         Rebeka Ryan, MSSW, LSW  PRN   Phone: [phone]

## 2020-07-06 NOTE — PLAN OF CARE
Problem: Patient Care Overview  Goal: Plan of Care Review  Outcome: Ongoing (interventions implemented as appropriate)  Flowsheets  Taken 7/6/2020 1352 by Vanna Ojeda PTA  Plan of Care Reviewed With: patient  Taken 7/6/2020 1552 by Susannah Botello RN  Outcome Summary: Discharge pending due to pre-cert pending. Patient will d/c once precert has been approved. No complaints at this time. Will continue to monitor.

## 2020-07-06 NOTE — THERAPY TREATMENT NOTE
Patient Name: Ashley Lin  : 1950    MRN: [id number]                              Today's Date: 2020       Admit Date: 2020    Visit Dx:     ICD-10-CM ICD-9-CM   1. Intractable vomiting with nausea, unspecified vomiting type R11.2 536.2   2. Cervical pain M54.2 723.1   3. Intractable cyclical vomiting associated with migraine G43.A1 346.21   4. Migraine without aura and with status migrainosus, not intractable G43.001 346.12     Patient Active Problem List   Diagnosis   • Intractable vomiting   • Asthma   • Cervical disc herniation   • Cervical stenosis of spine   • Chronic obstructive pulmonary disease (CMS/HCC)   • Low back pain   • Pseudarthrosis following spinal fusion   • History of lumbar fusion   • S/P cervical spinal fusion   • Hypothyroidism (acquired)   • Estrogen deficiency   • Depression   • Migraines   • Chronic pain syndrome   • GERD without esophagitis   • Intractable cyclical vomiting associated with migraine     Past Medical History:   Diagnosis Date   • Chronic back pain    • Chronic obstructive pulmonary disease (CMS/HCC) 2020   • Chronic pain syndrome 2020   • Depression 2020   • Disease of thyroid gland    • Estrogen deficiency 2020   • GERD without esophagitis 2020   • Hypothyroidism (acquired) 2020   • Migraines 2020   • Vomiting 2020     Past Surgical History:   Procedure Laterality Date   • CHOLECYSTECTOMY     • ENDOSCOPY N/A 2020    Procedure: ESOPHAGOGASTRODUODENOSCOPY WITH DILATATION (BOUGIE #[id number]);  Surgeon: Benjamin Mike MD;  Location: [facility];  Service: Gastroenterology;  Laterality: N/A;  Post operative diagnosis:GASTROPARESIS AND DYSPHAGIA    • HYSTERECTOMY     • NECK SURGERY     • SPINE SURGERY  2020     General Information     Row Name 20 1335          PT Evaluation Time/Intention    Document Type  therapy note (daily note)  -SC     Mode of Treatment  physical therapy;individual therapy  -SC      [facility] Name 07/06/20 1335          General Information    Existing Precautions/Restrictions  fall;spinal cervical fusions about 4 weeks ago  -SC     Row Name 07/06/20 1335          Cognitive Assessment/Intervention- PT/OT    Orientation Status (Cognition)  oriented x 3  -[facility] Name 07/06/20 1335          Safety Issues, Functional Mobility    Safety Issues Affecting Function (Mobility)  problem solving  -SC     Impairments Affecting Function (Mobility)  balance;endurance/activity tolerance;strength;pain;coordination  -SC       User Key  (r) = Recorded By, (t) = Taken By, (c) = Cosigned By    Initials Name Provider Type    SC Vanna Ojeda PTA Physical Therapy Assistant        Mobility     Row Name 07/06/20 1337          Bed Mobility Assessment/Treatment    Bed Mobility Assessment/Treatment  supine-sit;sit-supine;rolling left  -SC     [place] Level (Bed Mobility)  minimum assist (75% patient effort)  -SC     Rolling Left [place] (Bed Mobility)  minimum assist (75% patient effort)  -SC     Sit-Supine [place] (Bed Mobility)  minimum assist (75% patient effort)  -SC     Comment (Bed Mobility)  log roll  -[facility] Name 07/06/20 1337          Transfer Assessment/Treatment    Comment (Transfers)  sit to stand from eob, pt slightly hesitant  -[facility] Name 07/06/20 1337          Bed-Chair Transfer    Bed-Chair [place] (Transfers)  not tested  -SC     Row Name 07/06/20 1337          Sit-Stand Transfer    Sit-Stand [place] (Transfers)  minimum assist (75% patient effort)  -SC     Assistive Device (Sit-Stand Transfers)  walker, front-wheeled  -[facility] Name 07/06/20 1337          Gait/Stairs Assessment/Training    Gait/Stairs Assessment/Training  distance ambulated  -SC     [place] Level (Gait)  minimum assist (75% patient effort);1 person assist  -SC     Distance in Feet (Gait)  20',  shuffle gait pattern,  cues to take longer step length but pt was unable  -SC     Pattern (Gait)   step-through  -SC     Deviations/Abnormal Patterns (Gait)  base of support, narrow;festinating/shuffling;stride length decreased;gait speed decreased  -SC     Bilateral Gait Deviations  heel strike decreased  -SC     Comment (Gait/Stairs)  pt very unsteady  -SC       User Key  (r) = Recorded By, (t) = Taken By, (c) = Cosigned By    Initials Name Provider Type    SC Vanna Ojeda, MAGNUS Physical Therapy Assistant        Obj/Interventions     Row Name 07/06/20 1339          Therapeutic Exercise    Comment (Therapeutic Exercise)  laq's and ap's  -[facility] Name 07/06/20 1339          Static Sitting Balance    Level of [place] (Unsupported Sitting, Static Balance)  supervision  -SC     Sitting Position (Unsupported Sitting, Static Balance)  sitting on edge of bed  -[facility] Name 07/06/20 1339          Dynamic Sitting Balance    Level of [place], Reaches Outside Midline (Sitting, Dynamic Balance)  contact guard assist  -SC     Sitting Position, Reaches Outside Midline (Sitting, Dynamic Balance)  sitting on edge of bed  -SC     Comment, Reaches Outside Midline (Sitting, Dynamic Balance)  pt leaning posterior with le exercises at eob  -[facility] Name 07/06/20 1339          Static Standing Balance    Level of [place] (Supported Standing, Static Balance)  minimal assist, 75% patient effort  -SC     Assistive Device Utilized (Supported Standing, Static Balance)  walker, rolling  -[facility] Name 07/06/20 1339          Dynamic Standing Balance    Level of [place], Reaches Outside Midline (Standing, Dynamic Balance)  minimal assist, 75% patient effort;1 person assist  -SC     Assistive Device Utilized (Supported Standing, Dynamic Balance)  walker, rolling  -SC     Comment, Reaches Outside Midline (Standing, Dynamic Balance)  poor gait quality  -SC       User Key  (r) = Recorded By, (t) = Taken By, (c) = Cosigned By    Initials Name Provider Type    SC Vanna Ojeda, MAGNUS Physical Therapy Assistant         Goals/Plan    No documentation.       Clinical Impression     [facility] Name 07/06/20 1342          Pain Assessment    Additional Documentation  Pain Scale: FACES Pre/Post-Treatment (Group)  -SC     Row Name 07/06/20 1342          Pain Scale: Numbers Pre/Post-Treatment    Pre/Post Treatment Pain Comment  back and neck pain  -SC     Row Name 07/06/20 1342          Pain Scale: FACES Pre/Post-Treatment    Pain: FACES Scale, Pretreatment  2-->hurts little bit  -SC     Pain: FACES Scale, Post-Treatment  4-->hurts little more  -SC     Row Name 07/06/20 1342          Physical Therapy Clinical Impression    Criteria for Skilled Interventions Met (PT Clinical Impression)  treatment indicated  -SC     Rehab Potential (PT Clinical Summary)  fair, will monitor progress closely  -SC     Row Name 07/06/20 1342          Positioning and Restraints    Pre-Treatment Position  in bed  -SC     Post Treatment Position  bed  -SC     In Bed  notified nsg;supine;exit alarm on;call light within reach;fowlers  -SC       User Key  (r) = Recorded By, (t) = Taken By, (c) = Cosigned By    Initials Name Provider Type    Vanna Morales, PTA Physical Therapy Assistant        Outcome Measures     [facility] Name 07/06/20 1348          How much help from another person do you currently need...    Turning from your back to your side while in flat bed without using bedrails?  3  -SC     Moving from lying on back to sitting on the side of a flat bed without bedrails?  2  -SC     Moving to and from a bed to a chair (including a wheelchair)?  3  -SC     Standing up from a chair using your arms (e.g., wheelchair, bedside chair)?  3  -SC     Climbing 3-5 steps with a railing?  2  -SC     To walk in hospital room?  2  -SC     AM-PAC 6 Clicks Score (PT)  15  -[facility] Name 07/06/20 1348          Functional Assessment    Outcome Measure Options  AM-PAC 6 Clicks Basic Mobility (PT)  -SC       User Key  (r) = Recorded By, (t) = Taken By, (c) = Cosigned By     Initials Name Provider Type    Vanna Morales, MAGNUS Physical Therapy Assistant        Physical Therapy Education                 Title: PT OT SLP Therapies (Done)     Topic: Physical Therapy (Done)     Point: Mobility training (Done)     Description:   Instruct learner(s) on safety and technique for assisting patient out of bed, chair or wheelchair.  Instruct in the proper use of assistive devices, such as walker, crutches, cane or brace.              Patient Friendly Description:   It's important to get you on your feet again, but we need to do so in a way that is safe for you. Falling has serious consequences, and your personal safety is the most important thing of all.        When it's time to get out of bed, one of us or a family member will sit next to you on the bed to give you support.     If your doctor or nurse tells you to use a walker, crutches, a cane, or a brace, be sure you use it every time you get out of bed, even if you think you don't need it.    Learning Progress Summary           Patient Acceptance, E, VU,NR by SC at 7/6/2020 1350    Acceptance, E,TB, VU by AV at 7/3/2020 2211    Acceptance, E,TB,D, VU,DU,NR by  at 7/3/2020 0424    Acceptance, E,TB, NR by AO at 7/2/2020 1238    Comment:  Educated pt on spinal precautions and safe bed mobility/transfers as well supine ther-ex.    Acceptance, E,TB, VU by AO at 7/1/2020 1243    Comment:  Educated pt on spinal precautions, on safe bed mobility/transfers/gait training, and on POC with anticipated physical therapy needs.                   Point: Precautions (Done)     Description:   Instruct learner(s) on prescribed precautions during mobility and gait tasks              Learning Progress Summary           Patient Acceptance, E, VU,NR by SC at 7/6/2020 1350    Acceptance, E,TB, VU by AV at 7/3/2020 2211    Acceptance, E,TB,D, VU,DU,NR by  at 7/3/2020 0424    Acceptance, E,TB, NR by AO at 7/2/2020 1238    Comment:  Educated pt on spinal  precautions and safe bed mobility/transfers as well supine ther-ex.    Acceptance, E,TB, VU by  at 7/1/2020 1243    Comment:  Educated pt on spinal precautions, on safe bed mobility/transfers/gait training, and on POC with anticipated physical therapy needs.                               User Key     Initials Effective Dates Name Provider Type Discipline    AO 03/01/19 -  Devorah Dey, PT Physical Therapist PT    SC 03/01/19 -  Vanna Ojeda PTA Physical Therapy Assistant PT     03/01/19 -  Jennie Salcido, RN Registered Nurse Nurse     06/24/19 -  Shubham Chan LPN Licensed Nurse Nurse              PT Recommendation and Plan     Outcome Summary/Treatment Plan (PT)  Anticipated Discharge Disposition (PT): inpatient rehabilitation facility  Plan of Care Reviewed With: patient  Progress: improving  Outcome Summary: pt with fair tolerance for PT tx session but was very cooperative.  pt still requires min assist (25%) for safe transfers.  pt has very poor gait quality.  pt was painful at end of tx session.  pt does have potential to cont to improve with continued physical therapy.  pt is not safe to return home alone.  Recommend IP rehab at d/c to allow pt to recover her mobility.  PPE used:  mask, eyeshield and gloves     Time Calculation:   PT Charges     Row Name 07/06/20 1356             Time Calculation    Start Time  1325  -SC      Stop Time  1345  -SC      Time Calculation (min)  20 min  -SC      PT Received On  07/06/20  -SC      PT - Next Appointment  07/08/20  -SC         Time Calculation- PT    Total Timed Code Minutes- PT  20 minute(s)  -SC         Timed Charges    65139 - Gait Training Minutes   10  -SC      69450 - PT Therapeutic Activity Minutes  10  -SC        User Key  (r) = Recorded By, (t) = Taken By, (c) = Cosigned By    Initials Name Provider Type    SC Vanna Ojeda PTA Physical Therapy Assistant        Therapy Charges for Today     Code Description Service Date Service  Provider Modifiers Qty    04895839861 HC GAIT TRAINING EA 15 MIN 7/6/2020 Vanna Ojeda, PTA GP 1    73673723537 HC PT THERAPEUTIC ACT EA 15 MIN 7/6/2020 Vanna Ojeda, MAGNUS GP 1          PT G-Codes  Outcome Measure Options: AM-PAC 6 Clicks Basic Mobility (PT)  AM-PAC 6 Clicks Score (PT): 15    Vanna Ojeda PTA  7/6/2020

## 2020-07-07 NOTE — PROGRESS NOTES
Discharge Planning Assessment   [place]     Patient Name: Ashley Lin  MRN: [id number]  Today's Date: 7/7/2020    Admit Date: 6/30/2020          Plan    Final Discharge Disposition Code  03 - skilled nursing facility (SNF)    Final Note  to [facility] for rehab       Carol naegele rn  Case management  Office number [phone]  Cell phone [phone]

## 2020-10-06 ENCOUNTER — OFFICE (AMBULATORY)
Dept: URBAN - METROPOLITAN AREA CLINIC 64 | Facility: CLINIC | Age: 70
End: 2020-10-06

## 2020-10-06 VITALS
DIASTOLIC BLOOD PRESSURE: 92 MMHG | HEART RATE: 100 BPM | SYSTOLIC BLOOD PRESSURE: 140 MMHG | HEIGHT: 62 IN | WEIGHT: 136 LBS

## 2020-10-06 DIAGNOSIS — R11.2 NAUSEA WITH VOMITING, UNSPECIFIED: ICD-10-CM

## 2020-10-06 DIAGNOSIS — R10.84 GENERALIZED ABDOMINAL PAIN: ICD-10-CM

## 2020-10-06 DIAGNOSIS — R93.2 ABNORMAL FINDINGS ON DIAGNOSTIC IMAGING OF LIVER AND BILIARY: ICD-10-CM

## 2020-10-06 DIAGNOSIS — R19.7 DIARRHEA, UNSPECIFIED: ICD-10-CM

## 2020-10-06 PROCEDURE — 99214 OFFICE O/P EST MOD 30 MIN: CPT | Performed by: INTERNAL MEDICINE

## 2020-10-06 RX ORDER — COLESTIPOL HYDROCHLORIDE 1 G/1
TABLET, FILM COATED ORAL
Qty: 180 | Refills: 11 | Status: COMPLETED
Start: 2020-10-06 | End: 2023-10-27

## 2020-11-09 ENCOUNTER — LAB (OUTPATIENT)
Dept: LAB | Facility: HOSPITAL | Age: 70
End: 2020-11-09

## 2020-11-09 PROCEDURE — C9803 HOPD COVID-19 SPEC COLLECT: HCPCS

## 2020-11-09 PROCEDURE — U0004 COV-19 TEST NON-CDC HGH THRU: HCPCS

## 2020-11-10 LAB — SARS-COV-2 RNA RESP QL NAA+PROBE: NOT DETECTED

## 2020-11-11 ENCOUNTER — ANESTHESIA (OUTPATIENT)
Dept: GASTROENTEROLOGY | Facility: HOSPITAL | Age: 70
End: 2020-11-11

## 2020-11-11 ENCOUNTER — ON CAMPUS - OUTPATIENT (AMBULATORY)
Dept: URBAN - METROPOLITAN AREA HOSPITAL 85 | Facility: HOSPITAL | Age: 70
End: 2020-11-11

## 2020-11-11 ENCOUNTER — ANESTHESIA EVENT (OUTPATIENT)
Dept: GASTROENTEROLOGY | Facility: HOSPITAL | Age: 70
End: 2020-11-11

## 2020-11-11 ENCOUNTER — HOSPITAL ENCOUNTER (OUTPATIENT)
Facility: HOSPITAL | Age: 70
Setting detail: HOSPITAL OUTPATIENT SURGERY
Discharge: HOME | End: 2020-11-11
Attending: INTERNAL MEDICINE | Admitting: INTERNAL MEDICINE

## 2020-11-11 VITALS
BODY MASS INDEX: 24.87 KG/M2 | HEART RATE: 90 BPM | OXYGEN SATURATION: 97 % | WEIGHT: 135.14 LBS | TEMPERATURE: 97.6 F | SYSTOLIC BLOOD PRESSURE: 107 MMHG | RESPIRATION RATE: 16 BRPM | HEIGHT: 62 IN | DIASTOLIC BLOOD PRESSURE: 57 MMHG

## 2020-11-11 DIAGNOSIS — R11.2 NAUSEA WITH VOMITING, UNSPECIFIED: ICD-10-CM

## 2020-11-11 DIAGNOSIS — R11.2 NAUSEA & VOMITING: ICD-10-CM

## 2020-11-11 DIAGNOSIS — R19.7 DIARRHEA: ICD-10-CM

## 2020-11-11 DIAGNOSIS — K31.89 OTHER DISEASES OF STOMACH AND DUODENUM: ICD-10-CM

## 2020-11-11 DIAGNOSIS — R19.7 DIARRHEA, UNSPECIFIED: ICD-10-CM

## 2020-11-11 DIAGNOSIS — R10.9 ABDOMINAL PAIN: ICD-10-CM

## 2020-11-11 DIAGNOSIS — R10.9 UNSPECIFIED ABDOMINAL PAIN: ICD-10-CM

## 2020-11-11 PROCEDURE — 25010000002 PHENYLEPHRINE 10 MG/ML SOLUTION: Performed by: ANESTHESIOLOGIST ASSISTANT

## 2020-11-11 PROCEDURE — 88305 TISSUE EXAM BY PATHOLOGIST: CPT | Performed by: INTERNAL MEDICINE

## 2020-11-11 PROCEDURE — 45380 COLONOSCOPY AND BIOPSY: CPT | Performed by: INTERNAL MEDICINE

## 2020-11-11 PROCEDURE — 43239 EGD BIOPSY SINGLE/MULTIPLE: CPT | Performed by: INTERNAL MEDICINE

## 2020-11-11 PROCEDURE — 25010000002 PROPOFOL 10 MG/ML EMULSION: Performed by: ANESTHESIOLOGIST ASSISTANT

## 2020-11-11 RX ORDER — SODIUM CHLORIDE 0.9 % (FLUSH) 0.9 %
10 SYRINGE (ML) INJECTION AS NEEDED
Status: DISCONTINUED | OUTPATIENT
Start: 2020-11-11 | End: 2020-11-11 | Stop reason: HOSPADM

## 2020-11-11 RX ORDER — ATORVASTATIN CALCIUM 10 MG/1
10 TABLET, FILM COATED ORAL NIGHTLY
COMMUNITY

## 2020-11-11 RX ORDER — DULOXETIN HYDROCHLORIDE 60 MG/1
60 CAPSULE, DELAYED RELEASE ORAL DAILY
COMMUNITY
End: 2022-09-27

## 2020-11-11 RX ORDER — LISINOPRIL 10 MG/1
10 TABLET ORAL DAILY
COMMUNITY
End: 2022-09-27

## 2020-11-11 RX ORDER — SODIUM CHLORIDE 0.9 % (FLUSH) 0.9 %
3 SYRINGE (ML) INJECTION EVERY 12 HOURS SCHEDULED
Status: DISCONTINUED | OUTPATIENT
Start: 2020-11-11 | End: 2020-11-11 | Stop reason: HOSPADM

## 2020-11-11 RX ORDER — PROPRANOLOL HYDROCHLORIDE 40 MG/1
40 TABLET ORAL 2 TIMES DAILY
COMMUNITY
End: 2022-09-27

## 2020-11-11 RX ORDER — FLUTICASONE PROPIONATE 50 MCG
2 SPRAY, SUSPENSION (ML) NASAL DAILY
COMMUNITY

## 2020-11-11 RX ORDER — PROPOFOL 10 MG/ML
VIAL (ML) INTRAVENOUS AS NEEDED
Status: DISCONTINUED | OUTPATIENT
Start: 2020-11-11 | End: 2020-11-11 | Stop reason: SURG

## 2020-11-11 RX ORDER — BUSPIRONE HYDROCHLORIDE 15 MG/1
15 TABLET ORAL 3 TIMES DAILY
COMMUNITY
End: 2022-09-27

## 2020-11-11 RX ORDER — BACLOFEN 10 MG/1
10 TABLET ORAL
COMMUNITY
End: 2022-09-30 | Stop reason: HOSPADM

## 2020-11-11 RX ORDER — DOXYCYCLINE HYCLATE 100 MG/1
100 CAPSULE ORAL 2 TIMES DAILY
COMMUNITY
End: 2022-09-27

## 2020-11-11 RX ORDER — ONDANSETRON 2 MG/ML
4 INJECTION INTRAMUSCULAR; INTRAVENOUS ONCE AS NEEDED
Status: DISCONTINUED | OUTPATIENT
Start: 2020-11-11 | End: 2020-11-11 | Stop reason: HOSPADM

## 2020-11-11 RX ORDER — ERGOCALCIFEROL 1.25 MG/1
50000 CAPSULE ORAL WEEKLY
COMMUNITY
End: 2022-05-12 | Stop reason: SDUPTHER

## 2020-11-11 RX ORDER — PHENYLEPHRINE HYDROCHLORIDE 10 MG/ML
INJECTION INTRAVENOUS AS NEEDED
Status: DISCONTINUED | OUTPATIENT
Start: 2020-11-11 | End: 2020-11-11 | Stop reason: SURG

## 2020-11-11 RX ORDER — OXCARBAZEPINE 300 MG/1
450 TABLET, FILM COATED ORAL 2 TIMES DAILY
COMMUNITY

## 2020-11-11 RX ORDER — METOPROLOL TARTRATE 50 MG/1
50 TABLET, FILM COATED ORAL 2 TIMES DAILY
COMMUNITY
End: 2022-09-27

## 2020-11-11 RX ORDER — DICYCLOMINE HCL 20 MG
10 TABLET ORAL 4 TIMES DAILY
COMMUNITY
End: 2022-09-27

## 2020-11-11 RX ORDER — MONTELUKAST SODIUM 10 MG/1
10 TABLET ORAL NIGHTLY
COMMUNITY

## 2020-11-11 RX ORDER — SODIUM CHLORIDE 9 MG/ML
30 INJECTION, SOLUTION INTRAVENOUS CONTINUOUS PRN
Status: DISCONTINUED | OUTPATIENT
Start: 2020-11-11 | End: 2020-11-11 | Stop reason: HOSPADM

## 2020-11-11 RX ADMIN — PHENYLEPHRINE HYDROCHLORIDE 100 MCG: 10 INJECTION INTRAVENOUS at 09:49

## 2020-11-11 RX ADMIN — PHENYLEPHRINE HYDROCHLORIDE 100 MCG: 10 INJECTION INTRAVENOUS at 09:33

## 2020-11-11 RX ADMIN — PHENYLEPHRINE HYDROCHLORIDE 100 MCG: 10 INJECTION INTRAVENOUS at 09:43

## 2020-11-11 RX ADMIN — SODIUM CHLORIDE 30 ML/HR: 9 INJECTION, SOLUTION INTRAVENOUS at 08:44

## 2020-11-11 RX ADMIN — PHENYLEPHRINE HYDROCHLORIDE 150 MCG: 10 INJECTION INTRAVENOUS at 09:39

## 2020-11-11 RX ADMIN — PROPOFOL 210 MG: 10 INJECTION, EMULSION INTRAVENOUS at 09:20

## 2020-11-11 RX ADMIN — PHENYLEPHRINE HYDROCHLORIDE 100 MCG: 10 INJECTION INTRAVENOUS at 09:36

## 2020-11-11 RX ADMIN — PHENYLEPHRINE HYDROCHLORIDE 100 MCG: 10 INJECTION INTRAVENOUS at 09:46

## 2020-11-11 NOTE — ANESTHESIA POSTPROCEDURE EVALUATION
Patient: Ashley Lin    Procedure Summary     Date: 11/11/20 Room / Location: [facility] ENDOSCOPY 1 / [facility] ENDOSCOPY    Anesthesia Start: 0857 Anesthesia Stop: 0953    Procedures:       ESOPHAGOGASTRODUODENOSCOPY WITH BIOPSY X 1 AREA (N/A )      COLONOSCOPY WITH RANDOM COLON BIOPSIES X4 AREAS, BIOPSY X 1 AREA (N/A ) Diagnosis:       Nausea & vomiting      Diarrhea      Abdominal pain      (Nausea & vomiting [R11.2])      (Diarrhea [R19.7])      (Abdominal pain [R10.9])    Surgeon: Aguila Garcia MD Provider: Herman Tiwari MD    Anesthesia Type: MAC ASA Status: 3          Anesthesia Type: MAC    Vitals  Vitals Value Taken Time   /57 11/11/20 1019   Temp     Pulse 90 11/11/20 1019   Resp 16 11/11/20 1019   SpO2 97 % 11/11/20 1019           Post Anesthesia Care and Evaluation    Patient location during evaluation: PACU  Patient participation: complete - patient participated  Level of consciousness: awake  Pain scale: See nurse's notes for pain score.  Pain management: adequate  Airway patency: patent  Anesthetic complications: No anesthetic complications  PONV Status: none  Cardiovascular status: acceptable  Respiratory status: acceptable  Hydration status: acceptable    Comments: Patient seen and examined postoperatively; vital signs stable; SpO2 greater than or equal to 90%; cardiopulmonary status stable; nausea/vomiting adequately controlled; pain adequately controlled; no apparent anesthesia complications; patient discharged from anesthesia care when discharge criteria were met

## 2020-11-11 NOTE — H&P
LOS: 0 days   Patient Care Team:  Jamey Fairchild MD as PCP - General (Family Medicine)      Subjective     Interval History:     Subjective: Nausea, vomiting, diarrhea.  Labs 10/20 - celiac panel, alkaline phosphatase 165 otherwise normal LFTs, elevated TSH to 5, CRP and ESR negative.      ROS:   No chest pain, shortness of breath, or cough.        Medication Review:   No current facility-administered medications for this encounter.     Current Outpatient Medications:   •  albuterol sulfate  (90 Base) MCG/ACT inhaler, Inhale 2 puffs Every 4 (Four) Hours As Needed for Wheezing or Shortness of Air., Disp: , Rfl:   •  azelastine (OPTIVAR) 0.05 % ophthalmic solution, Administer 1 drop to both eyes 2 (Two) Times a Day., Disp: , Rfl:   •  budesonide (PULMICORT) 180 MCG/ACT inhaler, Inhale 2 puffs 2 (Two) Times a Day., Disp: , Rfl:   •  butalbital-acetaminophen-caffeine (FIORICET, ESGIC) -40 MG per tablet, Take 1-2 tablets by mouth Every 4 (Four) Hours As Needed for Headache., Disp: , Rfl:   •  cycloSPORINE (RESTASIS) 0.05 % ophthalmic emulsion, Administer 1 drop to both eyes 2 (Two) Times a Day., Disp: , Rfl:   •  estradiol (ESTRACE) 1 MG tablet, Take 1.5 mg by mouth Daily., Disp: , Rfl:   •  gabapentin (NEURONTIN) 800 MG tablet, Take 1 tablet by mouth 2 (Two) Times a Day. (Patient taking differently: Take 800 mg by mouth 4 (Four) Times a Day.), Disp: 30 tablet, Rfl: 0  •  levothyroxine (SYNTHROID, LEVOTHROID) 25 MCG tablet, Take 25 mcg by mouth Daily., Disp: , Rfl:   •  oxyCODONE-acetaminophen (PERCOCET)  MG per tablet, Take 1 tablet by mouth Every 6 (Six) Hours As Needed for Moderate Pain  or Severe Pain ., Disp: 10 tablet, Rfl: 0  •  sucralfate (CARAFATE) 1 g tablet, Take 1 g by mouth 4 (Four) Times a Day., Disp: , Rfl:   •  topiramate (TOPAMAX) 100 MG tablet, Take 100 mg by mouth 4 (Four) Times a Day., Disp: , Rfl:   •  umeclidinium-vilanterol (Anoro Ellipta) 62.5-25 MCG/INH aerosol powder   "inhaler, Inhale 1 puff Daily., Disp: , Rfl:   •  vitamin E 400 UNIT capsule, Take 400 Units by mouth Daily., Disp: , Rfl:   •  omeprazole (priLOSEC) 40 MG capsule, Take 40 mg by mouth 2 (two) times a day., Disp: , Rfl:   •  rizatriptan MLT (MAXALT-MLT) 10 MG disintegrating tablet, Place 10 mg on the tongue 1 (One) Time As Needed for Migraine. May repeat in 2 hours if needed, Disp: , Rfl:       Objective     Vital Signs  Vitals:    11/03/20 1307   Weight: 59 kg (130 lb)   Height: 157.5 cm (62\")       Physical Exam:    General Appearance:    Awake and alert, in no acute distress   Head:    Normocephalic, without obvious abnormality   Eyes:          Conjunctivae normal, anicteric sclera   Throat:   No oral lesions, no thrush, oral mucosa moist   Neck:   No adenopathy, supple, no JVD   Lungs:     respirations regular, even and unlabored   Abdomen:     Soft, non-tender, no rebound or guarding, non-distended, no hepatosplenomegaly   Rectal:     Deferred   Extremities:   No edema, no cyanosis   Skin:   No bruising or rash, no jaundice        Results Review:    Lab Results (last 24 hours)     ** No results found for the last 24 hours. **          Imaging Results (Last 24 Hours)     ** No results found for the last 24 hours. **            Assessment/Plan   Proceed with scope and MAC anesthesia        Aguila Garcia MD  11/11/20  07:54 EST  "

## 2020-11-11 NOTE — DISCHARGE INSTRUCTIONS
A responsible adult should stay with you and you should rest quietly for the rest of the day.    Do not drink alcohol, drive, operate any heavy machinery or power tools or make any legal/important decisions for the next 24 hours.     Progress your diet as tolerated.  If you begin to experience severe pain, increased shortness of breath, racing heartbeat or a fever above 101 F, seek immediate medical attention.     Follow up with MD as instructed. Call office for results in 3 to 5 days if needed.    Impression:  Raudel-en-Y gastric bypass anatomy  Gastritis with a few streaks of old heme  Superficial erosions in the sigmoid colon status post biopsies     Recommendations:  Follow-up histopathology  Treat for postsurgical gastroparesis with gastroparesis diet  No recall for colonoscopy due to patient's age  Okay for antidiarrheals  Follow-up in the office

## 2020-11-11 NOTE — OP NOTE
ESOPHAGOGASTRODUODENOSCOPY, COLONOSCOPY Procedure Report    Patient Name:  Ashley Lin  YOB: 1950    Date of Surgery:  11/11/2020     Pre-Op Diagnosis:  Nausea & vomiting [R11.2]  Diarrhea [R19.7]  Abdominal pain [R10.9]       Post-Op Diagnosis Codes:     * Nausea & vomiting [R11.2]     * Diarrhea [R19.7]     * Abdominal pain [R10.9]      Procedure/CPT® Codes:      Procedure(s):  ESOPHAGOGASTRODUODENOSCOPY WITH BIOPSY X 1 AREA  COLONOSCOPY WITH RANDOM COLON BIOPSIES X4 AREAS, BIOPSY X 1 AREA    Staff:  Surgeon(s):  Aguila Garcia MD         Anesthesia: Monitored Anesthesia Care    Implants:    Nothing was implanted during the procedure    Specimen:        See Below    No blood loss    Complications:  None     Description of Procedure:  Informed consent was obtained for the procedure, including sedation.  Risks of perforation, hemorrhage, adverse drug reaction and aspiration were discussed.  The patient was brought into the endoscopy suite. Continuous cardiopulmonary monitoring was performed. The patient was placed in the left lateral decubitus position.  The bite block was inserted into the patient's mouth. After adequate sedation was attained, the Olympus gastroscope was inserted into the patient's mouth and advanced to the second portion of the duodenum without difficulty.  Circumferential examination was performed. A retroflex exam was performed in the patient's stomach.  On completion of the exam, the bowel was decompressed, the scope was removed from the patient, the patient tolerated the procedure well, there were no immediate post-operative complications.     Examination of the esophagus showed normal mucosa  Examination of the stomach showed Raudel-en-Y gastric bypass anatomy with widely patent gastrojejunostomy without ulceration or stricture.  Gastric mucosa showed few streaks of old heme, biopsies were obtained from the gastric body and sent fresh to pathology evaluate for H.  pylori.  Retroflex examination of the stomach was normal   Examination of the jejunum showed normal mucosa    Subsequently, the colonoscopy was performed with the patient in the left lateral decubitus position. The digital rectal exam was performed which was normal.  Subsequently, the pediatric Olympus colonoscope was inserted into the patient's rectum and advanced to the level of the cecum and terminal ileum without difficulty.  The bowel prep was excellent.  Circumferential examination of the patient's colon was performed on scope withdrawal.  A retroflex exam was performed in the rectum which showed normal mucosa.  The bowel was decompressed, the scope was withdrawn from the patient, and the patient tolerated the procedure well. There were no immediate post-operative complications.     Findings:    Normal mucosa of the terminal ileum x10 cm  Normal mucosa in the cecum, ascending, and descending colon status post random colon biopsy to evaluate for microscopic colitis  A few scattered small subcentimeter erosions in the sigmoid colon status post biopsy.  Appearance suggested NSAID versus Moore 2 inhibitor related.  The remainder of the mucosa in the sigmoid colon appeared normal.  Crohn's disease is felt unlikely.        Impression:  Raudel-en-Y gastric bypass anatomy  Gastritis with a few streaks of old heme  Superficial erosions in the sigmoid colon status post biopsies    Recommendations:  Follow-up histopathology  Treat for postsurgical gastroparesis with gastroparesis diet  No recall for colonoscopy due to patient's age  Okay for antidiarrheals  Follow-up in the office      Aguila Garcia MD     Date: 11/11/2020  Time: 09:56 EST

## 2020-11-11 NOTE — ANESTHESIA PREPROCEDURE EVALUATION
Anesthesia Evaluation     Patient summary reviewed and Nursing notes reviewed   NPO Solid Status: > 8 hours  NPO Liquid Status: > 8 hours           Airway   Mallampati: II  TM distance: >3 FB  Neck ROM: full  No difficulty expected  Dental - normal exam     Pulmonary - normal exam    breath sounds clear to auscultation  (+) COPD, asthma,  Cardiovascular - negative cardio ROS and normal exam  Exercise tolerance: unable to assess    ECG reviewed  Rhythm: regular  Rate: normal        Neuro/Psych  (+) headaches, psychiatric history,     GI/Hepatic/Renal/Endo    (+)  GERD,      Musculoskeletal (-) negative ROS    Abdominal  - normal exam   Substance History - negative use     OB/GYN negative ob/gyn ROS         Other - negative ROS                       Anesthesia Plan    ASA 3     MAC     intravenous induction     Anesthetic plan, all risks, benefits, and alternatives have been provided, discussed and informed consent has been obtained with: patient.

## 2020-11-12 LAB
LAB AP CASE REPORT: NORMAL
LAB AP CLINICAL INFORMATION: NORMAL
LAB AP DIAGNOSIS COMMENT: NORMAL
PATH REPORT.FINAL DX SPEC: NORMAL
PATH REPORT.GROSS SPEC: NORMAL

## 2021-02-24 ENCOUNTER — OFFICE (AMBULATORY)
Dept: URBAN - METROPOLITAN AREA CLINIC 64 | Facility: CLINIC | Age: 71
End: 2021-02-24

## 2021-02-24 VITALS
HEIGHT: 62 IN | WEIGHT: 141 LBS | DIASTOLIC BLOOD PRESSURE: 71 MMHG | SYSTOLIC BLOOD PRESSURE: 110 MMHG | HEART RATE: 114 BPM

## 2021-02-24 DIAGNOSIS — R14.0 ABDOMINAL DISTENSION (GASEOUS): ICD-10-CM

## 2021-02-24 DIAGNOSIS — R11.2 NAUSEA WITH VOMITING, UNSPECIFIED: ICD-10-CM

## 2021-02-24 PROCEDURE — 99213 OFFICE O/P EST LOW 20 MIN: CPT | Performed by: INTERNAL MEDICINE

## 2021-02-24 RX ORDER — CIPROFLOXACIN 500 MG/1
1000 TABLET, FILM COATED ORAL
Qty: 20 | Refills: 0 | Status: COMPLETED
Start: 2021-02-24 | End: 2022-01-07

## 2021-10-25 ENCOUNTER — OFFICE VISIT (OUTPATIENT)
Dept: PAIN MEDICINE | Facility: CLINIC | Age: 71
End: 2021-10-25

## 2021-10-25 VITALS
WEIGHT: 140 LBS | HEIGHT: 62 IN | SYSTOLIC BLOOD PRESSURE: 137 MMHG | BODY MASS INDEX: 25.76 KG/M2 | DIASTOLIC BLOOD PRESSURE: 84 MMHG | OXYGEN SATURATION: 96 % | RESPIRATION RATE: 16 BRPM | HEART RATE: 90 BPM

## 2021-10-25 DIAGNOSIS — Z98.1 HISTORY OF LUMBAR FUSION: ICD-10-CM

## 2021-10-25 DIAGNOSIS — G43.009 MIGRAINE WITHOUT AURA AND WITHOUT STATUS MIGRAINOSUS, NOT INTRACTABLE: Primary | ICD-10-CM

## 2021-10-25 DIAGNOSIS — Z98.1 S/P CERVICAL SPINAL FUSION: ICD-10-CM

## 2021-10-25 PROCEDURE — 99204 OFFICE O/P NEW MOD 45 MIN: CPT | Performed by: STUDENT IN AN ORGANIZED HEALTH CARE EDUCATION/TRAINING PROGRAM

## 2021-10-25 RX ORDER — EPINEPHRINE 0.3 MG/.3ML
0.3 INJECTION SUBCUTANEOUS ONCE
COMMUNITY

## 2021-10-25 RX ORDER — PROMETHAZINE HCL 50 MG
50 TABLET ORAL EVERY 6 HOURS PRN
COMMUNITY
Start: 2021-10-20 | End: 2022-09-30 | Stop reason: HOSPADM

## 2021-10-25 RX ORDER — OXYBUTYNIN CHLORIDE 10 MG/1
10 TABLET, EXTENDED RELEASE ORAL DAILY
COMMUNITY
Start: 2021-08-18

## 2021-10-25 RX ORDER — MONTELUKAST SODIUM 4 MG/1
1 TABLET, CHEWABLE ORAL 2 TIMES DAILY
COMMUNITY
Start: 2021-10-20 | End: 2022-09-30 | Stop reason: HOSPADM

## 2021-10-25 RX ORDER — AMITRIPTYLINE HYDROCHLORIDE 100 MG/1
100 TABLET, FILM COATED ORAL
COMMUNITY
Start: 2021-09-20 | End: 2022-09-30 | Stop reason: HOSPADM

## 2021-10-25 RX ORDER — GLYCOPYRROLATE 2 MG/1
1 TABLET ORAL 2 TIMES DAILY
COMMUNITY
Start: 2021-10-15 | End: 2022-09-27

## 2021-10-25 NOTE — PROGRESS NOTES
CHIEF COMPLAINT  Migraines       Subjective   Ashley Lin is a 71 y.o. female who was referred by , Rogelio JACKSON MD  to our pain management clinic for consultation, evaluation and treatment of migraines and botox injections.     Sees [facility] surgeon for lower back. Had multiple epidurals in past without much.     PHQ-9- 6  SOAPP- 10    PMH:   COPD, syncope, hypoglycemia, hypothyroidism, IBS, asthma, dysphagia, bladder stimulator, cervical fusion C3-C6, history of lumbar fusion, hysterectomy, rotator cuff surgery, stomach surgery, gastroparesis.    Headaches began: Childhood   Headache Severity at Worst: 10/10   Headache Location: b/l occipital region radiating to frontal region.   Headache Frequency: 3-4 weeks (improved by botox) - last one done 10/15/2021  Headache Duration: several days  Headache onset: stress   Headache Character: poundin   Associated Symptoms of nausea, emesis, photophobia, phonophobia  Associated Autonomic Symptoms: no  Associated Visual Changes: yes  Alleviating Factors: botox, demerol shots  Additional Exacerbating Factors: stress  History of Significant Head Injury: none  Last Eyeglasses / Contacts Prescription Check: wears contact lenses (last year)  FH of Headache: mother had them  FH of Brain Tumor: no  FH of Brain Aneurysm: no  Three regular meals each day: yes  Sleep: 3-4 hrs   Caffeine Use: no  OTC NSAID Use:   Past Prescription Headache Medications: aimovig, topamax  Current Prescription Headache Medications: Amitriptyline, baclofen, oxcarbazepine, rizatriptan, Fioricet, Botox, promethazine    Gabapentin 800 mg BID      Current Outpatient Medications:   •  albuterol sulfate  (90 Base) MCG/ACT inhaler, Inhale 2 puffs Every 4 (Four) Hours As Needed for Wheezing or Shortness of Air., Disp: , Rfl:   •  amitriptyline (ELAVIL) 75 MG tablet, Take 75 mg by mouth every night at bedtime., Disp: , Rfl:   •  atorvastatin (LIPITOR) 20 MG tablet, Take 20 mg by mouth  Daily., Disp: , Rfl:   •  azelastine (OPTIVAR) 0.05 % ophthalmic solution, Administer 1 drop to both eyes 2 (Two) Times a Day., Disp: , Rfl:   •  baclofen (LIORESAL) 10 MG tablet, Take 10 mg by mouth Every 6 (Six) Hours As Needed for Muscle Spasms., Disp: , Rfl:   •  budesonide (PULMICORT) 180 MCG/ACT inhaler, Inhale 2 puffs 2 (Two) Times a Day., Disp: , Rfl:   •  busPIRone (BUSPAR) 15 MG tablet, Take 15 mg by mouth 3 (Three) Times a Day., Disp: , Rfl:   •  butalbital-acetaminophen-caffeine (FIORICET, ESGIC) -40 MG per tablet, Take 1-2 tablets by mouth Every 4 (Four) Hours As Needed for Headache., Disp: , Rfl:   •  colestipol (COLESTID) 1 g tablet, , Disp: , Rfl:   •  cycloSPORINE (RESTASIS) 0.05 % ophthalmic emulsion, Administer 1 drop to both eyes 2 (Two) Times a Day., Disp: , Rfl:   •  dicyclomine (BENTYL) 20 MG tablet, Take 10 mg by mouth 4 (Four) Times a Day., Disp: , Rfl:   •  doxycycline (VIBRAMYCIN) 100 MG capsule, Take 100 mg by mouth 2 (Two) Times a Day., Disp: , Rfl:   •  DULoxetine (CYMBALTA) 60 MG capsule, Take 60 mg by mouth Daily., Disp: , Rfl:   •  EPINEPHrine (EpiPen 2-Mohit) 0.3 MG/0.3ML solution auto-injector injection, ., Disp: , Rfl:   •  estradiol (ESTRACE) 1 MG tablet, Take 1.5 mg by mouth Daily., Disp: , Rfl:   •  fluticasone (FLONASE) 50 MCG/ACT nasal spray, 2 sprays into the nostril(s) as directed by provider Daily., Disp: , Rfl:   •  fluticasone-salmeterol (ADVAIR HFA) 230-21 MCG/ACT inhaler, Inhale 2 puffs 2 (Two) Times a Day., Disp: , Rfl:   •  gabapentin (NEURONTIN) 800 MG tablet, Take 1 tablet by mouth 2 (Two) Times a Day. (Patient taking differently: Take 800 mg by mouth 3 (Three) Times a Day.), Disp: 30 tablet, Rfl: 0  •  glycopyrrolate (ROBINUL) 2 MG tablet, Take 1 tablet by mouth 2 (Two) Times a Day., Disp: , Rfl:   •  levothyroxine (SYNTHROID, LEVOTHROID) 25 MCG tablet, Take 25 mcg by mouth Daily., Disp: , Rfl:   •  lisinopril (PRINIVIL,ZESTRIL) 10 MG tablet, Take 10 mg by  mouth Daily., Disp: , Rfl:   •  metoprolol tartrate (LOPRESSOR) 50 MG tablet, Take 50 mg by mouth 2 (Two) Times a Day., Disp: , Rfl:   •  montelukast (SINGULAIR) 10 MG tablet, Take 10 mg by mouth Every Night., Disp: , Rfl:   •  omeprazole (priLOSEC) 40 MG capsule, Take 40 mg by mouth 2 (two) times a day., Disp: , Rfl:   •  OXcarbazepine (TRILEPTAL) 300 MG tablet, Take 150 mg by mouth 2 (Two) Times a Day., Disp: , Rfl:   •  oxybutynin XL (DITROPAN-XL) 10 MG 24 hr tablet, , Disp: , Rfl:   •  oxyCODONE-acetaminophen (PERCOCET)  MG per tablet, Take 1 tablet by mouth Every 6 (Six) Hours As Needed for Moderate Pain  or Severe Pain ., Disp: 10 tablet, Rfl: 0  •  promethazine (PHENERGAN) 50 MG tablet, , Disp: , Rfl:   •  propranolol (INDERAL) 40 MG tablet, Take 40 mg by mouth 2 (Two) Times a Day., Disp: , Rfl:   •  rizatriptan MLT (MAXALT-MLT) 10 MG disintegrating tablet, Place 10 mg on the tongue 1 (One) Time As Needed for Migraine. May repeat in 2 hours if needed, Disp: , Rfl:   •  sucralfate (CARAFATE) 1 g tablet, Take 1 g by mouth 4 (Four) Times a Day., Disp: , Rfl:   •  topiramate (TOPAMAX) 100 MG tablet, Take 100 mg by mouth 4 (Four) Times a Day., Disp: , Rfl:   •  umeclidinium-vilanterol (Anoro Ellipta) 62.5-25 MCG/INH aerosol powder  inhaler, Inhale 1 puff Daily., Disp: , Rfl:   •  vitamin D (ERGOCALCIFEROL) 1.25 MG (99922 UT) capsule capsule, Take 50,000 Units by mouth 1 (One) Time Per Week., Disp: , Rfl:   •  vitamin E 400 UNIT capsule, Take 400 Units by mouth Daily., Disp: , Rfl:   •  [START ON 1/21/2022] OnabotulinumtoxinA 200 units reconstituted solution, Inject 155 Units into the appropriate muscle as directed by prescriber 1 (One) Time for 1 dose., Disp: 1 each, Rfl: 0    The following portions of the patient's history were reviewed and updated as appropriate: allergies, current medications, past family history, past medical history, past social history, past surgical history, and problem  list.      REVIEW OF PERTINENT MEDICAL DATA    Past Medical History:   Diagnosis Date   • Asthma    • Chronic back pain    • Chronic headaches    • Chronic obstructive pulmonary disease (HCC) 7/1/2020   • Chronic pain syndrome 7/1/2020   • Depression 7/1/2020   • Disease of thyroid gland    • Estrogen deficiency 7/1/2020   • GERD without esophagitis 7/1/2020   • Hypothyroidism (acquired) 7/1/2020   • Migraines 7/1/2020   • Neuropathy    • Vomiting 06/30/2020     Past Surgical History:   Procedure Laterality Date   • CHOLECYSTECTOMY     • COLONOSCOPY N/A 11/11/2020    Procedure: COLONOSCOPY WITH RANDOM COLON BIOPSIES X4 AREAS, BIOPSY X 1 AREA;  Surgeon: Aguila Garcia MD;  Location: [facility] ENDOSCOPY;  Service: Gastroenterology;  Laterality: N/A;  POST- colon erosion   • ENDOSCOPY N/A 7/2/2020    Procedure: ESOPHAGOGASTRODUODENOSCOPY WITH DILATATION (BOUGIE #54);  Surgeon: Benjamin Mike MD;  Location: [facility] ENDOSCOPY;  Service: Gastroenterology;  Laterality: N/A;  Post operative diagnosis:GASTROPARESIS AND DYSPHAGIA    • ENDOSCOPY N/A 11/11/2020    Procedure: ESOPHAGOGASTRODUODENOSCOPY WITH BIOPSY X 1 AREA;  Surgeon: Aguila Garcia MD;  Location: [facility] ENDOSCOPY;  Service: Gastroenterology;  Laterality: N/A;  POST   • HYSTERECTOMY     • NECK SURGERY     • SPINE SURGERY  06/09/2020     Family History   Problem Relation Age of Onset   • Heart disease Mother    • No Known Problems Father      Social History     Socioeconomic History   • Marital status:    Tobacco Use   • Smoking status: Former Smoker     Quit date: 2006     Years since quitting: 15.8   • Smokeless tobacco: Never Used   Vaping Use   • Vaping Use: Never used   Substance and Sexual Activity   • Alcohol use: Never   • Drug use: Never   • Sexual activity: Defer         Review of Systems   Musculoskeletal: Positive for arthralgias, back pain and neck pain.         Vitals:    10/25/21 1030   BP: 137/84   Pulse: 90   Resp: 16   SpO2:  "96%   Weight: 63.5 kg (140 lb)   Height: 157.5 cm (62\")   PainSc:   7         Objective   Physical Exam  HENT:      Head:             Imaging Reviewed:  MRI Brain - 2/2019    Unremarkable MRI of the brain.    Assessment:    1. Migraine without aura and without status migrainosus, not intractable    2. S/P cervical spinal fusion    3. History of lumbar fusion         Plan:     1.Patient experiences migraine headache more than fifteen times per month, and averages 3-4 per week, lasting more than four hours per day. She has tried and failed NSAID therapy, at least two triptans, and the usual prophylactic preventative therapies including beta blocker, TCA, and Topamax. They are a good candidate for Botox therapy. Discussed with the patient regarding the etiology of their pain. Informed them that they would likely benefit from botox injections for chronic migraine. The procedure was described in detail and the risks, benefits and alternatives were discussed with the patient (including but not limited to: trouble swallowing for several months after treatment; muscle weakness near where the medicine was injected, bruising, bleeding, pain, redness, or swelling where the injection was given;headache, muscle stiffness, neck pain) who agreed to proceed.       RTC for injection (1/20/2022) and then 3 week follow up.     Zeke Dhaliwal DO  Pain Management   [facility]         INSPECT REPORT    As part of the patient's treatment plan, I may be prescribing controlled substances. The patient has been made aware of appropriate use of such medications, including potential risk of somnolence, limited ability to drive and/or work safely, and the potential for dependence or overdose. It has also been made clear that these medications are for use by this patient only, without concomitant use of alcohol or other substances unless prescribed.     Patient has completed prescribing agreement detailing terms of continued prescribing of " controlled substances, including monitoring INSPECT reports, urine drug screening, and pill counts if necessary. The patient is aware that inappropriate use will results in cessation of prescribing such medications.    INSPECT report has been reviewed and scanned into the patient's chart.      EMR Dragon/Transcription Disclaimer:   Much of this encounter note is an electronic transcription/translation of spoken language to printed text. The electronic translation of spoken language may permit erroneous, or at times, nonsensical words or phrases to be inadvertently transcribed; Although I have reviewed the note for such errors, some may still exist.

## 2021-10-28 ENCOUNTER — TELEPHONE (OUTPATIENT)
Dept: PAIN MEDICINE | Facility: CLINIC | Age: 71
End: 2021-10-28

## 2021-10-29 ENCOUNTER — PATIENT ROUNDING (BHMG ONLY) (OUTPATIENT)
Dept: PAIN MEDICINE | Facility: CLINIC | Age: 71
End: 2021-10-29

## 2021-10-29 NOTE — PROGRESS NOTES
October 29, 2021    Hello, may I speak with Ashley Lin?    My name is Olivia Luevano Office     I am  with [facility]  [address] [place] [address].    Before we get started may I verify your date of birth? 1950    I am calling to officially welcome you to our practice and ask about your recent visit. Is this a good time to talk? No- voicemail not set up

## 2021-11-16 ENCOUNTER — TELEPHONE (OUTPATIENT)
Dept: PAIN MEDICINE | Facility: CLINIC | Age: 71
End: 2021-11-16

## 2021-11-16 NOTE — TELEPHONE ENCOUNTER
Provider: SYLVIA  Caller: JAMIA  Phone Number: [phone]  Reason for Call: PER HUB WORK FLOW ATTEMPTED TO WARM TRANSFER PT WAS RETURNING A CALL FROM DESHAWN

## 2022-01-07 ENCOUNTER — OFFICE (AMBULATORY)
Dept: URBAN - METROPOLITAN AREA CLINIC 64 | Facility: CLINIC | Age: 72
End: 2022-01-07

## 2022-01-07 VITALS
SYSTOLIC BLOOD PRESSURE: 121 MMHG | HEIGHT: 62 IN | WEIGHT: 148 LBS | DIASTOLIC BLOOD PRESSURE: 83 MMHG | HEART RATE: 100 BPM

## 2022-01-07 DIAGNOSIS — R14.0 ABDOMINAL DISTENSION (GASEOUS): ICD-10-CM

## 2022-01-07 DIAGNOSIS — R11.2 NAUSEA WITH VOMITING, UNSPECIFIED: ICD-10-CM

## 2022-01-07 DIAGNOSIS — R10.84 GENERALIZED ABDOMINAL PAIN: ICD-10-CM

## 2022-01-07 PROCEDURE — 99214 OFFICE O/P EST MOD 30 MIN: CPT | Performed by: INTERNAL MEDICINE

## 2022-01-07 RX ORDER — SUCRALFATE 1 G/1
TABLET ORAL
Qty: 180 | Refills: 5 | Status: COMPLETED
End: 2023-10-27

## 2022-01-07 RX ORDER — AZITHROMYCIN 250 MG/1
TABLET, FILM COATED ORAL
Qty: 6 | Refills: 1 | Status: ACTIVE
Start: 2022-01-07 | End: 2022-02-25

## 2022-01-07 RX ORDER — METOCLOPRAMIDE 10 MG/1
TABLET ORAL
Qty: 360 | Refills: 5 | Status: COMPLETED
End: 2023-10-27

## 2022-01-07 RX ORDER — LEVOTHYROXINE SODIUM 50 UG/1
50 CAPSULE ORAL
Qty: 90 | Refills: 5 | Status: ACTIVE

## 2022-01-07 RX ORDER — AMITRIPTYLINE HYDROCHLORIDE 75 MG/1
75 TABLET, FILM COATED ORAL
Qty: 90 | Refills: 5 | Status: COMPLETED
End: 2023-10-27

## 2022-01-07 RX ORDER — COLESTIPOL HYDROCHLORIDE 1 G/1
TABLET, FILM COATED ORAL
Qty: 180 | Refills: 11 | Status: COMPLETED
Start: 2020-10-06 | End: 2023-10-27

## 2022-01-07 RX ORDER — ELUXADOLINE 100 MG/1
200 TABLET, FILM COATED ORAL
Qty: 90 | Refills: 3 | Status: ACTIVE
Start: 2022-01-07

## 2022-01-19 ENCOUNTER — TELEPHONE (OUTPATIENT)
Dept: PAIN MEDICINE | Facility: CLINIC | Age: 72
End: 2022-01-19

## 2022-01-19 NOTE — TELEPHONE ENCOUNTER
Caller: Ashley Lin    Relationship to patient: Self    Best call back number:[phone]    Patient is needing: PATIENT IS SCHEDULED FOR AN INJECTION APPT TOMORROW AT THE HOSPITAL, SHE SAID SHE WILL NOT BE ABLE TO MAKE IT IF THE WEATHER GETS BAD AND ASKING WHO SHE SHOULD CALL IF SHE NEEDS TO RESCHEDULE, THE HOSPITAL OR THE OFFICE

## 2022-01-20 ENCOUNTER — HOSPITAL ENCOUNTER (OUTPATIENT)
Dept: PAIN MEDICINE | Facility: HOSPITAL | Age: 72
Discharge: HOME | End: 2022-01-20

## 2022-01-20 VITALS
WEIGHT: 135 LBS | HEART RATE: 103 BPM | BODY MASS INDEX: 24.84 KG/M2 | TEMPERATURE: 97.1 F | SYSTOLIC BLOOD PRESSURE: 182 MMHG | DIASTOLIC BLOOD PRESSURE: 81 MMHG | OXYGEN SATURATION: 96 % | RESPIRATION RATE: 16 BRPM | HEIGHT: 62 IN

## 2022-01-20 RX ORDER — ELUXADOLINE 100 MG/1
1 TABLET, FILM COATED ORAL 2 TIMES DAILY
COMMUNITY

## 2022-01-20 RX ORDER — CONJUGATED ESTROGENS 0.62 MG/G
CREAM VAGINAL
COMMUNITY
End: 2022-01-20

## 2022-01-20 RX ORDER — MELOXICAM 7.5 MG/1
TABLET ORAL
COMMUNITY
End: 2022-09-27

## 2022-01-20 RX ORDER — SODIUM CHLORIDE 9 MG/ML
INJECTION INTRAVENOUS
Status: DISCONTINUED
Start: 2022-01-20 | End: 2022-01-20 | Stop reason: WASHOUT

## 2022-01-20 NOTE — DISCHARGE INSTRUCTIONS
BOTOX INJECTION DISCHARGE INSTRUCTIONS  PAIN MANAGEMENT    1.) You may experience mild discomfort at the injection sites for several hours following treatment.      2.)  Ice may be used at the procedure site for the first 24 hours if desired but it is not necessary.      3.)  Avoid heat to the injection site for 24 hours      4.)  Light activity for 24 hours, then return to your normal activity      5.)  Continue your normal medications      6.)  You may experience some of the following mild reactions:   Neck Stiffness    Headache          Muscular Weakness   Injection site discomfort   Fatigue               Muscle Spasm   Eye Weakness    7.)  Pain Management Center hours are Monday-Friday 8:00 a.m.-4:00 p.m..  For any problems related          to your procedure, please call us at [phone].  After hours, call [phone] and the           answering service will contact the physician on call.

## 2022-01-21 ENCOUNTER — HOSPITAL ENCOUNTER (OUTPATIENT)
Dept: PAIN MEDICINE | Facility: HOSPITAL | Age: 72
Discharge: HOME | End: 2022-01-21
Admitting: STUDENT IN AN ORGANIZED HEALTH CARE EDUCATION/TRAINING PROGRAM

## 2022-01-21 VITALS
WEIGHT: 135 LBS | TEMPERATURE: 97.5 F | DIASTOLIC BLOOD PRESSURE: 77 MMHG | SYSTOLIC BLOOD PRESSURE: 145 MMHG | RESPIRATION RATE: 16 BRPM | BODY MASS INDEX: 24.84 KG/M2 | HEIGHT: 62 IN | OXYGEN SATURATION: 96 % | HEART RATE: 110 BPM

## 2022-01-21 DIAGNOSIS — G43.009 MIGRAINE WITHOUT AURA AND WITHOUT STATUS MIGRAINOSUS, NOT INTRACTABLE: Primary | Chronic | ICD-10-CM

## 2022-01-21 PROCEDURE — 64615 CHEMODENERV MUSC MIGRAINE: CPT | Performed by: STUDENT IN AN ORGANIZED HEALTH CARE EDUCATION/TRAINING PROGRAM

## 2022-01-21 RX ORDER — ESOMEPRAZOLE MAGNESIUM 40 MG/1
CAPSULE, DELAYED RELEASE ORAL
COMMUNITY
End: 2022-09-27

## 2022-01-21 RX ORDER — METOCLOPRAMIDE 10 MG/1
TABLET ORAL
COMMUNITY
Start: 2022-01-07 | End: 2022-09-27

## 2022-01-21 RX ORDER — SODIUM CHLORIDE 9 MG/ML
10 INJECTION INTRAVENOUS AS NEEDED
Status: DISCONTINUED | OUTPATIENT
Start: 2022-01-21 | End: 2022-01-22 | Stop reason: HOSPADM

## 2022-01-21 RX ORDER — TIOTROPIUM BROMIDE AND OLODATEROL 3.124; 2.736 UG/1; UG/1
2 SPRAY, METERED RESPIRATORY (INHALATION)
COMMUNITY
Start: 2022-01-18

## 2022-01-21 RX ORDER — SODIUM CHLORIDE 9 MG/ML
INJECTION INTRAVENOUS
Status: DISPENSED
Start: 2022-01-21 | End: 2022-01-22

## 2022-01-21 RX ADMIN — SODIUM CHLORIDE 10 ML: 9 INJECTION, SOLUTION INTRAMUSCULAR; INTRAVENOUS; SUBCUTANEOUS at 15:26

## 2022-01-21 NOTE — H&P
H and P reviewed from previous visit and no changes to patient's clinical presentation. Will proceed with procedure as planned. Patient denies history of DM and being on blood thinners.    Zeke Dhaliwal DO  Pain Management   [facility]

## 2022-01-21 NOTE — PROCEDURES
Procedure Performed: Botox Injection for Migarine    Preoperative Diagnosis: MIGRAINE CHRONIC     Complications: None    Procedure in Detail:   The patient's chart was reviewed.  After obtaining written informed consent, the patient was placed in the supine position.  The area was prepped and draped in the usual sterile fashion using antiseptic solution.      The following points were identified:     10 units divided in 2 sites  Procerus 5 units in one site  Frontalis 20 units divided in 4 sites  Temporalis 40 units divided in 8 sites  Occipitalis 30 units divided in 6 sites  Cervical Paraspinal muscle group 20 units divided in 4 sites  Trapezius 30 units divided in 6 sites    Then, a 25 gauge 1 1/2 inch needle was inserted into each point and onabotulinum toxin A was injected in each point for a total of 155 units.      The patient tolerated the procedure well.  The needle was flushed and withdrawn, and a Band-Aid was applied.      Disposition:   The patient was given written discharge instructions.       Zeke Dhaliwal DO  Pain Management   [facility]

## 2022-01-21 NOTE — DISCHARGE INSTRUCTIONS
BOTOX INJECTION DISCHARGE INSTRUCTIONS  PAIN MANAGEMENT    1.) You may experience mild discomfort at the injection sites for several hours following treatment.      2.)  Ice may be used at the procedure site for the first 24 hours if desired but it is not necessary.      3.)  Avoid heat to the injection site for 24 hours      4.)  Light activity for 24 hours, then return to your normal activity      5.)  Continue your normal medications      6.)  You may experience some of the following mild reactions:   Neck Stiffness    Headache          Muscular Weakness   Injection site discomfort   Fatigue               Muscle Spasm   Eye Weakness    7.)  Pain Management Center hours are Monday-Friday 8:00 a.m.-4:00 p.m..  For any problems related          to your procedure, please call us at [phone].  After hours, call [phone] and the           answering service will contact the physician on call.

## 2022-01-24 ENCOUNTER — TELEPHONE (OUTPATIENT)
Dept: PAIN MEDICINE | Facility: HOSPITAL | Age: 72
End: 2022-01-24

## 2022-01-28 ENCOUNTER — ON CAMPUS - OUTPATIENT (AMBULATORY)
Dept: URBAN - METROPOLITAN AREA HOSPITAL 2 | Facility: HOSPITAL | Age: 72
End: 2022-01-28

## 2022-01-28 VITALS
DIASTOLIC BLOOD PRESSURE: 72 MMHG | HEART RATE: 102 BPM | SYSTOLIC BLOOD PRESSURE: 155 MMHG | DIASTOLIC BLOOD PRESSURE: 93 MMHG | OXYGEN SATURATION: 97 % | DIASTOLIC BLOOD PRESSURE: 82 MMHG | SYSTOLIC BLOOD PRESSURE: 129 MMHG | RESPIRATION RATE: 16 BRPM | OXYGEN SATURATION: 96 % | WEIGHT: 145 LBS | DIASTOLIC BLOOD PRESSURE: 67 MMHG | DIASTOLIC BLOOD PRESSURE: 89 MMHG | SYSTOLIC BLOOD PRESSURE: 131 MMHG | SYSTOLIC BLOOD PRESSURE: 116 MMHG | SYSTOLIC BLOOD PRESSURE: 113 MMHG | HEART RATE: 101 BPM | HEART RATE: 109 BPM | TEMPERATURE: 97.7 F | HEIGHT: 62 IN | OXYGEN SATURATION: 95 % | HEART RATE: 108 BPM | RESPIRATION RATE: 18 BRPM | OXYGEN SATURATION: 94 % | HEART RATE: 116 BPM

## 2022-01-28 DIAGNOSIS — K44.9 DIAPHRAGMATIC HERNIA WITHOUT OBSTRUCTION OR GANGRENE: ICD-10-CM

## 2022-01-28 DIAGNOSIS — R11.2 NAUSEA WITH VOMITING, UNSPECIFIED: ICD-10-CM

## 2022-01-28 DIAGNOSIS — K31.84 GASTROPARESIS: ICD-10-CM

## 2022-01-28 DIAGNOSIS — Z98.890 OTHER SPECIFIED POSTPROCEDURAL STATES: ICD-10-CM

## 2022-01-28 PROBLEM — Z48.815 ENCNTR FOR SURGICAL AFTCR FOLLOWING SURGERY ON THE DGSTV SYS: Status: ACTIVE | Noted: 2022-01-28

## 2022-01-28 PROCEDURE — 43450 DILATE ESOPHAGUS 1/MULT PASS: CPT | Performed by: INTERNAL MEDICINE

## 2022-01-28 PROCEDURE — 43235 EGD DIAGNOSTIC BRUSH WASH: CPT | Performed by: INTERNAL MEDICINE

## 2022-01-28 RX ORDER — METOCLOPRAMIDE 10 MG/1
TABLET ORAL
Qty: 360 | Refills: 5 | Status: COMPLETED
End: 2023-10-27

## 2022-02-03 NOTE — PROGRESS NOTES
Subjective   Ashley Lin is a 72 y.o. female is here for follow up for migraines. Patient was last seen on 1/21/2022 for botox injection with excellent relief. No headaches since the botox injection.     On last visit:     Previous Injection:   1/21/22- Botox - excellent relief with botox.     Hx: referred by , Rogelio JACKSON MD  to our pain management clinic for consultation, evaluation and treatment of migraines and botox injections.  Sees [facility] spine surgeon for lower back. Had multiple epidurals in past without much.      PHQ-9- 6  SOAPP- 10     PMH:   COPD, syncope, hypoglycemia, hypothyroidism, IBS, asthma, dysphagia, bladder stimulator, cervical fusion C3-C6, history of lumbar fusion, hysterectomy, rotator cuff surgery, stomach surgery, gastroparesis.     Headaches began: Childhood   Headache Severity at Worst: 10/10   Headache Location: b/l occipital region radiating to frontal region.   Headache Frequency: 3-4 weeks (improved by botox) - last one done 10/15/2021  Headache Duration: several days  Headache onset: stress   Headache Character: poundin   Associated Symptoms of nausea, emesis, photophobia, phonophobia  Associated Autonomic Symptoms: no  Associated Visual Changes: yes  Alleviating Factors: botox, demerol shots  Additional Exacerbating Factors: stress  History of Significant Head Injury: none  Last Eyeglasses / Contacts Prescription Check: wears contact lenses (last year)  FH of Headache: mother had them  FH of Brain Tumor: no  FH of Brain Aneurysm: no  Three regular meals each day: yes  Sleep: 3-4 hrs   Caffeine Use: no  OTC NSAID Use:   Past Prescription Headache Medications: aimovig, topamax  Current Prescription Headache Medications: Amitriptyline, baclofen, oxcarbazepine, rizatriptan, Fioricet, Botox, promethazine     Gabapentin 800 mg BID        Current Outpatient Medications:   •  albuterol sulfate  (90 Base) MCG/ACT inhaler, Inhale 2 puffs Every 4 (Four) Hours As  Needed for Wheezing or Shortness of Air., Disp: , Rfl:   •  amitriptyline (ELAVIL) 75 MG tablet, Take 75 mg by mouth every night at bedtime., Disp: , Rfl:   •  atorvastatin (LIPITOR) 20 MG tablet, Take 20 mg by mouth Daily., Disp: , Rfl:   •  azelastine (OPTIVAR) 0.05 % ophthalmic solution, Administer 1 drop to both eyes 2 (Two) Times a Day., Disp: , Rfl:   •  baclofen (LIORESAL) 10 MG tablet, Take 10 mg by mouth Every 6 (Six) Hours As Needed for Muscle Spasms., Disp: , Rfl:   •  budesonide (PULMICORT) 180 MCG/ACT inhaler, Inhale 2 puffs 2 (Two) Times a Day., Disp: , Rfl:   •  butalbital-acetaminophen-caffeine (FIORICET, ESGIC) -40 MG per tablet, Take 1-2 tablets by mouth Every 4 (Four) Hours As Needed for Headache., Disp: , Rfl:   •  colestipol (COLESTID) 1 g tablet, , Disp: , Rfl:   •  cycloSPORINE (RESTASIS) 0.05 % ophthalmic emulsion, Administer 1 drop to both eyes 2 (Two) Times a Day., Disp: , Rfl:   •  dicyclomine (BENTYL) 20 MG tablet, Take 10 mg by mouth 4 (Four) Times a Day., Disp: , Rfl:   •  doxycycline (VIBRAMYCIN) 100 MG capsule, Take 100 mg by mouth 2 (Two) Times a Day., Disp: , Rfl:   •  DULoxetine (CYMBALTA) 60 MG capsule, Take 60 mg by mouth Daily., Disp: , Rfl:   •  Eluxadoline (Viberzi) 100 MG tablet, Viberzi 100 mg tablet  TAKE 1 TABLET BY MOUTH TWICE A DAY, Disp: , Rfl:   •  EPINEPHrine (EpiPen 2-Mohit) 0.3 MG/0.3ML solution auto-injector injection, ., Disp: , Rfl:   •  esomeprazole (nexIUM) 40 MG capsule, esomeprazole magnesium 40 mg capsule,delayed release, Disp: , Rfl:   •  estradiol (ESTRACE) 1 MG tablet, Take 1.5 mg by mouth Daily., Disp: , Rfl:   •  fluticasone-salmeterol (ADVAIR HFA) 230-21 MCG/ACT inhaler, Inhale 2 puffs 2 (Two) Times a Day., Disp: , Rfl:   •  gabapentin (NEURONTIN) 800 MG tablet, Take 1 tablet by mouth 2 (Two) Times a Day. (Patient taking differently: Take 800 mg by mouth 3 (Three) Times a Day.), Disp: 30 tablet, Rfl: 0  •  glycopyrrolate (ROBINUL) 2 MG tablet, Take  1 tablet by mouth 2 (Two) Times a Day., Disp: , Rfl:   •  levothyroxine (SYNTHROID, LEVOTHROID) 25 MCG tablet, Take 25 mcg by mouth Daily., Disp: , Rfl:   •  lisinopril (PRINIVIL,ZESTRIL) 10 MG tablet, Take 10 mg by mouth Daily., Disp: , Rfl:   •  meloxicam (MOBIC) 7.5 MG tablet, meloxicam 7.5 mg tablet, Disp: , Rfl:   •  metoclopramide (REGLAN) 10 MG tablet, TAKE ONE TABLET BY MOUTH TWICE DAILY (INCREASED DOSE), Disp: , Rfl:   •  metoprolol tartrate (LOPRESSOR) 50 MG tablet, Take 50 mg by mouth 2 (Two) Times a Day., Disp: , Rfl:   •  montelukast (SINGULAIR) 10 MG tablet, Take 10 mg by mouth Every Night., Disp: , Rfl:   •  OXcarbazepine (TRILEPTAL) 300 MG tablet, Take 150 mg by mouth 2 (Two) Times a Day., Disp: , Rfl:   •  oxybutynin XL (DITROPAN-XL) 10 MG 24 hr tablet, , Disp: , Rfl:   •  oxyCODONE-acetaminophen (PERCOCET)  MG per tablet, Take 1 tablet by mouth Every 6 (Six) Hours As Needed for Moderate Pain  or Severe Pain ., Disp: 10 tablet, Rfl: 0  •  promethazine (PHENERGAN) 50 MG tablet, , Disp: , Rfl:   •  rizatriptan MLT (MAXALT-MLT) 10 MG disintegrating tablet, Place 10 mg on the tongue 1 (One) Time As Needed for Migraine. May repeat in 2 hours if needed, Disp: , Rfl:   •  Stiolto Respimat 2.5-2.5 MCG/ACT aerosol solution inhaler, , Disp: , Rfl:   •  sucralfate (CARAFATE) 1 g tablet, Take 1 g by mouth 4 (Four) Times a Day., Disp: , Rfl:   •  umeclidinium-vilanterol (Anoro Ellipta) 62.5-25 MCG/INH aerosol powder  inhaler, Inhale 1 puff Daily., Disp: , Rfl:   •  vitamin D (ERGOCALCIFEROL) 1.25 MG (70572 UT) capsule capsule, Take 50,000 Units by mouth 1 (One) Time Per Week., Disp: , Rfl:   •  vitamin E 400 UNIT capsule, Take 400 Units by mouth Daily., Disp: , Rfl:   •  busPIRone (BUSPAR) 15 MG tablet, Take 15 mg by mouth 3 (Three) Times a Day., Disp: , Rfl:   •  fluticasone (FLONASE) 50 MCG/ACT nasal spray, 2 sprays into the nostril(s) as directed by provider Daily., Disp: , Rfl:   •  propranolol  (INDERAL) 40 MG tablet, Take 40 mg by mouth 2 (Two) Times a Day., Disp: , Rfl:   •  topiramate (TOPAMAX) 100 MG tablet, Take 100 mg by mouth 4 (Four) Times a Day., Disp: , Rfl:     The following portions of the patient's history were reviewed and updated as appropriate: allergies, current medications, past family history, past medical history, past social history, past surgical history, and problem list.      REVIEW OF PERTINENT MEDICAL DATA    Past Medical History:   Diagnosis Date   • Asthma    • Chronic back pain    • Chronic headaches    • Chronic obstructive pulmonary disease (HCC) 7/1/2020   • Chronic pain syndrome 7/1/2020   • Depression 7/1/2020   • Disease of thyroid gland    • Estrogen deficiency 7/1/2020   • GERD without esophagitis 7/1/2020   • Hypothyroidism (acquired) 7/1/2020   • Migraines 7/1/2020   • Neuropathy    • Vomiting 06/30/2020     Past Surgical History:   Procedure Laterality Date   • CHOLECYSTECTOMY     • COLONOSCOPY N/A 11/11/2020    Procedure: COLONOSCOPY WITH RANDOM COLON BIOPSIES X4 AREAS, BIOPSY X 1 AREA;  Surgeon: Aguila Garcia MD;  Location: [facility] ENDOSCOPY;  Service: Gastroenterology;  Laterality: N/A;  POST- colon erosion   • ENDOSCOPY N/A 7/2/2020    Procedure: ESOPHAGOGASTRODUODENOSCOPY WITH DILATATION (BOUGIE #54);  Surgeon: Benjamin Mike MD;  Location: [facility] ENDOSCOPY;  Service: Gastroenterology;  Laterality: N/A;  Post operative diagnosis:GASTROPARESIS AND DYSPHAGIA    • ENDOSCOPY N/A 11/11/2020    Procedure: ESOPHAGOGASTRODUODENOSCOPY WITH BIOPSY X 1 AREA;  Surgeon: Aguila Garcia MD;  Location: [facility] ENDOSCOPY;  Service: Gastroenterology;  Laterality: N/A;  POST   • HYSTERECTOMY     • NECK SURGERY     • SPINE SURGERY  06/09/2020     Family History   Problem Relation Age of Onset   • Heart disease Mother    • No Known Problems Father      Social History     Socioeconomic History   • Marital status:    Tobacco Use   • Smoking status: Former Smoker  "    Quit date: 2006     Years since quittin.1   • Smokeless tobacco: Never Used   Vaping Use   • Vaping Use: Never used   Substance and Sexual Activity   • Alcohol use: Never   • Drug use: Never   • Sexual activity: Defer         Review of Systems   Neurological: Positive for headaches.         Vitals:    22 1129   BP: 124/76   Pulse: 111   Resp: 16   SpO2: 95%   Weight: 61.2 kg (135 lb)   Height: 157.5 cm (62\")   PainSc:   5         Objective   Physical Exam  HENT:      Head:             Imaging Reviewed:      Assessment:    1. Migraine without aura and without status migrainosus, not intractable    2. S/P cervical spinal fusion    3. History of lumbar fusion            Plan:      1. Excellent relief from previous Botox injections patient experiences migraine headache more than fifteen times per month, and averages 3-4 per week, lasting more than four hours per day. She has tried and failed NSAID therapy, at least two triptans, and the usual prophylactic preventative therapies including beta blocker, TCA, and Topamax. They are a good candidate for Botox therapy. Discussed with the patient regarding the etiology of their pain. Informed them that they would likely benefit from botox injections for chronic migraine. The procedure was described in detail and the risks, benefits and alternatives were discussed with the patient (including but not limited to: trouble swallowing for several months after treatment; muscle weakness near where the medicine was injected, bruising, bleeding, pain, redness, or swelling where the injection was given;headache, muscle stiffness, neck pain) who agreed to proceed.   Will repeat every 3 months.        RTC for injection every 3 months.  Outpatient pharmacy order sent to 2022 for injection on 2022.     Zeke Dhaliwal DO  Pain Management   [facility]                  INSPECT REPORT    As part of the patient's treatment plan, I may be prescribing controlled " substances. The patient has been made aware of appropriate use of such medications, including potential risk of somnolence, limited ability to drive and/or work safely, and the potential for dependence or overdose. It has also been made clear that these medications are for use by this patient only, without concomitant use of alcohol or other substances unless prescribed.     Patient has completed prescribing agreement detailing terms of continued prescribing of controlled substances, including monitoring INSPECT reports, urine drug screening, and pill counts if necessary. The patient is aware that inappropriate use will results in cessation of prescribing such medications.    INSPECT report has been reviewed and scanned into the patient's chart.

## 2022-02-07 ENCOUNTER — OFFICE VISIT (OUTPATIENT)
Dept: PAIN MEDICINE | Facility: CLINIC | Age: 72
End: 2022-02-07

## 2022-02-07 ENCOUNTER — TELEPHONE (OUTPATIENT)
Dept: PAIN MEDICINE | Facility: CLINIC | Age: 72
End: 2022-02-07

## 2022-02-07 VITALS
WEIGHT: 135 LBS | HEART RATE: 111 BPM | BODY MASS INDEX: 24.84 KG/M2 | HEIGHT: 62 IN | SYSTOLIC BLOOD PRESSURE: 124 MMHG | RESPIRATION RATE: 16 BRPM | DIASTOLIC BLOOD PRESSURE: 76 MMHG | OXYGEN SATURATION: 95 %

## 2022-02-07 DIAGNOSIS — Z98.1 S/P CERVICAL SPINAL FUSION: ICD-10-CM

## 2022-02-07 DIAGNOSIS — G43.009 MIGRAINE WITHOUT AURA AND WITHOUT STATUS MIGRAINOSUS, NOT INTRACTABLE: Primary | ICD-10-CM

## 2022-02-07 DIAGNOSIS — Z98.1 HISTORY OF LUMBAR FUSION: ICD-10-CM

## 2022-02-07 PROCEDURE — 99213 OFFICE O/P EST LOW 20 MIN: CPT | Performed by: STUDENT IN AN ORGANIZED HEALTH CARE EDUCATION/TRAINING PROGRAM

## 2022-02-25 ENCOUNTER — OFFICE (AMBULATORY)
Dept: URBAN - METROPOLITAN AREA CLINIC 64 | Facility: CLINIC | Age: 72
End: 2022-02-25

## 2022-02-25 VITALS
HEART RATE: 94 BPM | SYSTOLIC BLOOD PRESSURE: 142 MMHG | WEIGHT: 145 LBS | HEIGHT: 62 IN | DIASTOLIC BLOOD PRESSURE: 92 MMHG

## 2022-02-25 DIAGNOSIS — K44.9 DIAPHRAGMATIC HERNIA WITHOUT OBSTRUCTION OR GANGRENE: ICD-10-CM

## 2022-02-25 DIAGNOSIS — R11.2 NAUSEA WITH VOMITING, UNSPECIFIED: ICD-10-CM

## 2022-02-25 DIAGNOSIS — K31.84 GASTROPARESIS: ICD-10-CM

## 2022-02-25 DIAGNOSIS — R19.7 DIARRHEA, UNSPECIFIED: ICD-10-CM

## 2022-02-25 DIAGNOSIS — R14.0 ABDOMINAL DISTENSION (GASEOUS): ICD-10-CM

## 2022-02-25 DIAGNOSIS — G47.00 INSOMNIA, UNSPECIFIED: ICD-10-CM

## 2022-02-25 DIAGNOSIS — R10.84 GENERALIZED ABDOMINAL PAIN: ICD-10-CM

## 2022-02-25 PROCEDURE — 99213 OFFICE O/P EST LOW 20 MIN: CPT | Performed by: INTERNAL MEDICINE

## 2022-02-25 RX ORDER — LEVOTHYROXINE SODIUM 50 UG/1
50 CAPSULE ORAL
Qty: 90 | Refills: 5 | Status: ACTIVE

## 2022-02-25 RX ORDER — AMITRIPTYLINE HYDROCHLORIDE 75 MG/1
75 TABLET, FILM COATED ORAL
Qty: 90 | Refills: 5 | Status: COMPLETED
End: 2023-10-27

## 2022-02-25 RX ORDER — SUCRALFATE 1 G/1
TABLET ORAL
Qty: 180 | Refills: 5 | Status: COMPLETED
End: 2023-10-27

## 2022-02-25 RX ORDER — ELUXADOLINE 100 MG/1
200 TABLET, FILM COATED ORAL
Qty: 90 | Refills: 3 | Status: ACTIVE
Start: 2022-01-07

## 2022-02-25 RX ORDER — COLESTIPOL HYDROCHLORIDE 1 G/1
TABLET, FILM COATED ORAL
Qty: 180 | Refills: 11 | Status: COMPLETED
Start: 2020-10-06 | End: 2023-10-27

## 2022-02-25 RX ORDER — METOCLOPRAMIDE 10 MG/1
TABLET ORAL
Qty: 360 | Refills: 5 | Status: COMPLETED
End: 2023-10-27

## 2022-02-25 RX ORDER — DOXYLAMINE SUCCINATE 25 MG
TABLET ORAL
Qty: 90 | Refills: 5 | Status: COMPLETED
Start: 2022-02-25 | End: 2023-10-27

## 2022-02-25 RX ORDER — ONDANSETRON 4 MG/1
16 TABLET, ORALLY DISINTEGRATING ORAL
Qty: 100 | Refills: 5 | Status: ACTIVE

## 2022-04-28 ENCOUNTER — APPOINTMENT (OUTPATIENT)
Dept: PAIN MEDICINE | Facility: HOSPITAL | Age: 72
End: 2022-04-28

## 2022-05-12 ENCOUNTER — HOSPITAL ENCOUNTER (OUTPATIENT)
Dept: PAIN MEDICINE | Facility: HOSPITAL | Age: 72
Discharge: HOME | End: 2022-05-12
Admitting: STUDENT IN AN ORGANIZED HEALTH CARE EDUCATION/TRAINING PROGRAM

## 2022-05-12 VITALS
SYSTOLIC BLOOD PRESSURE: 139 MMHG | BODY MASS INDEX: 24.84 KG/M2 | HEIGHT: 62 IN | HEART RATE: 109 BPM | RESPIRATION RATE: 16 BRPM | DIASTOLIC BLOOD PRESSURE: 82 MMHG | TEMPERATURE: 97.7 F | OXYGEN SATURATION: 94 % | WEIGHT: 135 LBS

## 2022-05-12 DIAGNOSIS — G43.009 MIGRAINE WITHOUT AURA AND WITHOUT STATUS MIGRAINOSUS, NOT INTRACTABLE: Primary | ICD-10-CM

## 2022-05-12 PROCEDURE — 64615 CHEMODENERV MUSC MIGRAINE: CPT | Performed by: STUDENT IN AN ORGANIZED HEALTH CARE EDUCATION/TRAINING PROGRAM

## 2022-05-12 PROCEDURE — 25010000002 ONABOTULINUMTOXINA 100 UNITS RECONSTITUTED SOLUTION: Performed by: STUDENT IN AN ORGANIZED HEALTH CARE EDUCATION/TRAINING PROGRAM

## 2022-05-12 RX ORDER — PREDNISOLONE ACETATE 10 MG/ML
SUSPENSION/ DROPS OPHTHALMIC
COMMUNITY
End: 2022-09-27

## 2022-05-12 RX ORDER — SODIUM CHLORIDE 9 MG/ML
INJECTION INTRAVENOUS
Status: COMPLETED
Start: 2022-05-12 | End: 2022-05-12

## 2022-05-12 RX ORDER — PROMETHAZINE HYDROCHLORIDE 25 MG/1
50 SUPPOSITORY RECTAL EVERY 6 HOURS PRN
COMMUNITY
End: 2022-09-30 | Stop reason: HOSPADM

## 2022-05-12 RX ORDER — ONABOTULINUMTOXINA 200 [USP'U]/1
INJECTION, POWDER, LYOPHILIZED, FOR SOLUTION INTRADERMAL; INTRAMUSCULAR
COMMUNITY
End: 2022-09-27

## 2022-05-12 RX ORDER — CETIRIZINE HYDROCHLORIDE 10 MG/1
10 TABLET ORAL DAILY
COMMUNITY

## 2022-05-12 RX ADMIN — ONABOTULINUMTOXINA 155 UNITS: 100 INJECTION, POWDER, LYOPHILIZED, FOR SOLUTION INTRADERMAL; INTRAMUSCULAR at 11:15

## 2022-05-12 RX ADMIN — SODIUM CHLORIDE 4 ML: 9 INJECTION, SOLUTION INTRAMUSCULAR; INTRAVENOUS; SUBCUTANEOUS at 11:15

## 2022-05-12 NOTE — H&P
H and P reviewed from previous visit and no changes to patient's clinical presentation. Will proceed with procedure as planned. Patient denies history of DM and being on blood thinners.    Zeke Dhaliwal DO  Pain Management   [facility]

## 2022-05-12 NOTE — H&P
H and P reviewed from previous visit and no changes to patient's clinical presentation. Will proceed with procedure as planned.     Zeke Dhaliwal DO  Pain Management   [facility]

## 2022-05-12 NOTE — DISCHARGE INSTRUCTIONS
BOTOX INJECTION DISCHARGE INSTRUCTIONS  PAIN MANAGEMENT    1.) You may experience mild discomfort at the injection sites for several hours following treatment.      2.)  Ice may be used at the procedure site for the first 24 hours if desired but it is not necessary.      3.)  Avoid heat to the injection site for 24 hours      4.)  Light activity for 24 hours, then return to your normal activity      5.)  Continue your normal medications      6.)  You may experience some of the following mild reactions:   Neck Stiffness    Headache          Muscular Weakness   Injection site discomfort   Fatigue               Muscle Spasm   Eye Weakness    7.)  Pain Management Center hours are Monday-Friday 8:00 a.m.-4:00 p.m..  For any problems related          to your procedure, please call us at [phone].  After hours, call [phone] and the           answering service will contact the physician on call.

## 2022-05-12 NOTE — PROCEDURES
Procedure Performed: Botox Injection for Migarine    Preoperative Diagnosis: MIGRAINE CHRONIC     Complications: None    Procedure in Detail:   The patient's chart was reviewed.  After obtaining written informed consent, the patient was placed in the supine position.  The area was prepped and draped in the usual sterile fashion using antiseptic solution.      The following points were identified:     10 units divided in 2 sites  Procerus 5 units in one site  Frontalis 20 units divided in 4 sites  Temporalis 40 units divided in 8 sites  Occipitalis 30 units divided in 6 sites  Cervical Paraspinal muscle group 20 units divided in 4 sites  Trapezius 30 units divided in 6 sites    Then, a 25 gauge 1 1/2 inch needle was inserted into each point and onabotulinum toxin A was injected in each point for a total of 155 units.      The patient tolerated the procedure well.  The needle was flushed and withdrawn, and a Band-Aid was applied.      Disposition:   The patient was given written discharge instructions.       Zeke Dhaliwal DO  Pain Management   [facility]

## 2022-05-13 ENCOUNTER — TELEPHONE (OUTPATIENT)
Dept: PAIN MEDICINE | Facility: CLINIC | Age: 72
End: 2022-05-13

## 2022-05-13 ENCOUNTER — TELEPHONE (OUTPATIENT)
Dept: PAIN MEDICINE | Facility: HOSPITAL | Age: 72
End: 2022-05-13

## 2022-05-27 ENCOUNTER — HOSPITAL ENCOUNTER (EMERGENCY)
Facility: HOSPITAL | Age: 72
Discharge: HOME | End: 2022-05-27
Attending: EMERGENCY MEDICINE

## 2022-05-27 ENCOUNTER — APPOINTMENT (OUTPATIENT)
Dept: CT IMAGING | Facility: HOSPITAL | Age: 72
End: 2022-05-27

## 2022-05-27 VITALS
WEIGHT: 147 LBS | SYSTOLIC BLOOD PRESSURE: 127 MMHG | OXYGEN SATURATION: 96 % | HEIGHT: 65 IN | DIASTOLIC BLOOD PRESSURE: 87 MMHG | RESPIRATION RATE: 16 BRPM | HEART RATE: 96 BPM | BODY MASS INDEX: 24.49 KG/M2 | TEMPERATURE: 97.8 F

## 2022-05-27 DIAGNOSIS — R40.0 SOMNOLENCE: Primary | ICD-10-CM

## 2022-05-27 DIAGNOSIS — T50.901A MEDICATION OVERDOSE, ACCIDENTAL OR UNINTENTIONAL, INITIAL ENCOUNTER: ICD-10-CM

## 2022-05-27 LAB
ALBUMIN SERPL-MCNC: 3.3 G/DL (ref 3.5–5.2)
ALBUMIN/GLOB SERPL: 1.5 G/DL
ALP SERPL-CCNC: 114 U/L (ref 39–117)
ALT SERPL W P-5'-P-CCNC: 12 U/L (ref 1–33)
AMPHET+METHAMPHET UR QL: NEGATIVE
AMPHETAMINES UR QL: NEGATIVE
ANION GAP SERPL CALCULATED.3IONS-SCNC: 10.6 MMOL/L (ref 5–15)
AST SERPL-CCNC: 22 U/L (ref 1–32)
BARBITURATES UR QL SCN: POSITIVE
BASOPHILS # BLD AUTO: 0.04 10*3/MM3 (ref 0–0.2)
BASOPHILS NFR BLD AUTO: 0.5 % (ref 0–1.5)
BENZODIAZ UR QL SCN: NEGATIVE
BILIRUB SERPL-MCNC: <0.2 MG/DL (ref 0–1.2)
BILIRUB UR QL STRIP: NEGATIVE
BUN SERPL-MCNC: 25 MG/DL (ref 8–23)
BUN/CREAT SERPL: 28.7 (ref 7–25)
BUPRENORPHINE SERPL-MCNC: NEGATIVE NG/ML
CALCIUM SPEC-SCNC: 8.3 MG/DL (ref 8.6–10.5)
CANNABINOIDS SERPL QL: NEGATIVE
CHLORIDE SERPL-SCNC: 105 MMOL/L (ref 98–107)
CLARITY UR: CLEAR
CO2 SERPL-SCNC: 22.4 MMOL/L (ref 22–29)
COCAINE UR QL: NEGATIVE
COLOR UR: YELLOW
CREAT SERPL-MCNC: 0.87 MG/DL (ref 0.57–1)
DEPRECATED RDW RBC AUTO: 50.6 FL (ref 37–54)
EGFRCR SERPLBLD CKD-EPI 2021: 71.8 ML/MIN/1.73
EOSINOPHIL # BLD AUTO: 0.17 10*3/MM3 (ref 0–0.4)
EOSINOPHIL NFR BLD AUTO: 2.2 % (ref 0.3–6.2)
ERYTHROCYTE [DISTWIDTH] IN BLOOD BY AUTOMATED COUNT: 13.3 % (ref 12.3–15.4)
ETHANOL BLD-MCNC: <10 MG/DL (ref 0–10)
ETHANOL UR QL: <0.01 %
GLOBULIN UR ELPH-MCNC: 2.2 GM/DL
GLUCOSE SERPL-MCNC: 117 MG/DL (ref 65–99)
GLUCOSE UR STRIP-MCNC: NEGATIVE MG/DL
HCT VFR BLD AUTO: 35.6 % (ref 34–46.6)
HGB BLD-MCNC: 11.3 G/DL (ref 12–15.9)
HGB UR QL STRIP.AUTO: NEGATIVE
IMM GRANULOCYTES # BLD AUTO: 0.02 10*3/MM3 (ref 0–0.05)
IMM GRANULOCYTES NFR BLD AUTO: 0.3 % (ref 0–0.5)
KETONES UR QL STRIP: ABNORMAL
LEUKOCYTE ESTERASE UR QL STRIP.AUTO: NEGATIVE
LIPASE SERPL-CCNC: 11 U/L (ref 13–60)
LYMPHOCYTES # BLD AUTO: 1.56 10*3/MM3 (ref 0.7–3.1)
LYMPHOCYTES NFR BLD AUTO: 20.6 % (ref 19.6–45.3)
MCH RBC QN AUTO: 32.1 PG (ref 26.6–33)
MCHC RBC AUTO-ENTMCNC: 31.7 G/DL (ref 31.5–35.7)
MCV RBC AUTO: 101.1 FL (ref 79–97)
METHADONE UR QL SCN: NEGATIVE
MONOCYTES # BLD AUTO: 0.85 10*3/MM3 (ref 0.1–0.9)
MONOCYTES NFR BLD AUTO: 11.2 % (ref 5–12)
NEUTROPHILS NFR BLD AUTO: 4.95 10*3/MM3 (ref 1.7–7)
NEUTROPHILS NFR BLD AUTO: 65.2 % (ref 42.7–76)
NITRITE UR QL STRIP: NEGATIVE
OPIATES UR QL: NEGATIVE
OXYCODONE UR QL SCN: NEGATIVE
PCP UR QL SCN: NEGATIVE
PH UR STRIP.AUTO: 5.5 [PH] (ref 4.5–8)
PLATELET # BLD AUTO: 156 10*3/MM3 (ref 140–450)
PMV BLD AUTO: 9.9 FL (ref 6–12)
POTASSIUM SERPL-SCNC: 3.9 MMOL/L (ref 3.5–5.2)
PROPOXYPH UR QL: NEGATIVE
PROT SERPL-MCNC: 5.5 G/DL (ref 6–8.5)
PROT UR QL STRIP: NEGATIVE
RBC # BLD AUTO: 3.52 10*6/MM3 (ref 3.77–5.28)
SARS-COV-2 RNA PNL SPEC NAA+PROBE: NOT DETECTED
SODIUM SERPL-SCNC: 138 MMOL/L (ref 136–145)
SP GR UR STRIP: 1.02 (ref 1–1.03)
TRICYCLICS UR QL SCN: POSITIVE
TROPONIN T SERPL-MCNC: <0.01 NG/ML (ref 0–0.03)
UROBILINOGEN UR QL STRIP: ABNORMAL
WBC NRBC COR # BLD: 7.59 10*3/MM3 (ref 3.4–10.8)

## 2022-05-27 PROCEDURE — 82077 ASSAY SPEC XCP UR&BREATH IA: CPT | Performed by: EMERGENCY MEDICINE

## 2022-05-27 PROCEDURE — 96361 HYDRATE IV INFUSION ADD-ON: CPT

## 2022-05-27 PROCEDURE — 80306 DRUG TEST PRSMV INSTRMNT: CPT | Performed by: EMERGENCY MEDICINE

## 2022-05-27 PROCEDURE — 99284 EMERGENCY DEPT VISIT MOD MDM: CPT | Performed by: EMERGENCY MEDICINE

## 2022-05-27 PROCEDURE — 99285 EMERGENCY DEPT VISIT HI MDM: CPT

## 2022-05-27 PROCEDURE — 70450 CT HEAD/BRAIN W/O DYE: CPT

## 2022-05-27 PROCEDURE — 72125 CT NECK SPINE W/O DYE: CPT

## 2022-05-27 PROCEDURE — 85025 COMPLETE CBC W/AUTO DIFF WBC: CPT | Performed by: EMERGENCY MEDICINE

## 2022-05-27 PROCEDURE — 83690 ASSAY OF LIPASE: CPT | Performed by: EMERGENCY MEDICINE

## 2022-05-27 PROCEDURE — 93010 ELECTROCARDIOGRAM REPORT: CPT | Performed by: INTERNAL MEDICINE

## 2022-05-27 PROCEDURE — 87635 SARS-COV-2 COVID-19 AMP PRB: CPT | Performed by: EMERGENCY MEDICINE

## 2022-05-27 PROCEDURE — 84484 ASSAY OF TROPONIN QUANT: CPT | Performed by: EMERGENCY MEDICINE

## 2022-05-27 PROCEDURE — 80053 COMPREHEN METABOLIC PANEL: CPT | Performed by: EMERGENCY MEDICINE

## 2022-05-27 PROCEDURE — 96374 THER/PROPH/DIAG INJ IV PUSH: CPT

## 2022-05-27 PROCEDURE — 81003 URINALYSIS AUTO W/O SCOPE: CPT | Performed by: EMERGENCY MEDICINE

## 2022-05-27 PROCEDURE — 93005 ELECTROCARDIOGRAM TRACING: CPT | Performed by: EMERGENCY MEDICINE

## 2022-05-27 RX ORDER — SODIUM CHLORIDE 9 MG/ML
125 INJECTION, SOLUTION INTRAVENOUS CONTINUOUS
Status: DISCONTINUED | OUTPATIENT
Start: 2022-05-27 | End: 2022-05-27 | Stop reason: HOSPADM

## 2022-05-27 RX ORDER — NALOXONE HYDROCHLORIDE 1 MG/ML
2 INJECTION INTRAMUSCULAR; INTRAVENOUS; SUBCUTANEOUS ONCE
Status: COMPLETED | OUTPATIENT
Start: 2022-05-27 | End: 2022-05-27

## 2022-05-27 RX ORDER — SODIUM CHLORIDE 0.9 % (FLUSH) 0.9 %
10 SYRINGE (ML) INJECTION AS NEEDED
Status: DISCONTINUED | OUTPATIENT
Start: 2022-05-27 | End: 2022-05-27 | Stop reason: HOSPADM

## 2022-05-27 RX ADMIN — NALOXONE HYDROCHLORIDE 2 MG: 1 INJECTION PARENTERAL at 09:54

## 2022-05-27 RX ADMIN — SODIUM CHLORIDE 500 ML: 9 INJECTION, SOLUTION INTRAVENOUS at 10:36

## 2022-05-27 RX ADMIN — SODIUM CHLORIDE 125 ML/HR: 9 INJECTION, SOLUTION INTRAVENOUS at 09:54

## 2022-05-31 LAB — QT INTERVAL: 350 MS

## 2022-06-06 ENCOUNTER — HOSPITAL ENCOUNTER (EMERGENCY)
Facility: HOSPITAL | Age: 72
Discharge: HOME | End: 2022-06-06
Attending: EMERGENCY MEDICINE | Admitting: EMERGENCY MEDICINE

## 2022-06-06 ENCOUNTER — APPOINTMENT (OUTPATIENT)
Dept: CT IMAGING | Facility: HOSPITAL | Age: 72
End: 2022-06-06

## 2022-06-06 VITALS
SYSTOLIC BLOOD PRESSURE: 139 MMHG | HEIGHT: 62 IN | DIASTOLIC BLOOD PRESSURE: 92 MMHG | HEART RATE: 102 BPM | RESPIRATION RATE: 15 BRPM | BODY MASS INDEX: 23.92 KG/M2 | WEIGHT: 130 LBS | TEMPERATURE: 97.7 F | OXYGEN SATURATION: 95 %

## 2022-06-06 DIAGNOSIS — R19.7 DIARRHEA, UNSPECIFIED TYPE: ICD-10-CM

## 2022-06-06 DIAGNOSIS — G62.9 NEUROPATHY: ICD-10-CM

## 2022-06-06 DIAGNOSIS — K52.9 GASTROENTERITIS: Primary | ICD-10-CM

## 2022-06-06 LAB
ALBUMIN SERPL-MCNC: 4.2 G/DL (ref 3.5–5.2)
ALBUMIN/GLOB SERPL: 2 G/DL
ALP SERPL-CCNC: 136 U/L (ref 39–117)
ALT SERPL W P-5'-P-CCNC: 21 U/L (ref 1–33)
ANION GAP SERPL CALCULATED.3IONS-SCNC: 12 MMOL/L (ref 5–15)
AST SERPL-CCNC: 37 U/L (ref 1–32)
BASOPHILS # BLD AUTO: 0 10*3/MM3 (ref 0–0.2)
BASOPHILS NFR BLD AUTO: 0.2 % (ref 0–1.5)
BILIRUB SERPL-MCNC: 0.3 MG/DL (ref 0–1.2)
BILIRUB UR QL STRIP: NEGATIVE
BUN SERPL-MCNC: 20 MG/DL (ref 8–23)
BUN/CREAT SERPL: 22.7 (ref 7–25)
CALCIUM SPEC-SCNC: 8.8 MG/DL (ref 8.6–10.5)
CHLORIDE SERPL-SCNC: 100 MMOL/L (ref 98–107)
CLARITY UR: CLEAR
CO2 SERPL-SCNC: 24 MMOL/L (ref 22–29)
COLOR UR: ABNORMAL
CREAT SERPL-MCNC: 0.88 MG/DL (ref 0.57–1)
DEPRECATED RDW RBC AUTO: 47.7 FL (ref 37–54)
EGFRCR SERPLBLD CKD-EPI 2021: 70.8 ML/MIN/1.73
EOSINOPHIL # BLD AUTO: 0.1 10*3/MM3 (ref 0–0.4)
EOSINOPHIL NFR BLD AUTO: 0.7 % (ref 0.3–6.2)
ERYTHROCYTE [DISTWIDTH] IN BLOOD BY AUTOMATED COUNT: 14 % (ref 12.3–15.4)
GLOBULIN UR ELPH-MCNC: 2.1 GM/DL
GLUCOSE SERPL-MCNC: 122 MG/DL (ref 65–99)
GLUCOSE UR STRIP-MCNC: NEGATIVE MG/DL
HCT VFR BLD AUTO: 36.9 % (ref 34–46.6)
HGB BLD-MCNC: 12.4 G/DL (ref 12–15.9)
HGB UR QL STRIP.AUTO: NEGATIVE
KETONES UR QL STRIP: ABNORMAL
LEUKOCYTE ESTERASE UR QL STRIP.AUTO: NEGATIVE
LYMPHOCYTES # BLD AUTO: 1.8 10*3/MM3 (ref 0.7–3.1)
LYMPHOCYTES NFR BLD AUTO: 16.8 % (ref 19.6–45.3)
MCH RBC QN AUTO: 32.6 PG (ref 26.6–33)
MCHC RBC AUTO-ENTMCNC: 33.5 G/DL (ref 31.5–35.7)
MCV RBC AUTO: 97 FL (ref 79–97)
MONOCYTES # BLD AUTO: 0.9 10*3/MM3 (ref 0.1–0.9)
MONOCYTES NFR BLD AUTO: 9 % (ref 5–12)
NEUTROPHILS NFR BLD AUTO: 7.7 10*3/MM3 (ref 1.7–7)
NEUTROPHILS NFR BLD AUTO: 73.3 % (ref 42.7–76)
NITRITE UR QL STRIP: NEGATIVE
NRBC BLD AUTO-RTO: 0 /100 WBC (ref 0–0.2)
PH UR STRIP.AUTO: <=5 [PH] (ref 5–8)
PLATELET # BLD AUTO: 166 10*3/MM3 (ref 140–450)
PMV BLD AUTO: 7.1 FL (ref 6–12)
POTASSIUM SERPL-SCNC: 3.6 MMOL/L (ref 3.5–5.2)
PROT SERPL-MCNC: 6.3 G/DL (ref 6–8.5)
PROT UR QL STRIP: NEGATIVE
RBC # BLD AUTO: 3.81 10*6/MM3 (ref 3.77–5.28)
SODIUM SERPL-SCNC: 136 MMOL/L (ref 136–145)
SP GR UR STRIP: 1.03 (ref 1–1.03)
UROBILINOGEN UR QL STRIP: ABNORMAL
WBC NRBC COR # BLD: 10.5 10*3/MM3 (ref 3.4–10.8)

## 2022-06-06 PROCEDURE — 85025 COMPLETE CBC W/AUTO DIFF WBC: CPT | Performed by: EMERGENCY MEDICINE

## 2022-06-06 PROCEDURE — 80053 COMPREHEN METABOLIC PANEL: CPT | Performed by: EMERGENCY MEDICINE

## 2022-06-06 PROCEDURE — 93005 ELECTROCARDIOGRAM TRACING: CPT | Performed by: EMERGENCY MEDICINE

## 2022-06-06 PROCEDURE — 51702 INSERT TEMP BLADDER CATH: CPT

## 2022-06-06 PROCEDURE — 96375 TX/PRO/DX INJ NEW DRUG ADDON: CPT

## 2022-06-06 PROCEDURE — 25010000002 DIPHENHYDRAMINE PER 50 MG: Performed by: EMERGENCY MEDICINE

## 2022-06-06 PROCEDURE — 81003 URINALYSIS AUTO W/O SCOPE: CPT | Performed by: EMERGENCY MEDICINE

## 2022-06-06 PROCEDURE — 96376 TX/PRO/DX INJ SAME DRUG ADON: CPT

## 2022-06-06 PROCEDURE — 25010000002 ONDANSETRON PER 1 MG: Performed by: EMERGENCY MEDICINE

## 2022-06-06 PROCEDURE — 99283 EMERGENCY DEPT VISIT LOW MDM: CPT

## 2022-06-06 PROCEDURE — 74176 CT ABD & PELVIS W/O CONTRAST: CPT

## 2022-06-06 PROCEDURE — 25010000002 HYDROMORPHONE 1 MG/ML SOLUTION: Performed by: EMERGENCY MEDICINE

## 2022-06-06 PROCEDURE — 96374 THER/PROPH/DIAG INJ IV PUSH: CPT

## 2022-06-06 RX ORDER — ONDANSETRON 2 MG/ML
4 INJECTION INTRAMUSCULAR; INTRAVENOUS ONCE
Status: COMPLETED | OUTPATIENT
Start: 2022-06-06 | End: 2022-06-06

## 2022-06-06 RX ORDER — DIPHENHYDRAMINE HYDROCHLORIDE 50 MG/ML
25 INJECTION INTRAMUSCULAR; INTRAVENOUS ONCE
Status: COMPLETED | OUTPATIENT
Start: 2022-06-06 | End: 2022-06-06

## 2022-06-06 RX ORDER — DIPHENOXYLATE HYDROCHLORIDE AND ATROPINE SULFATE 2.5; .025 MG/1; MG/1
1 TABLET ORAL 4 TIMES DAILY PRN
Qty: 8 TABLET | Refills: 0 | Status: SHIPPED | OUTPATIENT
Start: 2022-06-06 | End: 2022-06-14

## 2022-06-06 RX ORDER — SODIUM CHLORIDE 0.9 % (FLUSH) 0.9 %
10 SYRINGE (ML) INJECTION AS NEEDED
Status: CANCELLED | OUTPATIENT
Start: 2022-06-06

## 2022-06-06 RX ORDER — SODIUM CHLORIDE 0.9 % (FLUSH) 0.9 %
10 SYRINGE (ML) INJECTION AS NEEDED
Status: DISCONTINUED | OUTPATIENT
Start: 2022-06-06 | End: 2022-06-06 | Stop reason: HOSPADM

## 2022-06-06 RX ADMIN — DIPHENHYDRAMINE HYDROCHLORIDE 25 MG: 50 INJECTION, SOLUTION INTRAMUSCULAR; INTRAVENOUS at 18:06

## 2022-06-06 RX ADMIN — DIPHENHYDRAMINE HYDROCHLORIDE 25 MG: 50 INJECTION, SOLUTION INTRAMUSCULAR; INTRAVENOUS at 15:34

## 2022-06-06 RX ADMIN — ONDANSETRON 4 MG: 2 INJECTION INTRAMUSCULAR; INTRAVENOUS at 15:34

## 2022-06-06 RX ADMIN — HYDROMORPHONE HYDROCHLORIDE 0.5 MG: 1 INJECTION, SOLUTION INTRAMUSCULAR; INTRAVENOUS; SUBCUTANEOUS at 15:33

## 2022-06-06 RX ADMIN — SODIUM CHLORIDE 500 ML: 9 INJECTION, SOLUTION INTRAVENOUS at 15:34

## 2022-06-06 RX ADMIN — HYDROMORPHONE HYDROCHLORIDE 0.5 MG: 1 INJECTION, SOLUTION INTRAMUSCULAR; INTRAVENOUS; SUBCUTANEOUS at 18:05

## 2022-06-06 NOTE — DISCHARGE INSTRUCTIONS
Continue to continue to decrease your Neurontin to 800 mg twice a day.  Call your doctor tomorrow to talk about your neuropathy.  Make sure that you are drinking plenty of fluids.  Lomotil for diarrhea if needed.

## 2022-06-06 NOTE — ED PROVIDER NOTES
Subjective   History of Present Illness  72-year-old female states that she has not been able to urinate for the past 2 days and now has abdominal distention and pain.  She also states that she has been weak and has been falling at home.  And the  states that she is followed about 5 times in the past week.  She denies any numbness or any tingling.  There is no loss of consciousness.  She denies any chest pain.  She has a history of asthma as well as chronic back pain depression and neuropathy.  Review of Systems   Constitutional: Positive for activity change.   HENT: Negative.    Eyes: Negative.    Respiratory: Negative.    Cardiovascular: Negative.    Gastrointestinal: Positive for abdominal distention and abdominal pain.   Endocrine: Negative.    Genitourinary: Positive for decreased urine volume and difficulty urinating.   Musculoskeletal: Positive for arthralgias and back pain.   Skin: Negative.    Allergic/Immunologic: Negative.    Neurological: Positive for numbness.   Hematological: Negative.    Psychiatric/Behavioral: Positive for dysphoric mood.       Past Medical History:   Diagnosis Date   • Asthma    • Chronic back pain    • Chronic headaches    • Chronic obstructive pulmonary disease (HCC) 7/1/2020   • Chronic pain syndrome 7/1/2020   • Depression 7/1/2020   • Disease of thyroid gland    • Estrogen deficiency 7/1/2020   • GERD without esophagitis 7/1/2020   • Hypothyroidism (acquired) 7/1/2020   • Migraines 7/1/2020   • Neuropathy    • Vomiting 06/30/2020       Allergies   Allergen Reactions   • Morphine Hives   • Penicillins Hives and Shortness Of Breath     Can take iv form - but not po form         Past Surgical History:   Procedure Laterality Date   • CHOLECYSTECTOMY     • COLONOSCOPY N/A 11/11/2020    Procedure: COLONOSCOPY WITH RANDOM COLON BIOPSIES X4 AREAS, BIOPSY X 1 AREA;  Surgeon: Aguila Garcia MD;  Location: [facility] ENDOSCOPY;  Service: Gastroenterology;  Laterality: N/A;   POST- colon erosion   • ENDOSCOPY N/A 2020    Procedure: ESOPHAGOGASTRODUODENOSCOPY WITH DILATATION (BOUGIE #54);  Surgeon: Benjamin Mike MD;  Location: [facility] ENDOSCOPY;  Service: Gastroenterology;  Laterality: N/A;  Post operative diagnosis:GASTROPARESIS AND DYSPHAGIA    • ENDOSCOPY N/A 2020    Procedure: ESOPHAGOGASTRODUODENOSCOPY WITH BIOPSY X 1 AREA;  Surgeon: Aguila Garcia MD;  Location: [facility] ENDOSCOPY;  Service: Gastroenterology;  Laterality: N/A;  POST   • HYSTERECTOMY     • NECK SURGERY     • SPINE SURGERY  2020       Family History   Problem Relation Age of Onset   • Heart disease Mother    • No Known Problems Father        Social History     Socioeconomic History   • Marital status:    Tobacco Use   • Smoking status: Former Smoker     Quit date:      Years since quittin.4   • Smokeless tobacco: Never Used   Vaping Use   • Vaping Use: Never used   Substance and Sexual Activity   • Alcohol use: Never   • Drug use: Never   • Sexual activity: Defer           Objective   Physical Exam  Patient is awake and alert with stable vital signs pupils equal round reactive to light EOMs are full ENT unremarkable the neck is supple her chest is clear cardiovascular exam reveals a regular rhythm her abdomen shows distention particularly of the bladder bowel sounds are positive she has no cyanosis clubbing or edema she has tone in all extremities no focal deficit was noted  Procedures           ED Course           Results for orders placed or performed during the hospital encounter of 22   Comprehensive Metabolic Panel    Specimen: Blood   Result Value Ref Range    Glucose 122 (H) 65 - 99 mg/dL    BUN 20 8 - 23 mg/dL    Creatinine 0.88 0.57 - 1.00 mg/dL    Sodium 136 136 - 145 mmol/L    Potassium 3.6 3.5 - 5.2 mmol/L    Chloride 100 98 - 107 mmol/L    CO2 24.0 22.0 - 29.0 mmol/L    Calcium 8.8 8.6 - 10.5 mg/dL    Total Protein 6.3 6.0 - 8.5 g/dL    Albumin 4.20 3.50 -  5.20 g/dL    ALT (SGPT) 21 1 - 33 U/L    AST (SGOT) 37 (H) 1 - 32 U/L    Alkaline Phosphatase 136 (H) 39 - 117 U/L    Total Bilirubin 0.3 0.0 - 1.2 mg/dL    Globulin 2.1 gm/dL    A/G Ratio 2.0 g/dL    BUN/Creatinine Ratio 22.7 7.0 - 25.0    Anion Gap 12.0 5.0 - 15.0 mmol/L    eGFR 70.8 >60.0 mL/min/1.73   Urinalysis With Culture If Indicated - Urine, Catheter    Specimen: Urine, Catheter   Result Value Ref Range    Color, UA Dark Yellow (A) Yellow, Straw    Appearance, UA Clear Clear    pH, UA <=5.0 5.0 - 8.0    Specific Gravity, UA 1.026 1.005 - 1.030    Glucose, UA Negative Negative    Ketones, UA Trace (A) Negative    Bilirubin, UA Negative Negative    Blood, UA Negative Negative    Protein, UA Negative Negative    Leuk Esterase, UA Negative Negative    Nitrite, UA Negative Negative    Urobilinogen, UA 1.0 E.U./dL 0.2 - 1.0 E.U./dL   CBC Auto Differential    Specimen: Blood   Result Value Ref Range    WBC 10.50 3.40 - 10.80 10*3/mm3    RBC 3.81 3.77 - 5.28 10*6/mm3    Hemoglobin 12.4 12.0 - 15.9 g/dL    Hematocrit 36.9 34.0 - 46.6 %    MCV 97.0 79.0 - 97.0 fL    MCH 32.6 26.6 - 33.0 pg    MCHC 33.5 31.5 - 35.7 g/dL    RDW 14.0 12.3 - 15.4 %    RDW-SD 47.7 37.0 - 54.0 fl    MPV 7.1 6.0 - 12.0 fL    Platelets 166 140 - 450 10*3/mm3    Neutrophil % 73.3 42.7 - 76.0 %    Lymphocyte % 16.8 (L) 19.6 - 45.3 %    Monocyte % 9.0 5.0 - 12.0 %    Eosinophil % 0.7 0.3 - 6.2 %    Basophil % 0.2 0.0 - 1.5 %    Neutrophils, Absolute 7.70 (H) 1.70 - 7.00 10*3/mm3    Lymphocytes, Absolute 1.80 0.70 - 3.10 10*3/mm3    Monocytes, Absolute 0.90 0.10 - 0.90 10*3/mm3    Eosinophils, Absolute 0.10 0.00 - 0.40 10*3/mm3    Basophils, Absolute 0.00 0.00 - 0.20 10*3/mm3    nRBC 0.0 0.0 - 0.2 /100 WBC   ECG 12 Lead   Result Value Ref Range    QT Interval 338 ms     Medications   sodium chloride 0.9 % flush 10 mL (has no administration in time range)   sodium chloride 0.9 % bolus 500 mL (500 mL Intravenous New Bag 6/6/22 9506)    HYDROmorphone (DILAUDID) injection 0.5 mg (0.5 mg Intravenous Given 6/6/22 1533)   ondansetron (ZOFRAN) injection 4 mg (4 mg Intravenous Given 6/6/22 1534)   diphenhydrAMINE (BENADRYL) injection 25 mg (25 mg Intravenous Given 6/6/22 1534)     CT Abdomen Pelvis Without Contrast    Result Date: 6/6/2022   1. No acute findings in the abdomen or pelvis. 2. Surgical changes of gastric bypass, cholecystectomy, sacral stimulator device, lower lumbar spine fusion. 3. No evidence of active bowel inflammation or bowel obstruction. 4. Mild intrahepatic and extra hepatic biliary ductal dilation is unchanged from 2020, and may represent chronic increased capacitance from cholecystectomy.    Electronically Signed By-Velvet Maher MD On:6/6/2022 5:02 PM This report was finalized on [id number] by  Velvet Maher MD.                                          MDM  I see no evidence that the patient actually has urinary retention and I do not see any evidence of a bowel obstruction or any other acute intra-abdominal pathology.  The patient does have a history of ongoing headaches which is treated with Botox injections and she also is on Neurontin for the neuropathy that she has.  She needs to follow-up with her doctor tomorrow regarding the neuropathy.  She also is given Lomotil for diarrhea if needed.  It appears she has a gastroenterologist also.  The patient only has 80 cc of urine which was drained with a Staton catheter and then removed.  Final diagnoses:   Gastroenteritis   Diarrhea, unspecified type   Neuropathy       ED Disposition  ED Disposition     None          No follow-up provider specified.       Medication List      No changes were made to your prescriptions during this visit.          Sam Jackson MD  06/06/22 [id number]       Sam Jackson MD  06/06/22 [id number]

## 2022-06-10 LAB — QT INTERVAL: 338 MS

## 2022-08-16 NOTE — PROGRESS NOTES
Subjective   Ashley Lin is a 72 y.o. female is here for follow-up for migraines.  Last seen on 5/12/2022 for Botox injection with excellent relief. Her migraines have significantly worsened as she was not able to get her botox injections on time due to insurance issues.  She is accompanied by her  today. States that she has been having diarrhea, vomiting and weakness since last 1 week. Denies fever, chills, cough.     On last visit:     Previous Injection:   5/12/2022- Botox injection and excellent relief with decreased days of migraines, decreased severity and decrease duration of migraine.  1/21/22- Botox - excellent relief with botox.     Hx: referred by , Rogelio JACKSON MD  to our pain management clinic for consultation, evaluation and treatment of migraines and botox injections.  Sees [facility] surgeon for lower back. Had multiple epidurals in past without much.      PHQ-9- 6  SOAPP- 10     PMH:   COPD, syncope, hypoglycemia, hypothyroidism, IBS, asthma, dysphagia, bladder stimulator, cervical fusion C3-C6, history of lumbar fusion, hysterectomy, rotator cuff surgery, stomach surgery, gastroparesis.     Headaches began: Childhood   Headache Severity at Worst: 10/10   Headache Location: b/l occipital region radiating to frontal region.   Headache Frequency: 3-4 weeks (improved by botox) - last one done 10/15/2021  Headache Duration: several days  Headache onset: stress   Headache Character: poundin   Associated Symptoms of nausea, emesis, photophobia, phonophobia  Associated Autonomic Symptoms: no  Associated Visual Changes: yes  Alleviating Factors: botox, demerol shots  Additional Exacerbating Factors: stress  History of Significant Head Injury: none  Last Eyeglasses / Contacts Prescription Check: wears contact lenses (last year)  FH of Headache: mother had them  FH of Brain Tumor: no  FH of Brain Aneurysm: no  Three regular meals each day: yes  Sleep: 3-4 hrs   Caffeine Use:  no  OTC NSAID Use:   Past Prescription Headache Medications: aimovig, topamax  Current Prescription Headache Medications: Amitriptyline, baclofen, oxcarbazepine, rizatriptan, Fioricet, Botox, promethazine     Gabapentin 800 mg BID        Current Outpatient Medications:   •  albuterol sulfate  (90 Base) MCG/ACT inhaler, Inhale 2 puffs Every 4 (Four) Hours As Needed for Wheezing or Shortness of Air., Disp: , Rfl:   •  amitriptyline (ELAVIL) 75 MG tablet, Take 75 mg by mouth every night at bedtime., Disp: , Rfl:   •  atorvastatin (LIPITOR) 20 MG tablet, Take 20 mg by mouth Daily., Disp: , Rfl:   •  azelastine (OPTIVAR) 0.05 % ophthalmic solution, Administer 1 drop to both eyes 2 (Two) Times a Day., Disp: , Rfl:   •  baclofen (LIORESAL) 10 MG tablet, Take 10 mg by mouth Every 6 (Six) Hours As Needed for Muscle Spasms., Disp: , Rfl:   •  budesonide (PULMICORT) 180 MCG/ACT inhaler, Inhale 2 puffs 2 (Two) Times a Day., Disp: , Rfl:   •  busPIRone (BUSPAR) 15 MG tablet, Take 15 mg by mouth 3 (Three) Times a Day., Disp: , Rfl:   •  butalbital-acetaminophen-caffeine (FIORICET, ESGIC) -40 MG per tablet, Take 1-2 tablets by mouth Every 4 (Four) Hours As Needed for Headache., Disp: , Rfl:   •  cetirizine (zyrTEC) 10 MG tablet, cetirizine 10 mg tablet  TAKE 1 TABLET EVERY DAY BY ORAL ROUTE FOR 90 DAYS., Disp: , Rfl:   •  colestipol (COLESTID) 1 g tablet, , Disp: , Rfl:   •  cycloSPORINE (RESTASIS) 0.05 % ophthalmic emulsion, Administer 1 drop to both eyes 2 (Two) Times a Day., Disp: , Rfl:   •  dicyclomine (BENTYL) 20 MG tablet, Take 10 mg by mouth 4 (Four) Times a Day., Disp: , Rfl:   •  doxycycline (VIBRAMYCIN) 100 MG capsule, Take 100 mg by mouth 2 (Two) Times a Day., Disp: , Rfl:   •  DULoxetine (CYMBALTA) 60 MG capsule, Take 60 mg by mouth Daily., Disp: , Rfl:   •  Eluxadoline (Viberzi) 100 MG tablet, Viberzi 100 mg tablet  TAKE 1 TABLET BY MOUTH TWICE A DAY, Disp: , Rfl:   •  EPINEPHrine (EPIPEN) 0.3 MG/0.3ML  solution auto-injector injection, ., Disp: , Rfl:   •  esomeprazole (nexIUM) 40 MG capsule, esomeprazole magnesium 40 mg capsule,delayed release, Disp: , Rfl:   •  estradiol (ESTRACE) 1 MG tablet, Take 1.5 mg by mouth Daily., Disp: , Rfl:   •  fluticasone (FLONASE) 50 MCG/ACT nasal spray, 2 sprays into the nostril(s) as directed by provider Daily., Disp: , Rfl:   •  fluticasone-salmeterol (ADVAIR HFA) 230-21 MCG/ACT inhaler, Inhale 2 puffs 2 (Two) Times a Day., Disp: , Rfl:   •  gabapentin (NEURONTIN) 800 MG tablet, Take 1 tablet by mouth 2 (Two) Times a Day. (Patient taking differently: Take 800 mg by mouth 3 (Three) Times a Day.), Disp: 30 tablet, Rfl: 0  •  glycopyrrolate (ROBINUL) 2 MG tablet, Take 1 tablet by mouth 2 (Two) Times a Day., Disp: , Rfl:   •  levothyroxine (SYNTHROID, LEVOTHROID) 25 MCG tablet, Take 25 mcg by mouth Daily., Disp: , Rfl:   •  lisinopril (PRINIVIL,ZESTRIL) 10 MG tablet, Take 10 mg by mouth Daily., Disp: , Rfl:   •  meloxicam (MOBIC) 7.5 MG tablet, meloxicam 7.5 mg tablet, Disp: , Rfl:   •  metoclopramide (REGLAN) 10 MG tablet, TAKE ONE TABLET BY MOUTH TWICE DAILY (INCREASED DOSE), Disp: , Rfl:   •  metoprolol tartrate (LOPRESSOR) 50 MG tablet, Take 50 mg by mouth 2 (Two) Times a Day., Disp: , Rfl:   •  montelukast (SINGULAIR) 10 MG tablet, Take 10 mg by mouth Every Night., Disp: , Rfl:   •  OnabotulinumtoxinA (Botox) 200 units reconstituted solution, Botox 200 unit injection, Disp: , Rfl:   •  OXcarbazepine (TRILEPTAL) 300 MG tablet, Take 150 mg by mouth 2 (Two) Times a Day., Disp: , Rfl:   •  oxybutynin XL (DITROPAN-XL) 10 MG 24 hr tablet, , Disp: , Rfl:   •  oxyCODONE-acetaminophen (PERCOCET)  MG per tablet, Take 1 tablet by mouth Every 6 (Six) Hours As Needed for Moderate Pain  or Severe Pain ., Disp: 10 tablet, Rfl: 0  •  prednisoLONE acetate (PRED FORTE) 1 % ophthalmic suspension, prednisolone acetate 1 % eye drops,suspension, Disp: , Rfl:   •  promethazine (PHENERGAN) 25 MG  suppository, Promethegan 25 mg rectal suppository, Disp: , Rfl:   •  promethazine (PHENERGAN) 50 MG tablet, , Disp: , Rfl:   •  propranolol (INDERAL) 40 MG tablet, Take 40 mg by mouth 2 (Two) Times a Day., Disp: , Rfl:   •  rizatriptan MLT (MAXALT-MLT) 10 MG disintegrating tablet, Place 10 mg on the tongue 1 (One) Time As Needed for Migraine. May repeat in 2 hours if needed, Disp: , Rfl:   •  Stiolto Respimat 2.5-2.5 MCG/ACT aerosol solution inhaler, , Disp: , Rfl:   •  sucralfate (CARAFATE) 1 g tablet, Take 1 g by mouth 4 (Four) Times a Day., Disp: , Rfl:   •  topiramate (TOPAMAX) 100 MG tablet, Take 100 mg by mouth 4 (Four) Times a Day., Disp: , Rfl:   •  umeclidinium-vilanterol (ANORO ELLIPTA) 62.5-25 MCG/INH aerosol powder  inhaler, Inhale 1 puff Daily., Disp: , Rfl:   •  vitamin D3 125 MCG (5000 UT) capsule capsule, cholecalciferol (vitamin D3) 125 mcg (5,000 unit) capsule  TAKE 1 TABLET BY MOUTH EVERY DAY, Disp: , Rfl:   •  vitamin E 400 UNIT capsule, Take 400 Units by mouth Daily., Disp: , Rfl:     The following portions of the patient's history were reviewed and updated as appropriate: allergies, current medications, past family history, past medical history, past social history, past surgical history, and problem list.      REVIEW OF PERTINENT MEDICAL DATA    Past Medical History:   Diagnosis Date   • Asthma    • Chronic back pain    • Chronic headaches    • Chronic obstructive pulmonary disease (HCC) 7/1/2020   • Chronic pain syndrome 7/1/2020   • Depression 7/1/2020   • Disease of thyroid gland    • Estrogen deficiency 7/1/2020   • GERD without esophagitis 7/1/2020   • Hypothyroidism (acquired) 7/1/2020   • Migraines 7/1/2020   • Neuropathy    • Vomiting 06/30/2020     Past Surgical History:   Procedure Laterality Date   • CHOLECYSTECTOMY     • COLONOSCOPY N/A 11/11/2020    Procedure: COLONOSCOPY WITH RANDOM COLON BIOPSIES X4 AREAS, BIOPSY X 1 AREA;  Surgeon: Aguila Garcia MD;  Location: [facility]  ENDOSCOPY;  Service: Gastroenterology;  Laterality: N/A;  POST- colon erosion   • ENDOSCOPY N/A 2020    Procedure: ESOPHAGOGASTRODUODENOSCOPY WITH DILATATION (BOUGIE #54);  Surgeon: Benjamin Mike MD;  Location: [facility] ENDOSCOPY;  Service: Gastroenterology;  Laterality: N/A;  Post operative diagnosis:GASTROPARESIS AND DYSPHAGIA    • ENDOSCOPY N/A 2020    Procedure: ESOPHAGOGASTRODUODENOSCOPY WITH BIOPSY X 1 AREA;  Surgeon: Aguila Garcia MD;  Location: [facility] ENDOSCOPY;  Service: Gastroenterology;  Laterality: N/A;  POST   • HYSTERECTOMY     • NECK SURGERY     • SPINE SURGERY  2020     Family History   Problem Relation Age of Onset   • Heart disease Mother    • No Known Problems Father      Social History     Socioeconomic History   • Marital status:    Tobacco Use   • Smoking status: Former Smoker     Quit date:      Years since quittin.6   • Smokeless tobacco: Never Used   Vaping Use   • Vaping Use: Never used   Substance and Sexual Activity   • Alcohol use: Never   • Drug use: Never   • Sexual activity: Defer         Review of Systems   Neurological: Positive for headaches.         Vitals:    22 1546   BP: 107/72   Pulse: (!) 122   Resp: 16   SpO2: 93%   PainSc: 10-Worst pain ever         Objective   Physical Exam  HENT:      Head:             Imaging Reviewed:      Assessment:    1. Migraine without aura and without status migrainosus, not intractable            Plan:    1. Excellent relief from previous Botox injections patient experiences migraine headache more than fifteen times per month, and averages 3-4 per week, lasting more than four hours per day. She has tried and failed NSAID therapy, at least two triptans, and the usual prophylactic preventative therapies including beta blocker, TCA, and Topamax. They are a good candidate for Botox therapy. Discussed with the patient regarding the etiology of their pain. Informed them that they would likely benefit from  botox injections for chronic migraine. The procedure was described in detail and the risks, benefits and alternatives were discussed with the patient (including but not limited to: trouble swallowing for several months after treatment; muscle weakness near where the medicine was injected, bruising, bleeding, pain, redness, or swelling where the injection was given;headache, muscle stiffness, neck pain) who agreed to proceed.   Will repeat every 3 months.  2. Advised patient to reach out to PCP ASAP regarding diarrhea, vomiting and weakness. If worsens, recommend visiting ED.         RTC for injection every 3 months.     Zeke Dhaliwal DO  Pain Management   [facility]                  INSPECT REPORT    As part of the patient's treatment plan, I may be prescribing controlled substances. The patient has been made aware of appropriate use of such medications, including potential risk of somnolence, limited ability to drive and/or work safely, and the potential for dependence or overdose. It has also been made clear that these medications are for use by this patient only, without concomitant use of alcohol or other substances unless prescribed.     Patient has completed prescribing agreement detailing terms of continued prescribing of controlled substances, including monitoring INSPECT reports, urine drug screening, and pill counts if necessary. The patient is aware that inappropriate use will results in cessation of prescribing such medications.    INSPECT report has been reviewed and scanned into the patient's chart.

## 2022-08-18 ENCOUNTER — OFFICE VISIT (OUTPATIENT)
Dept: PAIN MEDICINE | Facility: CLINIC | Age: 72
End: 2022-08-18

## 2022-08-18 ENCOUNTER — APPOINTMENT (OUTPATIENT)
Dept: PAIN MEDICINE | Facility: HOSPITAL | Age: 72
End: 2022-08-18

## 2022-08-18 VITALS
RESPIRATION RATE: 16 BRPM | OXYGEN SATURATION: 93 % | SYSTOLIC BLOOD PRESSURE: 107 MMHG | HEART RATE: 122 BPM | DIASTOLIC BLOOD PRESSURE: 72 MMHG

## 2022-08-18 DIAGNOSIS — G43.009 MIGRAINE WITHOUT AURA AND WITHOUT STATUS MIGRAINOSUS, NOT INTRACTABLE: Primary | ICD-10-CM

## 2022-08-18 PROCEDURE — 99213 OFFICE O/P EST LOW 20 MIN: CPT | Performed by: STUDENT IN AN ORGANIZED HEALTH CARE EDUCATION/TRAINING PROGRAM

## 2022-08-23 ENCOUNTER — APPOINTMENT (OUTPATIENT)
Dept: PAIN MEDICINE | Facility: HOSPITAL | Age: 72
End: 2022-08-23

## 2022-08-30 ENCOUNTER — TELEPHONE (OUTPATIENT)
Dept: PAIN MEDICINE | Facility: CLINIC | Age: 72
End: 2022-08-30

## 2022-09-27 ENCOUNTER — APPOINTMENT (OUTPATIENT)
Dept: CT IMAGING | Facility: HOSPITAL | Age: 72
End: 2022-09-27

## 2022-09-27 ENCOUNTER — APPOINTMENT (OUTPATIENT)
Dept: GENERAL RADIOLOGY | Facility: HOSPITAL | Age: 72
End: 2022-09-27

## 2022-09-27 ENCOUNTER — HOSPITAL ENCOUNTER (INPATIENT)
Facility: HOSPITAL | Age: 72
LOS: 2 days | Discharge: NURSING, REHAB OR LONG-TERM CARE | End: 2022-09-30
Attending: EMERGENCY MEDICINE | Admitting: HOSPITALIST

## 2022-09-27 DIAGNOSIS — R41.82 ALTERED MENTAL STATUS, UNSPECIFIED ALTERED MENTAL STATUS TYPE: Primary | ICD-10-CM

## 2022-09-27 DIAGNOSIS — R00.0 TACHYCARDIA: ICD-10-CM

## 2022-09-27 DIAGNOSIS — M48.02 CERVICAL STENOSIS OF SPINE: ICD-10-CM

## 2022-09-27 LAB
AMPHET+METHAMPHET UR QL: NEGATIVE
ANION GAP SERPL CALCULATED.3IONS-SCNC: 13 MMOL/L (ref 5–15)
BACTERIA UR QL AUTO: ABNORMAL /HPF
BARBITURATES UR QL SCN: POSITIVE
BASOPHILS # BLD AUTO: 0 10*3/MM3 (ref 0–0.2)
BASOPHILS NFR BLD AUTO: 0.3 % (ref 0–1.5)
BENZODIAZ UR QL SCN: NEGATIVE
BILIRUB UR QL STRIP: NEGATIVE
BUN SERPL-MCNC: 30 MG/DL (ref 8–23)
BUN/CREAT SERPL: 30.9 (ref 7–25)
CALCIUM SPEC-SCNC: 8.4 MG/DL (ref 8.6–10.5)
CANNABINOIDS SERPL QL: NEGATIVE
CHLORIDE SERPL-SCNC: 102 MMOL/L (ref 98–107)
CLARITY UR: CLEAR
CO2 SERPL-SCNC: 22 MMOL/L (ref 22–29)
COCAINE UR QL: NEGATIVE
COLOR UR: YELLOW
CREAT SERPL-MCNC: 0.97 MG/DL (ref 0.57–1)
DEPRECATED RDW RBC AUTO: 49 FL (ref 37–54)
EGFRCR SERPLBLD CKD-EPI 2021: 63 ML/MIN/1.73
EOSINOPHIL # BLD AUTO: 0 10*3/MM3 (ref 0–0.4)
EOSINOPHIL NFR BLD AUTO: 1.1 % (ref 0.3–6.2)
ERYTHROCYTE [DISTWIDTH] IN BLOOD BY AUTOMATED COUNT: 14 % (ref 12.3–15.4)
ETHANOL UR QL: <0.01 %
GLUCOSE BLDC GLUCOMTR-MCNC: 136 MG/DL (ref 70–105)
GLUCOSE SERPL-MCNC: 137 MG/DL (ref 65–99)
GLUCOSE UR STRIP-MCNC: NEGATIVE MG/DL
HCT VFR BLD AUTO: 40.8 % (ref 34–46.6)
HGB BLD-MCNC: 13.1 G/DL (ref 12–15.9)
HGB UR QL STRIP.AUTO: NEGATIVE
HYALINE CASTS UR QL AUTO: ABNORMAL /LPF
KETONES UR QL STRIP: ABNORMAL
LEUKOCYTE ESTERASE UR QL STRIP.AUTO: ABNORMAL
LYMPHOCYTES # BLD AUTO: 0.7 10*3/MM3 (ref 0.7–3.1)
LYMPHOCYTES NFR BLD AUTO: 17.7 % (ref 19.6–45.3)
MCH RBC QN AUTO: 32.4 PG (ref 26.6–33)
MCHC RBC AUTO-ENTMCNC: 32.2 G/DL (ref 31.5–35.7)
MCV RBC AUTO: 100.6 FL (ref 79–97)
METHADONE UR QL SCN: NEGATIVE
MONOCYTES # BLD AUTO: 0.6 10*3/MM3 (ref 0.1–0.9)
MONOCYTES NFR BLD AUTO: 15.4 % (ref 5–12)
NEUTROPHILS NFR BLD AUTO: 2.4 10*3/MM3 (ref 1.7–7)
NEUTROPHILS NFR BLD AUTO: 65.5 % (ref 42.7–76)
NITRITE UR QL STRIP: NEGATIVE
NRBC BLD AUTO-RTO: 0 /100 WBC (ref 0–0.2)
OPIATES UR QL: NEGATIVE
OXYCODONE UR QL SCN: POSITIVE
PH UR STRIP.AUTO: 5.5 [PH] (ref 5–8)
PLATELET # BLD AUTO: 163 10*3/MM3 (ref 140–450)
PMV BLD AUTO: 7.9 FL (ref 6–12)
POTASSIUM SERPL-SCNC: 4 MMOL/L (ref 3.5–5.2)
PROT UR QL STRIP: ABNORMAL
RBC # BLD AUTO: 4.06 10*6/MM3 (ref 3.77–5.28)
RBC # UR STRIP: ABNORMAL /HPF
REF LAB TEST METHOD: ABNORMAL
SODIUM SERPL-SCNC: 137 MMOL/L (ref 136–145)
SP GR UR STRIP: 1.02 (ref 1–1.03)
SQUAMOUS #/AREA URNS HPF: ABNORMAL /HPF
UROBILINOGEN UR QL STRIP: ABNORMAL
WBC # UR STRIP: ABNORMAL /HPF
WBC NRBC COR # BLD: 3.7 10*3/MM3 (ref 3.4–10.8)
WHOLE BLOOD HOLD COAG: NORMAL
WHOLE BLOOD HOLD SPECIMEN: NORMAL

## 2022-09-27 PROCEDURE — 80307 DRUG TEST PRSMV CHEM ANLYZR: CPT | Performed by: EMERGENCY MEDICINE

## 2022-09-27 PROCEDURE — 25010000002 NALOXONE HCL 2 MG/2ML SOLUTION PREFILLED SYRINGE: Performed by: EMERGENCY MEDICINE

## 2022-09-27 PROCEDURE — 99285 EMERGENCY DEPT VISIT HI MDM: CPT

## 2022-09-27 PROCEDURE — 86038 ANTINUCLEAR ANTIBODIES: CPT | Performed by: NURSE PRACTITIONER

## 2022-09-27 PROCEDURE — 85025 COMPLETE CBC W/AUTO DIFF WBC: CPT | Performed by: EMERGENCY MEDICINE

## 2022-09-27 PROCEDURE — G0378 HOSPITAL OBSERVATION PER HR: HCPCS

## 2022-09-27 PROCEDURE — 74018 RADEX ABDOMEN 1 VIEW: CPT

## 2022-09-27 PROCEDURE — 81001 URINALYSIS AUTO W/SCOPE: CPT | Performed by: EMERGENCY MEDICINE

## 2022-09-27 PROCEDURE — 80074 ACUTE HEPATITIS PANEL: CPT | Performed by: NURSE PRACTITIONER

## 2022-09-27 PROCEDURE — 93005 ELECTROCARDIOGRAM TRACING: CPT | Performed by: EMERGENCY MEDICINE

## 2022-09-27 PROCEDURE — 82962 GLUCOSE BLOOD TEST: CPT

## 2022-09-27 PROCEDURE — 70450 CT HEAD/BRAIN W/O DYE: CPT

## 2022-09-27 PROCEDURE — 80048 BASIC METABOLIC PNL TOTAL CA: CPT | Performed by: EMERGENCY MEDICINE

## 2022-09-27 PROCEDURE — P9612 CATHETERIZE FOR URINE SPEC: HCPCS

## 2022-09-27 PROCEDURE — 82077 ASSAY SPEC XCP UR&BREATH IA: CPT | Performed by: EMERGENCY MEDICINE

## 2022-09-27 PROCEDURE — 25010000002 ENOXAPARIN PER 10 MG: Performed by: HOSPITALIST

## 2022-09-27 RX ORDER — PANTOPRAZOLE SODIUM 40 MG/1
40 TABLET, DELAYED RELEASE ORAL EVERY MORNING
Status: DISCONTINUED | OUTPATIENT
Start: 2022-09-28 | End: 2022-09-30 | Stop reason: HOSPADM

## 2022-09-27 RX ORDER — SODIUM CHLORIDE 9 MG/ML
75 INJECTION, SOLUTION INTRAVENOUS CONTINUOUS
Status: DISCONTINUED | OUTPATIENT
Start: 2022-09-27 | End: 2022-09-30 | Stop reason: HOSPADM

## 2022-09-27 RX ORDER — METOPROLOL TARTRATE 5 MG/5ML
5 INJECTION INTRAVENOUS ONCE
Status: DISCONTINUED | OUTPATIENT
Start: 2022-09-27 | End: 2022-09-27

## 2022-09-27 RX ORDER — IBUPROFEN 800 MG/1
800 TABLET ORAL EVERY 8 HOURS PRN
COMMUNITY
End: 2022-09-30 | Stop reason: HOSPADM

## 2022-09-27 RX ORDER — ENOXAPARIN SODIUM 100 MG/ML
40 INJECTION SUBCUTANEOUS DAILY
Status: DISCONTINUED | OUTPATIENT
Start: 2022-09-27 | End: 2022-09-30 | Stop reason: HOSPADM

## 2022-09-27 RX ORDER — GABAPENTIN 800 MG/1
800 TABLET ORAL 4 TIMES DAILY
Status: ON HOLD | COMMUNITY
End: 2022-09-30 | Stop reason: SDUPTHER

## 2022-09-27 RX ORDER — LEVOTHYROXINE SODIUM 0.05 MG/1
50 TABLET ORAL
Status: DISCONTINUED | OUTPATIENT
Start: 2022-09-28 | End: 2022-09-30 | Stop reason: HOSPADM

## 2022-09-27 RX ORDER — MONTELUKAST SODIUM 10 MG/1
10 TABLET ORAL NIGHTLY
Status: DISCONTINUED | OUTPATIENT
Start: 2022-09-27 | End: 2022-09-30 | Stop reason: HOSPADM

## 2022-09-27 RX ORDER — OXYCODONE AND ACETAMINOPHEN 10; 325 MG/1; MG/1
1 TABLET ORAL EVERY 6 HOURS
Status: ON HOLD | COMMUNITY
End: 2022-09-30 | Stop reason: SDUPTHER

## 2022-09-27 RX ORDER — SODIUM CHLORIDE 0.9 % (FLUSH) 0.9 %
10 SYRINGE (ML) INJECTION EVERY 12 HOURS SCHEDULED
Status: DISCONTINUED | OUTPATIENT
Start: 2022-09-27 | End: 2022-09-30 | Stop reason: HOSPADM

## 2022-09-27 RX ORDER — LACTULOSE 10 G/15ML
20 SOLUTION ORAL 2 TIMES DAILY
Status: DISCONTINUED | OUTPATIENT
Start: 2022-09-27 | End: 2022-09-30 | Stop reason: HOSPADM

## 2022-09-27 RX ORDER — ALBUTEROL SULFATE 90 UG/1
2 AEROSOL, METERED RESPIRATORY (INHALATION) EVERY 4 HOURS PRN
Status: DISCONTINUED | OUTPATIENT
Start: 2022-09-27 | End: 2022-09-27 | Stop reason: CLARIF

## 2022-09-27 RX ORDER — NALOXONE HYDROCHLORIDE 1 MG/ML
2 INJECTION INTRAMUSCULAR; INTRAVENOUS; SUBCUTANEOUS ONCE
Status: COMPLETED | OUTPATIENT
Start: 2022-09-27 | End: 2022-09-27

## 2022-09-27 RX ORDER — CALCIUM CARBONATE 200(500)MG
2 TABLET,CHEWABLE ORAL 2 TIMES DAILY PRN
Status: DISCONTINUED | OUTPATIENT
Start: 2022-09-27 | End: 2022-09-30 | Stop reason: HOSPADM

## 2022-09-27 RX ORDER — SODIUM CHLORIDE 0.9 % (FLUSH) 0.9 %
10 SYRINGE (ML) INJECTION AS NEEDED
Status: DISCONTINUED | OUTPATIENT
Start: 2022-09-27 | End: 2022-09-30 | Stop reason: HOSPADM

## 2022-09-27 RX ORDER — ACETAMINOPHEN 650 MG/1
650 SUPPOSITORY RECTAL EVERY 4 HOURS PRN
Status: DISCONTINUED | OUTPATIENT
Start: 2022-09-27 | End: 2022-09-30 | Stop reason: HOSPADM

## 2022-09-27 RX ORDER — ONDANSETRON 2 MG/ML
4 INJECTION INTRAMUSCULAR; INTRAVENOUS EVERY 4 HOURS PRN
Status: DISCONTINUED | OUTPATIENT
Start: 2022-09-27 | End: 2022-09-30 | Stop reason: HOSPADM

## 2022-09-27 RX ORDER — ALBUTEROL SULFATE 2.5 MG/3ML
2.5 SOLUTION RESPIRATORY (INHALATION) EVERY 4 HOURS PRN
Status: DISCONTINUED | OUTPATIENT
Start: 2022-09-27 | End: 2022-09-30 | Stop reason: HOSPADM

## 2022-09-27 RX ORDER — ACETAMINOPHEN 160 MG/5ML
650 SOLUTION ORAL EVERY 4 HOURS PRN
Status: DISCONTINUED | OUTPATIENT
Start: 2022-09-27 | End: 2022-09-30 | Stop reason: HOSPADM

## 2022-09-27 RX ORDER — ATORVASTATIN CALCIUM 10 MG/1
10 TABLET, FILM COATED ORAL NIGHTLY
Status: DISCONTINUED | OUTPATIENT
Start: 2022-09-27 | End: 2022-09-29

## 2022-09-27 RX ORDER — OXCARBAZEPINE 150 MG/1
450 TABLET, FILM COATED ORAL 2 TIMES DAILY
Status: DISCONTINUED | OUTPATIENT
Start: 2022-09-27 | End: 2022-09-30 | Stop reason: HOSPADM

## 2022-09-27 RX ORDER — ACETAMINOPHEN 325 MG/1
650 TABLET ORAL EVERY 4 HOURS PRN
Status: DISCONTINUED | OUTPATIENT
Start: 2022-09-27 | End: 2022-09-30 | Stop reason: HOSPADM

## 2022-09-27 RX ORDER — OMEPRAZOLE 40 MG/1
40 CAPSULE, DELAYED RELEASE ORAL DAILY
COMMUNITY

## 2022-09-27 RX ADMIN — ENOXAPARIN SODIUM 40 MG: 100 INJECTION SUBCUTANEOUS at 21:44

## 2022-09-27 RX ADMIN — ATORVASTATIN CALCIUM 10 MG: 10 TABLET, FILM COATED ORAL at 21:43

## 2022-09-27 RX ADMIN — ACETAMINOPHEN 650 MG: 325 TABLET, FILM COATED ORAL at 21:44

## 2022-09-27 RX ADMIN — SODIUM CHLORIDE 500 ML: 9 INJECTION, SOLUTION INTRAVENOUS at 15:40

## 2022-09-27 RX ADMIN — SODIUM CHLORIDE 100 ML/HR: 9 INJECTION, SOLUTION INTRAVENOUS at 17:06

## 2022-09-27 RX ADMIN — Medication 10 ML: at 21:44

## 2022-09-27 RX ADMIN — NALOXONE HYDROCHLORIDE 2 MG: 1 INJECTION PARENTERAL at 14:15

## 2022-09-27 RX ADMIN — LACTULOSE 20 G: 20 SOLUTION ORAL at 21:44

## 2022-09-27 RX ADMIN — MONTELUKAST 10 MG: 10 TABLET, FILM COATED ORAL at 21:43

## 2022-09-27 RX ADMIN — OXCARBAZEPINE 450 MG: 150 TABLET, FILM COATED ORAL at 21:43

## 2022-09-27 RX ADMIN — SODIUM CHLORIDE 1000 ML: 9 INJECTION, SOLUTION INTRAVENOUS at 17:05

## 2022-09-28 ENCOUNTER — APPOINTMENT (OUTPATIENT)
Dept: GENERAL RADIOLOGY | Facility: HOSPITAL | Age: 72
End: 2022-09-28

## 2022-09-28 ENCOUNTER — INPATIENT HOSPITAL (AMBULATORY)
Dept: URBAN - METROPOLITAN AREA HOSPITAL 84 | Facility: HOSPITAL | Age: 72
End: 2022-09-28

## 2022-09-28 DIAGNOSIS — K52.9 NONINFECTIVE GASTROENTERITIS AND COLITIS, UNSPECIFIED: ICD-10-CM

## 2022-09-28 DIAGNOSIS — G93.40 ENCEPHALOPATHY, UNSPECIFIED: ICD-10-CM

## 2022-09-28 DIAGNOSIS — R74.01 ELEVATION OF LEVELS OF LIVER TRANSAMINASE LEVELS: ICD-10-CM

## 2022-09-28 DIAGNOSIS — R11.2 NAUSEA WITH VOMITING, UNSPECIFIED: ICD-10-CM

## 2022-09-28 DIAGNOSIS — R10.9 UNSPECIFIED ABDOMINAL PAIN: ICD-10-CM

## 2022-09-28 LAB
ALBUMIN SERPL-MCNC: 3.1 G/DL (ref 3.5–5.2)
ALBUMIN/GLOB SERPL: 1.2 G/DL
ALP SERPL-CCNC: 252 U/L (ref 39–117)
ALPHA1 GLOB MFR UR ELPH: 156 MG/DL (ref 90–200)
ALT SERPL W P-5'-P-CCNC: 313 U/L (ref 1–33)
ANION GAP SERPL CALCULATED.3IONS-SCNC: 7 MMOL/L (ref 5–15)
AST SERPL-CCNC: 599 U/L (ref 1–32)
B PARAPERT DNA SPEC QL NAA+PROBE: NOT DETECTED
B PERT DNA SPEC QL NAA+PROBE: NOT DETECTED
BASOPHILS # BLD AUTO: 0 10*3/MM3 (ref 0–0.2)
BASOPHILS # BLD AUTO: 0 10*3/MM3 (ref 0–0.2)
BASOPHILS NFR BLD AUTO: 0.4 % (ref 0–1.5)
BASOPHILS NFR BLD AUTO: 0.6 % (ref 0–1.5)
BILIRUB SERPL-MCNC: 0.4 MG/DL (ref 0–1.2)
BUN SERPL-MCNC: 19 MG/DL (ref 8–23)
BUN/CREAT SERPL: 25.3 (ref 7–25)
C PNEUM DNA NPH QL NAA+NON-PROBE: NOT DETECTED
CALCIUM SPEC-SCNC: 8.1 MG/DL (ref 8.6–10.5)
CERULOPLASMIN SERPL-MCNC: 21 MG/DL (ref 19–39)
CHLORIDE SERPL-SCNC: 112 MMOL/L (ref 98–107)
CHOLEST SERPL-MCNC: 160 MG/DL (ref 0–200)
CK SERPL-CCNC: 6129 U/L (ref 20–180)
CO2 SERPL-SCNC: 23 MMOL/L (ref 22–29)
CREAT SERPL-MCNC: 0.75 MG/DL (ref 0.57–1)
CRP SERPL-MCNC: 10.7 MG/DL (ref 0–0.5)
D-LACTATE SERPL-SCNC: 1.5 MMOL/L (ref 0.5–2)
DEPRECATED RDW RBC AUTO: 48.1 FL (ref 37–54)
DEPRECATED RDW RBC AUTO: 49.4 FL (ref 37–54)
EGFRCR SERPLBLD CKD-EPI 2021: 85.8 ML/MIN/1.73
EOSINOPHIL # BLD AUTO: 0.1 10*3/MM3 (ref 0–0.4)
EOSINOPHIL # BLD AUTO: 0.1 10*3/MM3 (ref 0–0.4)
EOSINOPHIL NFR BLD AUTO: 2.3 % (ref 0.3–6.2)
EOSINOPHIL NFR BLD AUTO: 2.6 % (ref 0.3–6.2)
ERYTHROCYTE [DISTWIDTH] IN BLOOD BY AUTOMATED COUNT: 13.9 % (ref 12.3–15.4)
ERYTHROCYTE [DISTWIDTH] IN BLOOD BY AUTOMATED COUNT: 14 % (ref 12.3–15.4)
ERYTHROCYTE [SEDIMENTATION RATE] IN BLOOD: 18 MM/HR (ref 0–30)
FERRITIN SERPL-MCNC: 532.4 NG/ML (ref 13–150)
FLUAV SUBTYP SPEC NAA+PROBE: NOT DETECTED
FLUBV RNA ISLT QL NAA+PROBE: NOT DETECTED
GGT SERPL-CCNC: 236 U/L (ref 5–36)
GLOBULIN UR ELPH-MCNC: 2.6 GM/DL
GLUCOSE SERPL-MCNC: 113 MG/DL (ref 65–99)
HADV DNA SPEC NAA+PROBE: NOT DETECTED
HAV IGM SERPL QL IA: NORMAL
HBA1C MFR BLD: 5.5 % (ref 3.5–5.6)
HBV CORE IGM SERPL QL IA: NORMAL
HBV SURFACE AG SERPL QL IA: NORMAL
HCOV 229E RNA SPEC QL NAA+PROBE: NOT DETECTED
HCOV HKU1 RNA SPEC QL NAA+PROBE: NOT DETECTED
HCOV NL63 RNA SPEC QL NAA+PROBE: NOT DETECTED
HCOV OC43 RNA SPEC QL NAA+PROBE: NOT DETECTED
HCT VFR BLD AUTO: 35.1 % (ref 34–46.6)
HCT VFR BLD AUTO: 38.4 % (ref 34–46.6)
HCV AB SER DONR QL: NORMAL
HDLC SERPL-MCNC: 63 MG/DL (ref 40–60)
HGB BLD-MCNC: 11.5 G/DL (ref 12–15.9)
HGB BLD-MCNC: 12.1 G/DL (ref 12–15.9)
HMPV RNA NPH QL NAA+NON-PROBE: NOT DETECTED
HPIV1 RNA ISLT QL NAA+PROBE: NOT DETECTED
HPIV2 RNA SPEC QL NAA+PROBE: NOT DETECTED
HPIV3 RNA NPH QL NAA+PROBE: NOT DETECTED
HPIV4 P GENE NPH QL NAA+PROBE: NOT DETECTED
IRON 24H UR-MRATE: 78 MCG/DL (ref 37–145)
IRON 24H UR-MRATE: 78 MCG/DL (ref 37–145)
IRON SATN MFR SERPL: 34 % (ref 20–50)
LDLC SERPL CALC-MCNC: 79 MG/DL (ref 0–100)
LDLC/HDLC SERPL: 1.22 {RATIO}
LYMPHOCYTES # BLD AUTO: 1 10*3/MM3 (ref 0.7–3.1)
LYMPHOCYTES # BLD AUTO: 1.9 10*3/MM3 (ref 0.7–3.1)
LYMPHOCYTES NFR BLD AUTO: 20.7 % (ref 19.6–45.3)
LYMPHOCYTES NFR BLD AUTO: 38.3 % (ref 19.6–45.3)
M PNEUMO IGG SER IA-ACNC: NOT DETECTED
MAGNESIUM SERPL-MCNC: 2.2 MG/DL (ref 1.6–2.4)
MCH RBC QN AUTO: 31.8 PG (ref 26.6–33)
MCH RBC QN AUTO: 31.8 PG (ref 26.6–33)
MCHC RBC AUTO-ENTMCNC: 31.5 G/DL (ref 31.5–35.7)
MCHC RBC AUTO-ENTMCNC: 32.6 G/DL (ref 31.5–35.7)
MCV RBC AUTO: 100.9 FL (ref 79–97)
MCV RBC AUTO: 97.5 FL (ref 79–97)
MONOCYTES # BLD AUTO: 0.6 10*3/MM3 (ref 0.1–0.9)
MONOCYTES # BLD AUTO: 0.7 10*3/MM3 (ref 0.1–0.9)
MONOCYTES NFR BLD AUTO: 11.3 % (ref 5–12)
MONOCYTES NFR BLD AUTO: 13.4 % (ref 5–12)
NEUTROPHILS NFR BLD AUTO: 2.2 10*3/MM3 (ref 1.7–7)
NEUTROPHILS NFR BLD AUTO: 3.2 10*3/MM3 (ref 1.7–7)
NEUTROPHILS NFR BLD AUTO: 45.4 % (ref 42.7–76)
NEUTROPHILS NFR BLD AUTO: 65 % (ref 42.7–76)
NRBC BLD AUTO-RTO: 0 /100 WBC (ref 0–0.2)
NRBC BLD AUTO-RTO: 0.1 /100 WBC (ref 0–0.2)
PHOSPHATE SERPL-MCNC: 2.7 MG/DL (ref 2.5–4.5)
PLATELET # BLD AUTO: 120 10*3/MM3 (ref 140–450)
PLATELET # BLD AUTO: 163 10*3/MM3 (ref 140–450)
PMV BLD AUTO: 7.7 FL (ref 6–12)
PMV BLD AUTO: 8 FL (ref 6–12)
POTASSIUM SERPL-SCNC: 5.1 MMOL/L (ref 3.5–5.2)
PROCALCITONIN SERPL-MCNC: 1.44 NG/ML (ref 0–0.25)
PROT SERPL-MCNC: 5.7 G/DL (ref 6–8.5)
RBC # BLD AUTO: 3.6 10*6/MM3 (ref 3.77–5.28)
RBC # BLD AUTO: 3.81 10*6/MM3 (ref 3.77–5.28)
RHINOVIRUS RNA SPEC NAA+PROBE: NOT DETECTED
RSV RNA NPH QL NAA+NON-PROBE: NOT DETECTED
SARS-COV-2 RNA NPH QL NAA+NON-PROBE: NOT DETECTED
SODIUM SERPL-SCNC: 142 MMOL/L (ref 136–145)
TIBC SERPL-MCNC: 229 MCG/DL (ref 298–536)
TRANSFERRIN SERPL-MCNC: 154 MG/DL (ref 200–360)
TRIGL SERPL-MCNC: 100 MG/DL (ref 0–150)
TSH SERPL DL<=0.05 MIU/L-ACNC: 1.4 UIU/ML (ref 0.27–4.2)
VIT B12 BLD-MCNC: 1646 PG/ML (ref 211–946)
VLDLC SERPL-MCNC: 18 MG/DL (ref 5–40)
WBC NRBC COR # BLD: 4.9 10*3/MM3 (ref 3.4–10.8)
WBC NRBC COR # BLD: 4.9 10*3/MM3 (ref 3.4–10.8)

## 2022-09-28 PROCEDURE — 86015 ACTIN ANTIBODY EACH: CPT | Performed by: NURSE PRACTITIONER

## 2022-09-28 PROCEDURE — 82390 ASSAY OF CERULOPLASMIN: CPT | Performed by: NURSE PRACTITIONER

## 2022-09-28 PROCEDURE — 0202U NFCT DS 22 TRGT SARS-COV-2: CPT | Performed by: INTERNAL MEDICINE

## 2022-09-28 PROCEDURE — 86140 C-REACTIVE PROTEIN: CPT | Performed by: HOSPITALIST

## 2022-09-28 PROCEDURE — 80053 COMPREHEN METABOLIC PANEL: CPT | Performed by: HOSPITALIST

## 2022-09-28 PROCEDURE — 83735 ASSAY OF MAGNESIUM: CPT | Performed by: HOSPITALIST

## 2022-09-28 PROCEDURE — 83540 ASSAY OF IRON: CPT | Performed by: HOSPITALIST

## 2022-09-28 PROCEDURE — 25010000002 LEVOFLOXACIN PER 250 MG: Performed by: HOSPITALIST

## 2022-09-28 PROCEDURE — 36415 COLL VENOUS BLD VENIPUNCTURE: CPT | Performed by: HOSPITALIST

## 2022-09-28 PROCEDURE — 82550 ASSAY OF CK (CPK): CPT | Performed by: INTERNAL MEDICINE

## 2022-09-28 PROCEDURE — 82550 ASSAY OF CK (CPK): CPT | Performed by: HOSPITALIST

## 2022-09-28 PROCEDURE — 92610 EVALUATE SWALLOWING FUNCTION: CPT

## 2022-09-28 PROCEDURE — 84100 ASSAY OF PHOSPHORUS: CPT | Performed by: HOSPITALIST

## 2022-09-28 PROCEDURE — 85025 COMPLETE CBC W/AUTO DIFF WBC: CPT | Performed by: HOSPITALIST

## 2022-09-28 PROCEDURE — 83605 ASSAY OF LACTIC ACID: CPT | Performed by: HOSPITALIST

## 2022-09-28 PROCEDURE — 84443 ASSAY THYROID STIM HORMONE: CPT | Performed by: HOSPITALIST

## 2022-09-28 PROCEDURE — 99222 1ST HOSP IP/OBS MODERATE 55: CPT | Performed by: NURSE PRACTITIONER

## 2022-09-28 PROCEDURE — 84145 PROCALCITONIN (PCT): CPT | Performed by: HOSPITALIST

## 2022-09-28 PROCEDURE — 82728 ASSAY OF FERRITIN: CPT | Performed by: NURSE PRACTITIONER

## 2022-09-28 PROCEDURE — 86381 MITOCHONDRIAL ANTIBODY EACH: CPT | Performed by: NURSE PRACTITIONER

## 2022-09-28 PROCEDURE — 82103 ALPHA-1-ANTITRYPSIN TOTAL: CPT | Performed by: NURSE PRACTITIONER

## 2022-09-28 PROCEDURE — 82977 ASSAY OF GGT: CPT | Performed by: NURSE PRACTITIONER

## 2022-09-28 PROCEDURE — 80061 LIPID PANEL: CPT | Performed by: HOSPITALIST

## 2022-09-28 PROCEDURE — 87040 BLOOD CULTURE FOR BACTERIA: CPT | Performed by: HOSPITALIST

## 2022-09-28 PROCEDURE — 99222 1ST HOSP IP/OBS MODERATE 55: CPT | Performed by: PSYCHIATRY & NEUROLOGY

## 2022-09-28 PROCEDURE — 84466 ASSAY OF TRANSFERRIN: CPT | Performed by: HOSPITALIST

## 2022-09-28 PROCEDURE — 25010000002 ONDANSETRON PER 1 MG: Performed by: HOSPITALIST

## 2022-09-28 PROCEDURE — 71045 X-RAY EXAM CHEST 1 VIEW: CPT

## 2022-09-28 PROCEDURE — 82607 VITAMIN B-12: CPT | Performed by: HOSPITALIST

## 2022-09-28 PROCEDURE — 86376 MICROSOMAL ANTIBODY EACH: CPT | Performed by: NURSE PRACTITIONER

## 2022-09-28 PROCEDURE — 85025 COMPLETE CBC W/AUTO DIFF WBC: CPT | Performed by: NURSE PRACTITIONER

## 2022-09-28 PROCEDURE — 25010000002 ENOXAPARIN PER 10 MG: Performed by: HOSPITALIST

## 2022-09-28 PROCEDURE — 85652 RBC SED RATE AUTOMATED: CPT | Performed by: HOSPITALIST

## 2022-09-28 PROCEDURE — 80053 COMPREHEN METABOLIC PANEL: CPT | Performed by: NURSE PRACTITIONER

## 2022-09-28 PROCEDURE — 83036 HEMOGLOBIN GLYCOSYLATED A1C: CPT | Performed by: HOSPITALIST

## 2022-09-28 RX ORDER — BUTALBITAL, ACETAMINOPHEN AND CAFFEINE 50; 325; 40 MG/1; MG/1; MG/1
1 TABLET ORAL EVERY 4 HOURS PRN
Status: DISCONTINUED | OUTPATIENT
Start: 2022-09-28 | End: 2022-09-30 | Stop reason: HOSPADM

## 2022-09-28 RX ORDER — LEVOFLOXACIN 5 MG/ML
750 INJECTION, SOLUTION INTRAVENOUS ONCE
Status: COMPLETED | OUTPATIENT
Start: 2022-09-28 | End: 2022-09-28

## 2022-09-28 RX ORDER — POLYETHYLENE GLYCOL 3350 17 G/17G
17 POWDER, FOR SOLUTION ORAL DAILY
Status: DISCONTINUED | OUTPATIENT
Start: 2022-09-28 | End: 2022-09-30 | Stop reason: HOSPADM

## 2022-09-28 RX ORDER — HYDROCODONE BITARTRATE AND ACETAMINOPHEN 7.5; 325 MG/1; MG/1
1 TABLET ORAL EVERY 8 HOURS PRN
Status: DISCONTINUED | OUTPATIENT
Start: 2022-09-28 | End: 2022-09-30 | Stop reason: HOSPADM

## 2022-09-28 RX ORDER — TOPIRAMATE 100 MG/1
100 TABLET, FILM COATED ORAL EVERY 12 HOURS SCHEDULED
Status: DISCONTINUED | OUTPATIENT
Start: 2022-09-28 | End: 2022-09-30 | Stop reason: HOSPADM

## 2022-09-28 RX ORDER — CETIRIZINE HYDROCHLORIDE 10 MG/1
10 TABLET ORAL DAILY
Status: DISCONTINUED | OUTPATIENT
Start: 2022-09-28 | End: 2022-09-29

## 2022-09-28 RX ORDER — GABAPENTIN 300 MG/1
300 CAPSULE ORAL EVERY 8 HOURS SCHEDULED
Status: DISCONTINUED | OUTPATIENT
Start: 2022-09-28 | End: 2022-09-30 | Stop reason: HOSPADM

## 2022-09-28 RX ORDER — OXYBUTYNIN CHLORIDE 10 MG/1
10 TABLET, EXTENDED RELEASE ORAL DAILY
Status: DISCONTINUED | OUTPATIENT
Start: 2022-09-28 | End: 2022-09-30 | Stop reason: HOSPADM

## 2022-09-28 RX ORDER — DOCUSATE SODIUM 100 MG/1
100 CAPSULE, LIQUID FILLED ORAL 2 TIMES DAILY
Status: DISCONTINUED | OUTPATIENT
Start: 2022-09-28 | End: 2022-09-30 | Stop reason: HOSPADM

## 2022-09-28 RX ORDER — CYCLOSPORINE 0.5 MG/ML
1 EMULSION OPHTHALMIC 2 TIMES DAILY
Status: DISCONTINUED | OUTPATIENT
Start: 2022-09-28 | End: 2022-09-30 | Stop reason: HOSPADM

## 2022-09-28 RX ADMIN — CETIRIZINE HYDROCHLORIDE 10 MG: 10 TABLET, FILM COATED ORAL at 14:36

## 2022-09-28 RX ADMIN — LEVOFLOXACIN 750 MG: 5 INJECTION, SOLUTION INTRAVENOUS at 10:23

## 2022-09-28 RX ADMIN — ACETAMINOPHEN 650 MG: 325 TABLET, FILM COATED ORAL at 11:51

## 2022-09-28 RX ADMIN — ONDANSETRON 4 MG: 2 INJECTION INTRAMUSCULAR; INTRAVENOUS at 15:20

## 2022-09-28 RX ADMIN — LEVOTHYROXINE SODIUM 50 MCG: 0.05 TABLET ORAL at 05:50

## 2022-09-28 RX ADMIN — BUTALBITAL, ACETAMINOPHEN, AND CAFFEINE 1 TABLET: 50; 325; 40 TABLET ORAL at 18:41

## 2022-09-28 RX ADMIN — OXYBUTYNIN CHLORIDE 10 MG: 10 TABLET, EXTENDED RELEASE ORAL at 14:36

## 2022-09-28 RX ADMIN — Medication 10 ML: at 08:21

## 2022-09-28 RX ADMIN — LACTULOSE 20 G: 20 SOLUTION ORAL at 08:20

## 2022-09-28 RX ADMIN — ACETAMINOPHEN 650 MG: 325 TABLET, FILM COATED ORAL at 03:45

## 2022-09-28 RX ADMIN — ONDANSETRON 4 MG: 2 INJECTION INTRAMUSCULAR; INTRAVENOUS at 21:39

## 2022-09-28 RX ADMIN — ENOXAPARIN SODIUM 40 MG: 100 INJECTION SUBCUTANEOUS at 15:21

## 2022-09-28 RX ADMIN — PANTOPRAZOLE SODIUM 40 MG: 40 TABLET, DELAYED RELEASE ORAL at 05:50

## 2022-09-28 RX ADMIN — BUTALBITAL, ACETAMINOPHEN, AND CAFFEINE 1 TABLET: 50; 325; 40 TABLET ORAL at 06:34

## 2022-09-28 RX ADMIN — OXCARBAZEPINE 450 MG: 150 TABLET, FILM COATED ORAL at 10:35

## 2022-09-28 RX ADMIN — GABAPENTIN 300 MG: 300 CAPSULE ORAL at 14:35

## 2022-09-28 RX ADMIN — HYDROCODONE BITARTRATE AND ACETAMINOPHEN 1 TABLET: 7.5; 325 TABLET ORAL at 14:36

## 2022-09-28 RX ADMIN — CYCLOSPORINE 1 DROP: 0.5 EMULSION OPHTHALMIC at 21:39

## 2022-09-29 ENCOUNTER — INPATIENT HOSPITAL (AMBULATORY)
Dept: URBAN - METROPOLITAN AREA HOSPITAL 84 | Facility: HOSPITAL | Age: 72
End: 2022-09-29

## 2022-09-29 DIAGNOSIS — R10.9 UNSPECIFIED ABDOMINAL PAIN: ICD-10-CM

## 2022-09-29 DIAGNOSIS — R74.01 ELEVATION OF LEVELS OF LIVER TRANSAMINASE LEVELS: ICD-10-CM

## 2022-09-29 DIAGNOSIS — G93.40 ENCEPHALOPATHY, UNSPECIFIED: ICD-10-CM

## 2022-09-29 DIAGNOSIS — K52.9 NONINFECTIVE GASTROENTERITIS AND COLITIS, UNSPECIFIED: ICD-10-CM

## 2022-09-29 DIAGNOSIS — R11.2 NAUSEA WITH VOMITING, UNSPECIFIED: ICD-10-CM

## 2022-09-29 LAB
ALBUMIN SERPL-MCNC: 3 G/DL (ref 3.5–5.2)
ALBUMIN/GLOB SERPL: 1.2 G/DL
ALP SERPL-CCNC: 245 U/L (ref 39–117)
ALT SERPL W P-5'-P-CCNC: 206 U/L (ref 1–33)
ANION GAP SERPL CALCULATED.3IONS-SCNC: 10 MMOL/L (ref 5–15)
AST SERPL-CCNC: 217 U/L (ref 1–32)
BILIRUB SERPL-MCNC: 0.3 MG/DL (ref 0–1.2)
BUN SERPL-MCNC: 8 MG/DL (ref 8–23)
BUN/CREAT SERPL: 13.6 (ref 7–25)
CALCIUM SPEC-SCNC: 8.1 MG/DL (ref 8.6–10.5)
CHLORIDE SERPL-SCNC: 106 MMOL/L (ref 98–107)
CK SERPL-CCNC: 3727 U/L (ref 20–180)
CO2 SERPL-SCNC: 20 MMOL/L (ref 22–29)
CREAT SERPL-MCNC: 0.59 MG/DL (ref 0.57–1)
EGFRCR SERPLBLD CKD-EPI 2021: 97.1 ML/MIN/1.73
GLOBULIN UR ELPH-MCNC: 2.6 GM/DL
GLUCOSE SERPL-MCNC: 114 MG/DL (ref 65–99)
INR PPP: 1.02 (ref 0.93–1.1)
LKM-1 AB SER-ACNC: 2 UNITS (ref 0–20)
MITOCHONDRIA M2 IGG SER-ACNC: <20 UNITS (ref 0–20)
POTASSIUM SERPL-SCNC: 4.1 MMOL/L (ref 3.5–5.2)
PROT SERPL-MCNC: 5.6 G/DL (ref 6–8.5)
PROTHROMBIN TIME: 10.5 SECONDS (ref 9.6–11.7)
SMA IGG SER-ACNC: 5 UNITS (ref 0–19)
SODIUM SERPL-SCNC: 136 MMOL/L (ref 136–145)

## 2022-09-29 PROCEDURE — 97166 OT EVAL MOD COMPLEX 45 MIN: CPT

## 2022-09-29 PROCEDURE — 97163 PT EVAL HIGH COMPLEX 45 MIN: CPT | Performed by: PHYSICAL THERAPIST

## 2022-09-29 PROCEDURE — 85610 PROTHROMBIN TIME: CPT | Performed by: NURSE PRACTITIONER

## 2022-09-29 PROCEDURE — 25010000002 ONDANSETRON PER 1 MG: Performed by: HOSPITALIST

## 2022-09-29 PROCEDURE — 93005 ELECTROCARDIOGRAM TRACING: CPT | Performed by: INTERNAL MEDICINE

## 2022-09-29 PROCEDURE — 99232 SBSQ HOSP IP/OBS MODERATE 35: CPT | Performed by: NURSE PRACTITIONER

## 2022-09-29 PROCEDURE — 93010 ELECTROCARDIOGRAM REPORT: CPT | Performed by: INTERNAL MEDICINE

## 2022-09-29 PROCEDURE — 25010000002 ENOXAPARIN PER 10 MG: Performed by: HOSPITALIST

## 2022-09-29 RX ORDER — METOPROLOL TARTRATE 5 MG/5ML
2.5 INJECTION INTRAVENOUS ONCE
Status: DISCONTINUED | OUTPATIENT
Start: 2022-09-30 | End: 2022-09-30

## 2022-09-29 RX ORDER — CETIRIZINE HYDROCHLORIDE 10 MG/1
5 TABLET ORAL DAILY
Status: DISCONTINUED | OUTPATIENT
Start: 2022-09-30 | End: 2022-09-30 | Stop reason: HOSPADM

## 2022-09-29 RX ADMIN — GABAPENTIN 300 MG: 300 CAPSULE ORAL at 05:47

## 2022-09-29 RX ADMIN — TOPIRAMATE 100 MG: 100 TABLET, FILM COATED ORAL at 08:50

## 2022-09-29 RX ADMIN — GABAPENTIN 300 MG: 300 CAPSULE ORAL at 21:23

## 2022-09-29 RX ADMIN — CYCLOSPORINE 1 DROP: 0.5 EMULSION OPHTHALMIC at 21:23

## 2022-09-29 RX ADMIN — OXCARBAZEPINE 450 MG: 150 TABLET, FILM COATED ORAL at 00:21

## 2022-09-29 RX ADMIN — LEVOTHYROXINE SODIUM 50 MCG: 0.05 TABLET ORAL at 05:47

## 2022-09-29 RX ADMIN — DOCUSATE SODIUM 100 MG: 100 CAPSULE, LIQUID FILLED ORAL at 08:50

## 2022-09-29 RX ADMIN — LACTULOSE 20 G: 20 SOLUTION ORAL at 00:21

## 2022-09-29 RX ADMIN — GABAPENTIN 300 MG: 300 CAPSULE ORAL at 00:21

## 2022-09-29 RX ADMIN — BUTALBITAL, ACETAMINOPHEN, AND CAFFEINE 1 TABLET: 50; 325; 40 TABLET ORAL at 21:23

## 2022-09-29 RX ADMIN — OXYBUTYNIN CHLORIDE 10 MG: 10 TABLET, EXTENDED RELEASE ORAL at 08:50

## 2022-09-29 RX ADMIN — Medication 10 ML: at 08:55

## 2022-09-29 RX ADMIN — ATORVASTATIN CALCIUM 10 MG: 10 TABLET, FILM COATED ORAL at 00:21

## 2022-09-29 RX ADMIN — CETIRIZINE HYDROCHLORIDE 10 MG: 10 TABLET, FILM COATED ORAL at 08:50

## 2022-09-29 RX ADMIN — HYDROCODONE BITARTRATE AND ACETAMINOPHEN 1 TABLET: 7.5; 325 TABLET ORAL at 00:22

## 2022-09-29 RX ADMIN — DOCUSATE SODIUM 100 MG: 100 CAPSULE, LIQUID FILLED ORAL at 00:21

## 2022-09-29 RX ADMIN — ONDANSETRON 4 MG: 2 INJECTION INTRAMUSCULAR; INTRAVENOUS at 10:53

## 2022-09-29 RX ADMIN — OXCARBAZEPINE 450 MG: 150 TABLET, FILM COATED ORAL at 08:51

## 2022-09-29 RX ADMIN — TOPIRAMATE 100 MG: 100 TABLET, FILM COATED ORAL at 21:23

## 2022-09-29 RX ADMIN — CYCLOSPORINE 1 DROP: 0.5 EMULSION OPHTHALMIC at 08:51

## 2022-09-29 RX ADMIN — LACTULOSE 20 G: 20 SOLUTION ORAL at 08:50

## 2022-09-29 RX ADMIN — BUTALBITAL, ACETAMINOPHEN, AND CAFFEINE 1 TABLET: 50; 325; 40 TABLET ORAL at 05:49

## 2022-09-29 RX ADMIN — ENOXAPARIN SODIUM 40 MG: 100 INJECTION SUBCUTANEOUS at 16:12

## 2022-09-29 RX ADMIN — GABAPENTIN 300 MG: 300 CAPSULE ORAL at 14:08

## 2022-09-29 RX ADMIN — HYDROCODONE BITARTRATE AND ACETAMINOPHEN 1 TABLET: 7.5; 325 TABLET ORAL at 23:44

## 2022-09-29 RX ADMIN — PANTOPRAZOLE SODIUM 40 MG: 40 TABLET, DELAYED RELEASE ORAL at 05:47

## 2022-09-29 RX ADMIN — MONTELUKAST 10 MG: 10 TABLET, FILM COATED ORAL at 00:21

## 2022-09-29 RX ADMIN — ONDANSETRON 4 MG: 2 INJECTION INTRAMUSCULAR; INTRAVENOUS at 01:32

## 2022-09-29 RX ADMIN — TOPIRAMATE 100 MG: 100 TABLET, FILM COATED ORAL at 00:21

## 2022-09-29 RX ADMIN — HYDROCODONE BITARTRATE AND ACETAMINOPHEN 1 TABLET: 7.5; 325 TABLET ORAL at 16:12

## 2022-09-29 RX ADMIN — SODIUM CHLORIDE 150 ML/HR: 9 INJECTION, SOLUTION INTRAVENOUS at 02:36

## 2022-09-29 RX ADMIN — MONTELUKAST 10 MG: 10 TABLET, FILM COATED ORAL at 21:23

## 2022-09-29 RX ADMIN — HYDROCODONE BITARTRATE AND ACETAMINOPHEN 1 TABLET: 7.5; 325 TABLET ORAL at 08:50

## 2022-09-29 RX ADMIN — OXCARBAZEPINE 450 MG: 150 TABLET, FILM COATED ORAL at 21:23

## 2022-09-30 VITALS
TEMPERATURE: 98.1 F | RESPIRATION RATE: 16 BRPM | HEIGHT: 65 IN | BODY MASS INDEX: 20.83 KG/M2 | OXYGEN SATURATION: 95 % | WEIGHT: 125 LBS | HEART RATE: 112 BPM | SYSTOLIC BLOOD PRESSURE: 129 MMHG | DIASTOLIC BLOOD PRESSURE: 84 MMHG

## 2022-09-30 LAB
ANA SER QL: NEGATIVE
QT INTERVAL: 274 MS

## 2022-09-30 RX ORDER — POLYETHYLENE GLYCOL 3350 17 G/17G
17 POWDER, FOR SOLUTION ORAL DAILY
Start: 2022-10-01

## 2022-09-30 RX ORDER — TOPIRAMATE 100 MG/1
100 TABLET, FILM COATED ORAL 2 TIMES DAILY
Start: 2022-09-30

## 2022-09-30 RX ORDER — GABAPENTIN 800 MG/1
800 TABLET ORAL 3 TIMES DAILY
Qty: 30 TABLET | Refills: 0 | Status: SHIPPED | OUTPATIENT
Start: 2022-09-30 | End: 2022-11-18

## 2022-09-30 RX ORDER — OXYCODONE AND ACETAMINOPHEN 10; 325 MG/1; MG/1
1 TABLET ORAL EVERY 6 HOURS
Qty: 15 TABLET | Refills: 0 | Status: SHIPPED | OUTPATIENT
Start: 2022-09-30

## 2022-09-30 RX ORDER — PSEUDOEPHEDRINE HCL 30 MG
100 TABLET ORAL 2 TIMES DAILY
Start: 2022-09-30

## 2022-09-30 RX ADMIN — GABAPENTIN 300 MG: 300 CAPSULE ORAL at 05:33

## 2022-09-30 RX ADMIN — LEVOTHYROXINE SODIUM 50 MCG: 0.05 TABLET ORAL at 05:33

## 2022-09-30 RX ADMIN — CYCLOSPORINE 1 DROP: 0.5 EMULSION OPHTHALMIC at 08:50

## 2022-09-30 RX ADMIN — OXYBUTYNIN CHLORIDE 10 MG: 10 TABLET, EXTENDED RELEASE ORAL at 08:43

## 2022-09-30 RX ADMIN — LACTULOSE 20 G: 20 SOLUTION ORAL at 08:42

## 2022-09-30 RX ADMIN — BUTALBITAL, ACETAMINOPHEN, AND CAFFEINE 1 TABLET: 50; 325; 40 TABLET ORAL at 08:49

## 2022-09-30 RX ADMIN — OXCARBAZEPINE 450 MG: 150 TABLET, FILM COATED ORAL at 08:43

## 2022-09-30 RX ADMIN — PANTOPRAZOLE SODIUM 40 MG: 40 TABLET, DELAYED RELEASE ORAL at 05:33

## 2022-09-30 RX ADMIN — Medication 12.5 MG: at 00:49

## 2022-09-30 RX ADMIN — TOPIRAMATE 100 MG: 100 TABLET, FILM COATED ORAL at 08:43

## 2022-09-30 RX ADMIN — CETIRIZINE HYDROCHLORIDE 5 MG: 10 TABLET, FILM COATED ORAL at 08:43

## 2022-10-01 LAB — QT INTERVAL: 313 MS

## 2022-10-03 LAB
BACTERIA SPEC AEROBE CULT: NORMAL
BACTERIA SPEC AEROBE CULT: NORMAL

## 2022-10-03 NOTE — CASE MANAGEMENT/SOCIAL WORK
Case Management Discharge Note      Final Note: [facility] at [facility].         Selected Continued Care - Discharged on 9/30/2022 Admission date: 9/27/2022 - Discharge disposition: Rehab Facility or Unit (DC - External)    Destination Coordination complete.    Service Provider Selected Services Address Phone Fax Patient Preferred    [facility]  Skilled Nursing [address] [place], [place] [id number] [phone] --           Transportation Services  Private: Car    Final Discharge Disposition Code: 03 - skilled nursing facility (SNF)

## 2022-10-24 ENCOUNTER — TRANSCRIBE ORDERS (OUTPATIENT)
Dept: HOME HEALTH SERVICES | Facility: HOME HEALTHCARE | Age: 72
End: 2022-10-24

## 2022-10-24 ENCOUNTER — HOME HEALTH ADMISSION (OUTPATIENT)
Dept: HOME HEALTH SERVICES | Facility: HOME HEALTHCARE | Age: 72
End: 2022-10-24

## 2022-10-24 DIAGNOSIS — M48.061 SPINAL STENOSIS, LUMBAR REGION, WITHOUT NEUROGENIC CLAUDICATION: Primary | ICD-10-CM

## 2022-11-18 ENCOUNTER — HOSPITAL ENCOUNTER (OUTPATIENT)
Facility: HOSPITAL | Age: 72
Setting detail: OBSERVATION
Discharge: HOME | End: 2022-11-20
Attending: EMERGENCY MEDICINE | Admitting: HOSPITALIST

## 2022-11-18 ENCOUNTER — APPOINTMENT (OUTPATIENT)
Dept: GENERAL RADIOLOGY | Facility: HOSPITAL | Age: 72
End: 2022-11-18

## 2022-11-18 ENCOUNTER — APPOINTMENT (OUTPATIENT)
Dept: CARDIOLOGY | Facility: HOSPITAL | Age: 72
End: 2022-11-18

## 2022-11-18 DIAGNOSIS — L03.116 CELLULITIS OF LEFT LOWER EXTREMITY: ICD-10-CM

## 2022-11-18 DIAGNOSIS — M79.605 LEFT LEG PAIN: Primary | ICD-10-CM

## 2022-11-18 LAB
ALBUMIN SERPL-MCNC: 4.2 G/DL (ref 3.5–5.2)
ALBUMIN/GLOB SERPL: 1.6 G/DL
ALP SERPL-CCNC: 174 U/L (ref 39–117)
ALT SERPL W P-5'-P-CCNC: 9 U/L (ref 1–33)
ANION GAP SERPL CALCULATED.3IONS-SCNC: 8 MMOL/L (ref 5–15)
APTT PPP: 26.4 SECONDS (ref 61–76.5)
AST SERPL-CCNC: 20 U/L (ref 1–32)
BASOPHILS # BLD AUTO: 0.1 10*3/MM3 (ref 0–0.2)
BASOPHILS NFR BLD AUTO: 1.2 % (ref 0–1.5)
BH CV LOWER VASCULAR LEFT COMMON FEMORAL AUGMENT: NORMAL
BH CV LOWER VASCULAR LEFT COMMON FEMORAL COMPETENT: NORMAL
BH CV LOWER VASCULAR LEFT COMMON FEMORAL COMPRESS: NORMAL
BH CV LOWER VASCULAR LEFT COMMON FEMORAL PHASIC: NORMAL
BH CV LOWER VASCULAR LEFT COMMON FEMORAL SPONT: NORMAL
BH CV LOWER VASCULAR LEFT DISTAL FEMORAL COMPRESS: NORMAL
BH CV LOWER VASCULAR LEFT GASTRONEMIUS COMPRESS: NORMAL
BH CV LOWER VASCULAR LEFT GREATER SAPH AK COMPRESS: NORMAL
BH CV LOWER VASCULAR LEFT GREATER SAPH BK COMPRESS: NORMAL
BH CV LOWER VASCULAR LEFT LESSER SAPH COMPRESS: NORMAL
BH CV LOWER VASCULAR LEFT MID FEMORAL AUGMENT: NORMAL
BH CV LOWER VASCULAR LEFT MID FEMORAL COMPETENT: NORMAL
BH CV LOWER VASCULAR LEFT MID FEMORAL COMPRESS: NORMAL
BH CV LOWER VASCULAR LEFT MID FEMORAL PHASIC: NORMAL
BH CV LOWER VASCULAR LEFT MID FEMORAL SPONT: NORMAL
BH CV LOWER VASCULAR LEFT PERONEAL COMPRESS: NORMAL
BH CV LOWER VASCULAR LEFT POPLITEAL AUGMENT: NORMAL
BH CV LOWER VASCULAR LEFT POPLITEAL COMPETENT: NORMAL
BH CV LOWER VASCULAR LEFT POPLITEAL COMPRESS: NORMAL
BH CV LOWER VASCULAR LEFT POPLITEAL PHASIC: NORMAL
BH CV LOWER VASCULAR LEFT POPLITEAL SPONT: NORMAL
BH CV LOWER VASCULAR LEFT POSTERIOR TIBIAL COMPRESS: NORMAL
BH CV LOWER VASCULAR LEFT PROXIMAL FEMORAL COMPRESS: NORMAL
BH CV LOWER VASCULAR LEFT SAPHENOFEMORAL JUNCTION COMPRESS: NORMAL
BH CV LOWER VASCULAR RIGHT COMMON FEMORAL AUGMENT: NORMAL
BH CV LOWER VASCULAR RIGHT COMMON FEMORAL COMPETENT: NORMAL
BH CV LOWER VASCULAR RIGHT COMMON FEMORAL COMPRESS: NORMAL
BH CV LOWER VASCULAR RIGHT COMMON FEMORAL PHASIC: NORMAL
BH CV LOWER VASCULAR RIGHT COMMON FEMORAL SPONT: NORMAL
BH CV VAS PRELIMINARY FINDINGS SCRIPTING: 1
BILIRUB SERPL-MCNC: <0.2 MG/DL (ref 0–1.2)
BUN SERPL-MCNC: 21 MG/DL (ref 8–23)
BUN/CREAT SERPL: 25 (ref 7–25)
CALCIUM SPEC-SCNC: 9.1 MG/DL (ref 8.6–10.5)
CHLORIDE SERPL-SCNC: 104 MMOL/L (ref 98–107)
CK SERPL-CCNC: 139 U/L (ref 20–180)
CO2 SERPL-SCNC: 27 MMOL/L (ref 22–29)
CREAT SERPL-MCNC: 0.84 MG/DL (ref 0.57–1)
DEPRECATED RDW RBC AUTO: 50.8 FL (ref 37–54)
EGFRCR SERPLBLD CKD-EPI 2021: 74.9 ML/MIN/1.73
EOSINOPHIL # BLD AUTO: 0.2 10*3/MM3 (ref 0–0.4)
EOSINOPHIL NFR BLD AUTO: 3.1 % (ref 0.3–6.2)
ERYTHROCYTE [DISTWIDTH] IN BLOOD BY AUTOMATED COUNT: 14 % (ref 12.3–15.4)
GLOBULIN UR ELPH-MCNC: 2.7 GM/DL
GLUCOSE SERPL-MCNC: 99 MG/DL (ref 65–99)
HCT VFR BLD AUTO: 38.2 % (ref 34–46.6)
HGB BLD-MCNC: 12.7 G/DL (ref 12–15.9)
HOLD SPECIMEN: NORMAL
INR PPP: 0.95 (ref 0.93–1.1)
LYMPHOCYTES # BLD AUTO: 1.8 10*3/MM3 (ref 0.7–3.1)
LYMPHOCYTES NFR BLD AUTO: 31.2 % (ref 19.6–45.3)
MAXIMAL PREDICTED HEART RATE: 150 BPM
MCH RBC QN AUTO: 32.7 PG (ref 26.6–33)
MCHC RBC AUTO-ENTMCNC: 33.4 G/DL (ref 31.5–35.7)
MCV RBC AUTO: 97.9 FL (ref 79–97)
MONOCYTES # BLD AUTO: 0.7 10*3/MM3 (ref 0.1–0.9)
MONOCYTES NFR BLD AUTO: 12.5 % (ref 5–12)
NEUTROPHILS NFR BLD AUTO: 3 10*3/MM3 (ref 1.7–7)
NEUTROPHILS NFR BLD AUTO: 52 % (ref 42.7–76)
NRBC BLD AUTO-RTO: 0 /100 WBC (ref 0–0.2)
PLATELET # BLD AUTO: 204 10*3/MM3 (ref 140–450)
PMV BLD AUTO: 8.4 FL (ref 6–12)
POTASSIUM SERPL-SCNC: 5 MMOL/L (ref 3.5–5.2)
PROCALCITONIN SERPL-MCNC: 0.06 NG/ML (ref 0–0.25)
PROT SERPL-MCNC: 6.9 G/DL (ref 6–8.5)
PROTHROMBIN TIME: 9.8 SECONDS (ref 9.6–11.7)
RBC # BLD AUTO: 3.9 10*6/MM3 (ref 3.77–5.28)
SODIUM SERPL-SCNC: 139 MMOL/L (ref 136–145)
STRESS TARGET HR: 128 BPM
URATE SERPL-MCNC: 3.1 MG/DL (ref 2.4–5.7)
WBC NRBC COR # BLD: 5.7 10*3/MM3 (ref 3.4–10.8)

## 2022-11-18 PROCEDURE — 25010000002 ENOXAPARIN PER 10 MG: Performed by: PHYSICIAN ASSISTANT

## 2022-11-18 PROCEDURE — 82607 VITAMIN B-12: CPT | Performed by: HOSPITALIST

## 2022-11-18 PROCEDURE — G0378 HOSPITAL OBSERVATION PER HR: HCPCS

## 2022-11-18 PROCEDURE — 25010000002 ONDANSETRON PER 1 MG: Performed by: PHYSICIAN ASSISTANT

## 2022-11-18 PROCEDURE — 96376 TX/PRO/DX INJ SAME DRUG ADON: CPT

## 2022-11-18 PROCEDURE — 96375 TX/PRO/DX INJ NEW DRUG ADDON: CPT

## 2022-11-18 PROCEDURE — 84550 ASSAY OF BLOOD/URIC ACID: CPT | Performed by: EMERGENCY MEDICINE

## 2022-11-18 PROCEDURE — 73590 X-RAY EXAM OF LOWER LEG: CPT

## 2022-11-18 PROCEDURE — 96372 THER/PROPH/DIAG INJ SC/IM: CPT

## 2022-11-18 PROCEDURE — 25010000002 HYDROMORPHONE 1 MG/ML SOLUTION: Performed by: PHYSICIAN ASSISTANT

## 2022-11-18 PROCEDURE — 84145 PROCALCITONIN (PCT): CPT | Performed by: PHYSICIAN ASSISTANT

## 2022-11-18 PROCEDURE — 82550 ASSAY OF CK (CPK): CPT | Performed by: EMERGENCY MEDICINE

## 2022-11-18 PROCEDURE — 99284 EMERGENCY DEPT VISIT MOD MDM: CPT

## 2022-11-18 PROCEDURE — 85610 PROTHROMBIN TIME: CPT | Performed by: EMERGENCY MEDICINE

## 2022-11-18 PROCEDURE — 96365 THER/PROPH/DIAG IV INF INIT: CPT

## 2022-11-18 PROCEDURE — 25010000002 HYDROMORPHONE 1 MG/ML SOLUTION: Performed by: EMERGENCY MEDICINE

## 2022-11-18 PROCEDURE — 63710000001 PROMETHAZINE PER 25 MG: Performed by: EMERGENCY MEDICINE

## 2022-11-18 PROCEDURE — 73630 X-RAY EXAM OF FOOT: CPT

## 2022-11-18 PROCEDURE — 25010000002 CEFTRIAXONE PER 250 MG: Performed by: EMERGENCY MEDICINE

## 2022-11-18 PROCEDURE — 85730 THROMBOPLASTIN TIME PARTIAL: CPT | Performed by: EMERGENCY MEDICINE

## 2022-11-18 PROCEDURE — 93971 EXTREMITY STUDY: CPT

## 2022-11-18 PROCEDURE — 85025 COMPLETE CBC W/AUTO DIFF WBC: CPT | Performed by: EMERGENCY MEDICINE

## 2022-11-18 PROCEDURE — 80053 COMPREHEN METABOLIC PANEL: CPT | Performed by: EMERGENCY MEDICINE

## 2022-11-18 RX ORDER — TOPIRAMATE 100 MG/1
100 TABLET, FILM COATED ORAL 2 TIMES DAILY
Status: DISCONTINUED | OUTPATIENT
Start: 2022-11-18 | End: 2022-11-20 | Stop reason: HOSPADM

## 2022-11-18 RX ORDER — PROMETHAZINE HYDROCHLORIDE 25 MG/1
12.5 TABLET ORAL ONCE
Status: COMPLETED | OUTPATIENT
Start: 2022-11-18 | End: 2022-11-18

## 2022-11-18 RX ORDER — MONTELUKAST SODIUM 10 MG/1
10 TABLET ORAL NIGHTLY
Status: DISCONTINUED | OUTPATIENT
Start: 2022-11-18 | End: 2022-11-20 | Stop reason: HOSPADM

## 2022-11-18 RX ORDER — CHOLECALCIFEROL (VITAMIN D3) 125 MCG
5 CAPSULE ORAL NIGHTLY PRN
Status: DISCONTINUED | OUTPATIENT
Start: 2022-11-18 | End: 2022-11-20 | Stop reason: HOSPADM

## 2022-11-18 RX ORDER — SUCRALFATE 1 G/1
1 TABLET ORAL
Status: DISCONTINUED | OUTPATIENT
Start: 2022-11-18 | End: 2022-11-20 | Stop reason: HOSPADM

## 2022-11-18 RX ORDER — HYDROCODONE BITARTRATE AND ACETAMINOPHEN 5; 325 MG/1; MG/1
1 TABLET ORAL ONCE AS NEEDED
Status: COMPLETED | OUTPATIENT
Start: 2022-11-18 | End: 2022-11-18

## 2022-11-18 RX ORDER — ONDANSETRON 4 MG/1
4 TABLET, ORALLY DISINTEGRATING ORAL ONCE
Status: DISCONTINUED | OUTPATIENT
Start: 2022-11-18 | End: 2022-11-18

## 2022-11-18 RX ORDER — GABAPENTIN 400 MG/1
800 CAPSULE ORAL EVERY 12 HOURS SCHEDULED
Status: DISCONTINUED | OUTPATIENT
Start: 2022-11-18 | End: 2022-11-19

## 2022-11-18 RX ORDER — ALBUTEROL SULFATE 2.5 MG/3ML
2.5 SOLUTION RESPIRATORY (INHALATION) EVERY 6 HOURS PRN
Status: DISCONTINUED | OUTPATIENT
Start: 2022-11-18 | End: 2022-11-19 | Stop reason: SDUPTHER

## 2022-11-18 RX ORDER — ALBUTEROL SULFATE 2.5 MG/3ML
2.5 SOLUTION RESPIRATORY (INHALATION) EVERY 6 HOURS PRN
Status: DISCONTINUED | OUTPATIENT
Start: 2022-11-18 | End: 2022-11-20 | Stop reason: HOSPADM

## 2022-11-18 RX ORDER — GABAPENTIN 800 MG/1
800 TABLET ORAL 4 TIMES DAILY
COMMUNITY

## 2022-11-18 RX ORDER — POLYETHYLENE GLYCOL 3350 17 G/17G
17 POWDER, FOR SOLUTION ORAL DAILY
Status: DISCONTINUED | OUTPATIENT
Start: 2022-11-18 | End: 2022-11-20 | Stop reason: HOSPADM

## 2022-11-18 RX ORDER — CETIRIZINE HYDROCHLORIDE 10 MG/1
10 TABLET ORAL DAILY
Status: DISCONTINUED | OUTPATIENT
Start: 2022-11-18 | End: 2022-11-20 | Stop reason: HOSPADM

## 2022-11-18 RX ORDER — SODIUM CHLORIDE 0.9 % (FLUSH) 0.9 %
10 SYRINGE (ML) INJECTION EVERY 12 HOURS SCHEDULED
Status: DISCONTINUED | OUTPATIENT
Start: 2022-11-18 | End: 2022-11-20 | Stop reason: HOSPADM

## 2022-11-18 RX ORDER — OXCARBAZEPINE 150 MG/1
450 TABLET, FILM COATED ORAL 2 TIMES DAILY
Status: DISCONTINUED | OUTPATIENT
Start: 2022-11-18 | End: 2022-11-20 | Stop reason: HOSPADM

## 2022-11-18 RX ORDER — ENOXAPARIN SODIUM 100 MG/ML
40 INJECTION SUBCUTANEOUS EVERY 24 HOURS
Status: DISCONTINUED | OUTPATIENT
Start: 2022-11-18 | End: 2022-11-20 | Stop reason: HOSPADM

## 2022-11-18 RX ORDER — SODIUM CHLORIDE 9 MG/ML
40 INJECTION, SOLUTION INTRAVENOUS AS NEEDED
Status: DISCONTINUED | OUTPATIENT
Start: 2022-11-18 | End: 2022-11-20 | Stop reason: HOSPADM

## 2022-11-18 RX ORDER — PANTOPRAZOLE SODIUM 40 MG/1
40 TABLET, DELAYED RELEASE ORAL EVERY MORNING
Status: DISCONTINUED | OUTPATIENT
Start: 2022-11-19 | End: 2022-11-20 | Stop reason: HOSPADM

## 2022-11-18 RX ORDER — ATORVASTATIN CALCIUM 10 MG/1
10 TABLET, FILM COATED ORAL NIGHTLY
Status: DISCONTINUED | OUTPATIENT
Start: 2022-11-18 | End: 2022-11-20 | Stop reason: HOSPADM

## 2022-11-18 RX ORDER — ONDANSETRON 2 MG/ML
4 INJECTION INTRAMUSCULAR; INTRAVENOUS EVERY 6 HOURS PRN
Status: DISCONTINUED | OUTPATIENT
Start: 2022-11-18 | End: 2022-11-20 | Stop reason: HOSPADM

## 2022-11-18 RX ORDER — OXYBUTYNIN CHLORIDE 10 MG/1
10 TABLET, EXTENDED RELEASE ORAL DAILY
Status: DISCONTINUED | OUTPATIENT
Start: 2022-11-19 | End: 2022-11-20 | Stop reason: HOSPADM

## 2022-11-18 RX ORDER — ONDANSETRON 4 MG/1
4 TABLET, FILM COATED ORAL EVERY 6 HOURS PRN
Status: DISCONTINUED | OUTPATIENT
Start: 2022-11-18 | End: 2022-11-20 | Stop reason: HOSPADM

## 2022-11-18 RX ORDER — LEVOTHYROXINE SODIUM 0.05 MG/1
50 TABLET ORAL
Status: DISCONTINUED | OUTPATIENT
Start: 2022-11-19 | End: 2022-11-20 | Stop reason: HOSPADM

## 2022-11-18 RX ORDER — SODIUM CHLORIDE 0.9 % (FLUSH) 0.9 %
10 SYRINGE (ML) INJECTION AS NEEDED
Status: DISCONTINUED | OUTPATIENT
Start: 2022-11-18 | End: 2022-11-20 | Stop reason: HOSPADM

## 2022-11-18 RX ADMIN — CEFTRIAXONE 1 G: 1 INJECTION, POWDER, FOR SOLUTION INTRAMUSCULAR; INTRAVENOUS at 11:54

## 2022-11-18 RX ADMIN — MONTELUKAST 10 MG: 10 TABLET, FILM COATED ORAL at 20:41

## 2022-11-18 RX ADMIN — HYDROMORPHONE HYDROCHLORIDE 0.5 MG: 1 INJECTION, SOLUTION INTRAMUSCULAR; INTRAVENOUS; SUBCUTANEOUS at 20:41

## 2022-11-18 RX ADMIN — Medication 10 ML: at 20:42

## 2022-11-18 RX ADMIN — OXCARBAZEPINE 450 MG: 150 TABLET, FILM COATED ORAL at 20:41

## 2022-11-18 RX ADMIN — Medication 10 ML: at 12:41

## 2022-11-18 RX ADMIN — TOPIRAMATE 100 MG: 100 TABLET, FILM COATED ORAL at 20:41

## 2022-11-18 RX ADMIN — GABAPENTIN 800 MG: 400 CAPSULE ORAL at 20:41

## 2022-11-18 RX ADMIN — HYDROCODONE BITARTRATE AND ACETAMINOPHEN 1 TABLET: 5; 325 TABLET ORAL at 10:26

## 2022-11-18 RX ADMIN — ONDANSETRON 4 MG: 2 INJECTION INTRAMUSCULAR; INTRAVENOUS at 20:41

## 2022-11-18 RX ADMIN — ENOXAPARIN SODIUM 40 MG: 100 INJECTION SUBCUTANEOUS at 17:10

## 2022-11-18 RX ADMIN — HYDROMORPHONE HYDROCHLORIDE 0.5 MG: 1 INJECTION, SOLUTION INTRAMUSCULAR; INTRAVENOUS; SUBCUTANEOUS at 14:33

## 2022-11-18 RX ADMIN — HYDROMORPHONE HYDROCHLORIDE 0.5 MG: 1 INJECTION, SOLUTION INTRAMUSCULAR; INTRAVENOUS; SUBCUTANEOUS at 11:54

## 2022-11-18 RX ADMIN — HYDROMORPHONE HYDROCHLORIDE 0.5 MG: 1 INJECTION, SOLUTION INTRAMUSCULAR; INTRAVENOUS; SUBCUTANEOUS at 17:14

## 2022-11-18 RX ADMIN — ATORVASTATIN CALCIUM 10 MG: 10 TABLET, FILM COATED ORAL at 20:41

## 2022-11-18 RX ADMIN — PROMETHAZINE HYDROCHLORIDE 12.5 MG: 25 TABLET ORAL at 10:26

## 2022-11-18 NOTE — H&P
[facility] H&P    Patient Name: Ashley Lin  : 1950  MRN: [id number]  Primary Care Physician: Jamey Fairchild MD  Date of admission: 2022     Patient Care Team:  Jamey Fairchild MD as PCP - General (Family Medicine)          Subjective   History Present Illness     Chief Complaint:   Chief Complaint   Patient presents with   • Leg Pain     Left leg swelling with pain X2 weeks. PCP told her to come be seen.         History of Present Illness  Obtained from admitting physician HPI on 2022:  History of present illness this is a 72-year-old female with multiple health problems who complains of pain to the left leg for last 2 weeks.  No fever chills is throbbing aching continuous severe worse with movement better with rest and nonradiating.  She associated with swelling and edema.  No injury no recent long car ride plane or immobilization foreign travels no chest pain or shortness of breath.  Patient called her doctor today and was sent to the ER.  No other complaints or associated symptoms at this time.    2022 (postobservation admission):  Patient confirms the HPI noted above including several week history of mild left foot and lower extremity swelling with severe burning pain made worse with any attempts at weightbearing and even with light touch.  She notes that the pain began shortly after she had developed a blister on her left heel which is subsequently healed.  No other traumas reported and patient denies any new footwear or insect bites or stings.  No other pain, dyspnea, fever, cough, nausea or vomiting is noted.      Review of Systems   Constitutional: Negative.   HENT: Negative.    Eyes: Negative.    Cardiovascular: Positive for leg swelling.   Respiratory: Negative.    Skin: Negative.    Musculoskeletal: Positive for joint pain.   Gastrointestinal: Negative.    Genitourinary: Negative.    Neurological: Negative.    Psychiatric/Behavioral: Negative.             Personal History     Past Medical History:   Past Medical History:   Diagnosis Date   • Asthma    • Chronic back pain    • Chronic headaches    • Chronic obstructive pulmonary disease (HCC) 7/1/2020   • Chronic pain syndrome 7/1/2020   • Depression 7/1/2020   • Disease of thyroid gland    • Estrogen deficiency 7/1/2020   • GERD without esophagitis 7/1/2020   • Hypothyroidism (acquired) 7/1/2020   • Migraines 7/1/2020   • Neuropathy    • Vomiting 06/30/2020       Surgical History:      Past Surgical History:   Procedure Laterality Date   • CHOLECYSTECTOMY     • COLONOSCOPY N/A 11/11/2020    Procedure: COLONOSCOPY WITH RANDOM COLON BIOPSIES X4 AREAS, BIOPSY X 1 AREA;  Surgeon: Aguila Garcia MD;  Location: [facility] ENDOSCOPY;  Service: Gastroenterology;  Laterality: N/A;  POST- colon erosion   • ENDOSCOPY N/A 7/2/2020    Procedure: ESOPHAGOGASTRODUODENOSCOPY WITH DILATATION (BOUGIE #54);  Surgeon: Benjamin Mike MD;  Location: [facility] ENDOSCOPY;  Service: Gastroenterology;  Laterality: N/A;  Post operative diagnosis:GASTROPARESIS AND DYSPHAGIA    • ENDOSCOPY N/A 11/11/2020    Procedure: ESOPHAGOGASTRODUODENOSCOPY WITH BIOPSY X 1 AREA;  Surgeon: Aguila Garcia MD;  Location: [facility] ENDOSCOPY;  Service: Gastroenterology;  Laterality: N/A;  POST   • HYSTERECTOMY     • NECK SURGERY     • SPINE SURGERY  06/09/2020           Family History: family history includes Heart disease in her mother; No Known Problems in her father. Otherwise pertinent FHx was reviewed and unremarkable.     Social History:  reports that she quit smoking about 16 years ago. She has never used smokeless tobacco. She reports that she does not drink alcohol and does not use drugs.      Medications:  Prior to Admission medications    Medication Sig Start Date End Date Taking? Authorizing Provider   albuterol sulfate  (90 Base) MCG/ACT inhaler Inhale 2 puffs Every 4 (Four) Hours As Needed for Wheezing or Shortness of Air.  May use every 4 to 6 hours    Ramonita Cason MD   atorvastatin (LIPITOR) 10 MG tablet Take 10 mg by mouth Every Night.    Ramonita Cason MD   butalbital-acetaminophen-caffeine (FIORICET, ESGIC) -40 MG per tablet Take 1-2 tablets by mouth Every 4 (Four) Hours As Needed for Headache.    Ramonita Cason MD   cetirizine (zyrTEC) 10 MG tablet Take 10 mg by mouth Daily.    Ramonita Cason MD   cycloSPORINE (RESTASIS) 0.05 % ophthalmic emulsion Administer 1 drop to both eyes 2 (Two) Times a Day.    Ramonita Cason MD   docusate sodium 100 MG capsule Take 1 capsule by mouth 2 (Two) Times a Day. 9/30/22   Jared Steven MD   Eluxadoline (Viberzi) 100 MG tablet Take 1 tablet by mouth 2 (Two) Times a Day.    Ramonita Cason MD   EPINEPHrine (EPIPEN) 0.3 MG/0.3ML solution auto-injector injection Inject 0.3 mg into the appropriate muscle as directed by prescriber 1 (One) Time.    Ramonita Cason MD   estradiol (ESTRACE) 1 MG tablet Take 1 mg by mouth Daily.    Ramonita Cason MD   fluticasone (FLONASE) 50 MCG/ACT nasal spray 2 sprays into the nostril(s) as directed by provider Daily.    Ramonita Cason MD   gabapentin (NEURONTIN) 800 MG tablet Take 1 tablet by mouth 3 (Three) Times a Day. 9/30/22   Jared Steven MD   levothyroxine (SYNTHROID, LEVOTHROID) 50 MCG tablet Take 50 mcg by mouth Every Morning.    Ramonita Cason MD   montelukast (SINGULAIR) 10 MG tablet Take 10 mg by mouth Every Night.    Ramonita Cason MD   omeprazole (priLOSEC) 40 MG capsule Take 40 mg by mouth Daily.    Ramonita Cason MD   OXcarbazepine (TRILEPTAL) 300 MG tablet Take 450 mg by mouth 2 (Two) Times a Day.    Ramonita Cason MD   oxybutynin XL (DITROPAN-XL) 10 MG 24 hr tablet Take 10 mg by mouth Daily. 8/18/21   Ramonita Cason MD   oxyCODONE-acetaminophen (PERCOCET)  MG per tablet Take 1 tablet by mouth Every 6 (Six) Hours. 9/30/22   Jared Steven MD    polyethylene glycol (MIRALAX) 17 g packet Take 17 g by mouth Daily. 10/1/22   Jared Steven MD   rizatriptan MLT (MAXALT-MLT) 10 MG disintegrating tablet Place 10 mg on the tongue 1 (One) Time As Needed for Migraine. May repeat in 2 hours if needed    Ramonita Cason MD   Stiolto Respimat 2.5-2.5 MCG/ACT aerosol solution inhaler Inhale 2 puffs Daily. 1/18/22   Ramonita Cason MD   sucralfate (CARAFATE) 1 g tablet Take 1 g by mouth 2 (Two) Times a Day.    Ramonita Cason MD   topiramate (TOPAMAX) 100 MG tablet Take 1 tablet by mouth 2 (Two) Times a Day. 9/30/22   Jared Steven MD   vitamin D3 125 MCG (5000 UT) capsule capsule Take 5,000 Units by mouth Daily.    Ramonita Cason MD       Allergies:    Allergies   Allergen Reactions   • Morphine Hives   • Penicillins Hives and Shortness Of Breath     Can take iv form - but not po form         Objective   Objective     Vital Signs  Temp:  [98.2 °F (36.8 °C)] 98.2 °F (36.8 °C)  Heart Rate:  [] 88  Resp:  [18] 18  BP: (141-160)/(71-83) 160/83  SpO2:  [98 %-100 %] 100 %  on   ;   Device (Oxygen Therapy): room air  Body mass index is 26.25 kg/m².    Physical Exam  Vitals reviewed.   Constitutional:       General: She is not in acute distress.     Appearance: Normal appearance. She is normal weight. She is not ill-appearing, toxic-appearing or diaphoretic.   HENT:      Head: Normocephalic.      Right Ear: External ear normal.      Left Ear: External ear normal.      Nose: Nose normal.      Mouth/Throat:      Mouth: Mucous membranes are moist.   Eyes:      Extraocular Movements: Extraocular movements intact.   Cardiovascular:      Rate and Rhythm: Normal rate and regular rhythm.      Pulses: Normal pulses.   Pulmonary:      Effort: Pulmonary effort is normal.      Breath sounds: Normal breath sounds.   Abdominal:      General: Bowel sounds are normal.      Palpations: Abdomen is soft.   Musculoskeletal:         General: Normal range of motion.       Cervical back: Normal range of motion.      Right lower leg: No edema.      Left lower leg: Edema present.      Comments: Trace edema noted in patient's left foot extending to just proximal to the ankle with no obvious erythema but some warmth present   Skin:     General: Skin is warm and dry.      Capillary Refill: Capillary refill takes less than 2 seconds.   Neurological:      General: No focal deficit present.      Mental Status: She is alert.   Psychiatric:         Mood and Affect: Mood normal.         Behavior: Behavior normal.         Thought Content: Thought content normal.         Judgment: Judgment normal.           Results Review:  I have personally reviewed most recent lab results and agree with findings, most notably: CK, CMP, CBC, INR/PT, PTT.    Results from last 7 days   Lab Units 11/18/22  1017   WBC 10*3/mm3 5.70   HEMOGLOBIN g/dL 12.7   HEMATOCRIT % 38.2   PLATELETS 10*3/mm3 204   INR  0.95     Results from last 7 days   Lab Units 11/18/22  1017   SODIUM mmol/L 139   POTASSIUM mmol/L 5.0   CHLORIDE mmol/L 104   CO2 mmol/L 27.0   BUN mg/dL 21   CREATININE mg/dL 0.84   GLUCOSE mg/dL 99   CALCIUM mg/dL 9.1   ALT (SGPT) U/L 9   AST (SGOT) U/L 20     Estimated Creatinine Clearance: 55.2 mL/min (by C-G formula based on SCr of 0.84 mg/dL).  Brief Urine Lab Results  (Last result in the past 365 days)      Color   Clarity   Blood   Leuk Est   Nitrite   Protein   CREAT   Urine HCG        09/27/22 1352 Yellow   Clear   Negative   Trace   Negative   Trace                 Microbiology Results (last 10 days)     ** No results found for the last 240 hours. **          ECG/EMG Results (most recent)     None                  No radiology results for the last 7 days      Estimated Creatinine Clearance: 55.2 mL/min (by C-G formula based on SCr of 0.84 mg/dL).    Assessment & Plan   Assessment/Plan       Active Hospital Problems    Diagnosis  POA   • **Left leg pain [M79.605]  Yes      Resolved Hospital Problems    No resolved problems to display.     Left leg pain  -CK: 139  -CMP unremarkable  -WBCs: 5.70  -Venous duplex ultrasound left lower extremity pending official interpretation but appears to show no acute DVT  -Rocephin started in the ED, continue  -Check procalcitonin  -X-ray of left tib-fib and foot pending    Asthma  -Albuterol    Hypothyroidism  -Synthroid    OAB  -Oxybutynin    Hyperlipidemia  -Statin        VTE Prophylaxis -   Mechanical Order History:     None      Pharmalogical Order History:     None          CODE STATUS:    There are no questions and answers to display.       This patient has been examined wearing personal protective equipment.     I discussed the patient's findings and my recommendations with patient and nursing staff.      Signature:Electronically signed by Luis Manuel Martell PA-C, 11/18/22, 5:18 PM EST.

## 2022-11-18 NOTE — PLAN OF CARE
Goal Outcome Evaluation:               Plan are Pain mgt and IV antibiotics. Ambulated to the bathroom with stand by assist and uses cane. Will monitor.

## 2022-11-18 NOTE — ED PROVIDER NOTES
Subjective   History of Present Illness  Chief complaint left leg pain    History of present illness this is a 72-year-old female with multiple health problems who complains of pain to the left leg for last 2 weeks.  No fever chills is throbbing aching continuous severe worse with movement better with rest and nonradiating.  She associated with swelling and edema.  No injury no recent long car ride plane or immobilization foreign travels no chest pain or shortness of breath.  Patient called her doctor today and was sent to the ER.  No other complaints or associated symptoms at this time.        Review of Systems   Constitutional: Negative for chills and fever.   Respiratory: Negative for chest tightness and shortness of breath.    Cardiovascular: Negative for chest pain and palpitations.   Gastrointestinal: Negative for abdominal pain and vomiting.   Genitourinary: Negative for difficulty urinating and dysuria.   Musculoskeletal: Negative for back pain and neck pain.   Skin: Negative for rash and wound.   Neurological: Negative for weakness and numbness.   Psychiatric/Behavioral: Negative for agitation and confusion.       Past Medical History:   Diagnosis Date   • Asthma    • Chronic back pain    • Chronic headaches    • Chronic obstructive pulmonary disease (HCC) 7/1/2020   • Chronic pain syndrome 7/1/2020   • Depression 7/1/2020   • Disease of thyroid gland    • Estrogen deficiency 7/1/2020   • GERD without esophagitis 7/1/2020   • Hypothyroidism (acquired) 7/1/2020   • Migraines 7/1/2020   • Neuropathy    • Vomiting 06/30/2020       Allergies   Allergen Reactions   • Morphine Hives   • Penicillins Hives and Shortness Of Breath     Can take iv form - but not po form         Past Surgical History:   Procedure Laterality Date   • CHOLECYSTECTOMY     • COLONOSCOPY N/A 11/11/2020    Procedure: COLONOSCOPY WITH RANDOM COLON BIOPSIES X4 AREAS, BIOPSY X 1 AREA;  Surgeon: Aguila Garcia MD;  Location: [facility]  ENDOSCOPY;  Service: Gastroenterology;  Laterality: N/A;  POST- colon erosion   • ENDOSCOPY N/A 2020    Procedure: ESOPHAGOGASTRODUODENOSCOPY WITH DILATATION (BOUGIE #[id number]);  Surgeon: Benjamin Mike MD;  Location: [facility] ENDOSCOPY;  Service: Gastroenterology;  Laterality: N/A;  Post operative diagnosis:GASTROPARESIS AND DYSPHAGIA    • ENDOSCOPY N/A 2020    Procedure: ESOPHAGOGASTRODUODENOSCOPY WITH BIOPSY X 1 AREA;  Surgeon: Aguila Garcia MD;  Location: [facility] ENDOSCOPY;  Service: Gastroenterology;  Laterality: N/A;  POST   • HYSTERECTOMY     • NECK SURGERY     • SPINE SURGERY  2020       Family History   Problem Relation Age of Onset   • Heart disease Mother    • No Known Problems Father        Social History     Socioeconomic History   • Marital status:    Tobacco Use   • Smoking status: Former     Types: Cigarettes     Quit date:      Years since quittin.8   • Smokeless tobacco: Never   Vaping Use   • Vaping Use: Never used   Substance and Sexual Activity   • Alcohol use: Never   • Drug use: Never   • Sexual activity: Defer     Prior to Admission medications    Medication Sig Start Date End Date Taking? Authorizing Provider   albuterol sulfate  (90 Base) MCG/ACT inhaler Inhale 2 puffs Every 4 (Four) Hours As Needed for Wheezing or Shortness of Air. May use every 4 to 6 hours    Ramonita Cason MD   atorvastatin (LIPITOR) 10 MG tablet Take 10 mg by mouth Every Night.    Ramonita Cason MD   butalbital-acetaminophen-caffeine (FIORICET, ESGIC) -40 MG per tablet Take 1-2 tablets by mouth Every 4 (Four) Hours As Needed for Headache.    Ramonita Cason MD   cetirizine (zyrTEC) 10 MG tablet Take 10 mg by mouth Daily.    Ramonita Cason MD   cycloSPORINE (RESTASIS) 0.05 % ophthalmic emulsion Administer 1 drop to both eyes 2 (Two) Times a Day.    Ramonita Cason MD   docusate sodium 100 MG capsule Take 1 capsule by mouth 2 (Two) Times a  Day. 9/30/22   Jared Steven MD   Eluxadoline (Viberzi) 100 MG tablet Take 1 tablet by mouth 2 (Two) Times a Day.    Ramonita Cason MD   EPINEPHrine (EPIPEN) 0.3 MG/0.3ML solution auto-injector injection Inject 0.3 mg into the appropriate muscle as directed by prescriber 1 (One) Time.    Ramonita Cason MD   estradiol (ESTRACE) 1 MG tablet Take 1 mg by mouth Daily.    Ramonita Cason MD   fluticasone (FLONASE) 50 MCG/ACT nasal spray 2 sprays into the nostril(s) as directed by provider Daily.    Ramonita Cason MD   gabapentin (NEURONTIN) 800 MG tablet Take 1 tablet by mouth 3 (Three) Times a Day. 9/30/22   Jared Steven MD   levothyroxine (SYNTHROID, LEVOTHROID) 50 MCG tablet Take 50 mcg by mouth Every Morning.    Ramonita Cason MD   montelukast (SINGULAIR) 10 MG tablet Take 10 mg by mouth Every Night.    Ramonita Cason MD   omeprazole (priLOSEC) 40 MG capsule Take 40 mg by mouth Daily.    Ramonita Cason MD   OXcarbazepine (TRILEPTAL) 300 MG tablet Take 450 mg by mouth 2 (Two) Times a Day.    Ramonita Cason MD   oxybutynin XL (DITROPAN-XL) 10 MG 24 hr tablet Take 10 mg by mouth Daily. 8/18/21   Ramonita Cason MD   oxyCODONE-acetaminophen (PERCOCET)  MG per tablet Take 1 tablet by mouth Every 6 (Six) Hours. 9/30/22   Jared Steven MD   polyethylene glycol (MIRALAX) 17 g packet Take 17 g by mouth Daily. 10/1/22   Jared Steven MD   rizatriptan MLT (MAXALT-MLT) 10 MG disintegrating tablet Place 10 mg on the tongue 1 (One) Time As Needed for Migraine. May repeat in 2 hours if needed    Ramonita Cason MD   Stiolto Respimat 2.5-2.5 MCG/ACT aerosol solution inhaler Inhale 2 puffs Daily. 1/18/22   Ramonita Cason MD   sucralfate (CARAFATE) 1 g tablet Take 1 g by mouth 2 (Two) Times a Day.    Ramonita Cason MD   topiramate (TOPAMAX) 100 MG tablet Take 1 tablet by mouth 2 (Two) Times a Day. 9/30/22   Jared Steven MD   vitamin D3 125  MCG (5000 UT) capsule capsule Take 5,000 Units by mouth Daily.    Provider, MD Ramonita           Objective   Physical Exam  Constitutional this is a 72-year-old awake alert no acute distress temperature 97 blood pressure 143/80 the patient is noted to have a heart rate in the 90s.  Respirations of 18 HEENT extraocular muscles are intact pupils equal reactive sclerae clear mouth clear.  Neck supple no adenopathy no meningeal signs.  Lungs clear no retractions heart regular without murmur.  Abdomen soft without tenderness good bowel sounds no peritoneal findings or pulsatile masses extremities pulses are equal upper and lower extremities.  Lamination of that left leg reveals edema in the foot and the lower calf and lower leg below the knee.  Calf is soft compartments are soft to palpation there is no pain on proportion exam no pain with passive stretching patient has good medial femoral-popliteal undersize pedis and posterior tibialis pulses good cap refill toes up-and-down occluding big toe and dorsiflex plantarflex that difficulty.  The leg is warm although not red.  Is warm compared to the right leg.  There is no crepitus or subcutaneous air.  No evidence necrotizing fasciitis no blisters or necrotic lesions.  Foot is warm and dry neurologic awake alert and follows commands motor strength normal without focal weak  Procedures           ED Course      Results for orders placed or performed during the hospital encounter of 11/18/22   Comprehensive Metabolic Panel    Specimen: Arm, Right; Blood   Result Value Ref Range    Glucose 99 65 - 99 mg/dL    BUN 21 8 - 23 mg/dL    Creatinine 0.84 0.57 - 1.00 mg/dL    Sodium 139 136 - 145 mmol/L    Potassium 5.0 3.5 - 5.2 mmol/L    Chloride 104 98 - 107 mmol/L    CO2 27.0 22.0 - 29.0 mmol/L    Calcium 9.1 8.6 - 10.5 mg/dL    Total Protein 6.9 6.0 - 8.5 g/dL    Albumin 4.20 3.50 - 5.20 g/dL    ALT (SGPT) 9 1 - 33 U/L    AST (SGOT) 20 1 - 32 U/L    Alkaline Phosphatase 174  (H) 39 - 117 U/L    Total Bilirubin <0.2 0.0 - 1.2 mg/dL    Globulin 2.7 gm/dL    A/G Ratio 1.6 g/dL    BUN/Creatinine Ratio 25.0 7.0 - 25.0    Anion Gap 8.0 5.0 - 15.0 mmol/L    eGFR 74.9 >60.0 mL/min/1.73   Protime-INR    Specimen: Arm, Right; Blood   Result Value Ref Range    Protime 9.8 9.6 - 11.7 Seconds    INR 0.95 0.93 - 1.10   aPTT    Specimen: Arm, Right; Blood   Result Value Ref Range    PTT 26.4 (L) 61.0 - 76.5 seconds   Uric Acid    Specimen: Arm, Right; Blood   Result Value Ref Range    Uric Acid 3.1 2.4 - 5.7 mg/dL   CK    Specimen: Arm, Right; Blood   Result Value Ref Range    Creatine Kinase 139 20 - 180 U/L   CBC Auto Differential    Specimen: Arm, Right; Blood   Result Value Ref Range    WBC 5.70 3.40 - 10.80 10*3/mm3    RBC 3.90 3.77 - 5.28 10*6/mm3    Hemoglobin 12.7 12.0 - 15.9 g/dL    Hematocrit 38.2 34.0 - 46.6 %    MCV 97.9 (H) 79.0 - 97.0 fL    MCH 32.7 26.6 - 33.0 pg    MCHC 33.4 31.5 - 35.7 g/dL    RDW 14.0 12.3 - 15.4 %    RDW-SD 50.8 37.0 - 54.0 fl    MPV 8.4 6.0 - 12.0 fL    Platelets 204 140 - 450 10*3/mm3    Neutrophil % 52.0 42.7 - 76.0 %    Lymphocyte % 31.2 19.6 - 45.3 %    Monocyte % 12.5 (H) 5.0 - 12.0 %    Eosinophil % 3.1 0.3 - 6.2 %    Basophil % 1.2 0.0 - 1.5 %    Neutrophils, Absolute 3.00 1.70 - 7.00 10*3/mm3    Lymphocytes, Absolute 1.80 0.70 - 3.10 10*3/mm3    Monocytes, Absolute 0.70 0.10 - 0.90 10*3/mm3    Eosinophils, Absolute 0.20 0.00 - 0.40 10*3/mm3    Basophils, Absolute 0.10 0.00 - 0.20 10*3/mm3    nRBC 0.0 0.0 - 0.2 /100 WBC   Procalcitonin    Specimen: Arm, Right; Blood   Result Value Ref Range    Procalcitonin 0.06 0.00 - 0.25 ng/mL   Gold Top - SST   Result Value Ref Range    Extra Tube Hold for add-ons.    Duplex Venous Lower Extremity - Left   Result Value Ref Range    Target HR (85%) 128 bpm    Max. Pred. HR (100%) 150 bpm    Right Common Femoral Spont Y     Right Common Femoral Competent Y     Right Common Femoral Phasic Y     Right Common Femoral Compress  C     Right Common Femoral Augment Y     Left Common Femoral Spont Y     Left Common Femoral Competent Y     Left Common Femoral Phasic Y     Left Common Femoral Compress C     Left Common Femoral Augment Y     Left Saphenofemoral Junction Compress C     Left Proximal Femoral Compress C     Left Mid Femoral Spont Y     Left Mid Femoral Competent Y     Left Mid Femoral Phasic Y     Left Mid Femoral Compress C     Left Mid Femoral Augment Y     Left Distal Femoral Compress C     Left Popliteal Spont Y     Left Popliteal Competent Y     Left Popliteal Phasic Y     Left Popliteal Compress C     Left Popliteal Augment Y     Left Posterior Tibial Compress C     Left Peroneal Compress C     Left Gastronemius Compress C     Left Greater Saph AK Compress C     Left Greater Saph BK Compress C     Left Lesser Saph Compress C     BH CV VAS PRELIMINARY FINDINGS SCRIPTING 1.0      XR Tibia Fibula 2 View Left    Result Date: 11/18/2022  Negative for acute osseous abnormality.  Electronically Signed By-Carrillo Goodman MD On:11/18/2022 12:32 PM This report was finalized on [id number] by  Carrillo Goodman MD.    XR Foot 3+ View Left    Result Date: 11/18/2022  Negative for acute osseous abnormality.  Electronically Signed By-Carrillo Goodman MD On:11/18/2022 12:28 PM This report was finalized on [id number] by  Carrillo Goodman MD.    Medications   cefTRIAXone (ROCEPHIN) 1 g in sodium chloride 0.9 % 100 mL IVPB (has no administration in time range)   albuterol (PROVENTIL) nebulizer solution 0.083% 2.5 mg/3mL (has no administration in time range)   sodium chloride 0.9 % flush 10 mL (10 mL Intravenous Given 11/18/22 1241)   sodium chloride 0.9 % flush 10 mL (has no administration in time range)   sodium chloride 0.9 % infusion 40 mL (has no administration in time range)   ondansetron (ZOFRAN) tablet 4 mg (has no administration in time range)     Or   ondansetron (ZOFRAN) injection 4 mg (has no administration in time range)   melatonin tablet 5  mg (has no administration in time range)   Enoxaparin Sodium (LOVENOX) syringe 40 mg (has no administration in time range)   HYDROmorphone (DILAUDID) injection 0.5 mg (has no administration in time range)   HYDROcodone-acetaminophen (NORCO) 5-325 MG per tablet 1 tablet (1 tablet Oral Given 11/18/22 1026)   promethazine (PHENERGAN) tablet 12.5 mg (12.5 mg Oral Given 11/18/22 1026)   HYDROmorphone (DILAUDID) injection 0.5 mg (0.5 mg Intravenous Given 11/18/22 1154)   cefTRIAXone (ROCEPHIN) 1 g in sodium chloride 0.9 % 100 mL IVPB (0 g Intravenous Stopped 11/18/22 1235)   HYDROmorphone (DILAUDID) injection 0.5 mg (0.5 mg Intravenous Given 11/18/22 1433)                                            MDM  Number of Diagnoses or Management Options  Cellulitis of left lower extremity  Left leg pain: new and requires workup  Diagnosis management comments: Medical decision making.  Patient had the above exam evaluation was given 1 Norco p.o. and Phenergan 12.5 mg p.o. ultrasound of the leg was obtained and report called and was negative for DVT was report reviewed by me.  Patient continued to complain of severe pain she was given Dilaudid 0.5 mg IV.  Labs obtained reviewed by me CBC and electrolytes are unremarkable potassium was 5.  INR was 0.95.  The uric acid was 3.1.  X-rays obtained of the tib-fib and foot were negative for any fractures.  This reviewed by radiology.  The patient has persistent pain she is given additional 0.5 mg IV Dilaudid.  The pulses are good its warm and dry good cap refill she moves it the compartments are soft the foot and leg are warm but not erythematous.  I will treat for a potential infectious etiology there is no DVT reported by ultrasound.  Counseled good.  There is no arterial occlusion no venous occlusion.  No evidence of necrotizing fasciitis compartment syndrome.  This is not a complete list of all possibilities.  Started on Rocephin she will be placed in observation provider notified  stable unremarkable ER course 1.       Amount and/or Complexity of Data Reviewed  Clinical lab tests: reviewed    Risk of Complications, Morbidity, and/or Mortality  Presenting problems: moderate  Diagnostic procedures: moderate  Management options: moderate    Patient Progress  Patient progress: stable      Final diagnoses:   Left leg pain   Cellulitis of left lower extremity       ED Disposition  ED Disposition     ED Disposition   Decision to Admit    Condition   --    Comment   Level of Care: Observation Unit [28]   Diagnosis: Left leg pain [532542]   Admitting Physician: GRACIE HU [[id number]]   Attending Physician: GRACIE HU [[id number]]               No follow-up provider specified.       Medication List      ASK your doctor about these medications    gabapentin 800 MG tablet  Commonly known as: NEURONTIN  Ask about: Which instructions should I use?             Gracie Hu MD  11/18/22 6275

## 2022-11-19 ENCOUNTER — APPOINTMENT (OUTPATIENT)
Dept: CT IMAGING | Facility: HOSPITAL | Age: 72
End: 2022-11-19

## 2022-11-19 ENCOUNTER — APPOINTMENT (OUTPATIENT)
Dept: CARDIOLOGY | Facility: HOSPITAL | Age: 72
End: 2022-11-19

## 2022-11-19 ENCOUNTER — APPOINTMENT (OUTPATIENT)
Dept: GENERAL RADIOLOGY | Facility: HOSPITAL | Age: 72
End: 2022-11-19

## 2022-11-19 LAB
ANION GAP SERPL CALCULATED.3IONS-SCNC: 10 MMOL/L (ref 5–15)
BASOPHILS # BLD AUTO: 0 10*3/MM3 (ref 0–0.2)
BASOPHILS NFR BLD AUTO: 0.8 % (ref 0–1.5)
BH CV LOWER ARTERIAL LEFT ABI RATIO: 1.24
BH CV LOWER ARTERIAL LEFT DORSALIS PEDIS SYS MAX: 207
BH CV LOWER ARTERIAL LEFT GREAT TOE SYS MAX: >240
BH CV LOWER ARTERIAL LEFT POST TIBIAL SYS MAX: 220
BH CV LOWER ARTERIAL RIGHT ABI RATIO: 1.16
BH CV LOWER ARTERIAL RIGHT DORSALIS PEDIS SYS MAX: 203
BH CV LOWER ARTERIAL RIGHT GREAT TOE SYS MAX: >240
BH CV LOWER ARTERIAL RIGHT POST TIBIAL SYS MAX: 207
BUN SERPL-MCNC: 14 MG/DL (ref 8–23)
BUN/CREAT SERPL: 17.1 (ref 7–25)
CALCIUM SPEC-SCNC: 9.1 MG/DL (ref 8.6–10.5)
CHLORIDE SERPL-SCNC: 101 MMOL/L (ref 98–107)
CO2 SERPL-SCNC: 29 MMOL/L (ref 22–29)
CREAT SERPL-MCNC: 0.82 MG/DL (ref 0.57–1)
DEPRECATED RDW RBC AUTO: 48.6 FL (ref 37–54)
EGFRCR SERPLBLD CKD-EPI 2021: 77.1 ML/MIN/1.73
EOSINOPHIL # BLD AUTO: 0.2 10*3/MM3 (ref 0–0.4)
EOSINOPHIL NFR BLD AUTO: 4.5 % (ref 0.3–6.2)
ERYTHROCYTE [DISTWIDTH] IN BLOOD BY AUTOMATED COUNT: 14 % (ref 12.3–15.4)
GLUCOSE SERPL-MCNC: 149 MG/DL (ref 65–99)
HCT VFR BLD AUTO: 40 % (ref 34–46.6)
HGB BLD-MCNC: 12.7 G/DL (ref 12–15.9)
LYMPHOCYTES # BLD AUTO: 1.9 10*3/MM3 (ref 0.7–3.1)
LYMPHOCYTES NFR BLD AUTO: 39.2 % (ref 19.6–45.3)
MAGNESIUM SERPL-MCNC: 2.2 MG/DL (ref 1.6–2.4)
MAXIMAL PREDICTED HEART RATE: 150 BPM
MCH RBC QN AUTO: 31.9 PG (ref 26.6–33)
MCHC RBC AUTO-ENTMCNC: 31.9 G/DL (ref 31.5–35.7)
MCV RBC AUTO: 100 FL (ref 79–97)
MONOCYTES # BLD AUTO: 0.7 10*3/MM3 (ref 0.1–0.9)
MONOCYTES NFR BLD AUTO: 15.1 % (ref 5–12)
NEUTROPHILS NFR BLD AUTO: 1.9 10*3/MM3 (ref 1.7–7)
NEUTROPHILS NFR BLD AUTO: 40.4 % (ref 42.7–76)
NRBC BLD AUTO-RTO: 0.1 /100 WBC (ref 0–0.2)
PLATELET # BLD AUTO: 211 10*3/MM3 (ref 140–450)
PMV BLD AUTO: 7.9 FL (ref 6–12)
POTASSIUM SERPL-SCNC: 4.2 MMOL/L (ref 3.5–5.2)
RBC # BLD AUTO: 4 10*6/MM3 (ref 3.77–5.28)
SODIUM SERPL-SCNC: 140 MMOL/L (ref 136–145)
STRESS TARGET HR: 128 BPM
UPPER ARTERIAL LEFT ARM BRACHIAL SYS MAX: 177 MMHG
UPPER ARTERIAL RIGHT ARM BRACHIAL SYS MAX: 178 MMHG
VIT B12 BLD-MCNC: 573 PG/ML (ref 211–946)
WBC NRBC COR # BLD: 4.7 10*3/MM3 (ref 3.4–10.8)

## 2022-11-19 PROCEDURE — G0378 HOSPITAL OBSERVATION PER HR: HCPCS

## 2022-11-19 PROCEDURE — 96375 TX/PRO/DX INJ NEW DRUG ADDON: CPT

## 2022-11-19 PROCEDURE — 25010000002 HYDROMORPHONE 1 MG/ML SOLUTION: Performed by: PHYSICIAN ASSISTANT

## 2022-11-19 PROCEDURE — 25010000002 ENOXAPARIN PER 10 MG: Performed by: PHYSICIAN ASSISTANT

## 2022-11-19 PROCEDURE — 93922 UPR/L XTREMITY ART 2 LEVELS: CPT

## 2022-11-19 PROCEDURE — 85025 COMPLETE CBC W/AUTO DIFF WBC: CPT | Performed by: PHYSICIAN ASSISTANT

## 2022-11-19 PROCEDURE — 96366 THER/PROPH/DIAG IV INF ADDON: CPT

## 2022-11-19 PROCEDURE — 72128 CT CHEST SPINE W/O DYE: CPT

## 2022-11-19 PROCEDURE — 25010000002 DEXAMETHASONE PER 1 MG: Performed by: NURSE PRACTITIONER

## 2022-11-19 PROCEDURE — 96372 THER/PROPH/DIAG INJ SC/IM: CPT

## 2022-11-19 PROCEDURE — 83735 ASSAY OF MAGNESIUM: CPT | Performed by: PHYSICIAN ASSISTANT

## 2022-11-19 PROCEDURE — 83036 HEMOGLOBIN GLYCOSYLATED A1C: CPT | Performed by: HOSPITALIST

## 2022-11-19 PROCEDURE — 72110 X-RAY EXAM L-2 SPINE 4/>VWS: CPT

## 2022-11-19 PROCEDURE — 72131 CT LUMBAR SPINE W/O DYE: CPT

## 2022-11-19 PROCEDURE — 25010000002 CEFTRIAXONE PER 250 MG: Performed by: PHYSICIAN ASSISTANT

## 2022-11-19 PROCEDURE — 96376 TX/PRO/DX INJ SAME DRUG ADON: CPT

## 2022-11-19 PROCEDURE — 97162 PT EVAL MOD COMPLEX 30 MIN: CPT

## 2022-11-19 PROCEDURE — 80048 BASIC METABOLIC PNL TOTAL CA: CPT | Performed by: PHYSICIAN ASSISTANT

## 2022-11-19 RX ORDER — ACETAMINOPHEN 500 MG
1000 TABLET ORAL ONCE
Status: COMPLETED | OUTPATIENT
Start: 2022-11-19 | End: 2022-11-19

## 2022-11-19 RX ORDER — GABAPENTIN 600 MG/1
600 TABLET ORAL EVERY 8 HOURS SCHEDULED
Status: DISCONTINUED | OUTPATIENT
Start: 2022-11-19 | End: 2022-11-20 | Stop reason: HOSPADM

## 2022-11-19 RX ORDER — DEXAMETHASONE SODIUM PHOSPHATE 4 MG/ML
6 INJECTION, SOLUTION INTRA-ARTICULAR; INTRALESIONAL; INTRAMUSCULAR; INTRAVENOUS; SOFT TISSUE ONCE
Status: COMPLETED | OUTPATIENT
Start: 2022-11-19 | End: 2022-11-19

## 2022-11-19 RX ORDER — OXYCODONE HYDROCHLORIDE 5 MG/1
10 TABLET ORAL EVERY 6 HOURS PRN
Status: DISCONTINUED | OUTPATIENT
Start: 2022-11-19 | End: 2022-11-20 | Stop reason: HOSPADM

## 2022-11-19 RX ORDER — GABAPENTIN 100 MG/1
200 CAPSULE ORAL ONCE
Status: COMPLETED | OUTPATIENT
Start: 2022-11-19 | End: 2022-11-19

## 2022-11-19 RX ORDER — GABAPENTIN 100 MG/1
200 CAPSULE ORAL EVERY 8 HOURS SCHEDULED
Status: DISCONTINUED | OUTPATIENT
Start: 2022-11-19 | End: 2022-11-19

## 2022-11-19 RX ADMIN — MONTELUKAST 10 MG: 10 TABLET, FILM COATED ORAL at 21:07

## 2022-11-19 RX ADMIN — OXYCODONE 10 MG: 5 TABLET ORAL at 22:12

## 2022-11-19 RX ADMIN — GABAPENTIN 200 MG: 100 CAPSULE ORAL at 09:26

## 2022-11-19 RX ADMIN — Medication 10 ML: at 21:07

## 2022-11-19 RX ADMIN — OXYCODONE 10 MG: 5 TABLET ORAL at 09:25

## 2022-11-19 RX ADMIN — DEXAMETHASONE SODIUM PHOSPHATE 6 MG: 4 INJECTION, SOLUTION INTRAMUSCULAR; INTRAVENOUS at 13:46

## 2022-11-19 RX ADMIN — OXYCODONE 10 MG: 5 TABLET ORAL at 15:56

## 2022-11-19 RX ADMIN — ACETAMINOPHEN 1000 MG: 500 TABLET ORAL at 09:26

## 2022-11-19 RX ADMIN — CETIRIZINE HYDROCHLORIDE 10 MG: 10 TABLET, FILM COATED ORAL at 09:26

## 2022-11-19 RX ADMIN — Medication 10 ML: at 09:26

## 2022-11-19 RX ADMIN — POLYETHYLENE GLYCOL 3350 17 G: 17 POWDER, FOR SOLUTION ORAL at 09:26

## 2022-11-19 RX ADMIN — HYDROMORPHONE HYDROCHLORIDE 0.5 MG: 1 INJECTION, SOLUTION INTRAMUSCULAR; INTRAVENOUS; SUBCUTANEOUS at 00:09

## 2022-11-19 RX ADMIN — OXCARBAZEPINE 450 MG: 150 TABLET, FILM COATED ORAL at 09:25

## 2022-11-19 RX ADMIN — CEFTRIAXONE 1 G: 1 INJECTION, POWDER, FOR SOLUTION INTRAMUSCULAR; INTRAVENOUS at 12:49

## 2022-11-19 RX ADMIN — OXYBUTYNIN CHLORIDE 10 MG: 10 TABLET, EXTENDED RELEASE ORAL at 09:25

## 2022-11-19 RX ADMIN — ATORVASTATIN CALCIUM 10 MG: 10 TABLET, FILM COATED ORAL at 21:07

## 2022-11-19 RX ADMIN — TOPIRAMATE 100 MG: 100 TABLET, FILM COATED ORAL at 09:25

## 2022-11-19 RX ADMIN — GABAPENTIN 600 MG: 600 TABLET, FILM COATED ORAL at 21:07

## 2022-11-19 RX ADMIN — PANTOPRAZOLE SODIUM 40 MG: 40 TABLET, DELAYED RELEASE ORAL at 04:58

## 2022-11-19 RX ADMIN — LEVOTHYROXINE SODIUM 50 MCG: 50 TABLET ORAL at 04:58

## 2022-11-19 RX ADMIN — ENOXAPARIN SODIUM 40 MG: 100 INJECTION SUBCUTANEOUS at 17:01

## 2022-11-19 RX ADMIN — TOPIRAMATE 100 MG: 100 TABLET, FILM COATED ORAL at 21:07

## 2022-11-19 RX ADMIN — Medication 5 MG: at 22:12

## 2022-11-19 RX ADMIN — GABAPENTIN 800 MG: 400 CAPSULE ORAL at 09:26

## 2022-11-19 RX ADMIN — OXCARBAZEPINE 450 MG: 150 TABLET, FILM COATED ORAL at 21:07

## 2022-11-19 RX ADMIN — HYDROMORPHONE HYDROCHLORIDE 0.5 MG: 1 INJECTION, SOLUTION INTRAMUSCULAR; INTRAVENOUS; SUBCUTANEOUS at 04:59

## 2022-11-19 NOTE — PLAN OF CARE
Goal Outcome Evaluation:  Plan of Care Reviewed With: patient        Progress: no change  Outcome Evaluation: Patient 73 yo female admitted to the hospital with the complaint of swelling in left leg. X Ray foot, tibia fibula shows no acute osseus abnormality. Recent Dx reveals cellulitis of L leg. At baseline, patient resides alone at home. She uses cane for mobility and Ind in ADLs except driving.Father or son provide transportation when needed. As tammy's evaluation, patient required Min A for Bed Mob and CGA and cane for transfers and ambulation of around 30ft. Patient need RW until she is able to do full weight bearing on left foot. Patient was not able to bear full weight on left leg due to pain. Patient is not safe to go home alone and PT recommends SNF to return to her PLOF.

## 2022-11-19 NOTE — CASE MANAGEMENT/SOCIAL WORK
Discharge Planning Assessment  [facility]     Patient Name: Ashley Lin  MRN: [id number]  Today's Date: 11/19/2022    Admit Date: 11/18/2022    Plan: D/C Plan: Home, current with [facility].   Discharge Needs Assessment     Row Name 11/19/22 1032       Living Environment    People in Home alone    Current Living Arrangements apartment    Primary Care Provided by self    Provides Primary Care For no one, unable/limited ability to care for self    Family Caregiver if Needed child(anabela), adult    Family Caregiver Names Son-Seth, Father-Ej    Quality of Family Relationships supportive    Able to Return to Prior Arrangements yes       Resource/Environmental Concerns    Resource/Environmental Concerns none       Transition Planning    Patient/Family Anticipates Transition to home    Patient/Family Anticipated Services at Transition none    Transportation Anticipated family or friend will provide       Discharge Needs Assessment    Equipment Currently Used at Home cane, straight;walker, rolling;grab bar;shower chair               Discharge Plan     Row Name 11/19/22 1033       Plan    Plan D/C Plan: Home, current with [facility].    Plan Comments Spoke with pt at bedside, confirmed PCP and Pharmacy. Pt had a recent Rehab stay and is current with [facility]. Pt father, Ej, will provide transportation at [place]. Denies any DC needs at this time.              Continued Care and Services - Admitted Since 11/18/2022    Coordination has not been started for this encounter.     Selected Continued Care - Prior Encounters Includes continued care and service providers with selected services from prior encounters from 8/20/2022 to 11/19/2022    Discharged on 9/30/2022 Admission date: 9/27/2022 - Discharge disposition: Skilled Nursing Facility (DC - External)    Destination     Service Provider Selected Services Address Phone Fax Patient Preferred    [facility] [address] [place] [address]  [phone] --                       Demographic Summary     Row Name 11/19/22 1031       General Information    Admission Type observation    Arrived From emergency department    Referral Source emergency department    Reason for Consult discharge planning    Preferred Language English               Functional Status     Row Name 11/19/22 1031       Functional Status    Usual Activity Tolerance good    Current Activity Tolerance moderate       Functional Status, IADL    Medications independent    Meal Preparation independent    Housekeeping independent    Laundry independent    IADL Comments Father-Ej and Son- Seth, Assist with ADL's as Needed       Mental Status Summary    Recent Changes in Mental Status/Cognitive Functioning no changes                Met with patient in room wearing PPE: mask    Maintained distance greater than six feet and spent less than 15 minutes in the room    Cheryl Morocho RN    Phone [phone]  Fax [phone]

## 2022-11-19 NOTE — PROGRESS NOTES
[facility] Observation Unit progress note    Patient Name: Ashley Lin  : 1950  MRN: [id number]  Primary Care Physician: Jamey Fairchild MD  Date of admission: 2022     Patient Care Team:  Jamey Fairchild MD as PCP - General (Family Medicine)          Subjective   History Present Illness     Chief Complaint:   Chief Complaint   Patient presents with   • Leg Pain     Left leg swelling with pain X2 weeks. PCP told her to come be seen.      History of Present Illness  Obtained from admitting physician HPI on 2022:  History of present illness this is a 72-year-old female with multiple health problems who complains of pain to the left leg for last 2 weeks.  No fever chills is throbbing aching continuous severe worse with movement better with rest and nonradiating.  She associated with swelling and edema.  No injury no recent long car ride plane or immobilization foreign travels no chest pain or shortness of breath.  Patient called her doctor today and was sent to the ER.  No other complaints or associated symptoms at this time.     2022 (postobservation admission):  Patient confirms the HPI noted above including several week history of mild left foot and lower extremity swelling with severe burning pain made worse with any attempts at weightbearing and even with light touch.  She notes that the pain began shortly after she had developed a blister on her left heel which is subsequently healed.  No other traumas reported and patient denies any new footwear or insect bites or stings.  No other pain, dyspnea, fever, cough, nausea or vomiting is noted.    22: OBS NOTE  Pt states her swelling has improved overnight since leg has been elevated. No new redness/wounds/abscess/drainage. Continues to complain of unrelieved pain by home dose of percocet. xr obtained. PT consult placed. Spine consult placed. Will be admitted to hospitalist for continued uncontrolled pain and further  evaluation/possible acute rehab placement.  Follows with Dr. Quintanilla at [facility] and surg was recommended but has not yet been done; spoke with brayden recommended CT's of the thoracic and lumbar spine since mri's unable to be done; orders placed.       Review of Systems   Constitutional: Negative.   HENT: Negative.    Eyes: Negative.    Cardiovascular: Positive for leg swelling.   Respiratory: Negative.    Skin: Negative.    Musculoskeletal: Positive for joint pain. Back pain  Gastrointestinal: Negative.    Genitourinary: Negative.    Neurological: Negative.    Psychiatric/Behavioral: Negative.        Personal History     Past Medical History:   Past Medical History:   Diagnosis Date   • Asthma    • Chronic back pain    • Chronic headaches    • Chronic obstructive pulmonary disease (HCC) 7/1/2020   • Chronic pain syndrome 7/1/2020   • Depression 7/1/2020   • Disease of thyroid gland    • Estrogen deficiency 7/1/2020   • GERD without esophagitis 7/1/2020   • Hypothyroidism (acquired) 7/1/2020   • Migraines 7/1/2020   • Neuropathy    • Vomiting 06/30/2020       Surgical History:      Past Surgical History:   Procedure Laterality Date   • CHOLECYSTECTOMY     • COLONOSCOPY N/A 11/11/2020    Procedure: COLONOSCOPY WITH RANDOM COLON BIOPSIES X4 AREAS, BIOPSY X 1 AREA;  Surgeon: Aguila Garcia MD;  Location: [facility] ENDOSCOPY;  Service: Gastroenterology;  Laterality: N/A;  POST- colon erosion   • ENDOSCOPY N/A 7/2/2020    Procedure: ESOPHAGOGASTRODUODENOSCOPY WITH DILATATION (BOUGIE #[id number]);  Surgeon: Benjamin Mike MD;  Location: [facility] ENDOSCOPY;  Service: Gastroenterology;  Laterality: N/A;  Post operative diagnosis:GASTROPARESIS AND DYSPHAGIA    • ENDOSCOPY N/A 11/11/2020    Procedure: ESOPHAGOGASTRODUODENOSCOPY WITH BIOPSY X 1 AREA;  Surgeon: Aguila Garcia MD;  Location: [facility] ENDOSCOPY;  Service: Gastroenterology;  Laterality: N/A;  POST   • HYSTERECTOMY     • NECK SURGERY     • SPINE SURGERY   06/09/2020           Family History: family history includes Heart disease in her mother; No Known Problems in her father. Otherwise pertinent FHx was reviewed and unremarkable.     Social History:  reports that she quit smoking about 16 years ago. She has never used smokeless tobacco. She reports that she does not drink alcohol and does not use drugs.      Medications:  Prior to Admission medications    Medication Sig Start Date End Date Taking? Authorizing Provider   albuterol sulfate  (90 Base) MCG/ACT inhaler Inhale 2 puffs Every 4 (Four) Hours As Needed for Wheezing or Shortness of Air. May use every 4 to 6 hours   Yes Ramonita Cason MD   atorvastatin (LIPITOR) 10 MG tablet Take 10 mg by mouth Every Night.   Yes Ramonita Cason MD   butalbital-acetaminophen-caffeine (FIORICET, ESGIC) -40 MG per tablet Take 1-2 tablets by mouth Every 4 (Four) Hours As Needed for Headache.   Yes Ramonita Cason MD   cetirizine (zyrTEC) 10 MG tablet Take 10 mg by mouth Daily.   Yes Ramonita Cason MD   cycloSPORINE (RESTASIS) 0.05 % ophthalmic emulsion Administer 1 drop to both eyes 2 (Two) Times a Day.   Yes Ramonita Cason MD   docusate sodium 100 MG capsule Take 1 capsule by mouth 2 (Two) Times a Day. 9/30/22  Yes Jared Steven MD   Eluxadoline (Viberzi) 100 MG tablet Take 1 tablet by mouth 2 (Two) Times a Day.   Yes Ramonita Cason MD   EPINEPHrine (EPIPEN) 0.3 MG/0.3ML solution auto-injector injection Inject 0.3 mg into the appropriate muscle as directed by prescriber 1 (One) Time.   Yes Ramonita Cason MD   estradiol (ESTRACE) 1 MG tablet Take 1 mg by mouth Daily.   Yes Ramonita Cason MD   fluticasone (FLONASE) 50 MCG/ACT nasal spray 2 sprays into the nostril(s) as directed by provider Daily.   Yes Ramonita Cason MD   levothyroxine (SYNTHROID, LEVOTHROID) 50 MCG tablet Take 50 mcg by mouth Every Morning.   Yes Ramonita Cason MD   montelukast  (SINGULAIR) 10 MG tablet Take 10 mg by mouth Every Night.   Yes Ramonita Cason MD   omeprazole (priLOSEC) 40 MG capsule Take 40 mg by mouth Daily.   Yes Ramonita Cason MD   OXcarbazepine (TRILEPTAL) 300 MG tablet Take 450 mg by mouth 2 (Two) Times a Day.   Yes Ramonita Cason MD   oxybutynin XL (DITROPAN-XL) 10 MG 24 hr tablet Take 10 mg by mouth Daily. 8/18/21  Yes Ramonita Cason MD   oxyCODONE-acetaminophen (PERCOCET)  MG per tablet Take 1 tablet by mouth Every 6 (Six) Hours. 9/30/22  Yes Jared Steven MD   polyethylene glycol (MIRALAX) 17 g packet Take 17 g by mouth Daily. 10/1/22  Yes Jared Steven MD   rizatriptan MLT (MAXALT-MLT) 10 MG disintegrating tablet Place 10 mg on the tongue 1 (One) Time As Needed for Migraine. May repeat in 2 hours if needed   Yes Ramonita Cason MD   Stiolto Respimat 2.5-2.5 MCG/ACT aerosol solution inhaler Inhale 2 puffs Daily. 1/18/22  Yes Ramonita Cason MD   sucralfate (CARAFATE) 1 g tablet Take 1 g by mouth 2 (Two) Times a Day.   Yes Ramonita Cason MD   topiramate (TOPAMAX) 100 MG tablet Take 1 tablet by mouth 2 (Two) Times a Day. 9/30/22  Yes Jared Steven MD   vitamin D3 125 MCG (5000 UT) capsule capsule Take 5,000 Units by mouth Daily.   Yes Ramonita Cason MD   gabapentin (NEURONTIN) 800 MG tablet Take 800 mg by mouth 4 (Four) Times a Day.    Ramonita Cason MD       Allergies:    Allergies   Allergen Reactions   • Morphine Hives   • Penicillins Hives and Shortness Of Breath     Can take iv form - but not po form         Objective   Objective     Vital Signs  Temp:  [97 °F (36.1 °C)-98.5 °F (36.9 °C)] 98 °F (36.7 °C)  Heart Rate:  [] 100  Resp:  [18-20] 20  BP: (131-173)/(62-96) 153/91  SpO2:  [96 %-99 %] 98 %  on   ;   Device (Oxygen Therapy): room air  Body mass index is 25.57 kg/m².    Physical Exam     Constitutional: Awake, alert; nontoxic in appearance.  Somewhat agitated requesting pain  medication multiple times  HEENT: Normocephalic, atraumatic; pupils   with intact EOM; oropharynx is pink and moist without exudate or erythema. Edentulous  Neck: Supple, full range of motion without pain;     Cardiovascular: Regular rate and rhythm, normal S1-S2.   Pulmonary: Respiratory effort regular nonlabored, breath sounds clear to auscultation all fields.   Abdomen: Soft, nontender nondistended with normoactive bowel sounds; no rebound or guarding.   Musculoskeletal: Independent range of motion of all extremities; there is pain in the left lower extremity with range of motion of the hip, it is resting sensorimotor intact.  There is no pitting edema wounds erythema   Neuro: Alert oriented x3, speech is clear and appropriate, GCS 15   Skin:  Fleshtone warm, dry, intact; no erythematous or petechial rash or lesion       Results Review:  I have personally reviewed most recent lab results and agree with findings, most notably: cbc.    Results from last 7 days   Lab Units 11/19/22  0625 11/18/22  1017   WBC 10*3/mm3 4.70 5.70   HEMOGLOBIN g/dL 12.7 12.7   HEMATOCRIT % 40.0 38.2   PLATELETS 10*3/mm3 211 204   INR   --  0.95     Results from last 7 days   Lab Units 11/19/22  0625 11/18/22  1017   SODIUM mmol/L 140 139   POTASSIUM mmol/L 4.2 5.0   CHLORIDE mmol/L 101 104   CO2 mmol/L 29.0 27.0   BUN mg/dL 14 21   CREATININE mg/dL 0.82 0.84   GLUCOSE mg/dL 149* 99   CALCIUM mg/dL 9.1 9.1   ALT (SGPT) U/L  --  9   AST (SGOT) U/L  --  20   PROCALCITONIN ng/mL  --  0.06     Estimated Creatinine Clearance: 55.8 mL/min (by C-G formula based on SCr of 0.82 mg/dL).  Brief Urine Lab Results  (Last result in the past 365 days)      Color   Clarity   Blood   Leuk Est   Nitrite   Protein   CREAT   Urine HCG        09/27/22 1352 Yellow   Clear   Negative   Trace   Negative   Trace                 Microbiology Results (last 10 days)     ** No results found for the last 240 hours. **          ECG/EMG Results (most recent)     None           Results for orders placed during the hospital encounter of 11/18/22    Duplex Venous Lower Extremity - Left    Interpretation Summary  •  Normal left lower extremity venous duplex scan.          XR Tibia Fibula 2 View Left    Result Date: 11/18/2022  Negative for acute osseous abnormality.  Electronically Signed By-Carrillo Goodman MD On:11/18/2022 12:32 PM This report was finalized on [id number] by  Carrillo Goodman MD.    XR Foot 3+ View Left    Result Date: 11/18/2022  Negative for acute osseous abnormality.  Electronically Signed By-Carrillo Goodman MD On:11/18/2022 12:28 PM This report was finalized on [id number] by  Carrillo Goodman MD.        Estimated Creatinine Clearance: 55.8 mL/min (by C-G formula based on SCr of 0.82 mg/dL).    Assessment & Plan   Assessment/Plan       Active Hospital Problems    Diagnosis  POA   • **Left leg pain [M79.605]  Yes      Resolved Hospital Problems   No resolved problems to display.         Left leg pain  -CK: 139  -CMP unremarkable  -WBCs: 5.70  -Venous duplex ultrasound left lower extremity pending official interpretation but appears to show no acute DVT  -Rocephin started in the ED, continue  -Check procalcitonin- normal  -X-ray of left tib-fib and foot negative  - felt to be radicular and not infective in nature;  without cauda equina/epidural abscess symptoms; spine consult placed; unable to do mri; xrays of lumbar spine  - PT eval for possible acute placement, functional mobility below baseline per nursing staff.   - admit to hospitalist for uncontrolled pain, further eval, and continued management.      Asthma  -Albuterol     Hypothyroidism  -Synthroid     OAB  -Oxybutynin     Hyperlipidemia  -Statin        VTE Prophylaxis -   Mechanical Order History:     None      Pharmalogical Order History:      Ordered     Dose Route Frequency Stop    11/18/22 1213  Enoxaparin Sodium (LOVENOX) syringe 40 mg         40 mg SC Every 24 Hours --                CODE STATUS:    Code  Status and Medical Interventions:   Ordered at: 11/18/22 1211     Code Status (Patient has no pulse and is not breathing):    CPR (Attempt to Resuscitate)     Medical Interventions (Patient has pulse or is breathing):    Full Support       This patient has been examined wearing personal protective equipment.     I discussed the patient's findings and my recommendations with patient.      Signature: Electronically signed by HUMZA Sanford, 11/19/22, 12:46 PM EST.

## 2022-11-19 NOTE — PLAN OF CARE
Goal Outcome Evaluation:           Progress: no change  Outcome Evaluation: pt still c/o left leg pain. Pt is getting prn pain meds and on steroids. MRI of the spine could not be done d/t patient does not have the remote of her bladder stimulator with her. CT of spine and throracic are pending. Nuerosx is consulted. Will monitor.

## 2022-11-19 NOTE — THERAPY EVALUATION
Patient Name: Ashley Lin  : 1950    MRN: [id number]                              Today's Date: 2022       Admit Date: 2022    Visit Dx:     ICD-10-CM ICD-9-CM   1. Left leg pain  M79.605 729.5   2. Cellulitis of left lower extremity  L03.116 682.6     Patient Active Problem List   Diagnosis   • Intractable vomiting   • Asthma   • Cervical disc herniation   • Cervical stenosis of spine   • Chronic obstructive pulmonary disease (HCC)   • Low back pain   • Pseudarthrosis following spinal fusion   • History of lumbar fusion   • S/P cervical spinal fusion   • Hypothyroidism (acquired)   • Estrogen deficiency   • Depression   • Migraines   • Chronic pain syndrome   • GERD without esophagitis   • Intractable cyclical vomiting associated with migraine   • Somnolence   • Altered mental status, unspecified altered mental status type   • Acute encephalopathy   • Left leg pain     Past Medical History:   Diagnosis Date   • Asthma    • Chronic back pain    • Chronic headaches    • Chronic obstructive pulmonary disease (HCC) 2020   • Chronic pain syndrome 2020   • Depression 2020   • Disease of thyroid gland    • Estrogen deficiency 2020   • GERD without esophagitis 2020   • Hypothyroidism (acquired) 2020   • Migraines 2020   • Neuropathy    • Vomiting 2020     Past Surgical History:   Procedure Laterality Date   • CHOLECYSTECTOMY     • COLONOSCOPY N/A 2020    Procedure: COLONOSCOPY WITH RANDOM COLON BIOPSIES X4 AREAS, BIOPSY X 1 AREA;  Surgeon: Aguila Garcia MD;  Location: [facility] ENDOSCOPY;  Service: Gastroenterology;  Laterality: N/A;  POST- colon erosion   • ENDOSCOPY N/A 2020    Procedure: ESOPHAGOGASTRODUODENOSCOPY WITH DILATATION (BOUGIE #54);  Surgeon: Benjamin Mike MD;  Location: [facility] ENDOSCOPY;  Service: Gastroenterology;  Laterality: N/A;  Post operative diagnosis:GASTROPARESIS AND DYSPHAGIA    • ENDOSCOPY N/A 2020    Procedure:  ESOPHAGOGASTRODUODENOSCOPY WITH BIOPSY X 1 AREA;  Surgeon: Aguila Garcia MD;  Location: [facility] ENDOSCOPY;  Service: Gastroenterology;  Laterality: N/A;  POST   • HYSTERECTOMY     • NECK SURGERY     • SPINE SURGERY  06/09/2020      General Information     Row Name 11/19/22 1656          Physical Therapy Time and Intention    Document Type evaluation  -PG     Mode of Treatment physical therapy  -PG     Row Name 11/19/22 1656          General Information    Patient Profile Reviewed yes  -PG     Prior Level of Function independent:  -PG     Existing Precautions/Restrictions fall  -PG     Barriers to Rehab none identified  -PG     Row Name 11/19/22 1656          Living Environment    People in Home alone  -PG     Row Name 11/19/22 1656          Home Main Entrance    Number of Stairs, Main Entrance none  -PG     Row Name 11/19/22 1656          Safety Issues, Functional Mobility    Safety Issues Affecting Function (Mobility) safety precaution awareness  -PG     Impairments Affecting Function (Mobility) balance;postural/trunk control;endurance/activity tolerance;range of motion (ROM);strength;pain  -PG           User Key  (r) = Recorded By, (t) = Taken By, (c) = Cosigned By    Initials Name Provider Type    PG Sharda Lancaster, PT Physical Therapist               Mobility     Row Name 11/19/22 1658          Bed Mobility    Bed Mobility bed mobility (all) activities  -PG     All Activities, [place]ha (Bed Mobility) minimum assist (75% patient effort)  -PG     Assistive Device (Bed Mobility) head of bed elevated  -PG     Row Name 11/19/22 1658          Sit-Stand Transfer    Sit-Stand Desha (Transfers) contact guard  -PG     Assistive Device (Sit-Stand Transfers) cane, straight  -PG     Row Name 11/19/22 1658          Gait/Stairs (Locomotion)    [place] Level (Gait) contact guard  -PG     Assistive Device (Gait) cane, straight  -PG     Distance in Feet (Gait) 30ft  pt was unable to bear weight on left leg   -PG           User Key  (r) = Recorded By, (t) = Taken By, (c) = Cosigned By    Initials Name Provider Type    Sharda Tang PT Physical Therapist               Obj/Interventions     Row Name 11/19/22 1700          Range of Motion Comprehensive    General Range of Motion upper extremity range of motion deficits identified;lower extremity range of motion deficits identified  -     Row Name 11/19/22 1700          Strength Comprehensive (MMT)    Comment, General Manual Muscle Testing (MMT) Assessment Grossly 4/5  -PG     Row Name 11/19/22 1700          Balance    Balance Assessment sitting static balance;sitting dynamic balance;sit to stand dynamic balance;standing dynamic balance  -PG     Static Sitting Balance modified independence  -PG     Dynamic Sitting Balance contact guard  -PG     Position, Sitting Balance sitting edge of bed  -PG     Sit to Stand Dynamic Balance contact guard  -PG     Dynamic Standing Balance minimal assist  -PG     Position/Device Used, Standing Balance cane, straight  -PG           User Key  (r) = Recorded By, (t) = Taken By, (c) = Cosigned By    Initials Name Provider Type    Sharda Tang PT Physical Therapist               Goals/Plan     Row Name 11/19/22 1711          Bed Mobility Goal 1 (PT)    Activity/Assistive Device (Bed Mobility Goal 1, PT) bed mobility activities, all  -PG     [place] Level/Cues Needed (Bed Mobility Goal 1, PT) modified independence  -PG     Time Frame (Bed Mobility Goal 1, PT) long term goal (LTG);2 weeks  -PG     [facility] Name 11/19/22 1711          Transfer Goal 1 (PT)    Activity/Assistive Device (Transfer Goal 1, PT) transfers, all  -PG     [place] Level/Cues Needed (Transfer Goal 1, PT) modified independence  -PG     Time Frame (Transfer Goal 1, PT) long term goal (LTG);2 weeks  -PG     [facility] Name 11/19/22 1711          Gait Training Goal 1 (PT)    Activity/Assistive Device (Gait Training Goal 1, PT) gait (walking locomotion)  -PG      [place] Level (Gait Training Goal 1, PT) modified independence  -PG     Distance (Gait Training Goal 1, PT) 200ft  -PG     Time Frame (Gait Training Goal 1, PT) long term goal (LTG);2 weeks  -PG     Row Name 11/19/22 7311          Therapy Assessment/Plan (PT)    Planned Therapy Interventions (PT) balance training;bed mobility training;gait training;neuromuscular re-education;home exercise program;patient/family education;postural re-education;transfer training;stretching;strengthening;ROM (range of motion)  -PG           User Key  (r) = Recorded By, (t) = Taken By, (c) = Cosigned By    Initials Name Provider Type    PG Sharda Lancaster, PT Physical Therapist               Clinical Impression     Row Name 11/19/22 1137          Pain    Pretreatment Pain Rating 8/10  -PG     Posttreatment Pain Rating 8/10  -PG     Pain Location - Side/Orientation Left  L LEG  -PG     Row Name 11/19/22 9191          Plan of Care Review    Plan of Care Reviewed With patient  -PG     Progress no change  -PG     Outcome Evaluation Patient 73 yo female admitted to the hospital with the complaint of swelling in left leg. X Ray foot, tibia fibula shows no acute osseus abnormality. Recent Dx reveals cellulitis of L leg. At baseline, patient resides alone at home. She uses cane for mobility and Ind in ADLs except driving.Father or son provide transportation when needed. As tammy's evaluation, patient required Min A for Bed Mob and CGA and cane for transfers and ambulation of around 30ft. Patient need RW until she is able to do full weight bearing on left foot. Patient was not able to bear full weight on left leg due to pain. Patient is not safe to go home alone and PT recommends SNF to return to her PLOF.  -PG     Row Name 11/19/22 4942          Therapy Assessment/Plan (PT)    Patient/Family Therapy Goals Statement (PT) To improve mobility  -PG     Rehab Potential (PT) good, to achieve stated therapy goals  -PG     Criteria for Skilled  Interventions Met (PT) yes;skilled treatment is necessary  -PG     Therapy Frequency (PT) 3 times/wk  -PG     Predicted Duration of Therapy Intervention (PT) until d/c  -PG     Row Name 11/19/22 1703          Positioning and Restraints    Pre-Treatment Position in bed  -PG     Post Treatment Position bed  -PG     In Bed notified nsg;call light within reach;encouraged to call for assist;exit alarm on  -PG           User Key  (r) = Recorded By, (t) = Taken By, (c) = Cosigned By    Initials Name Provider Type    Sharda Tang, PT Physical Therapist               Outcome Measures     Row Name 11/19/22 1712 11/19/22 0800       How much help from another person do you currently need...    Turning from your back to your side while in flat bed without using bedrails? 3  -PG 4  -GK    Moving from lying on back to sitting on the side of a flat bed without bedrails? 2  -PG 4  -GK    Moving to and from a bed to a chair (including a wheelchair)? 3  -PG 3  -GK    Standing up from a chair using your arms (e.g., wheelchair, bedside chair)? 3  -PG 3  -GK    Climbing 3-5 steps with a railing? 1  -PG 2  -GK    To walk in hospital room? 2  -PG 2  -GK    AM-PAC 6 Clicks Score (PT) 14  -PG 18  -GK    Highest level of mobility 4 --> Transferred to chair/commode  -PG 6 --> Walked 10 steps or more  -    Row Name 11/19/22 1712          Functional Assessment    Outcome Measure Options AM-PAC 6 Clicks Basic Mobility (PT)  -PG           User Key  (r) = Recorded By, (t) = Taken By, (c) = Cosigned By    Initials Name Provider Type    Claudia Gonzalez, RN Registered Nurse    Sharda Tang, PT Physical Therapist                             Physical Therapy Education     Title: PT OT SLP Therapies (Done)     Topic: Physical Therapy (Done)     Point: Mobility training (Done)     Learning Progress Summary           Patient BORIS Campbell VU by INDER at 11/19/2022 1713                   Point: Home exercise program (Done)     Learning Progress  Summary           Patient BORIS Campbell VU by PG at 11/19/2022 1713                   Point: Body mechanics (Done)     Learning Progress Summary           Patient BORIS Campbell VU by PG at 11/19/2022 1713                   Point: Precautions (Done)     Learning Progress Summary           Patient BORIS Campbell VU by PG at 11/19/2022 1713                               User Key     Initials Effective Dates Name Provider Type Discipline    PG 09/01/22 -  Sharda Lancaster, PT Physical Therapist PT              PT Recommendation and Plan  Planned Therapy Interventions (PT): balance training, bed mobility training, gait training, neuromuscular re-education, home exercise program, patient/family education, postural re-education, transfer training, stretching, strengthening, ROM (range of motion)  Plan of Care Reviewed With: patient  Progress: no change  Outcome Evaluation: Patient 73 yo female admitted to the hospital with the complaint of swelling in left leg. X Ray foot, tibia fibula shows no acute osseus abnormality. Recent Dx reveals cellulitis of L leg. At baseline, patient resides alone at home. She uses cane for mobility and Ind in ADLs except driving.Father or son provide transportation when needed. As pertoday's evaluation, patient required Min A for Bed Mob and CGA and cane for transfers and ambulation of around 30ft. Patient need RW until she is able to do full weight bearing on left foot. Patient was not able to bear full weight on left leg due to pain. Patient is not safe to go home alone and PT recommends SNF to return to her PLOF.     Time Calculation:    PT Charges     Row Name 11/19/22 1713             Time Calculation    Start Time 1210  -PG      Stop Time 1239  -PG      Time Calculation (min) 29 min  -PG      PT Received On 11/19/22  -PG      PT - Next Appointment 11/21/22  -PG      PT Goal Re-Cert Due Date 12/03/22  -PG         Time Calculation- PT    Total Timed Code Minutes- PT 0 minute(s)  -PG            User Key   (r) = Recorded By, (t) = Taken By, (c) = Cosigned By    Initials Name Provider Type    PG Sharda Lancaster, PT Physical Therapist              Therapy Charges for Today     Code Description Service Date Service Provider Modifiers Qty    68571644291 HC PT EVAL MOD COMPLEXITY 4 11/19/2022 Sharda Lancaster, MARIELLE GP 1          PT G-Codes  Outcome Measure Options: AM-PAC 6 Clicks Basic Mobility (PT)  AM-PAC 6 Clicks Score (PT): 14  PT Discharge Summary  Anticipated Discharge Disposition (PT): skilled nursing facility    Sharda Lancaster PT  11/19/2022

## 2022-11-19 NOTE — NURSING NOTE
Pt states she does not have a way to bring the remote of her bladder stimulator in the hospital. MD aware.

## 2022-11-19 NOTE — DISCHARGE SUMMARY
[facility]  DISCHARGE SUMMARY    Patient Name: Ashley Lin  : 1950  MRN: [id number]    Date of Admission: 2022  Discharge Diagnosis: Peripheral neuropathy  Date of Discharge: 2022  Primary Care Physician: Jamey Fairchild MD      Presenting Problem:   Left leg pain [M79.605]  Cellulitis of left lower extremity [L03.116]    Active and Resolved Hospital Problems:  Active Hospital Problems    Diagnosis POA   • **Left leg pain [M79.605] Yes      Resolved Hospital Problems   No resolved problems to display.         Hospital Course     Hospital Course:  Chief Complaint:        Chief Complaint   Patient presents with   • Leg Pain       Left leg swelling with pain X2 weeks. PCP told her to come be seen.            History of Present Illness  Obtained from admitting physician HPI on 2022:  History of present illness this is a 72-year-old female with multiple health problems who complains of pain to the left leg for last 2 weeks.  No fever chills is throbbing aching continuous severe worse with movement better with rest and nonradiating.  She associated with swelling and edema.  No injury no recent long car ride plane or immobilization foreign travels no chest pain or shortness of breath.  Patient called her doctor today and was sent to the ER.  No other complaints or associated symptoms at this time.     Chief Complaint:   Chief Complaint   Patient presents with   • Leg Pain       Left leg swelling with pain X2 weeks. PCP told her to come be seen.            History of Present Illness  Obtained from admitting physician HPI on 2022:  History of present illness this is a 72-year-old female with multiple health problems who complains of pain to the left leg for last 2 weeks.  No fever chills is throbbing aching continuous severe worse with movement better with rest and nonradiating.  She associated with swelling and edema.  No injury  no recent long car ride plane or immobilization foreign travels no chest pain or shortness of breath.  Patient called her doctor today and was sent to the ER.  No other complaints or associated symptoms at this time.       Hospital course and problem list    Patient admitted with above-mentioned presentation.  She was initially placed on antibiotics for concern of possible cellulitis.  However on physical exam she has no erythema I cannot appreciate much edema either today.  Her procalcitonin is low normal she has no leukocytosis.  She does have pain in the left lower extremity in the context of chronic pain syndrome.  She is on chronic opioids.  She does have tenderness on that leg, jumps with pain to slightest touch.  Her CPK is normal.  X-rays over the left lower extremity have been negative.  She is nontoxic-appearing.  She does have chronic back pain.  Neuro exam is normal.  Pulses are somewhat faint however palpable.  She was ruled out \for DVT with venous duplex.  At this time we will check B12 level.  We will also get a arterial duplex with ABIs.  We are unable to do an MRI of her L-spine due to a bladder stimulator and we seek neurosurgery consult for clearance instead..  I will increase her gabapentin to 600 mg 3 times a day and this can further be uptitrated as patient tolerates.  Patient requesting IV narcotic pain medications specifically Dilaudid.  She does have previous admissions with altered mental status due to what appeared to be polypharmacy.  I explained to her that aggressive opiate pain medications are not in her best interest. And recommended gabapentin.  We also tried IV steroids and if she does have some relief from that we will go ahead with a p.o. taper outpatient.  We will DC her antibiotics are there is no clinical evidence of cellulitis.  As long as ABIs are normal and cleared by neurosx she can be discharged home with above-mentioned plan.  PT has seen her and recommending SNF however  patient refusing that.  We will plan on uptitrating her gabapentin as an outpatient with pcp and possible addition of Lyrica outpatient may be considered as well with careful attention to polypharmacy.    Otherwise will continue her home medications.stable condition    Plan as above    Addendum patient seen by neurosurgery, had a lumbar spine CT which showed chronic postop changes.  Patient responded to steroid and neurosurgery recommends continuing steroid taper on discharge.  We will continue her home dose of gabapentin and a 4 times daily after correction by pharmacy of the [facility].  Otherwise as above.      Reasons For Change In Medications and Indications for New Medications:      Day of Discharge     Vital Signs:  Temp:  [97 °F (36.1 °C)-98.5 °F (36.9 °C)] 98 °F (36.7 °C)  Heart Rate:  [] 100  Resp:  [18-20] 20  BP: (131-173)/(62-96) 153/91    Physical Exam:  Physical Exam   Head atraumatic normocephalic  Neck supple  Cardiac S1-S2 regular rate rhythm extra heart sounds  Abdomen soft nontender nondistended normal bowel sounds  Lower extremities no edema, no erythema, pulses palpable however somewhat faint bilaterally.  No open wounds.  No cyanosis.  Left lower extremity is sensitive to touch,      Pertinent  and/or Most Recent Results     LAB RESULTS:      Lab 11/19/22  0625 11/18/22  1017   WBC 4.70 5.70   HEMOGLOBIN 12.7 12.7   HEMATOCRIT 40.0 38.2   PLATELETS 211 204   NEUTROS ABS 1.90 3.00   LYMPHS ABS 1.90 1.80   MONOS ABS 0.70 0.70   EOS ABS 0.20 0.20   .0* 97.9*   PROCALCITONIN  --  0.06   PROTIME  --  9.8   APTT  --  26.4*         Lab 11/19/22  0625 11/18/22  1017   SODIUM 140 139   POTASSIUM 4.2 5.0   CHLORIDE 101 104   CO2 29.0 27.0   ANION GAP 10.0 8.0   BUN 14 21   CREATININE 0.82 0.84   EGFR 77.1 74.9   GLUCOSE 149* 99   CALCIUM 9.1 9.1   MAGNESIUM 2.2  --          Lab 11/18/22  1017   TOTAL PROTEIN 6.9   ALBUMIN 4.20   GLOBULIN 2.7   ALT (SGPT) 9   AST (SGOT) 20   BILIRUBIN <0.2    ALK PHOS 174*         Lab 11/18/22  1017   PROTIME 9.8   INR 0.95                 Brief Urine Lab Results  (Last result in the past 365 days)      Color   Clarity   Blood   Leuk Est   Nitrite   Protein   CREAT   Urine HCG        09/27/22 1352 Yellow   Clear   Negative   Trace   Negative   Trace               Microbiology Results (last 10 days)     ** No results found for the last 240 hours. **          XR Tibia Fibula 2 View Left    Result Date: 11/18/2022  Impression: Negative for acute osseous abnormality.  Electronically Signed By-Carrillo Goodman MD On:11/18/2022 12:32 PM This report was finalized on [id number] by  Carrillo Goodman MD.    XR Foot 3+ View Left    Result Date: 11/18/2022  Impression: Negative for acute osseous abnormality.  Electronically Signed By-Carrillo Goodman MD On:11/18/2022 12:28 PM This report was finalized on [id number] by  Carrillo Goodman MD.      Results for orders placed during the hospital encounter of 11/18/22    Duplex Venous Lower Extremity - Left    Interpretation Summary  •  Normal left lower extremity venous duplex scan.      Results for orders placed during the hospital encounter of 11/18/22    Duplex Venous Lower Extremity - Left    Interpretation Summary  •  Normal left lower extremity venous duplex scan.          Labs Pending at Discharge:      Procedures Performed           Consults:   Consults     Date and Time Order Name Status Description    11/19/2022  1:10 PM Inpatient Neurosurgery Consult      11/19/2022 12:43 PM Inpatient Hospitalist Consult              Discharge Details        Discharge Medications      ASK your doctor about these medications      Instructions Start Date   albuterol sulfate  (90 Base) MCG/ACT inhaler  Commonly known as: PROVENTIL HFA;VENTOLIN HFA;PROAIR HFA   2 puffs, Inhalation, Every 4 Hours PRN, May use every 4 to 6 hours      atorvastatin 10 MG tablet  Commonly known as: LIPITOR   10 mg, Oral, Nightly      butalbital-acetaminophen-caffeine  -40 MG per tablet  Commonly known as: FIORICET, ESGIC   1-2 tablets, Oral, Every 4 Hours PRN      cetirizine 10 MG tablet  Commonly known as: zyrTEC   10 mg, Oral, Daily      cycloSPORINE 0.05 % ophthalmic emulsion  Commonly known as: RESTASIS   1 drop, Both Eyes, 2 Times Daily      docusate sodium 100 MG capsule   100 mg, Oral, 2 Times Daily      EPINEPHrine 0.3 MG/0.3ML solution auto-injector injection  Commonly known as: EPIPEN   0.3 mg, Intramuscular, Once      estradiol 1 MG tablet  Commonly known as: ESTRACE   1 mg, Oral, Daily      fluticasone 50 MCG/ACT nasal spray  Commonly known as: FLONASE   2 sprays, Nasal, Daily      gabapentin 800 MG tablet  Commonly known as: NEURONTIN  Ask about: Which instructions should I use?   800 mg, Oral, 4 Times Daily      levothyroxine 50 MCG tablet  Commonly known as: SYNTHROID, LEVOTHROID   50 mcg, Oral, Every Early Morning      montelukast 10 MG tablet  Commonly known as: SINGULAIR   10 mg, Oral, Nightly      omeprazole 40 MG capsule  Commonly known as: priLOSEC   40 mg, Oral, Daily      OXcarbazepine 300 MG tablet  Commonly known as: TRILEPTAL   450 mg, Oral, 2 Times Daily      oxybutynin XL 10 MG 24 hr tablet  Commonly known as: DITROPAN-XL   10 mg, Oral, Daily      oxyCODONE-acetaminophen  MG per tablet  Commonly known as: PERCOCET   1 tablet, Oral, Every 6 Hours      polyethylene glycol 17 g packet  Commonly known as: MIRALAX   17 g, Oral, Daily      rizatriptan MLT 10 MG disintegrating tablet  Commonly known as: MAXALT-MLT   10 mg, Translingual, Once As Needed, May repeat in 2 hours if needed       Stiolto Respimat 2.5-2.5 MCG/ACT aerosol solution inhaler  Generic drug: tiotropium bromide-olodaterol   2 puffs, Inhalation, Daily - RT      sucralfate 1 g tablet  Commonly known as: CARAFATE   1 g, Oral, 2 Times Daily      topiramate 100 MG tablet  Commonly known as: TOPAMAX   100 mg, Oral, 2 Times Daily      Viberzi 100 MG tablet  Generic drug: Eluxadoline    1 tablet, Oral, 2 Times Daily      vitamin D3 125 MCG (5000 UT) capsule capsule   5,000 Units, Oral, Daily             Allergies   Allergen Reactions   • Morphine Hives   • Penicillins Hives and Shortness Of Breath     Can take iv form - but not po form           Discharge Disposition: Discharged home stable condition follow-up with PCP as an outpatient.      Diet:  Hospital:  Diet Order   Procedures   • Diet: Regular/House Diet; Texture: Regular Texture (IDDSI 7); Fluid Consistency: Thin (IDDSI 0)         Discharge Activity:         CODE STATUS:  Code Status and Medical Interventions:   Ordered at: 11/18/22 1211     Code Status (Patient has no pulse and is not breathing):    CPR (Attempt to Resuscitate)     Medical Interventions (Patient has pulse or is breathing):    Full Support         No future appointments.        Time spent on Discharge including face to face service: 38 minutes      Signature:   Electronically signed by Casey Matos MD, 11/19/22, 1:45 PM EST.

## 2022-11-19 NOTE — PLAN OF CARE
Problem: Fall Injury Risk  Goal: Absence of Fall and Fall-Related Injury  11/19/2022 0322 by Mayelin Zuniga RN  Outcome: Ongoing, Not Progressing  11/19/2022 0321 by Mayelin Zuniga RN  Outcome: Ongoing, Not Progressing     Problem: Asthma Comorbidity  Goal: Maintenance of Asthma Control  11/19/2022 0322 by Mayelin Zuniga, CHANDRIKA  Outcome: Ongoing, Not Progressing  11/19/2022 0321 by Mayelin Zuniga RN  Outcome: Ongoing, Not Progressing     Problem: Behavioral Health Comorbidity  Goal: Maintenance of Behavioral Health Symptom Control  11/19/2022 0322 by Mayelin Zuniga RN  Outcome: Ongoing, Not Progressing  11/19/2022 0321 by Mayelin Zuniga RN  Outcome: Ongoing, Not Progressing     Problem: COPD (Chronic Obstructive Pulmonary Disease) Comorbidity  Goal: Maintenance of COPD Symptom Control  11/19/2022 0322 by Mayelin Zuniga RN  Outcome: Ongoing, Not Progressing  11/19/2022 0321 by Mayelin Zuniga RN  Outcome: Ongoing, Not Progressing     Problem: Diabetes Comorbidity  Goal: Blood Glucose Level Within Targeted Range  11/19/2022 0322 by Mayelin Zuniga RN  Outcome: Ongoing, Not Progressing  11/19/2022 0321 by Mayelin Zuniga RN  Outcome: Ongoing, Not Progressing     Problem: Heart Failure Comorbidity  Goal: Maintenance of Heart Failure Symptom Control  11/19/2022 0322 by Mayelin Zuniga, CHANDRIKA  Outcome: Ongoing, Not Progressing  11/19/2022 0321 by Mayelin Zuniga RN  Outcome: Ongoing, Not Progressing     Problem: Hypertension Comorbidity  Goal: Blood Pressure in Desired Range  11/19/2022 0322 by Mayelin Zuniga RN  Outcome: Ongoing, Not Progressing  11/19/2022 0321 by Mayelin Zuniga RN  Outcome: Ongoing, Not Progressing     Problem: Obstructive Sleep Apnea Risk or Actual Comorbidity Management  Goal: Unobstructed Breathing During Sleep  11/19/2022 0322 by Mayelin Zuniga, CHANDRIKA  Outcome: Ongoing, Not Progressing  11/19/2022 0321 by Mayelin Zuniga RN  Outcome: Ongoing, Not Progressing     Problem:  Osteoarthritis Comorbidity  Goal: Maintenance of Osteoarthritis Symptom Control  11/19/2022 0322 by Mayelin Zuniga RN  Outcome: Ongoing, Not Progressing  11/19/2022 0321 by Mayelin Zuniga RN  Outcome: Ongoing, Not Progressing     Problem: Pain Chronic (Persistent) (Comorbidity Management)  Goal: Acceptable Pain Control and Functional Ability  11/19/2022 0322 by Mayelin Zuniga RN  Outcome: Ongoing, Not Progressing  11/19/2022 0321 by Mayelin Zuniga RN  Outcome: Ongoing, Not Progressing     Problem: Seizure Disorder Comorbidity  Goal: Maintenance of Seizure Control  11/19/2022 0322 by Mayelin Zuniga RN  Outcome: Ongoing, Not Progressing  11/19/2022 0321 by Mayelin Zuniga RN  Outcome: Ongoing, Not Progressing     Problem: Adult Inpatient Plan of Care  Goal: Plan of Care Review  Outcome: Ongoing, Not Progressing  Flowsheets (Taken 11/19/2022 0322)  Progress: no change  Plan of Care Reviewed With: patient  Outcome Evaluation: pt stable, getting IV antibiotics, PRN pain meds, falls risk maintained, not on tele, plan of care continued  Goal: Patient-Specific Goal (Individualized)  Outcome: Ongoing, Not Progressing  Goal: Absence of Hospital-Acquired Illness or Injury  Outcome: Ongoing, Not Progressing  Goal: Optimal Comfort and Wellbeing  Outcome: Ongoing, Not Progressing  Goal: Readiness for Transition of Care  Outcome: Ongoing, Not Progressing   Goal Outcome Evaluation:  Plan of Care Reviewed With: patient        Progress: no change  Outcome Evaluation: pt stable, getting IV antibiotics, PRN pain meds, falls risk maintained, not on tele, plan of care continued

## 2022-11-20 VITALS
BODY MASS INDEX: 25.73 KG/M2 | DIASTOLIC BLOOD PRESSURE: 79 MMHG | RESPIRATION RATE: 20 BRPM | OXYGEN SATURATION: 96 % | HEART RATE: 106 BPM | SYSTOLIC BLOOD PRESSURE: 132 MMHG | TEMPERATURE: 98 F | WEIGHT: 139.8 LBS | HEIGHT: 62 IN

## 2022-11-20 LAB
ANION GAP SERPL CALCULATED.3IONS-SCNC: 11 MMOL/L (ref 5–15)
BASOPHILS # BLD AUTO: 0 10*3/MM3 (ref 0–0.2)
BASOPHILS NFR BLD AUTO: 0.5 % (ref 0–1.5)
BUN SERPL-MCNC: 18 MG/DL (ref 8–23)
BUN/CREAT SERPL: 28.6 (ref 7–25)
CALCIUM SPEC-SCNC: 8.7 MG/DL (ref 8.6–10.5)
CHLORIDE SERPL-SCNC: 106 MMOL/L (ref 98–107)
CO2 SERPL-SCNC: 25 MMOL/L (ref 22–29)
CREAT SERPL-MCNC: 0.63 MG/DL (ref 0.57–1)
DEPRECATED RDW RBC AUTO: 48.6 FL (ref 37–54)
EGFRCR SERPLBLD CKD-EPI 2021: 95.6 ML/MIN/1.73
EOSINOPHIL # BLD AUTO: 0.1 10*3/MM3 (ref 0–0.4)
EOSINOPHIL NFR BLD AUTO: 1.6 % (ref 0.3–6.2)
ERYTHROCYTE [DISTWIDTH] IN BLOOD BY AUTOMATED COUNT: 13.9 % (ref 12.3–15.4)
GLUCOSE SERPL-MCNC: 131 MG/DL (ref 65–99)
HCT VFR BLD AUTO: 35.3 % (ref 34–46.6)
HGB BLD-MCNC: 11.3 G/DL (ref 12–15.9)
LYMPHOCYTES # BLD AUTO: 1.6 10*3/MM3 (ref 0.7–3.1)
LYMPHOCYTES NFR BLD AUTO: 35.7 % (ref 19.6–45.3)
MAGNESIUM SERPL-MCNC: 2.2 MG/DL (ref 1.6–2.4)
MCH RBC QN AUTO: 32.1 PG (ref 26.6–33)
MCHC RBC AUTO-ENTMCNC: 32 G/DL (ref 31.5–35.7)
MCV RBC AUTO: 100.2 FL (ref 79–97)
MONOCYTES # BLD AUTO: 0.8 10*3/MM3 (ref 0.1–0.9)
MONOCYTES NFR BLD AUTO: 17.1 % (ref 5–12)
NEUTROPHILS NFR BLD AUTO: 2.1 10*3/MM3 (ref 1.7–7)
NEUTROPHILS NFR BLD AUTO: 45.1 % (ref 42.7–76)
NRBC BLD AUTO-RTO: 0 /100 WBC (ref 0–0.2)
PLATELET # BLD AUTO: 173 10*3/MM3 (ref 140–450)
PMV BLD AUTO: 8.6 FL (ref 6–12)
POTASSIUM SERPL-SCNC: 4.4 MMOL/L (ref 3.5–5.2)
RBC # BLD AUTO: 3.52 10*6/MM3 (ref 3.77–5.28)
SODIUM SERPL-SCNC: 142 MMOL/L (ref 136–145)
WBC NRBC COR # BLD: 4.6 10*3/MM3 (ref 3.4–10.8)

## 2022-11-20 PROCEDURE — 80048 BASIC METABOLIC PNL TOTAL CA: CPT | Performed by: PHYSICIAN ASSISTANT

## 2022-11-20 PROCEDURE — G0378 HOSPITAL OBSERVATION PER HR: HCPCS

## 2022-11-20 PROCEDURE — 85025 COMPLETE CBC W/AUTO DIFF WBC: CPT | Performed by: PHYSICIAN ASSISTANT

## 2022-11-20 PROCEDURE — 99204 OFFICE O/P NEW MOD 45 MIN: CPT

## 2022-11-20 PROCEDURE — 83735 ASSAY OF MAGNESIUM: CPT | Performed by: PHYSICIAN ASSISTANT

## 2022-11-20 RX ORDER — METHYLPREDNISOLONE 4 MG/1
TABLET ORAL
Qty: 1 EACH | Refills: 0 | Status: SHIPPED | OUTPATIENT
Start: 2022-11-20 | End: 2022-11-20 | Stop reason: SDUPTHER

## 2022-11-20 RX ORDER — METHYLPREDNISOLONE 4 MG/1
TABLET ORAL
Qty: 1 EACH | Refills: 0 | Status: SHIPPED | OUTPATIENT
Start: 2022-11-20

## 2022-11-20 RX ADMIN — OXCARBAZEPINE 450 MG: 150 TABLET, FILM COATED ORAL at 08:55

## 2022-11-20 RX ADMIN — OXYCODONE 10 MG: 5 TABLET ORAL at 11:09

## 2022-11-20 RX ADMIN — OXYCODONE 10 MG: 5 TABLET ORAL at 04:57

## 2022-11-20 RX ADMIN — GABAPENTIN 600 MG: 600 TABLET, FILM COATED ORAL at 04:58

## 2022-11-20 RX ADMIN — OXYBUTYNIN CHLORIDE 10 MG: 10 TABLET, EXTENDED RELEASE ORAL at 08:55

## 2022-11-20 RX ADMIN — PANTOPRAZOLE SODIUM 40 MG: 40 TABLET, DELAYED RELEASE ORAL at 04:58

## 2022-11-20 RX ADMIN — TOPIRAMATE 100 MG: 100 TABLET, FILM COATED ORAL at 08:55

## 2022-11-20 RX ADMIN — CETIRIZINE HYDROCHLORIDE 10 MG: 10 TABLET, FILM COATED ORAL at 08:55

## 2022-11-20 RX ADMIN — LEVOTHYROXINE SODIUM 50 MCG: 50 TABLET ORAL at 04:58

## 2022-11-20 NOTE — CONSULTS
Neurosurgery Consultation      Patient: Ashley Lin    Sex: female    YOB: 1950    Date of Admission: 11/18/2022  9:13 AM    Admitting Dx: Left leg pain [M79.605]  Cellulitis of left lower extremity [L03.116]    Reason for Consult: Low back pain      Subjective     HPI:  Patient is a 72 y.o. female with COPD and history of L4-S1 fusion who is chronically managed by [facility].  Being evaluated in the hospital due to complaints of progressive low back pain and LLE radicular symptoms.  Patient denies recent trauma, bowel or bladder dysfunction, and saddle anesthesia.  She denies fever, chills, and lower extremity weakness.  She already has a follow-up scheduled with [facility] next week.      PMH:  Past Medical History:   Diagnosis Date   • Asthma    • Chronic back pain    • Chronic headaches    • Chronic obstructive pulmonary disease (HCC) 7/1/2020   • Chronic pain syndrome 7/1/2020   • Depression 7/1/2020   • Disease of thyroid gland    • Estrogen deficiency 7/1/2020   • GERD without esophagitis 7/1/2020   • Hypothyroidism (acquired) 7/1/2020   • Migraines 7/1/2020   • Neuropathy    • Vomiting 06/30/2020         Current Facility-Administered Medications:   •  albuterol (PROVENTIL) nebulizer solution 0.083% 2.5 mg/3mL, 2.5 mg, Nebulization, Q6H PRN, Luis Manuel Martell PA-C  •  atorvastatin (LIPITOR) tablet 10 mg, 10 mg, Oral, Nightly, Luis Manuel Martell PA-C, 10 mg at 11/19/22 2107  •  cetirizine (zyrTEC) tablet 10 mg, 10 mg, Oral, Daily, Luis Manuel Martell PA-C, 10 mg at 11/20/22 0855  •  Enoxaparin Sodium (LOVENOX) syringe 40 mg, 40 mg, Subcutaneous, Q24H, Luis Manuel Martell PA-C, 40 mg at 11/19/22 1701  •  gabapentin (NEURONTIN) tablet 600 mg, 600 mg, Oral, Q8H, Casey Matos MD, 600 mg at 11/20/22 0458  •  levothyroxine (SYNTHROID, LEVOTHROID) tablet 50 mcg, 50 mcg, Oral, Q AM, Luis Manuel Martell PA-C, 50 mcg at 11/20/22 0458  •  melatonin tablet 5 mg, 5 mg, Oral,  Nightly PRN, Luis Manuel Martell PA-C, 5 mg at 11/19/22 2212  •  montelukast (SINGULAIR) tablet 10 mg, 10 mg, Oral, Nightly, Luis Manuel Martell PA-C, 10 mg at 11/19/22 2107  •  ondansetron (ZOFRAN) tablet 4 mg, 4 mg, Oral, Q6H PRN **OR** ondansetron (ZOFRAN) injection 4 mg, 4 mg, Intravenous, Q6H PRN, Luis Manuel Martell PA-C, 4 mg at 11/18/22 2041  •  OXcarbazepine (TRILEPTAL) tablet 450 mg, 450 mg, Oral, BID, Luis Manuel Martell PA-C, 450 mg at 11/20/22 0855  •  oxybutynin XL (DITROPAN-XL) 24 hr tablet 10 mg, 10 mg, Oral, Daily, Luis Manuel Martell PA-C, 10 mg at 11/20/22 0855  •  oxyCODONE (ROXICODONE) immediate release tablet 10 mg, 10 mg, Oral, Q6H PRN, Rosy Ascencio APRN, 10 mg at 11/20/22 0457  •  pantoprazole (PROTONIX) EC tablet 40 mg, 40 mg, Oral, QAM, Luis Manuel Martell PA-C, 40 mg at 11/20/22 0458  •  polyethylene glycol (MIRALAX) packet 17 g, 17 g, Oral, Daily, Luis Manuel Martell PA-C, 17 g at 11/19/22 0926  •  sodium chloride 0.9 % flush 10 mL, 10 mL, Intravenous, Q12H, Luis Manuel Martell PA-C, 10 mL at 11/19/22 2107  •  sodium chloride 0.9 % flush 10 mL, 10 mL, Intravenous, PRN, Luis Manuel Martell PA-C  •  sodium chloride 0.9 % infusion 40 mL, 40 mL, Intravenous, PRN, Luis Manuel Martell PA-C  •  sucralfate (CARAFATE) tablet 1 g, 1 g, Oral, BID AC, Luis Manuel Martell PA-C  •  topiramate (TOPAMAX) tablet 100 mg, 100 mg, Oral, BID, Luis Manuel Martell PA-C, 100 mg at 11/20/22 0855    Allergies   Allergen Reactions   • Morphine Hives   • Penicillins Hives and Shortness Of Breath     Can take iv form - but not po form         Past Surgical History:   Procedure Laterality Date   • CHOLECYSTECTOMY     • COLONOSCOPY N/A 11/11/2020    Procedure: COLONOSCOPY WITH RANDOM COLON BIOPSIES X4 AREAS, BIOPSY X 1 AREA;  Surgeon: Aguila Garcia MD;  Location: [facility] ENDOSCOPY;  Service: Gastroenterology;  Laterality: N/A;  POST- colon erosion   • ENDOSCOPY N/A 7/2/2020    Procedure:  ESOPHAGOGASTRODUODENOSCOPY WITH DILATATION (BOUGIE #[id number]);  Surgeon: Benjamin Mike MD;  Location: [facility] ENDOSCOPY;  Service: Gastroenterology;  Laterality: N/A;  Post operative diagnosis:GASTROPARESIS AND DYSPHAGIA    • ENDOSCOPY N/A 2020    Procedure: ESOPHAGOGASTRODUODENOSCOPY WITH BIOPSY X 1 AREA;  Surgeon: Aguila Garcia MD;  Location: [facility] ENDOSCOPY;  Service: Gastroenterology;  Laterality: N/A;  POST   • HYSTERECTOMY     • NECK SURGERY     • SPINE SURGERY  2020       Social History     Socioeconomic History   • Marital status:    Tobacco Use   • Smoking status: Former     Types: Cigarettes     Quit date:      Years since quittin.8   • Smokeless tobacco: Never   Vaping Use   • Vaping Use: Never used   Substance and Sexual Activity   • Alcohol use: Never   • Drug use: Never   • Sexual activity: Defer       Family History   Problem Relation Age of Onset   • Heart disease Mother    • No Known Problems Father          Current Medications:  Scheduled Meds:atorvastatin, 10 mg, Oral, Nightly  cetirizine, 10 mg, Oral, Daily  enoxaparin, 40 mg, Subcutaneous, Q24H  gabapentin, 600 mg, Oral, Q8H  levothyroxine, 50 mcg, Oral, Q AM  montelukast, 10 mg, Oral, Nightly  OXcarbazepine, 450 mg, Oral, BID  oxybutynin XL, 10 mg, Oral, Daily  pantoprazole, 40 mg, Oral, QAM  polyethylene glycol, 17 g, Oral, Daily  sodium chloride, 10 mL, Intravenous, Q12H  sucralfate, 1 g, Oral, BID AC  topiramate, 100 mg, Oral, BID      Continuous Infusions:   PRN Meds: •  albuterol  •  melatonin  •  ondansetron **OR** ondansetron  •  oxyCODONE  •  sodium chloride  •  sodium chloride      10 point review of systems was completed and is negative except for what is noted in HPI      Objective     Vitals:    22 1506 22 1913 22 2100 22 0643   BP: 161/92 159/91 144/87 126/70   BP Location: Left arm Left arm Left arm Right arm   Patient Position: Lying Lying Lying Lying   Pulse: 90 111 107  92   Resp: 20 20 20 20   Temp: 97.9 °F (36.6 °C) 98.6 °F (37 °C) 97.9 °F (36.6 °C) 97.8 °F (36.6 °C)   TempSrc: Oral Oral Oral Oral   SpO2: 98% 98% 95% 95%   Weight:       Height:           Physical exam    General  - WD/WN female, appears their stated age, awake, cooperative, in no acute distress  HEENT  - Normocephalic, atraumatic, PERRLA, EOM intact  Cardiac  - RRR, no peripheral edema  Respiratory  - Normal respiratory rate and effort  Abdomen  - Soft, NT/ND  Musculoskeletal  - No joint redness, swelling, or tenderness  Skin  - Warm and dry, no bleeding, bruising, or rash  NEUROLOGIC  - A/O x3  - Moves all extremities symmetrically with 5/5 strengh  - Sensation intact throughout        RESULTS REVIEW:  Results from last 7 days   Lab Units 11/20/22 0449 11/19/22  0625 11/18/22  1017   WBC 10*3/mm3 4.60 4.70 5.70   HEMOGLOBIN g/dL 11.3* 12.7 12.7   HEMATOCRIT % 35.3 40.0 38.2   PLATELETS 10*3/mm3 173 211 204        Results from last 7 days   Lab Units 11/20/22  0449 11/19/22  0625 11/18/22  1017   SODIUM mmol/L 142 140 139   POTASSIUM mmol/L 4.4 4.2 5.0   CHLORIDE mmol/L 106 101 104   CO2 mmol/L 25.0 29.0 27.0   BUN mg/dL 18 14 21   CREATININE mg/dL 0.63 0.82 0.84   CALCIUM mg/dL 8.7 9.1 9.1   BILIRUBIN mg/dL  --   --  <0.2   ALK PHOS U/L  --   --  174*   ALT (SGPT) U/L  --   --  9   AST (SGOT) U/L  --   --  20   GLUCOSE mg/dL 131* 149* 99        XR Spine Lumbar Complete 4+VW    Result Date: 11/19/2022  DATE OF EXAM: 11/19/2022 9:00 AM  PROCEDURE: XR SPINE LUMBAR COMPLETE 4+VW-  INDICATIONS: left leg radiculopathy; M79.605-Pain in left leg; L03.116-Cellulitis of left lower limb  COMPARISON: No Comparisons Available  TECHNIQUE: A complete lumbar spine with a minimum of four radiologic views were obtained.    FINDINGS: There are posterior pedicle screws spanning the L4 and L5 levels with laminectomy defects at these levels. Disc spacers at L4-5 and L5-S1 are present. No radiographic signs of hardware loosening. The  vertebral body heights and alignment are maintained. Small nonbridging osteophytes are present at the L3 and L4 level. There is a neural stimulator lead coursing through the right fourth sacral neural foramen. An acute fracture or focal osseous lesion is not demonstrated. The SI joints are patent and symmetric. There is heterotopic bone screws. Multiple surgical clips are present.      Impression: No acute bony abnormality. There are postsurgical changes in the lower lumbar spine as described above. No radiographic signs of hardware loosening.  Electronically Signed By-Jermaine Rayo DO On:11/19/2022 8:34 PM This report was finalized on [id number] by  Jermaine Rayo DO.    CT Thoracic Spine Without Contrast    Result Date: 11/19/2022   DATE OF EXAM: 11/19/2022 4:41 PM  PROCEDURE: CT THORACIC SPINE WO CONTRAST-, CT LUMBAR SPINE WO CONTRAST-  INDICATIONS: left leg radiculopathy, bcak pain; M79.605-Pain in left leg; L03.116-Cellulitis of left lower limb  COMPARISON: MRI of the thoracic spine dated 4/18/2018 and chest CT dated 4/15/2019, L-spine radiographs dated 11/19/2022 and MRI of the lumbar spine dated 4/17/2008  TECHNIQUE: Routine transaxial slices were obtained through the thoracic spine and lumbar spine without the administration of intravenous contrast. Reconstructed coronal and sagittal images were also obtained. Automated exposure control and iterative construction methods were used.  FINDINGS: T-spine: The thoracic spine is well aligned. There are Schmorl Schmorl's nodes with minimal loss of height at the T9 and T10 levels. These are not acute. Remaining vertebral body heights in the thoracic spine are well aligned. No focal disc protrusions or extrusions. The central spinal canal is patent. Posterior elements are intact. There is mild disc space narrowing and lateral endplate osteophytes at the T9-T10 disc space level. No paraspinal masses or fluid collections. Partially visualized is hardware in the  lower cervical spine. No suspicious pulmonary opacities.  L-spine: There are postsurgical changes from previous L4 and L5 laminectomies with posterior lumbar fusion disc spacers in place. There is no evidence of hardware loosening. Heterotopic bone is present around posterior elements, unchanged. No paraspinal masses or fluid collections. No focal osseous lesions. Vertebral body heights and alignment are maintained. Disc spaces at the L1 2-3 and L3-4 levels are maintained. Tiny nonbridging anterior osteophytes at the L2-3 and L3-4 levels are present. No focal disc protrusions or extrusions or significant central canal stenosis. Mild disc bulge at the L3-4 level is noted causing mild bilateral neural foraminal narrowing.      Impression: T-spine: Mild degenerative changes at the T8-9 level. No acute abnormality or significant central canal stenosis or neural foraminal narrowing.  L-spine: Hardware and postsurgical changes at the L4-S1 levels are unchanged. There is mild degenerative change at the L2-3 and L3-4 levels without focal disc abnormality. No evidence of hardware complication or acute abnormality.  Electronically Signed By-Jermaine Rayo DO On:11/19/2022 9:09 PM This report was finalized on [id number] by  Jermaine Rayo DO.    CT Lumbar Spine Without Contrast    Result Date: 11/19/2022   DATE OF EXAM: 11/19/2022 4:41 PM  PROCEDURE: CT THORACIC SPINE WO CONTRAST-, CT LUMBAR SPINE WO CONTRAST-  INDICATIONS: left leg radiculopathy, bcak pain; M79.605-Pain in left leg; L03.116-Cellulitis of left lower limb  COMPARISON: MRI of the thoracic spine dated 4/18/2018 and chest CT dated 4/15/2019, L-spine radiographs dated 11/19/2022 and MRI of the lumbar spine dated 4/17/2008  TECHNIQUE: Routine transaxial slices were obtained through the thoracic spine and lumbar spine without the administration of intravenous contrast. Reconstructed coronal and sagittal images were also obtained. Automated exposure control and  iterative construction methods were used.  FINDINGS: T-spine: The thoracic spine is well aligned. There are Schmorl Schmorl's nodes with minimal loss of height at the T9 and T10 levels. These are not acute. Remaining vertebral body heights in the thoracic spine are well aligned. No focal disc protrusions or extrusions. The central spinal canal is patent. Posterior elements are intact. There is mild disc space narrowing and lateral endplate osteophytes at the T9-T10 disc space level. No paraspinal masses or fluid collections. Partially visualized is hardware in the lower cervical spine. No suspicious pulmonary opacities.  L-spine: There are postsurgical changes from previous L4 and L5 laminectomies with posterior lumbar fusion disc spacers in place. There is no evidence of hardware loosening. Heterotopic bone is present around posterior elements, unchanged. No paraspinal masses or fluid collections. No focal osseous lesions. Vertebral body heights and alignment are maintained. Disc spaces at the L1 2-3 and L3-4 levels are maintained. Tiny nonbridging anterior osteophytes at the L2-3 and L3-4 levels are present. No focal disc protrusions or extrusions or significant central canal stenosis. Mild disc bulge at the L3-4 level is noted causing mild bilateral neural foraminal narrowing.      Impression: T-spine: Mild degenerative changes at the T8-9 level. No acute abnormality or significant central canal stenosis or neural foraminal narrowing.  L-spine: Hardware and postsurgical changes at the L4-S1 levels are unchanged. There is mild degenerative change at the L2-3 and L3-4 levels without focal disc abnormality. No evidence of hardware complication or acute abnormality.  Electronically Signed By-Jermaine Rayo DO On:11/19/2022 9:09 PM This report was finalized on [id number] by  Jermaine Rayo DO.                   Assessment     MDM: This is a 72 y.o. female with history of L4-S1 fusion chronically managed by  [facility] who is being evaluated today for complaints of severe low back pain and left lower extremity radicular symptoms.  The symptoms have progressed slowly over time.  She has no recent trauma and no red flags for cauda equina syndrome.  There are no acute findings on CT thoracolumbar spine.  She already is set up to follow-up with [facility] next week.  She does not require neurosurgical intervention at this time.  I would recommend conservative measures with medications such as a Medrol Dosepak and possibly muscle relaxers.  She should follow-up with [facility] as scheduled.  Patient is agreeable to this plan.        Plan     1. Intractable low back pain  2. Lumbar stenosis with neurogenic claudication  3. Lumbar radiculopathy  - No neurosurgical intervention or follow-up necessary at this time  - Recommend pain management with medications -could consider Medrol Dosepak or muscle relaxers  - Follow-up with [facility] as scheduled  -Medical management and pain control per primary team      Neurosurgery will sign off at this time.  Call with any questions or concerns.    I discussed my assessment and recommendations with Dr. Sharp and the nursing staff      Dave Barba PA-C  11/20/2022  09:55 EST      Part of this note may be an electronic transcription/translation of spoken language to printed text using the Dragon Dictation System.

## 2022-11-20 NOTE — PLAN OF CARE
Problem: Fall Injury Risk  Goal: Absence of Fall and Fall-Related Injury  Outcome: Ongoing, Not Progressing     Problem: Asthma Comorbidity  Goal: Maintenance of Asthma Control  Outcome: Ongoing, Not Progressing     Problem: Behavioral Health Comorbidity  Goal: Maintenance of Behavioral Health Symptom Control  Outcome: Ongoing, Not Progressing     Problem: COPD (Chronic Obstructive Pulmonary Disease) Comorbidity  Goal: Maintenance of COPD Symptom Control  Outcome: Ongoing, Not Progressing     Problem: Diabetes Comorbidity  Goal: Blood Glucose Level Within Targeted Range  Outcome: Ongoing, Not Progressing     Problem: Heart Failure Comorbidity  Goal: Maintenance of Heart Failure Symptom Control  Outcome: Ongoing, Not Progressing     Problem: Hypertension Comorbidity  Goal: Blood Pressure in Desired Range  Outcome: Ongoing, Not Progressing     Problem: Obstructive Sleep Apnea Risk or Actual Comorbidity Management  Goal: Unobstructed Breathing During Sleep  Outcome: Ongoing, Not Progressing     Problem: Osteoarthritis Comorbidity  Goal: Maintenance of Osteoarthritis Symptom Control  Outcome: Ongoing, Not Progressing     Problem: Pain Chronic (Persistent) (Comorbidity Management)  Goal: Acceptable Pain Control and Functional Ability  Outcome: Ongoing, Not Progressing     Problem: Seizure Disorder Comorbidity  Goal: Maintenance of Seizure Control  Outcome: Ongoing, Not Progressing     Problem: Adult Inpatient Plan of Care  Goal: Plan of Care Review  Outcome: Ongoing, Not Progressing  Flowsheets (Taken 11/19/2022 2968)  Progress: no change  Plan of Care Reviewed With: patient  Outcome Evaluation: falls risk maintained, VSS, not on tele, PRN pain meds, CT for the AM, neurosurgery consulted, pt stable, plan of care continued  Goal: Patient-Specific Goal (Individualized)  Outcome: Ongoing, Not Progressing  Goal: Absence of Hospital-Acquired Illness or Injury  Outcome: Ongoing, Not Progressing  Goal: Optimal Comfort and  Wellbeing  Outcome: Ongoing, Not Progressing  Goal: Readiness for Transition of Care  Outcome: Ongoing, Not Progressing     Problem: Pain Acute  Goal: Acceptable Pain Control and Functional Ability  Outcome: Ongoing, Not Progressing   Goal Outcome Evaluation:  Plan of Care Reviewed With: patient        Progress: no change  Outcome Evaluation: falls risk maintained, VSS, not on tele, PRN pain meds, CT for the AM, neurosurgery consulted, pt stable, plan of care continued

## 2022-11-20 NOTE — PLAN OF CARE
Goal Outcome Evaluation:  Plan of Care Reviewed With: patient        Progress: improving  Outcome Evaluation: Patient to discharge home and follow up with [facility] for her scheduled appointment next week.

## 2022-11-20 NOTE — PROGRESS NOTES
[facility] Medicine Services Daily Progress Note    Patient Name: Ashley Lin  : 1950  MRN: [id number]  Primary Care Physician:  Jamey Fairchild MD  Date of admission: 2022      Subjective      Chief Complaint: Left lower extremity pain    Seen and examined bedside.  Pain much improved.    ROS   Cardiac no chest pain no palpitations  GI no nausea no vomiting Nexa pulmonary no shortness of breath no cough      Objective      Vitals:   Temp:  [97.8 °F (36.6 °C)-98.6 °F (37 °C)] 97.8 °F (36.6 °C)  Heart Rate:  [] 92  Resp:  [20] 20  BP: (126-161)/(70-92) 126/70    Physical Exam   Physical Exam   Constitutional:  oriented to person, place, and time. No distress.   HENT:   Head: Normocephalic and atraumatic.   Eyes: Conjunctivae and EOM are normal. Pupils are equal, round, and reactive to light.   Neck: No JVD present. No thyromegaly present.   Cardiovascular: Normal rate, regular rhythm, normal heart sounds and intact distal pulses. Exam reveals no gallop and no friction rub.   No murmur heard.  Pulmonary/Chest: Effort normal and breath sounds normal. No stridor. No respiratory distress.  has no wheezes.  has no rales.  exhibits no tenderness.   Abdominal: Soft. Bowel sounds are normal.  no distension and no mass. There is no tenderness. There is no rebound and no guarding. No hernia.   Musculoskeletal: Normal range of motion.   Lymphadenopathy:     no cervical adenopathy.   Neurological:  alert and oriented to person, place, and time. No cranial nerve deficit or sensory deficit. exhibits normal muscle tone.   Skin: No rash noted.  not diaphoretic.   Psychiatric:  normal mood and affect.   Vitals reviewed.         Result Review    Result Review:  I have personally reviewed the results from the time of this admission to 2022 09:47 EST and agree with these findings:  [x]  Laboratory  []  Microbiology  [x]  Radiology  []  EKG/Telemetry   []  Cardiology/Vascular    []  Pathology  []  Old records  []  Other:          Assessment & Plan      Brief Patient Summary:  Ashley Lin is a 72 y.o. female who presents with left lower extremity pain.      atorvastatin, 10 mg, Oral, Nightly  cetirizine, 10 mg, Oral, Daily  enoxaparin, 40 mg, Subcutaneous, Q24H  gabapentin, 600 mg, Oral, Q8H  levothyroxine, 50 mcg, Oral, Q AM  montelukast, 10 mg, Oral, Nightly  OXcarbazepine, 450 mg, Oral, BID  oxybutynin XL, 10 mg, Oral, Daily  pantoprazole, 40 mg, Oral, QAM  polyethylene glycol, 17 g, Oral, Daily  sodium chloride, 10 mL, Intravenous, Q12H  sucralfate, 1 g, Oral, BID AC  topiramate, 100 mg, Oral, BID             Active Hospital Problems:  Active Hospital Problems    Diagnosis    • **Left leg pain      Plan:   Left lower extremity pain  CPK normal  No evidence of cellulitis antibiotics were stopped.  Procalcitonin was low normal  Duplex to rule out DVT  ABIs negative  Unable to do MRI lumbar spine, CT L-spine showed chronic postop changes  Patient responded to steroid and neurosurgery recommending steroid taper on discharge  Patient will continue on her gabapentin as well    Otherwise patient will continue her home medications as before.    Plan discharged home stable condition follow-up outpatient with neurosurgery and PCP    Of note patient refused SNF placement she prefers to go home.  Says she feels much better today.  DVT prophylaxis:  Medical DVT prophylaxis orders are present.    CODE STATUS:    Code Status (Patient has no pulse and is not breathing): CPR (Attempt to Resuscitate)  Medical Interventions (Patient has pulse or is breathing): Full Support      Disposition:  I expect patient to be discharged today.        Electronically signed by Casey Matos MD, 11/20/22, 09:47 EST.  [facility] Hospitalist Team

## 2022-11-21 ENCOUNTER — READMISSION MANAGEMENT (OUTPATIENT)
Dept: CALL CENTER | Facility: HOSPITAL | Age: 72
End: 2022-11-21

## 2022-11-21 LAB — HBA1C MFR BLD: 5.4 % (ref 3.5–5.6)

## 2022-11-21 NOTE — CASE MANAGEMENT/SOCIAL WORK
Case Management Discharge Note                Selected Continued Care - Discharged on 11/20/2022 Admission date: 11/18/2022 - Discharge disposition: Home or Self Care        Home Medical Care     Service Provider Selected Services Address Phone Fax Patient Preferred    [facility] [address] [place] [id number] [phone] --                Selected Continued Care - Prior Encounters Includes continued care and service providers with selected services from prior encounters from 8/20/2022 to 11/20/2022    Discharged on 9/30/2022 Admission date: 9/27/2022 - Discharge disposition: Skilled Nursing Facility (DC - External)    Destination     Service Provider Selected Services Address Phone Fax Patient Preferred    [facility] [address] [place], [place] [id number] [phone] --                    Transportation Services  Private: Car    Final Discharge Disposition Code: 06 - home with home health care

## 2022-11-21 NOTE — OUTREACH NOTE
Prep Survey    Flowsheet Row Responses   [facility] facility patient discharged from? [facility]   Is LACE score < 7 ? No   Emergency Room discharge w/ pulse ox? No   Eligibility Readm Mgmt   Discharge diagnosis Cellulitis of left lower extremity   Does the patient have one of the following disease processes/diagnoses(primary or secondary)? Other   Does the patient have Home health ordered? No   Is there a DME ordered? No   Prep survey completed? Yes          ELICEO RUST - Registered Nurse

## 2022-11-21 NOTE — CASE MANAGEMENT/SOCIAL WORK
Case Management Discharge Note                Selected Continued Care - Discharged on 11/20/2022 Admission date: 11/18/2022 - Discharge disposition: Home or Self Care              Selected Continued Care - Prior Encounters Includes continued care and service providers with selected services from prior encounters from 8/20/2022 to 11/20/2022    Discharged on 9/30/2022 Admission date: 9/27/2022 - Discharge disposition: Skilled Nursing Facility (DC - External)    Destination     Service Provider Selected Services Address Phone Fax Patient Preferred    [facility] [address] [place], [place] [id number] [phone] --                    Transportation Services  Private: Car    Final Discharge Disposition Code: 06 - home with home health care

## 2022-11-21 NOTE — CASE MANAGEMENT/SOCIAL WORK
Case Management Discharge Note                Selected Continued Care - Discharged on 11/20/2022 Admission date: 11/18/2022 - Discharge disposition: Home or Self Care          Selected Continued Care - Prior Encounters Includes continued care and service providers with selected services from prior encounters from 8/20/2022 to 11/20/2022    Discharged on 9/30/2022 Admission date: 9/27/2022 - Discharge disposition: Skilled Nursing Facility (DC - External)    Destination     Service Provider Selected Services Address Phone Fax Patient Preferred    [facility] [address] [place], [place] [id number] [phone] --                    Transportation Services  Private: Car    Final Discharge Disposition Code: 06 - home with home health care

## 2022-11-26 ENCOUNTER — READMISSION MANAGEMENT (OUTPATIENT)
Dept: CALL CENTER | Facility: HOSPITAL | Age: 72
End: 2022-11-26

## 2022-11-26 NOTE — OUTREACH NOTE
Medical Week 1 Survey    Flowsheet Row Responses   [facility] patient discharged from? [facility]   Does the patient have one of the following disease processes/diagnoses(primary or secondary)? Other   Week 1 attempt successful? Yes   Call start time 0912   Call end time 0920   List who call center can speak with pt   Meds reviewed with patient/caregiver? Yes   Is the patient having any side effects they believe may be caused by any medication additions or changes? Yes   Side effects comments  Pt feeling jittery due to steroids.   Does the patient have all medications ordered at discharge? Yes   Is the patient taking all medications as directed (includes completed medication regime)? No   What is preventing the patient from taking all medications as directed? Side effects   Nursing Interventions Nurse provided patient education   Comments regarding appointments Pt to see pcp on November 29, 2022.   Does the patient have a primary care provider?  Yes   Does the patient have an appointment with their PCP within 7 days of discharge? Yes   Has the patient kept scheduled appointments due by today? N/A   Has home health visited the patient within 72 hours of discharge? Yes   Home health comments HH and PT are visiting weekly.   Psychosocial issues? No   Did the patient receive a copy of their discharge instructions? Yes   Nursing interventions Reviewed instructions with patient   What is the patient's perception of their health status since discharge? Same   Is the patient/caregiver able to teach back signs and symptoms related to disease process for when to call PCP? Yes   Is the patient/caregiver able to teach back signs and symptoms related to disease process for when to call 911? Yes   Is the patient/caregiver able to teach back the hierarchy of who to call/visit for symptoms/problems? PCP, Specialist, Home health nurse, Urgent Care, ED, 911 Yes   Week 1 call completed? Yes   Is the patient interested in additional  calls from an ambulatory ?  NOTE:  applies to high risk patients requiring additional follow-up. No   Wrap up additional comments Pt reports not taking steroids due to side effects. Pt is taking lasix and potassium. Pt to follow up with pcp on November 29, 2022.           BEBETO LOO - Registered Nurse

## 2022-12-06 ENCOUNTER — READMISSION MANAGEMENT (OUTPATIENT)
Dept: CALL CENTER | Facility: HOSPITAL | Age: 72
End: 2022-12-06

## 2022-12-06 NOTE — OUTREACH NOTE
Medical Week 2 Survey    Flowsheet Row Responses   [facility] facility patient discharged from? [facility]   Does the patient have one of the following disease processes/diagnoses(primary or secondary)? Other   Week 2 attempt successful? No   Unsuccessful attempts Attempt 1          MORENO CHÁVEZ - Registered Nurse

## 2022-12-13 ENCOUNTER — READMISSION MANAGEMENT (OUTPATIENT)
Dept: CALL CENTER | Facility: HOSPITAL | Age: 72
End: 2022-12-13

## 2022-12-13 NOTE — OUTREACH NOTE
Medical Week 3 Survey    Flowsheet Row Responses   [facility] patient discharged from? [facility]   Does the patient have one of the following disease processes/diagnoses(primary or secondary)? Other   Week 3 attempt successful? Yes   Call start time 1554   Call end time 1555   Discharge diagnosis Cellulitis of left lower extremity   Meds reviewed with patient/caregiver? Yes   Is the patient taking all medications as directed (includes completed medication regime)? Yes   Does the patient have a primary care provider?  Yes   Does the patient have an appointment with their PCP within 7 days of discharge? Yes   Has the patient kept scheduled appointments due by today? Yes   Has home health visited the patient within 72 hours of discharge? Yes   Psychosocial issues? No   Did the patient receive a copy of their discharge instructions? Yes   Nursing interventions Reviewed instructions with patient   What is the patient's perception of their health status since discharge? Improving   Is the patient/caregiver able to teach back signs and symptoms related to disease process for when to call PCP? Yes   Is the patient/caregiver able to teach back signs and symptoms related to disease process for when to call 911? Yes   Is the patient/caregiver able to teach back the hierarchy of who to call/visit for symptoms/problems? PCP, Specialist, Home health nurse, Urgent Care, ED, 911 Yes   Week 3 Call Completed? Yes   Wrap up additional comments pt reports she is doing well and has improved. No needs at this time.          LAWANDA MIRANDA - Registered Nurse

## 2022-12-22 ENCOUNTER — READMISSION MANAGEMENT (OUTPATIENT)
Dept: CALL CENTER | Facility: HOSPITAL | Age: 72
End: 2022-12-22

## 2022-12-22 NOTE — OUTREACH NOTE
Medical Week 4 Survey    Flowsheet Row Responses   [facility] patient discharged from? [facility]   Does the patient have one of the following disease processes/diagnoses(primary or secondary)? Other   Week 4 attempt successful? No   Revoke Decline to participate          AILYN JACKSON - Registered Nurse

## 2023-09-18 ENCOUNTER — OFFICE (AMBULATORY)
Dept: URBAN - METROPOLITAN AREA CLINIC 64 | Facility: CLINIC | Age: 73
End: 2023-09-18

## 2023-09-18 VITALS
DIASTOLIC BLOOD PRESSURE: 67 MMHG | HEART RATE: 117 BPM | SYSTOLIC BLOOD PRESSURE: 106 MMHG | WEIGHT: 108 LBS | HEIGHT: 62 IN

## 2023-09-18 DIAGNOSIS — R10.13 EPIGASTRIC PAIN: ICD-10-CM

## 2023-09-18 DIAGNOSIS — R13.10 DYSPHAGIA, UNSPECIFIED: ICD-10-CM

## 2023-09-18 DIAGNOSIS — K31.84 GASTROPARESIS: ICD-10-CM

## 2023-09-18 DIAGNOSIS — R63.4 ABNORMAL WEIGHT LOSS: ICD-10-CM

## 2023-09-18 DIAGNOSIS — R11.2 NAUSEA WITH VOMITING, UNSPECIFIED: ICD-10-CM

## 2023-09-18 PROCEDURE — 99214 OFFICE O/P EST MOD 30 MIN: CPT | Performed by: INTERNAL MEDICINE

## 2023-09-18 RX ORDER — METOCLOPRAMIDE 10 MG/1
TABLET ORAL
Qty: 360 | Refills: 5 | Status: COMPLETED
End: 2023-10-27

## 2023-10-27 ENCOUNTER — ON CAMPUS - OUTPATIENT (AMBULATORY)
Dept: URBAN - METROPOLITAN AREA HOSPITAL 2 | Facility: HOSPITAL | Age: 73
End: 2023-10-27

## 2023-10-27 VITALS
SYSTOLIC BLOOD PRESSURE: 155 MMHG | SYSTOLIC BLOOD PRESSURE: 136 MMHG | OXYGEN SATURATION: 100 % | RESPIRATION RATE: 18 BRPM | SYSTOLIC BLOOD PRESSURE: 97 MMHG | OXYGEN SATURATION: 98 % | TEMPERATURE: 97.7 F | HEIGHT: 62 IN | HEART RATE: 106 BPM | HEART RATE: 102 BPM | DIASTOLIC BLOOD PRESSURE: 56 MMHG | DIASTOLIC BLOOD PRESSURE: 63 MMHG | DIASTOLIC BLOOD PRESSURE: 68 MMHG | RESPIRATION RATE: 16 BRPM | SYSTOLIC BLOOD PRESSURE: 99 MMHG | DIASTOLIC BLOOD PRESSURE: 115 MMHG | OXYGEN SATURATION: 97 % | HEART RATE: 108 BPM | SYSTOLIC BLOOD PRESSURE: 114 MMHG | HEART RATE: 114 BPM | WEIGHT: 119 LBS | DIASTOLIC BLOOD PRESSURE: 91 MMHG

## 2023-10-27 DIAGNOSIS — R11.2 NAUSEA WITH VOMITING, UNSPECIFIED: ICD-10-CM

## 2023-10-27 DIAGNOSIS — Z98.890 OTHER SPECIFIED POSTPROCEDURAL STATES: ICD-10-CM

## 2023-10-27 DIAGNOSIS — K31.89 OTHER DISEASES OF STOMACH AND DUODENUM: ICD-10-CM

## 2023-10-27 DIAGNOSIS — R13.10 DYSPHAGIA, UNSPECIFIED: ICD-10-CM

## 2023-10-27 PROBLEM — T18.2XXA FOREIGN BODY IN STOMACH, INITIAL ENCOUNTER: Status: ACTIVE | Noted: 2023-10-27

## 2023-10-27 PROBLEM — Z48.815 ENCOUNTER FOR SURGICAL AFTERCARE FOLLOWING SURGERY ON THE DI: Status: ACTIVE | Noted: 2023-10-27

## 2023-10-27 PROCEDURE — 43235 EGD DIAGNOSTIC BRUSH WASH: CPT | Performed by: INTERNAL MEDICINE

## 2023-10-27 PROCEDURE — 43450 DILATE ESOPHAGUS 1/MULT PASS: CPT | Performed by: INTERNAL MEDICINE

## 2023-11-07 ENCOUNTER — APPOINTMENT (OUTPATIENT)
Dept: CT IMAGING | Facility: HOSPITAL | Age: 73
DRG: 193 | End: 2023-11-07
Payer: MEDICARE

## 2023-11-07 ENCOUNTER — APPOINTMENT (OUTPATIENT)
Dept: CARDIOLOGY | Facility: HOSPITAL | Age: 73
DRG: 193 | End: 2023-11-07
Payer: MEDICARE

## 2023-11-07 ENCOUNTER — APPOINTMENT (OUTPATIENT)
Dept: GENERAL RADIOLOGY | Facility: HOSPITAL | Age: 73
DRG: 193 | End: 2023-11-07
Payer: MEDICARE

## 2023-11-07 ENCOUNTER — HOSPITAL ENCOUNTER (INPATIENT)
Facility: HOSPITAL | Age: 73
LOS: 1 days | Discharge: NURSING, REHAB OR LONG-TERM CARE | DRG: 193 | End: 2023-11-10
Attending: EMERGENCY MEDICINE | Admitting: STUDENT IN AN ORGANIZED HEALTH CARE EDUCATION/TRAINING PROGRAM
Payer: MEDICARE

## 2023-11-07 DIAGNOSIS — G43.009 MIGRAINE WITHOUT AURA AND WITHOUT STATUS MIGRAINOSUS, NOT INTRACTABLE: Chronic | ICD-10-CM

## 2023-11-07 DIAGNOSIS — Z74.09 IMMOBILITY: ICD-10-CM

## 2023-11-07 DIAGNOSIS — I82.4Z2 ACUTE VENOUS EMBOLISM AND THROMBOSIS OF DEEP VESSELS OF DISTAL END OF LEFT LOWER EXTREMITY: ICD-10-CM

## 2023-11-07 DIAGNOSIS — R00.0 TACHYCARDIA: ICD-10-CM

## 2023-11-07 DIAGNOSIS — M48.02 CERVICAL STENOSIS OF SPINE: ICD-10-CM

## 2023-11-07 DIAGNOSIS — Z79.01 CHRONIC ANTICOAGULATION: ICD-10-CM

## 2023-11-07 DIAGNOSIS — R60.0 BILATERAL LEG EDEMA: Primary | ICD-10-CM

## 2023-11-07 DIAGNOSIS — I82.462 ACUTE DEEP VEIN THROMBOSIS (DVT) OF CALF MUSCLE VEIN OF LEFT LOWER EXTREMITY: Chronic | ICD-10-CM

## 2023-11-07 DIAGNOSIS — Z86.79 HISTORY OF SUBDURAL HEMATOMA: Chronic | ICD-10-CM

## 2023-11-07 PROBLEM — R53.1 WEAKNESS: Status: ACTIVE | Noted: 2023-11-07

## 2023-11-07 LAB
ALBUMIN SERPL-MCNC: 2.4 G/DL (ref 3.5–5.2)
ALBUMIN/GLOB SERPL: 0.9 G/DL
ALP SERPL-CCNC: 184 U/L (ref 39–117)
ALT SERPL W P-5'-P-CCNC: 16 U/L (ref 1–33)
ANION GAP SERPL CALCULATED.3IONS-SCNC: 9 MMOL/L (ref 5–15)
APTT PPP: 25.4 SECONDS (ref 24–31)
AST SERPL-CCNC: 30 U/L (ref 1–32)
BASOPHILS # BLD AUTO: 0 10*3/MM3 (ref 0–0.2)
BASOPHILS NFR BLD AUTO: 0.4 % (ref 0–1.5)
BH CV LOW VAS LEFT GASTRONEMIUS VESSEL: 1
BH CV LOWER VASCULAR LEFT COMMON FEMORAL AUGMENT: NORMAL
BH CV LOWER VASCULAR LEFT COMMON FEMORAL COMPETENT: NORMAL
BH CV LOWER VASCULAR LEFT COMMON FEMORAL COMPRESS: NORMAL
BH CV LOWER VASCULAR LEFT COMMON FEMORAL PHASIC: NORMAL
BH CV LOWER VASCULAR LEFT COMMON FEMORAL SPONT: NORMAL
BH CV LOWER VASCULAR LEFT DISTAL FEMORAL COMPRESS: NORMAL
BH CV LOWER VASCULAR LEFT GASTRONEMIUS COMPRESS: NORMAL
BH CV LOWER VASCULAR LEFT GASTRONEMIUS THROMBUS: NORMAL
BH CV LOWER VASCULAR LEFT GREATER SAPH AK COMPRESS: NORMAL
BH CV LOWER VASCULAR LEFT GREATER SAPH BK COMPRESS: NORMAL
BH CV LOWER VASCULAR LEFT LESSER SAPH COMPRESS: NORMAL
BH CV LOWER VASCULAR LEFT MID FEMORAL AUGMENT: NORMAL
BH CV LOWER VASCULAR LEFT MID FEMORAL COMPETENT: NORMAL
BH CV LOWER VASCULAR LEFT MID FEMORAL COMPRESS: NORMAL
BH CV LOWER VASCULAR LEFT MID FEMORAL PHASIC: NORMAL
BH CV LOWER VASCULAR LEFT MID FEMORAL SPONT: NORMAL
BH CV LOWER VASCULAR LEFT PERONEAL COMPRESS: NORMAL
BH CV LOWER VASCULAR LEFT POPLITEAL AUGMENT: NORMAL
BH CV LOWER VASCULAR LEFT POPLITEAL COMPETENT: NORMAL
BH CV LOWER VASCULAR LEFT POPLITEAL COMPRESS: NORMAL
BH CV LOWER VASCULAR LEFT POPLITEAL PHASIC: NORMAL
BH CV LOWER VASCULAR LEFT POPLITEAL SPONT: NORMAL
BH CV LOWER VASCULAR LEFT POSTERIOR TIBIAL COMPRESS: NORMAL
BH CV LOWER VASCULAR LEFT PROXIMAL FEMORAL COMPRESS: NORMAL
BH CV LOWER VASCULAR LEFT SAPHENOFEMORAL JUNCTION COMPRESS: NORMAL
BH CV LOWER VASCULAR RIGHT COMMON FEMORAL AUGMENT: NORMAL
BH CV LOWER VASCULAR RIGHT COMMON FEMORAL COMPETENT: NORMAL
BH CV LOWER VASCULAR RIGHT COMMON FEMORAL COMPRESS: NORMAL
BH CV LOWER VASCULAR RIGHT COMMON FEMORAL PHASIC: NORMAL
BH CV LOWER VASCULAR RIGHT COMMON FEMORAL SPONT: NORMAL
BH CV LOWER VASCULAR RIGHT DISTAL FEMORAL COMPRESS: NORMAL
BH CV LOWER VASCULAR RIGHT GASTRONEMIUS COMPRESS: NORMAL
BH CV LOWER VASCULAR RIGHT GREATER SAPH AK COMPRESS: NORMAL
BH CV LOWER VASCULAR RIGHT GREATER SAPH BK COMPRESS: NORMAL
BH CV LOWER VASCULAR RIGHT LESSER SAPH COMPRESS: NORMAL
BH CV LOWER VASCULAR RIGHT MID FEMORAL AUGMENT: NORMAL
BH CV LOWER VASCULAR RIGHT MID FEMORAL COMPETENT: NORMAL
BH CV LOWER VASCULAR RIGHT MID FEMORAL COMPRESS: NORMAL
BH CV LOWER VASCULAR RIGHT MID FEMORAL PHASIC: NORMAL
BH CV LOWER VASCULAR RIGHT MID FEMORAL SPONT: NORMAL
BH CV LOWER VASCULAR RIGHT PERONEAL COMPRESS: NORMAL
BH CV LOWER VASCULAR RIGHT POPLITEAL AUGMENT: NORMAL
BH CV LOWER VASCULAR RIGHT POPLITEAL COMPETENT: NORMAL
BH CV LOWER VASCULAR RIGHT POPLITEAL COMPRESS: NORMAL
BH CV LOWER VASCULAR RIGHT POPLITEAL PHASIC: NORMAL
BH CV LOWER VASCULAR RIGHT POPLITEAL SPONT: NORMAL
BH CV LOWER VASCULAR RIGHT POSTERIOR TIBIAL COMPRESS: NORMAL
BH CV LOWER VASCULAR RIGHT PROXIMAL FEMORAL COMPRESS: NORMAL
BH CV LOWER VASCULAR RIGHT SAPHENOFEMORAL JUNCTION COMPRESS: NORMAL
BH CV VAS PRELIMINARY FINDINGS SCRIPTING: 1
BILIRUB SERPL-MCNC: 0.4 MG/DL (ref 0–1.2)
BUN SERPL-MCNC: 12 MG/DL (ref 8–23)
BUN/CREAT SERPL: 19.7 (ref 7–25)
CALCIUM SPEC-SCNC: 8.5 MG/DL (ref 8.6–10.5)
CHLORIDE SERPL-SCNC: 107 MMOL/L (ref 98–107)
CO2 SERPL-SCNC: 26 MMOL/L (ref 22–29)
CREAT SERPL-MCNC: 0.61 MG/DL (ref 0.57–1)
DEPRECATED RDW RBC AUTO: 52.9 FL (ref 37–54)
EGFRCR SERPLBLD CKD-EPI 2021: 95.7 ML/MIN/1.73
EOSINOPHIL # BLD AUTO: 0.1 10*3/MM3 (ref 0–0.4)
EOSINOPHIL NFR BLD AUTO: 2.8 % (ref 0.3–6.2)
ERYTHROCYTE [DISTWIDTH] IN BLOOD BY AUTOMATED COUNT: 15 % (ref 12.3–15.4)
GEN 5 2HR TROPONIN T REFLEX: 22 NG/L
GLOBULIN UR ELPH-MCNC: 2.8 GM/DL
GLUCOSE SERPL-MCNC: 100 MG/DL (ref 65–99)
HCT VFR BLD AUTO: 39.9 % (ref 34–46.6)
HGB BLD-MCNC: 13.1 G/DL (ref 12–15.9)
INR PPP: 0.96 (ref 0.93–1.1)
LYMPHOCYTES # BLD AUTO: 1.4 10*3/MM3 (ref 0.7–3.1)
LYMPHOCYTES NFR BLD AUTO: 31 % (ref 19.6–45.3)
MAGNESIUM SERPL-MCNC: 1.6 MG/DL (ref 1.6–2.4)
MCH RBC QN AUTO: 33.6 PG (ref 26.6–33)
MCHC RBC AUTO-ENTMCNC: 32.9 G/DL (ref 31.5–35.7)
MCV RBC AUTO: 102.2 FL (ref 79–97)
MONOCYTES # BLD AUTO: 0.5 10*3/MM3 (ref 0.1–0.9)
MONOCYTES NFR BLD AUTO: 11.4 % (ref 5–12)
NEUTROPHILS NFR BLD AUTO: 2.5 10*3/MM3 (ref 1.7–7)
NEUTROPHILS NFR BLD AUTO: 54.4 % (ref 42.7–76)
NRBC BLD AUTO-RTO: 0.1 /100 WBC (ref 0–0.2)
NT-PROBNP SERPL-MCNC: 502.7 PG/ML (ref 0–900)
PLATELET # BLD AUTO: 239 10*3/MM3 (ref 140–450)
PMV BLD AUTO: 7.9 FL (ref 6–12)
POTASSIUM SERPL-SCNC: 4.2 MMOL/L (ref 3.5–5.2)
PROT SERPL-MCNC: 5.2 G/DL (ref 6–8.5)
PROTHROMBIN TIME: 10.5 SECONDS (ref 9.6–11.7)
RBC # BLD AUTO: 3.9 10*6/MM3 (ref 3.77–5.28)
SODIUM SERPL-SCNC: 142 MMOL/L (ref 136–145)
TROPONIN T DELTA: 1 NG/L
TROPONIN T SERPL HS-MCNC: 21 NG/L
TSH SERPL DL<=0.05 MIU/L-ACNC: 4.12 UIU/ML (ref 0.27–4.2)
WBC NRBC COR # BLD: 4.7 10*3/MM3 (ref 3.4–10.8)

## 2023-11-07 PROCEDURE — 85730 THROMBOPLASTIN TIME PARTIAL: CPT | Performed by: NURSE PRACTITIONER

## 2023-11-07 PROCEDURE — 83880 ASSAY OF NATRIURETIC PEPTIDE: CPT | Performed by: NURSE PRACTITIONER

## 2023-11-07 PROCEDURE — 71275 CT ANGIOGRAPHY CHEST: CPT

## 2023-11-07 PROCEDURE — 93005 ELECTROCARDIOGRAM TRACING: CPT | Performed by: NURSE PRACTITIONER

## 2023-11-07 PROCEDURE — 99285 EMERGENCY DEPT VISIT HI MDM: CPT

## 2023-11-07 PROCEDURE — 83735 ASSAY OF MAGNESIUM: CPT | Performed by: NURSE PRACTITIONER

## 2023-11-07 PROCEDURE — G0378 HOSPITAL OBSERVATION PER HR: HCPCS

## 2023-11-07 PROCEDURE — 93970 EXTREMITY STUDY: CPT

## 2023-11-07 PROCEDURE — 25510000001 IOPAMIDOL PER 1 ML: Performed by: EMERGENCY MEDICINE

## 2023-11-07 PROCEDURE — 85025 COMPLETE CBC W/AUTO DIFF WBC: CPT | Performed by: NURSE PRACTITIONER

## 2023-11-07 PROCEDURE — 25810000003 SODIUM CHLORIDE 0.9 % SOLUTION: Performed by: NURSE PRACTITIONER

## 2023-11-07 PROCEDURE — 80053 COMPREHEN METABOLIC PANEL: CPT | Performed by: NURSE PRACTITIONER

## 2023-11-07 PROCEDURE — 36415 COLL VENOUS BLD VENIPUNCTURE: CPT

## 2023-11-07 PROCEDURE — 84484 ASSAY OF TROPONIN QUANT: CPT | Performed by: NURSE PRACTITIONER

## 2023-11-07 PROCEDURE — 85610 PROTHROMBIN TIME: CPT | Performed by: NURSE PRACTITIONER

## 2023-11-07 PROCEDURE — 25010000002 ENOXAPARIN PER 10 MG: Performed by: NURSE PRACTITIONER

## 2023-11-07 PROCEDURE — 84443 ASSAY THYROID STIM HORMONE: CPT | Performed by: NURSE PRACTITIONER

## 2023-11-07 PROCEDURE — 71045 X-RAY EXAM CHEST 1 VIEW: CPT

## 2023-11-07 RX ORDER — TOPIRAMATE 100 MG/1
100 TABLET, FILM COATED ORAL 2 TIMES DAILY
Status: DISCONTINUED | OUTPATIENT
Start: 2023-11-08 | End: 2023-11-10 | Stop reason: HOSPADM

## 2023-11-07 RX ORDER — OXYCODONE HYDROCHLORIDE 5 MG/1
10 TABLET ORAL EVERY 4 HOURS PRN
Status: DISCONTINUED | OUTPATIENT
Start: 2023-11-07 | End: 2023-11-10 | Stop reason: HOSPADM

## 2023-11-07 RX ORDER — CYCLOSPORINE 0.5 MG/ML
1 EMULSION OPHTHALMIC 2 TIMES DAILY
Status: DISCONTINUED | OUTPATIENT
Start: 2023-11-08 | End: 2023-11-10 | Stop reason: HOSPADM

## 2023-11-07 RX ORDER — OXCARBAZEPINE 150 MG/1
450 TABLET, FILM COATED ORAL 2 TIMES DAILY
Status: DISCONTINUED | OUTPATIENT
Start: 2023-11-08 | End: 2023-11-08

## 2023-11-07 RX ORDER — METOPROLOL TARTRATE 5 MG/5ML
2.5 INJECTION INTRAVENOUS ONCE
Status: COMPLETED | OUTPATIENT
Start: 2023-11-07 | End: 2023-11-08

## 2023-11-07 RX ORDER — SUCRALFATE 1 G/1
1 TABLET ORAL 2 TIMES DAILY
Status: DISCONTINUED | OUTPATIENT
Start: 2023-11-08 | End: 2023-11-08

## 2023-11-07 RX ORDER — BUTALBITAL, ACETAMINOPHEN AND CAFFEINE 50; 325; 40 MG/1; MG/1; MG/1
1 TABLET ORAL EVERY 4 HOURS PRN
Status: DISCONTINUED | OUTPATIENT
Start: 2023-11-07 | End: 2023-11-10 | Stop reason: HOSPADM

## 2023-11-07 RX ORDER — ENOXAPARIN SODIUM 100 MG/ML
1 INJECTION SUBCUTANEOUS ONCE
Status: COMPLETED | OUTPATIENT
Start: 2023-11-07 | End: 2023-11-07

## 2023-11-07 RX ORDER — GABAPENTIN 400 MG/1
800 CAPSULE ORAL EVERY 8 HOURS SCHEDULED
Status: DISCONTINUED | OUTPATIENT
Start: 2023-11-08 | End: 2023-11-10 | Stop reason: HOSPADM

## 2023-11-07 RX ORDER — SODIUM CHLORIDE 0.9 % (FLUSH) 0.9 %
10 SYRINGE (ML) INJECTION AS NEEDED
Status: DISCONTINUED | OUTPATIENT
Start: 2023-11-07 | End: 2023-11-10 | Stop reason: HOSPADM

## 2023-11-07 RX ORDER — SUMATRIPTAN 50 MG/1
50 TABLET, FILM COATED ORAL DAILY PRN
Status: COMPLETED | OUTPATIENT
Start: 2023-11-07 | End: 2023-11-08

## 2023-11-07 RX ORDER — LEVOTHYROXINE SODIUM 0.05 MG/1
50 TABLET ORAL
Status: DISCONTINUED | OUTPATIENT
Start: 2023-11-08 | End: 2023-11-10 | Stop reason: HOSPADM

## 2023-11-07 RX ORDER — ESTRADIOL 1 MG/1
1 TABLET ORAL DAILY
Status: DISCONTINUED | OUTPATIENT
Start: 2023-11-08 | End: 2023-11-10 | Stop reason: HOSPADM

## 2023-11-07 RX ORDER — MONTELUKAST SODIUM 10 MG/1
10 TABLET ORAL NIGHTLY
Status: DISCONTINUED | OUTPATIENT
Start: 2023-11-08 | End: 2023-11-08

## 2023-11-07 RX ORDER — ALBUTEROL SULFATE 2.5 MG/3ML
2.5 SOLUTION RESPIRATORY (INHALATION) EVERY 4 HOURS PRN
Status: DISCONTINUED | OUTPATIENT
Start: 2023-11-07 | End: 2023-11-10 | Stop reason: HOSPADM

## 2023-11-07 RX ORDER — PANTOPRAZOLE SODIUM 40 MG/1
40 TABLET, DELAYED RELEASE ORAL
Status: DISCONTINUED | OUTPATIENT
Start: 2023-11-08 | End: 2023-11-09

## 2023-11-07 RX ORDER — CETIRIZINE HYDROCHLORIDE 10 MG/1
10 TABLET ORAL DAILY
Status: DISCONTINUED | OUTPATIENT
Start: 2023-11-08 | End: 2023-11-10 | Stop reason: HOSPADM

## 2023-11-07 RX ORDER — ATORVASTATIN CALCIUM 10 MG/1
10 TABLET, FILM COATED ORAL NIGHTLY
Status: DISCONTINUED | OUTPATIENT
Start: 2023-11-08 | End: 2023-11-10 | Stop reason: HOSPADM

## 2023-11-07 RX ORDER — OXYBUTYNIN CHLORIDE 10 MG/1
10 TABLET, EXTENDED RELEASE ORAL DAILY
Status: DISCONTINUED | OUTPATIENT
Start: 2023-11-08 | End: 2023-11-08

## 2023-11-07 RX ADMIN — IOPAMIDOL 100 ML: 755 INJECTION, SOLUTION INTRAVENOUS at 20:33

## 2023-11-07 RX ADMIN — OXYCODONE 10 MG: 5 TABLET ORAL at 19:49

## 2023-11-07 RX ADMIN — SODIUM CHLORIDE 500 ML: 9 INJECTION, SOLUTION INTRAVENOUS at 21:38

## 2023-11-07 RX ADMIN — ENOXAPARIN SODIUM 60 MG: 60 INJECTION SUBCUTANEOUS at 21:38

## 2023-11-08 ENCOUNTER — APPOINTMENT (OUTPATIENT)
Dept: CT IMAGING | Facility: HOSPITAL | Age: 73
DRG: 193 | End: 2023-11-08
Payer: MEDICARE

## 2023-11-08 LAB
ALBUMIN SERPL-MCNC: 2.1 G/DL (ref 3.5–5.2)
ALBUMIN/GLOB SERPL: 0.8 G/DL
ALP SERPL-CCNC: 155 U/L (ref 39–117)
ALT SERPL W P-5'-P-CCNC: 14 U/L (ref 1–33)
ANION GAP SERPL CALCULATED.3IONS-SCNC: 8 MMOL/L (ref 5–15)
AST SERPL-CCNC: 27 U/L (ref 1–32)
BASOPHILS # BLD AUTO: 0.1 10*3/MM3 (ref 0–0.2)
BASOPHILS NFR BLD AUTO: 1.2 % (ref 0–1.5)
BILIRUB SERPL-MCNC: 0.3 MG/DL (ref 0–1.2)
BUN SERPL-MCNC: 10 MG/DL (ref 8–23)
BUN/CREAT SERPL: 19.2 (ref 7–25)
CALCIUM SPEC-SCNC: 8 MG/DL (ref 8.6–10.5)
CHLORIDE SERPL-SCNC: 106 MMOL/L (ref 98–107)
CO2 SERPL-SCNC: 26 MMOL/L (ref 22–29)
CREAT SERPL-MCNC: 0.52 MG/DL (ref 0.57–1)
DEPRECATED RDW RBC AUTO: 53.4 FL (ref 37–54)
EGFRCR SERPLBLD CKD-EPI 2021: 99.5 ML/MIN/1.73
EOSINOPHIL # BLD AUTO: 0.2 10*3/MM3 (ref 0–0.4)
EOSINOPHIL NFR BLD AUTO: 4.2 % (ref 0.3–6.2)
ERYTHROCYTE [DISTWIDTH] IN BLOOD BY AUTOMATED COUNT: 15.1 % (ref 12.3–15.4)
GLOBULIN UR ELPH-MCNC: 2.5 GM/DL
GLUCOSE SERPL-MCNC: 96 MG/DL (ref 65–99)
HCT VFR BLD AUTO: 36.3 % (ref 34–46.6)
HGB BLD-MCNC: 12.2 G/DL (ref 12–15.9)
LYMPHOCYTES # BLD AUTO: 1.8 10*3/MM3 (ref 0.7–3.1)
LYMPHOCYTES NFR BLD AUTO: 37.5 % (ref 19.6–45.3)
MCH RBC QN AUTO: 34 PG (ref 26.6–33)
MCHC RBC AUTO-ENTMCNC: 33.8 G/DL (ref 31.5–35.7)
MCV RBC AUTO: 100.8 FL (ref 79–97)
MONOCYTES # BLD AUTO: 0.6 10*3/MM3 (ref 0.1–0.9)
MONOCYTES NFR BLD AUTO: 11.6 % (ref 5–12)
NEUTROPHILS NFR BLD AUTO: 2.2 10*3/MM3 (ref 1.7–7)
NEUTROPHILS NFR BLD AUTO: 45.5 % (ref 42.7–76)
NRBC BLD AUTO-RTO: 0.1 /100 WBC (ref 0–0.2)
PLATELET # BLD AUTO: 225 10*3/MM3 (ref 140–450)
PMV BLD AUTO: 7.8 FL (ref 6–12)
POTASSIUM SERPL-SCNC: 4.1 MMOL/L (ref 3.5–5.2)
PROT SERPL-MCNC: 4.6 G/DL (ref 6–8.5)
QT INTERVAL: 294 MS
QTC INTERVAL: 419 MS
RBC # BLD AUTO: 3.6 10*6/MM3 (ref 3.77–5.28)
SODIUM SERPL-SCNC: 140 MMOL/L (ref 136–145)
TROPONIN T SERPL HS-MCNC: 28 NG/L
WBC NRBC COR # BLD: 4.9 10*3/MM3 (ref 3.4–10.8)

## 2023-11-08 PROCEDURE — 25010000002 ONDANSETRON PER 1 MG: Performed by: NURSE PRACTITIONER

## 2023-11-08 PROCEDURE — 93005 ELECTROCARDIOGRAM TRACING: CPT | Performed by: NURSE PRACTITIONER

## 2023-11-08 PROCEDURE — 84484 ASSAY OF TROPONIN QUANT: CPT | Performed by: NURSE PRACTITIONER

## 2023-11-08 PROCEDURE — 85025 COMPLETE CBC W/AUTO DIFF WBC: CPT | Performed by: NURSE PRACTITIONER

## 2023-11-08 PROCEDURE — 70450 CT HEAD/BRAIN W/O DYE: CPT

## 2023-11-08 PROCEDURE — G0378 HOSPITAL OBSERVATION PER HR: HCPCS

## 2023-11-08 PROCEDURE — 25010000002 FUROSEMIDE PER 20 MG: Performed by: NURSE PRACTITIONER

## 2023-11-08 PROCEDURE — 80053 COMPREHEN METABOLIC PANEL: CPT | Performed by: NURSE PRACTITIONER

## 2023-11-08 RX ORDER — SODIUM CHLORIDE 9 MG/ML
40 INJECTION, SOLUTION INTRAVENOUS AS NEEDED
Status: DISCONTINUED | OUTPATIENT
Start: 2023-11-08 | End: 2023-11-10 | Stop reason: HOSPADM

## 2023-11-08 RX ORDER — PANTOPRAZOLE SODIUM 40 MG/1
40 TABLET, DELAYED RELEASE ORAL 2 TIMES DAILY
COMMUNITY

## 2023-11-08 RX ORDER — ONDANSETRON 4 MG/1
4 TABLET, FILM COATED ORAL EVERY 6 HOURS PRN
COMMUNITY

## 2023-11-08 RX ORDER — FUROSEMIDE 20 MG/1
20 TABLET ORAL DAILY
COMMUNITY

## 2023-11-08 RX ORDER — ONDANSETRON 2 MG/ML
4 INJECTION INTRAMUSCULAR; INTRAVENOUS EVERY 6 HOURS PRN
Status: DISCONTINUED | OUTPATIENT
Start: 2023-11-08 | End: 2023-11-10 | Stop reason: HOSPADM

## 2023-11-08 RX ORDER — SODIUM CHLORIDE 0.9 % (FLUSH) 0.9 %
10 SYRINGE (ML) INJECTION AS NEEDED
Status: DISCONTINUED | OUTPATIENT
Start: 2023-11-08 | End: 2023-11-10 | Stop reason: HOSPADM

## 2023-11-08 RX ORDER — SODIUM CHLORIDE 0.9 % (FLUSH) 0.9 %
10 SYRINGE (ML) INJECTION EVERY 12 HOURS SCHEDULED
Status: DISCONTINUED | OUTPATIENT
Start: 2023-11-08 | End: 2023-11-10 | Stop reason: HOSPADM

## 2023-11-08 RX ORDER — POLYETHYLENE GLYCOL 3350 17 G/17G
17 POWDER, FOR SOLUTION ORAL DAILY PRN
Status: DISCONTINUED | OUTPATIENT
Start: 2023-11-08 | End: 2023-11-10 | Stop reason: HOSPADM

## 2023-11-08 RX ORDER — BISACODYL 10 MG
10 SUPPOSITORY, RECTAL RECTAL DAILY PRN
Status: DISCONTINUED | OUTPATIENT
Start: 2023-11-08 | End: 2023-11-10 | Stop reason: HOSPADM

## 2023-11-08 RX ORDER — AMOXICILLIN 250 MG
2 CAPSULE ORAL 2 TIMES DAILY
Status: DISCONTINUED | OUTPATIENT
Start: 2023-11-08 | End: 2023-11-10 | Stop reason: HOSPADM

## 2023-11-08 RX ORDER — HYDROXYZINE HYDROCHLORIDE 25 MG/1
25 TABLET, FILM COATED ORAL EVERY 6 HOURS PRN
COMMUNITY

## 2023-11-08 RX ORDER — PREDNISOLONE ACETATE 10 MG/ML
1 SUSPENSION/ DROPS OPHTHALMIC NIGHTLY
Status: DISCONTINUED | OUTPATIENT
Start: 2023-11-08 | End: 2023-11-10 | Stop reason: HOSPADM

## 2023-11-08 RX ORDER — IPRATROPIUM BROMIDE AND ALBUTEROL SULFATE 2.5; .5 MG/3ML; MG/3ML
3 SOLUTION RESPIRATORY (INHALATION) EVERY 6 HOURS
COMMUNITY

## 2023-11-08 RX ORDER — BISACODYL 5 MG/1
5 TABLET, DELAYED RELEASE ORAL DAILY PRN
Status: DISCONTINUED | OUTPATIENT
Start: 2023-11-08 | End: 2023-11-10 | Stop reason: HOSPADM

## 2023-11-08 RX ORDER — PROMETHAZINE HYDROCHLORIDE 25 MG/1
25 TABLET ORAL EVERY 6 HOURS PRN
COMMUNITY

## 2023-11-08 RX ORDER — PREDNISOLONE ACETATE 10 MG/ML
1 SUSPENSION/ DROPS OPHTHALMIC NIGHTLY
COMMUNITY

## 2023-11-08 RX ORDER — POTASSIUM CHLORIDE 750 MG/1
10 TABLET, FILM COATED, EXTENDED RELEASE ORAL DAILY
COMMUNITY

## 2023-11-08 RX ORDER — FUROSEMIDE 10 MG/ML
20 INJECTION INTRAMUSCULAR; INTRAVENOUS DAILY
Status: DISCONTINUED | OUTPATIENT
Start: 2023-11-08 | End: 2023-11-10 | Stop reason: HOSPADM

## 2023-11-08 RX ADMIN — SUMATRIPTAN SUCCINATE 50 MG: 50 TABLET ORAL at 10:54

## 2023-11-08 RX ADMIN — TOPIRAMATE 100 MG: 100 TABLET, FILM COATED ORAL at 08:30

## 2023-11-08 RX ADMIN — TOPIRAMATE 100 MG: 100 TABLET, FILM COATED ORAL at 20:53

## 2023-11-08 RX ADMIN — PANTOPRAZOLE SODIUM 40 MG: 40 TABLET, DELAYED RELEASE ORAL at 08:30

## 2023-11-08 RX ADMIN — FUROSEMIDE 20 MG: 10 INJECTION, SOLUTION INTRAMUSCULAR; INTRAVENOUS at 08:30

## 2023-11-08 RX ADMIN — BUTALBITAL, ACETAMINOPHEN, AND CAFFEINE 1 TABLET: 50; 325; 40 TABLET ORAL at 20:53

## 2023-11-08 RX ADMIN — GABAPENTIN 800 MG: 400 CAPSULE ORAL at 08:30

## 2023-11-08 RX ADMIN — PREDNISOLONE ACETATE 1 DROP: 10 SUSPENSION/ DROPS OPHTHALMIC at 20:03

## 2023-11-08 RX ADMIN — OXYBUTYNIN CHLORIDE 10 MG: 10 TABLET, EXTENDED RELEASE ORAL at 10:54

## 2023-11-08 RX ADMIN — BUTALBITAL, ACETAMINOPHEN, AND CAFFEINE 1 TABLET: 50; 325; 40 TABLET ORAL at 13:58

## 2023-11-08 RX ADMIN — ONDANSETRON 4 MG: 2 INJECTION INTRAMUSCULAR; INTRAVENOUS at 20:53

## 2023-11-08 RX ADMIN — CYCLOSPORINE 1 DROP: 0.5 EMULSION OPHTHALMIC at 10:54

## 2023-11-08 RX ADMIN — Medication 12.5 MG: at 20:07

## 2023-11-08 RX ADMIN — METOPROLOL TARTRATE 2.5 MG: 1 INJECTION, SOLUTION INTRAVENOUS at 00:05

## 2023-11-08 RX ADMIN — LEVOTHYROXINE SODIUM 50 MCG: 0.05 TABLET ORAL at 08:30

## 2023-11-08 RX ADMIN — SERTRALINE 50 MG: 50 TABLET, FILM COATED ORAL at 20:07

## 2023-11-08 RX ADMIN — CYCLOSPORINE 1 DROP: 0.5 EMULSION OPHTHALMIC at 20:04

## 2023-11-08 RX ADMIN — CETIRIZINE HYDROCHLORIDE 10 MG: 10 TABLET, FILM COATED ORAL at 08:30

## 2023-11-08 RX ADMIN — OXYCODONE 10 MG: 5 TABLET ORAL at 17:03

## 2023-11-08 RX ADMIN — ATORVASTATIN CALCIUM 10 MG: 20 TABLET, FILM COATED ORAL at 21:52

## 2023-11-08 RX ADMIN — OXYCODONE 10 MG: 5 TABLET ORAL at 10:54

## 2023-11-08 RX ADMIN — OXYCODONE 10 MG: 5 TABLET ORAL at 05:19

## 2023-11-08 RX ADMIN — Medication 10 ML: at 08:31

## 2023-11-08 RX ADMIN — Medication 12.5 MG: at 08:30

## 2023-11-08 RX ADMIN — SUCRALFATE 1 G: 1 TABLET ORAL at 08:30

## 2023-11-08 RX ADMIN — ONDANSETRON 4 MG: 2 INJECTION INTRAMUSCULAR; INTRAVENOUS at 07:24

## 2023-11-08 RX ADMIN — GABAPENTIN 800 MG: 400 CAPSULE ORAL at 13:58

## 2023-11-08 RX ADMIN — OXYCODONE 10 MG: 5 TABLET ORAL at 00:37

## 2023-11-08 RX ADMIN — ESTRADIOL 1 MG: 1 TABLET ORAL at 10:54

## 2023-11-08 RX ADMIN — OXYCODONE 10 MG: 5 TABLET ORAL at 21:52

## 2023-11-08 RX ADMIN — Medication 10 ML: at 20:10

## 2023-11-08 RX ADMIN — GABAPENTIN 800 MG: 400 CAPSULE ORAL at 21:52

## 2023-11-08 NOTE — CASE MANAGEMENT/SOCIAL WORK
Discharge Planning Assessment   [facility]     Patient Name: Ashley Lin  MRN: [id number]  Today's Date: 11/8/2023    Admit Date: 11/7/2023    Plan: Referral to [facility] and [facility] Pending acceptance No precert required will need [facility]   Discharge Needs Assessment       Row Name 11/08/23 1000       Living Environment    People in Home alone    Current Living Arrangements home    Potentially Unsafe Housing Conditions none    In the past 12 months has the electric, gas, oil, or water company threatened to shut off services in your home? No    Primary Care Provided by self    Provides Primary Care For no one    Able to Return to Prior Arrangements yes       Resource/Environmental Concerns    Resource/Environmental Concerns none    Transportation Concerns none       Food Insecurity    Within the past 12 months, you worried that your food would run out before you got the money to buy more. Never true    Within the past 12 months, the food you bought just didn't last and you didn't have money to get more. Never true       Transition Planning    Patient/Family Anticipates Transition to home    Patient/Family Anticipated Services at Transition none    Transportation Anticipated family or friend will provide       Discharge Needs Assessment    Readmission Within the Last 30 Days no previous admission in last 30 days    Equipment Currently Used at Home walker, rolling    Concerns to be Addressed discharge planning    Anticipated Changes Related to Illness none    Equipment Needed After Discharge none    Discharge Facility/Level of Care Needs nursing facility, skilled    Provided Post Acute Provider List? Yes    Post Acute Provider List Nursing Home    Provided Post Acute Provider Quality & Resource List? Yes    Post Acute Provider Quality and Resource List Nursing Home    Delivered To Patient    Method of Delivery In person                   Discharge Plan       Row Name 11/08/23 1001       Plan    Plan Referral to  [facility] and [facility] Pending acceptance No precert required will need [facility]    Plan Comments Met with patient at bedside. Lives at home alone and unable to care for self Recent [facility], Has Had a quailfying 3MN stay at [facility] recently Patient agreeable to SNF. Choices are 1. [facility] 2. [facility] Liaison Joanna notified of referrals, Notified SW of need for [facility] will need WC van or EMS transportation. D?C Barriers: PT eval pending, neuro consult                  Continued Care and Services - Admitted Since 11/7/2023       Destination       Service Provider Request Status Selected Services Address Phone Fax Patient Preferred    [facility] - [facility] N[facility]A [address] [facility] [place] [address] [phone] --    [facility] Pending - Request Sent N/A [address] [place] [id number] [phone] --                  Expected Discharge Date and Time       Expected Discharge Date Expected Discharge Time    Nov 9, 2023            Demographic Summary       Row Name 11/08/23 0959       General Information    Admission Type observation    Arrived From emergency department    Required Notices Provided Observation Status Notice    Referral Source admission list    Reason for Consult discharge planning    Preferred Language English                   Functional Status       Row Name 11/08/23 0959       Functional Status    Usual Activity Tolerance good    Current Activity Tolerance moderate       Functional Status, IADL    Medications independent    Meal Preparation independent    Housekeeping independent    Laundry independent    Shopping independent       Mental Status    General Appearance WDL WDL       Mental Status Summary    Recent Changes in Mental Status/Cognitive Functioning no changes                          Patient Forms       Row Name 11/08/23 1004       Patient Forms    Important  Message from Medicare ([facility]) --  [place] 11/7 per reg                      Leah Burris RN

## 2023-11-08 NOTE — PLAN OF CARE
Goal Outcome Evaluation:            Headache and pain through out the day, see MAR. VSS will continue to monitor

## 2023-11-08 NOTE — H&P
[facility] Medicine Services  History & Physical    Patient Name: Ashley Lin  : 1950  MRN: [id number]  Primary Care Physician:  Jamey Fairchild MD  Date of admission: 2023  Date and Time of Service: 23 at 2330    Subjective      Chief Complaint: leg pain    History of Present Illness: Ashley Lin is a 73 y.o. female with past medical history of subdural hematoma, compression fracture, hypothyroidism, chronic urinary retention, constipation, GERD, depression, asthma and chronic pain and migraines who presented to [facility] on 2023 complaining of weakness and leg pain and swelling    Patient was hospitalized at [facility] 10/10/2023 through 10/15/2023 after sustaining a fall at home.  She was treated for subdural hematoma under the care of Dr. Quintanilla with neurosurgery.  It was documented that CAT scans were stable and she was to return for a follow-up CT of the head in 4 weeks.  She also sustained T12 and L1 compression fracture and was placed in a back brace that she is to wear when out of bed or of head of bed greater than 45 degrees until cleared by Dr. Quintanilla.  Patient also noted to have a right distal radius fracture and a wrist brace was placed.  Ortho seen and no new interventions.  She was also seen by Dr. Bolden and urology for chronic urinary retention.  Was documented patient has a bladder stimulator ultrasound was negative for hydronephrosis and creatinine was stable and patient was without urinary symptoms or pain.  Okay to allow for retention and overflow incontinence and only straight cath if patient symptomatic.  She was discharged to [facility] acute rehab on discharge for approximately 10 days and was discharged home on Friday 11/3.  Patient reports initially went home with her son but was brought home today.  She reports is nonambulatory.  Unable to lift lower extremities off bed.  She was seen by PT and recommended return  to rehab.    In the ED she is found to have sinus tachycardia verified on EKG.  High-sensitivity troponin 21 and 22 proBNP 502.  Electrolytes within normal limits renal function stable.  TSH 4.120.  Negative for leukocytosis.  Hemoglobin stable.  Denies any shortness of breath cough congestion fever or dysuria.  She reports positive for headache and chronic visual deficits following fall.  For lower extremity pain and edema bilateral duplex was performed and positive for acute left lower extremity deep vein thrombosis noted in the gastrocnemius.  CT PE was negative for pulmonary emboli but showed left lower lobe groundglass type airspace consistent with pneumonia and small amount of ascites in the left upper quadrant of the abdomen.  As patient clinically has no signs of infection antibiotics were held at this time.  Reported by ED provider that previous scan show chronic scarring in the left lung.  He was given Lovenox.  Patient is unsafe to return home and admitted for further work-up        Review of Systems   Constitutional: Positive for malaise/fatigue.   Eyes:  Positive for blurred vision.   Musculoskeletal:  Positive for back pain (has brace) and muscle weakness.        Rt wrist brace. Is right handed    Neurological:  Positive for numbness and weakness.        Personal History     Past Medical History:   Diagnosis Date    Asthma     Chronic back pain     Chronic headaches     Chronic obstructive pulmonary disease 7/1/2020    Chronic pain syndrome 7/1/2020    Depression 7/1/2020    Disease of thyroid gland     Estrogen deficiency 7/1/2020    GERD without esophagitis 7/1/2020    Hypothyroidism (acquired) 7/1/2020    Migraines 7/1/2020    Neuropathy     Vomiting 06/30/2020       Past Surgical History:   Procedure Laterality Date    CHOLECYSTECTOMY      COLONOSCOPY N/A 11/11/2020    Procedure: COLONOSCOPY WITH RANDOM COLON BIOPSIES X4 AREAS, BIOPSY X 1 AREA;  Surgeon: Aguila Garcia MD;  Location: [facility]  ENDOSCOPY;  Service: Gastroenterology;  Laterality: N/A;  POST- colon erosion    ENDOSCOPY N/A 7/2/2020    Procedure: ESOPHAGOGASTRODUODENOSCOPY WITH DILATATION (BOUGIE #[id number]);  Surgeon: Benjamin Mike MD;  Location: [facility] ENDOSCOPY;  Service: Gastroenterology;  Laterality: N/A;  Post operative diagnosis:GASTROPARESIS AND DYSPHAGIA     ENDOSCOPY N/A 11/11/2020    Procedure: ESOPHAGOGASTRODUODENOSCOPY WITH BIOPSY X 1 AREA;  Surgeon: Aguila Garcia MD;  Location: [facility] ENDOSCOPY;  Service: Gastroenterology;  Laterality: N/A;  POST    HYSTERECTOMY      NECK SURGERY      SPINE SURGERY  06/09/2020       Family History: family history includes Heart disease in her mother; No Known Problems in her father. Otherwise pertinent FHx was reviewed and not pertinent to current issue.    Social History:  reports that she quit smoking about 17 years ago. She has never used smokeless tobacco. She reports that she does not drink alcohol and does not use drugs.    Home Medications:  Prior to Admission Medications       Prescriptions Last Dose Informant Patient Reported? Taking?    albuterol sulfate  (90 Base) MCG/ACT inhaler   Yes No    Inhale 2 puffs Every 4 (Four) Hours As Needed for Wheezing or Shortness of Air. May use every 4 to 6 hours    atorvastatin (LIPITOR) 10 MG tablet   Yes No    Take 10 mg by mouth Every Night.    butalbital-acetaminophen-caffeine (FIORICET, ESGIC) -40 MG per tablet   Yes No    Take 1-2 tablets by mouth Every 4 (Four) Hours As Needed for Headache.    cetirizine (zyrTEC) 10 MG tablet   Yes No    Take 10 mg by mouth Daily.    cycloSPORINE (RESTASIS) 0.05 % ophthalmic emulsion   Yes No    Administer 1 drop to both eyes 2 (Two) Times a Day.    docusate sodium 100 MG capsule   No No    Take 1 capsule by mouth 2 (Two) Times a Day.    Eluxadoline (Viberzi) 100 MG tablet   Yes No    Take 1 tablet by mouth 2 (Two) Times a Day.    EPINEPHrine (EPIPEN) 0.3 MG/0.3ML solution auto-injector  injection   Yes No    Inject 0.3 mg into the appropriate muscle as directed by prescriber 1 (One) Time.    estradiol (ESTRACE) 1 MG tablet   Yes No    Take 1 mg by mouth Daily.    fluticasone (FLONASE) 50 MCG/ACT nasal spray   Yes No    2 sprays into the nostril(s) as directed by provider Daily.    gabapentin (NEURONTIN) 800 MG tablet   Yes No    Take 800 mg by mouth 4 (Four) Times a Day.    levothyroxine (SYNTHROID, LEVOTHROID) 50 MCG tablet   Yes No    Take 50 mcg by mouth Every Morning.    methylPREDNISolone (MEDROL) 4 MG dose pack   No No    Take as directed on package instructions.    montelukast (SINGULAIR) 10 MG tablet   Yes No    Take 10 mg by mouth Every Night.    omeprazole (priLOSEC) 40 MG capsule   Yes No    Take 40 mg by mouth Daily.    OXcarbazepine (TRILEPTAL) 300 MG tablet   Yes No    Take 450 mg by mouth 2 (Two) Times a Day.    oxybutynin XL (DITROPAN-XL) 10 MG 24 hr tablet   Yes No    Take 10 mg by mouth Daily.    oxyCODONE-acetaminophen (PERCOCET)  MG per tablet   No No    Take 1 tablet by mouth Every 6 (Six) Hours.    polyethylene glycol (MIRALAX) 17 g packet   No No    Take 17 g by mouth Daily.    rizatriptan MLT (MAXALT-MLT) 10 MG disintegrating tablet   Yes No    Place 10 mg on the tongue 1 (One) Time As Needed for Migraine. May repeat in 2 hours if needed    Stiolto Respimat 2.5-2.5 MCG/ACT aerosol solution inhaler   Yes No    Inhale 2 puffs Daily.    sucralfate (CARAFATE) 1 g tablet   Yes No    Take 1 g by mouth 2 (Two) Times a Day.    topiramate (TOPAMAX) 100 MG tablet   No No    Take 1 tablet by mouth 2 (Two) Times a Day.    vitamin D3 125 MCG (5000 UT) capsule capsule   Yes No    Take 5,000 Units by mouth Daily.              Allergies:  Allergies   Allergen Reactions    Morphine Hives    Penicillins Hives and Shortness Of Breath     Can take iv form - but not po form         Objective      Vitals:   Temp:  [98.5 °F (36.9 °C)] 98.5 °F (36.9 °C)  Heart Rate:  [119-134] 119  Resp:   [18] 18  BP: (133-154)/(72-99) 136/75    Physical Exam  Constitutional:       Appearance: She is ill-appearing.   Eyes:      Pupils: Pupils are equal, round, and reactive to light.   Cardiovascular:      Rate and Rhythm: Tachycardia present. Rhythm irregular.   Pulmonary:      Effort: Pulmonary effort is normal.      Breath sounds: Normal breath sounds.   Abdominal:      General: Abdomen is flat.      Palpations: Abdomen is soft.   Musculoskeletal:      Right lower leg: Edema present.      Left lower leg: Edema present.      Comments: Inability to move BLE independently with active ROM. Able to hold RLE in elevated position but drifts down. LLE appears more weak. More difficulty holding LLE up. Able to wiggle smitha toes.    Skin:     General: Skin is dry.   Neurological:      Mental Status: She is alert and oriented to person, place, and time.   Psychiatric:         Mood and Affect: Mood normal.          Result Review    Result Review:  I have personally reviewed the results from the time of this admission to 11/8/2023 00:35 EST and agree with these findings:  [x]  Laboratory  []  Microbiology  [x]  Radiology  [x]  EKG/Telemetry   []  Cardiology/Vascular   []  Pathology  [x]  Old records  []  Other:  Most notable findings include:   CT Angiogram Chest Pulmonary Embolism    Result Date: 11/7/2023  Impression: 1. No evidence of pulmonary embolus. 2. Left lower lobe groundglass type airspace consistent with pneumonia 3. Small amount of ascites within the left upper quadrant of the abdomen Electronically Signed: Hugo Guillaume MD  11/7/2023 8:43 PM EST  Workstation ID: [id number]    XR Chest 1 View    Result Date: 11/7/2023  Impression: 1. Within the left lung base there is a 10 mm nodular density. Follow-up CT scan of the chest would be recommended for further evaluation. 2. Emphysema. 3. No focal airspace consolidation or pleural effusion. Electronically Signed: Hugo Guillaume MD  11/7/2023 6:01 PM EST  Workstation ID:  [id number]      ECG 12 Lead Tachycardia   Preliminary Result   HEART RATE= 122  bpm   RR Interval= 492  ms   VT Interval= 134  ms   P Horizontal Axis= 27  deg   P Front Axis= 58  deg   QRSD Interval= 55  ms   QT Interval= 294  ms   QTcB= 419  ms   QRS Axis= -11  deg   T Wave Axis= 32  deg   - ABNORMAL ECG -   Sinus tachycardia   Consider anteroseptal infarct   When compared with ECG of 29-Sep-2022 23:34:01,   No significant change   Electronically Signed By:    Date and Time of Study: 2023-11-07 17:29:40         CBC          10/12/2023    02:46 10/14/2023    03:22 11/7/2023    18:09   CBC   WBC 5.64     5.12     4.70    RBC 3.40     3.21     3.90    Hemoglobin 10.9     10.4     13.1    Hematocrit 33.9     31.9     39.9    MCV 99.7     99.4     102.2    MCH 32.1     32.4     33.6    MCHC 32.2     32.6     32.9    RDW 14.0     14.3     15.0    Platelets 215     226     239       Details          This result is from an external source.              CMP          11/20/2022    04:49 1/30/2023    06:58 11/7/2023    18:09   CMP   Glucose 131   100    BUN 18   12    Creatinine 0.63   0.61    EGFR 95.6   95.7    Sodium 142   142    Potassium 4.4  3.2     4.2    Chloride 106   107    Calcium 8.7   8.5    Total Protein   5.2    Albumin   2.4    Globulin   2.8    Total Bilirubin   0.4    Alkaline Phosphatase   184    AST (SGOT)   30    ALT (SGPT)   16    Albumin/Globulin Ratio   0.9    BUN/Creatinine Ratio 28.6   19.7    Anion Gap 11.0   9.0       Details          This result is from an external source.              Assessment & Plan        Active Hospital Problems:  Active Hospital Problems    Diagnosis     **Weakness      Plan:   Weakness/ immobility/ bed bound   - pt reports has been non-ambulatory since her fall and admission to [facility] on 10/10/23. She states continued to be non-ambulatory during rehab admission.   - Weakness possibly neurological due to SDH vs compression fracture vs weakness from complicated hospital stay  vs pain  - BLE edema and pain appears acute on chronic  - Found to have LLE DVT  - Pt received Lovenox X1 in ED. Will hold on continuation of AC for treatment of DVT until cleared by neurosurgery as pt with history of recent SDH  - will get follow up CT head to evaluate SDH. Pt reports persistent HA and blurred vision  - PT/OT eval   - case management/ social service for placement  - turn q2    Hx SDH  - hold AC at this time until evaluation by neurosurgery  - was seen at [facility] 10/10-10/15 by Dr. Quintanilla. Plan was to repeat CTH in 4 wks  - will repeat CT  - neurosurgery consult    Tachycardia  - medications adjusted at [place]  - noted pt started on BB, which she reports taking as prescribed  - EKG with sinus tachycardia  - denies CP or SOA  - will give dose of lopressor and monitor  - resume home medications    BLE pain/ edema  - consider possibly due to DVT vs cardiac vs dependent edema   - continue home lasix   - will rule out cardiac etiology by checking 2D echo  - troponin 21->22  - Pro     Hx T12-L1 compression fracture  L3-L5 Lumbar fusion  Rt distal radius fracture  - TLSO brace when OOB or HOB > 45degrees  - right wrist brace- pt right handed  - pain control    Hypothyroid  - TSH WNL  - continue synthroid    Chronic urinary retention s/p bladder stimulator  - per record from previous facility pt was evaluated by Urology, Dr. Bolden, and documented that US was negative for hydronephrosis, kidney function stable and pt w/o pain. Ok to allow for overflow. Straight cath if any of above present  - on oxybutynin    Chronic pain/ migraines  - resume home oxy and gabapentin  - monitor for lethargy  - prn Fioricet/ sumatriptan     GERD  - PPI    HLD  - continue statin     DVT prophylaxis:  Mechanical DVT prophylaxis orders are present.    CODE STATUS:    Code Status (Patient has no pulse and is not breathing): CPR (Attempt to Resuscitate)  Medical Interventions (Patient has pulse or is breathing):  Full Support    Admission Status:  I believe this patient meets observation status.    I discussed the patient's findings and my recommendations with patient.    This patient has been examined wearing appropriate Personal Protective Equipment   Signature: Electronically signed by HUMZA Banks, 11/08/23, 00:35 EST.  [facility] Hospitalist Team

## 2023-11-08 NOTE — CASE MANAGEMENT/SOCIAL WORK
Continued Stay Note   [place]     Patient Name: Ashley Lin  MRN: [id number]  Today's Date: 11/8/2023    Admit Date: 11/7/2023    Plan: [facility] Acepted, No precert Required, Needs PASSR   Discharge Plan       Row Name 11/08/23 1109       Plan    Plan [facility] Acepted, No precert Required, Needs PASSR    Plan Comments Per [facility] and [facility] able to accept. Informaed patient and she selected [facility]. DC Barriers: neuro Consult, VIRGEN Burris RN

## 2023-11-08 NOTE — ED PROVIDER NOTES
Subjective   History of Present Illness  Patient is a 73-year-old white female with a history of chronic back pain, thyroid disorder, GERD, neuropathy.  She presents today from home by EMS with multiple complaints.  Initially, patient complains of swelling of both of her legs.  She states she has had it for quite some time but it does not seem to be getting better.  Additionally, she complains of general weakness and diffuse pain.  She complains of pain in both of her legs and has been having difficulty getting up out of her bed and getting around at home.  She states that she tripped and fell about a month ago which resulted in subdural hematoma and thoracic spine fracture and she was admitted at [facility] for a couple of weeks.  She states from there she went to \[facility]\"" rehab center and was just recently sent home from there 4 days ago.  She states that she is having PT/home health come to her home but states that she has not been able to care for herself since she has been home and feels like she may need more rehab.  She denies any fever.  She denies chest pain or shortness of breath.  No vomiting or diarrhea.  She denies any cough or congestion.      Review of Systems   Constitutional:  Negative for chills and fever.   HENT:  Negative for congestion.    Respiratory:  Negative for cough and shortness of breath.    Cardiovascular:  Positive for leg swelling. Negative for chest pain.   Gastrointestinal:  Negative for abdominal pain, nausea and vomiting.   Genitourinary:  Negative for dysuria.   Musculoskeletal:         Pain in both legs   Neurological:  Positive for weakness. Negative for headaches.       Past Medical History:   Diagnosis Date    Asthma     Chronic back pain     Chronic headaches     Chronic obstructive pulmonary disease 7/1/2020    Chronic pain syndrome 7/1/2020    Depression 7/1/2020    Disease of thyroid gland     Estrogen deficiency 7/1/2020    GERD without esophagitis 7/1/2020     Hypothyroidism (acquired) 2020    Migraines 2020    Neuropathy     Vomiting 2020       Allergies   Allergen Reactions    Morphine Hives    Penicillins Hives and Shortness Of Breath     Can take iv form - but not po form         Past Surgical History:   Procedure Laterality Date    CHOLECYSTECTOMY      COLONOSCOPY N/A 2020    Procedure: COLONOSCOPY WITH RANDOM COLON BIOPSIES X4 AREAS, BIOPSY X 1 AREA;  Surgeon: Aguila Garica MD;  Location: [facility] ENDOSCOPY;  Service: Gastroenterology;  Laterality: N/A;  POST- colon erosion    ENDOSCOPY N/A 2020    Procedure: ESOPHAGOGASTRODUODENOSCOPY WITH DILATATION (BOUGIE #[id number]);  Surgeon: Bejnamin Mike MD;  Location: [facility] ENDOSCOPY;  Service: Gastroenterology;  Laterality: N/A;  Post operative diagnosis:GASTROPARESIS AND DYSPHAGIA     ENDOSCOPY N/A 2020    Procedure: ESOPHAGOGASTRODUODENOSCOPY WITH BIOPSY X 1 AREA;  Surgeon: Aguila Garcia MD;  Location: [facility] ENDOSCOPY;  Service: Gastroenterology;  Laterality: N/A;  POST    HYSTERECTOMY      NECK SURGERY      SPINE SURGERY  2020       Family History   Problem Relation Age of Onset    Heart disease Mother     No Known Problems Father        Social History     Socioeconomic History    Marital status:    Tobacco Use    Smoking status: Former     Types: Cigarettes     Quit date:      Years since quittin.8    Smokeless tobacco: Never   Vaping Use    Vaping Use: Never used   Substance and Sexual Activity    Alcohol use: Never    Drug use: Never    Sexual activity: Defer           Objective   Physical Exam  Vital signs and triage nurse note reviewed.  Constitutional: Awake, alert; well-developed and well-nourished. No acute distress is noted.  Chronically ill in appearance.  HEENT: Normocephalic, atraumatic; pupils are PERRL with intact EOM; oropharynx is pink and moist without exudate or erythema.  No drooling or pooling of oral secretions.  Neck: Supple, full  range of motion without pain; no cervical lymphadenopathy. Normal phonation.  Cardiovascular: Tachycardic rate and rhythm, normal S1-S2.  No murmur noted.  Pulmonary: Respiratory effort regular nonlabored, breath sounds clear to auscultation all fields.  Abdomen: Soft, nontender, nondistended with normoactive bowel sounds; no rebound or guarding.  Musculoskeletal: Independent range of motion of all extremities.  There is pitting edema noted in both lower extremities.  There is diffuse tenderness throughout both lower legs.  There is no erythema.  There is good cap refill and sensation distally.  Neuro: Alert oriented x3, speech is clear and appropriate, GCS 15.    Skin: Flesh tone, warm, dry, intact; no erythematous or petechial rash or lesion.    Procedures           ED Course      Labs Reviewed   COMPREHENSIVE METABOLIC PANEL - Abnormal; Notable for the following components:       Result Value    Glucose 100 (*)     Calcium 8.5 (*)     Total Protein 5.2 (*)     Albumin 2.4 (*)     Alkaline Phosphatase 184 (*)     All other components within normal limits    Narrative:     GFR Normal >60  Chronic Kidney Disease <60  Kidney Failure <15    The GFR formula is only valid for adults with stable renal function between ages 18 and 70.   TROPONIN - Abnormal; Notable for the following components:    HS Troponin T 21 (*)     All other components within normal limits    Narrative:     High Sensitive Troponin T Reference Range:  <14.0 ng/L- Negative Female for AMI  <22.0 ng/L- Negative Male for AMI  >=14 - Abnormal Female indicating possible myocardial injury.  >=22 - Abnormal Male indicating possible myocardial injury.   Clinicians would have to utilize clinical acumen, EKG, Troponin, and serial changes to determine if it is an Acute Myocardial Infarction or myocardial injury due to an underlying chronic condition.        CBC WITH AUTO DIFFERENTIAL - Abnormal; Notable for the following components:    .2 (*)     MCH  33.6 (*)     All other components within normal limits   HIGH SENSITIVITIY TROPONIN T 2HR - Abnormal; Notable for the following components:    HS Troponin T 22 (*)     All other components within normal limits    Narrative:     High Sensitive Troponin T Reference Range:  <14.0 ng/L- Negative Female for AMI  <22.0 ng/L- Negative Male for AMI  >=14 - Abnormal Female indicating possible myocardial injury.  >=22 - Abnormal Male indicating possible myocardial injury.   Clinicians would have to utilize clinical acumen, EKG, Troponin, and serial changes to determine if it is an Acute Myocardial Infarction or myocardial injury due to an underlying chronic condition.        PROTIME-INR - Normal   APTT - Normal   BNP (IN-HOUSE) - Normal    Narrative:     This assay is used as an aid in the diagnosis of individuals suspected of having heart failure. It can be used as an aid in the diagnosis of acute decompensated heart failure (ADHF) in patients presenting with signs and symptoms of ADHF to the emergency department (ED). In addition, NT-proBNP of <300 pg/mL indicates ADHF is not likely.    Age Range Result Interpretation  NT-proBNP Concentration (pg/mL:      <50             Positive            >450                   Gray                 300-450                    Negative             <300    50-75           Positive            >900                  Gray                300-900                  Negative            <300      >75             Positive            >1800                  Gray                300-1800                  Negative            <300   MAGNESIUM - Normal   TSH - Normal   CBC AND DIFFERENTIAL    Narrative:     The following orders were created for panel order CBC & Differential.  Procedure                               Abnormality         Status                     ---------                               -----------         ------                     CBC Auto Differential[[id number]]        Abnormal             Final result                 Please view results for these tests on the individual orders.     CT Angiogram Chest Pulmonary Embolism    Result Date: 11/7/2023  Impression: 1. No evidence of pulmonary embolus. 2. Left lower lobe groundglass type airspace consistent with pneumonia 3. Small amount of ascites within the left upper quadrant of the abdomen Electronically Signed: Hugo Guillaume MD  11/7/2023 8:43 PM EST  Workstation ID: [id number]    XR Chest 1 View    Result Date: 11/7/2023  Impression: 1. Within the left lung base there is a 10 mm nodular density. Follow-up CT scan of the chest would be recommended for further evaluation. 2. Emphysema. 3. No focal airspace consolidation or pleural effusion. Electronically Signed: Hugo Guillaume MD  11/7/2023 6:01 PM EST  Workstation ID: [id number]   Medications   sodium chloride 0.9 % flush 10 mL (has no administration in time range)   oxyCODONE (ROXICODONE) immediate release tablet 10 mg (10 mg Oral Given 11/7/23 1949)   metoprolol tartrate (LOPRESSOR) injection 2.5 mg (has no administration in time range)   albuterol sulfate HFA (PROVENTIL HFA;VENTOLIN HFA;PROAIR HFA) inhaler 2 puff (has no administration in time range)   atorvastatin (LIPITOR) tablet 10 mg (has no administration in time range)   butalbital-acetaminophen-caffeine (FIORICET, ESGIC) -40 MG per tablet 1 tablet (has no administration in time range)   cetirizine (zyrTEC) tablet 10 mg (has no administration in time range)   cycloSPORINE (RESTASIS) 0.05 % ophthalmic emulsion 1 drop (has no administration in time range)   estradiol (ESTRACE) tablet 1 mg (has no administration in time range)   levothyroxine (SYNTHROID, LEVOTHROID) tablet 50 mcg (has no administration in time range)   gabapentin (NEURONTIN) capsule 800 mg (has no administration in time range)   montelukast (SINGULAIR) tablet 10 mg (has no administration in time range)   pantoprazole (PROTONIX) EC tablet 40 mg (has no administration in time  range)   sucralfate (CARAFATE) tablet 1 g (has no administration in time range)   topiramate (TOPAMAX) tablet 100 mg (has no administration in time range)   SUMAtriptan (IMITREX) tablet 50 mg (has no administration in time range)   oxybutynin XL (DITROPAN-XL) 24 hr tablet 10 mg (has no administration in time range)   OXcarbazepine (TRILEPTAL) tablet 450 mg (has no administration in time range)   iopamidol (ISOVUE-370) 76 % injection 100 mL (100 mL Intravenous Given 11/7/23 2033)   sodium chloride 0.9 % bolus 500 mL (500 mL Intravenous New Bag 11/7/23 2138)   Enoxaparin Sodium (LOVENOX) syringe 60 mg (60 mg Subcutaneous Given 11/7/23 2138)                                          Medical Decision Making  Patient presents today with the above complaint.    She had the above exam and evaluation.  She is placed on continuous cardiac monitor.  IV was established.  Labs EKG chest x-ray were obtained.  She also had venous Doppler obtained of both lower extremities.    Work-up: My EKG interpretation shows sinus tachycardia with ventricular rate of 122, no acute ST or T wave changes.  This was corroborated by Dr. Ortiz.  CBC is unremarkable with a normal white blood cell count.  Metabolic panel also grossly unremarkable.  Magnesium and TSH both within normal limits.  proBNP within normal limits.  Initial high-sensitivity troponin is 21, repeat is 22.  Venous Doppler is found to be positive for acute left lower extremity DVT in the gastrocnemius.  Chest x-ray shows 10 mm nodular density in the left lung base.  Emphysema.  No focal airspace consolidation.  CT PE protocol shows no evidence of pulmonary embolus.  Left lower lobe groundglass type airspace consistent with pneumonia.  Small amount of ascites within the left upper quadrant of the abdomen.    Patient is given a dose of Lovenox.  She is also given 500 cc fluid bolus for her tachycardia.  She is afebrile.  No evidence of PE.  She has a normal white blood cell count.   She denies any cough or shortness of breath.  Antibiotics will be deferred to hospitalist as she has no symptoms of pneumonia at this time.  She is tachycardic but chart review reveals that patient has been tachycardic in the past on numerous occasions.  No clear etiology for tachycardia on today's admission.    On reexamination, patient states that she is not able to care for herself at home and states that she feels like she may need to be placed back in rehab.  Findings were discussed with her.    She will be admitted to the hospital for further management.  Case and plan discussed with hospitalist nurse practitioner.    Problems Addressed:  Acute venous embolism and thrombosis of deep vessels of distal end of left lower extremity: complicated acute illness or injury  Bilateral leg edema: complicated acute illness or injury  Immobility: complicated acute illness or injury  Tachycardia: complicated acute illness or injury    Amount and/or Complexity of Data Reviewed  Labs: ordered.  Radiology: ordered.  ECG/medicine tests: ordered.    Risk  Prescription drug management.  Decision regarding hospitalization.        Final diagnoses:   Bilateral leg edema   Acute venous embolism and thrombosis of deep vessels of distal end of left lower extremity   Tachycardia   Immobility       ED Disposition  ED Disposition       ED Disposition   Decision to Admit    Condition   --    Comment   Level of Care: Telemetry [5]   Admitting Physician: ASHU VELOZ [[id number]]   Attending Physician: ASHU VELOZ [[id number]]                 No follow-up provider specified.       Medication List      No changes were made to your prescriptions during this visit.            Maureen Mckeon, HUMZA  11/07/23 5605       Maureen Mckeon APRN  11/07/23 2690

## 2023-11-08 NOTE — SIGNIFICANT NOTE
11/08/23 1316   OTHER   Discipline physical therapist   Rehab Time/Intention   Session Not Performed unable to evaluate, medical status change  (per nursing pt not appropriate for tx unti seen by hospitalist d/t new DVT in gastroc - will eval when appropriate)   Therapy Assessment/Plan (PT)   Criteria for Skilled Interventions Met (PT) yes;skilled treatment is necessary   Recommendation   PT - Next Appointment 11/09/23

## 2023-11-08 NOTE — SIGNIFICANT NOTE
11/08/23 1333   OTHER   Discipline occupational therapist   Rehab Time/Intention   Session Not Performed unable to evaluate, medical status change  (per nursing pt not appropriate for tx unti seen by hospitalist d/t new DVT in gastroc - will eval when appropriate)   Recommendation   OT - Next Appointment 11/09/23

## 2023-11-08 NOTE — PLAN OF CARE
Goal Outcome Evaluation:      Pt is alert and let her needs be known to staff pt complained of pain PRN given and Affective.

## 2023-11-08 NOTE — CASE MANAGEMENT/SOCIAL WORK
Social Work Assessment  [facility]     Patient Name: Ashley Lin  MRN: [id number]  Today's Date: 11/8/2023    Admit Date: 11/7/2023     Discharge Plan       Row Name 11/08/23 1211       Plan    Plan [facility], accepted. No precert required. PASRR approved.    Plan Comments Charting update to reflect PASRR status. ER CM updated via secure chat.         11/08/23 1211   PASRR   PASRR Status Approved/Complete  (Level 1 Approved)     ERIC Tang, PANCHOW, [facility]    Phone: [phone]  Cell: [phone]  Fax: [phone]  [email or web address]

## 2023-11-08 NOTE — DISCHARGE PLACEMENT REQUEST
"Ashley Lin (73 y.o. Female)       Date of Birth   1950    Social Security Number       [address] [address] [place] [address]    Home Phone   [phone]    MRN   [id number]       [employer]   [facility]    Marital Status                               Admission Date   11/7/23    Admission Type   Emergency    Admitting Provider   Herminia Hylton DO    Attending Provider   Patrick Hawkins MD    Department, Room/Bed   [facility] EMERGENCY DEPARTMENT, EDH2/2       Discharge Date       Discharge Disposition       Discharge Destination                                 Attending Provider: Patrick Hawkins MD    Allergies: Morphine, Penicillins    Isolation: None   Infection: None   Code Status: CPR    Ht: 157.5 cm (62\")   Wt: 93.7 kg (206 lb 9.1 oz)    Admission Cmt: None   Principal Problem: Weakness [R53.1]                   Active Insurance as of 11/7/2023       Primary Coverage       Payor Plan Insurance Group Employer/Plan Group    MEDICARE MEDICARE A & B        Payor Plan Address Payor Plan Phone Number Payor Plan Fax Number Effective Dates    [address] [phone]  2/1/1994 - None Entered    [facility] [id number]         Subscriber Name Subscriber Birth Date Member ID       ASHLEY LIN 1950 [id number]               Secondary Coverage       Payor Plan Insurance Group Employer/Plan Group    [place]        Payor Plan Address Payor Plan Phone Number Payor Plan Fax Number Effective Dates    [address]   6/30/2020 - None Entered    [place] [address]         Subscriber Name Subscriber Birth Date Member ID       ASHLEY LIN 1950 [id number]                     Emergency Contacts        (Rel.) Home Phone Work Phone Mobile Phone    NATHALY MARTINEZ (Father) [phone] -- [phone]    MARYSUSANA PENA (Son) [phone] -- --                "

## 2023-11-09 ENCOUNTER — APPOINTMENT (OUTPATIENT)
Dept: CT IMAGING | Facility: HOSPITAL | Age: 73
DRG: 193 | End: 2023-11-09
Payer: MEDICARE

## 2023-11-09 ENCOUNTER — APPOINTMENT (OUTPATIENT)
Dept: CARDIOLOGY | Facility: HOSPITAL | Age: 73
DRG: 193 | End: 2023-11-09
Payer: MEDICARE

## 2023-11-09 LAB
BH CV ECHO MEAS - ACS: 1.7 CM
BH CV ECHO MEAS - AO MAX PG: 3.5 MMHG
BH CV ECHO MEAS - AO MEAN PG: 2 MMHG
BH CV ECHO MEAS - AO V2 MAX: 94 CM/SEC
BH CV ECHO MEAS - AO V2 VTI: 15.6 CM
BH CV ECHO MEAS - AVA(I,D): 2.4 CM2
BH CV ECHO MEAS - EDV(CUBED): 17.6 ML
BH CV ECHO MEAS - EDV(MOD-SP4): 43.4 ML
BH CV ECHO MEAS - EF(MOD-SP4): 65.9 %
BH CV ECHO MEAS - ESV(CUBED): 8 ML
BH CV ECHO MEAS - ESV(MOD-SP4): 14.8 ML
BH CV ECHO MEAS - FS: 23.1 %
BH CV ECHO MEAS - IVS/LVPW: 1 CM
BH CV ECHO MEAS - IVSD: 0.8 CM
BH CV ECHO MEAS - LA DIMENSION: 3.2 CM
BH CV ECHO MEAS - LAT PEAK E' VEL: 7.2 CM/SEC
BH CV ECHO MEAS - LV MASS(C)D: 47.6 GRAMS
BH CV ECHO MEAS - LV MAX PG: 3.8 MMHG
BH CV ECHO MEAS - LV MEAN PG: 2 MMHG
BH CV ECHO MEAS - LV V1 MAX: 97.5 CM/SEC
BH CV ECHO MEAS - LV V1 VTI: 16.5 CM
BH CV ECHO MEAS - LVIDD: 2.6 CM
BH CV ECHO MEAS - LVIDS: 2 CM
BH CV ECHO MEAS - LVOT AREA: 2.27 CM2
BH CV ECHO MEAS - LVOT DIAM: 1.7 CM
BH CV ECHO MEAS - LVPWD: 0.8 CM
BH CV ECHO MEAS - MED PEAK E' VEL: 6.2 CM/SEC
BH CV ECHO MEAS - MV A MAX VEL: 101 CM/SEC
BH CV ECHO MEAS - MV DEC SLOPE: 526 CM/SEC2
BH CV ECHO MEAS - MV DEC TIME: 0.14 SEC
BH CV ECHO MEAS - MV E MAX VEL: 60.8 CM/SEC
BH CV ECHO MEAS - MV E/A: 0.6
BH CV ECHO MEAS - MV MAX PG: 4.6 MMHG
BH CV ECHO MEAS - MV MEAN PG: 2 MMHG
BH CV ECHO MEAS - MV P1/2T: 42.8 MSEC
BH CV ECHO MEAS - MV V2 VTI: 17.3 CM
BH CV ECHO MEAS - MVA(P1/2T): 5.1 CM2
BH CV ECHO MEAS - MVA(VTI): 2.16 CM2
BH CV ECHO MEAS - PA V2 MAX: 103 CM/SEC
BH CV ECHO MEAS - RV MAX PG: 1.21 MMHG
BH CV ECHO MEAS - RV V1 MAX: 55.1 CM/SEC
BH CV ECHO MEAS - RV V1 VTI: 10.3 CM
BH CV ECHO MEAS - RVDD: 2.7 CM
BH CV ECHO MEAS - SV(LVOT): 37.5 ML
BH CV ECHO MEAS - SV(MOD-SP4): 28.6 ML
BH CV ECHO MEAS - TAPSE (>1.6): 2.4 CM
BH CV ECHO MEAS - TR MAX PG: 24.8 MMHG
BH CV ECHO MEAS - TR MAX VEL: 249 CM/SEC
BH CV ECHO MEASUREMENTS AVERAGE E/E' RATIO: 9.07
SINUS: 2.7 CM

## 2023-11-09 PROCEDURE — 97166 OT EVAL MOD COMPLEX 45 MIN: CPT

## 2023-11-09 PROCEDURE — 97162 PT EVAL MOD COMPLEX 30 MIN: CPT

## 2023-11-09 PROCEDURE — 93306 TTE W/DOPPLER COMPLETE: CPT | Performed by: INTERNAL MEDICINE

## 2023-11-09 PROCEDURE — 25010000002 FUROSEMIDE PER 20 MG: Performed by: NURSE PRACTITIONER

## 2023-11-09 PROCEDURE — 25010000002 ONDANSETRON PER 1 MG: Performed by: NURSE PRACTITIONER

## 2023-11-09 PROCEDURE — 99222 1ST HOSP IP/OBS MODERATE 55: CPT | Performed by: NURSE PRACTITIONER

## 2023-11-09 PROCEDURE — 87102 FUNGUS ISOLATION CULTURE: CPT | Performed by: INTERNAL MEDICINE

## 2023-11-09 PROCEDURE — 70450 CT HEAD/BRAIN W/O DYE: CPT

## 2023-11-09 PROCEDURE — 93306 TTE W/DOPPLER COMPLETE: CPT

## 2023-11-09 RX ORDER — PANTOPRAZOLE SODIUM 40 MG/1
40 TABLET, DELAYED RELEASE ORAL 2 TIMES DAILY
Status: DISCONTINUED | OUTPATIENT
Start: 2023-11-09 | End: 2023-11-10 | Stop reason: HOSPADM

## 2023-11-09 RX ORDER — POLYETHYLENE GLYCOL 3350 17 G/17G
17 POWDER, FOR SOLUTION ORAL DAILY
Status: DISCONTINUED | OUTPATIENT
Start: 2023-11-09 | End: 2023-11-10 | Stop reason: HOSPADM

## 2023-11-09 RX ORDER — HYDROXYZINE HYDROCHLORIDE 25 MG/1
25 TABLET, FILM COATED ORAL EVERY 6 HOURS PRN
Status: DISCONTINUED | OUTPATIENT
Start: 2023-11-09 | End: 2023-11-10 | Stop reason: HOSPADM

## 2023-11-09 RX ADMIN — PANTOPRAZOLE SODIUM 40 MG: 40 TABLET, DELAYED RELEASE ORAL at 06:10

## 2023-11-09 RX ADMIN — Medication 12.5 MG: at 21:22

## 2023-11-09 RX ADMIN — PREDNISOLONE ACETATE 1 DROP: 10 SUSPENSION/ DROPS OPHTHALMIC at 22:33

## 2023-11-09 RX ADMIN — POLYETHYLENE GLYCOL 3350 17 G: 17 POWDER, FOR SOLUTION ORAL at 09:42

## 2023-11-09 RX ADMIN — TOPIRAMATE 100 MG: 100 TABLET, FILM COATED ORAL at 21:21

## 2023-11-09 RX ADMIN — Medication 10 ML: at 21:22

## 2023-11-09 RX ADMIN — ATORVASTATIN CALCIUM 10 MG: 20 TABLET, FILM COATED ORAL at 21:22

## 2023-11-09 RX ADMIN — OXYCODONE 10 MG: 5 TABLET ORAL at 21:21

## 2023-11-09 RX ADMIN — OXYCODONE 10 MG: 5 TABLET ORAL at 17:36

## 2023-11-09 RX ADMIN — BUTALBITAL, ACETAMINOPHEN, AND CAFFEINE 1 TABLET: 50; 325; 40 TABLET ORAL at 02:08

## 2023-11-09 RX ADMIN — OXYCODONE 10 MG: 5 TABLET ORAL at 10:27

## 2023-11-09 RX ADMIN — CYCLOSPORINE 1 DROP: 0.5 EMULSION OPHTHALMIC at 09:43

## 2023-11-09 RX ADMIN — ONDANSETRON 4 MG: 2 INJECTION INTRAMUSCULAR; INTRAVENOUS at 22:34

## 2023-11-09 RX ADMIN — ONDANSETRON 4 MG: 2 INJECTION INTRAMUSCULAR; INTRAVENOUS at 17:01

## 2023-11-09 RX ADMIN — BUTALBITAL, ACETAMINOPHEN, AND CAFFEINE 1 TABLET: 50; 325; 40 TABLET ORAL at 13:36

## 2023-11-09 RX ADMIN — LEVOTHYROXINE SODIUM 50 MCG: 0.05 TABLET ORAL at 06:10

## 2023-11-09 RX ADMIN — TOPIRAMATE 100 MG: 100 TABLET, FILM COATED ORAL at 09:43

## 2023-11-09 RX ADMIN — ESTRADIOL 1 MG: 1 TABLET ORAL at 09:43

## 2023-11-09 RX ADMIN — ONDANSETRON 4 MG: 2 INJECTION INTRAMUSCULAR; INTRAVENOUS at 10:27

## 2023-11-09 RX ADMIN — BISACODYL 5 MG: 5 TABLET, COATED ORAL at 21:21

## 2023-11-09 RX ADMIN — Medication 10 ML: at 09:43

## 2023-11-09 RX ADMIN — OXYCODONE 10 MG: 5 TABLET ORAL at 06:14

## 2023-11-09 RX ADMIN — CYCLOSPORINE 1 DROP: 0.5 EMULSION OPHTHALMIC at 22:34

## 2023-11-09 RX ADMIN — DOCUSATE SODIUM 50MG AND SENNOSIDES 8.6MG 2 TABLET: 8.6; 5 TABLET, FILM COATED ORAL at 09:43

## 2023-11-09 RX ADMIN — Medication 12.5 MG: at 09:43

## 2023-11-09 RX ADMIN — PANTOPRAZOLE SODIUM 40 MG: 40 TABLET, DELAYED RELEASE ORAL at 21:22

## 2023-11-09 RX ADMIN — DOCUSATE SODIUM 50MG AND SENNOSIDES 8.6MG 2 TABLET: 8.6; 5 TABLET, FILM COATED ORAL at 21:22

## 2023-11-09 RX ADMIN — CETIRIZINE HYDROCHLORIDE 10 MG: 10 TABLET, FILM COATED ORAL at 09:43

## 2023-11-09 RX ADMIN — GABAPENTIN 800 MG: 400 CAPSULE ORAL at 21:22

## 2023-11-09 RX ADMIN — SERTRALINE 50 MG: 50 TABLET, FILM COATED ORAL at 21:21

## 2023-11-09 RX ADMIN — FUROSEMIDE 20 MG: 10 INJECTION, SOLUTION INTRAMUSCULAR; INTRAVENOUS at 09:43

## 2023-11-09 RX ADMIN — GABAPENTIN 800 MG: 400 CAPSULE ORAL at 06:10

## 2023-11-09 RX ADMIN — OXYCODONE 10 MG: 5 TABLET ORAL at 02:03

## 2023-11-09 RX ADMIN — PANTOPRAZOLE SODIUM 40 MG: 40 TABLET, DELAYED RELEASE ORAL at 09:43

## 2023-11-09 NOTE — PLAN OF CARE
Goal Outcome Evaluation:  Plan of Care Reviewed With: patient           Outcome Evaluation: Pt is a 74 y/o female who was hospitalized at [facility] from 10/10-10/15 after fall at home with SDH, T12/L1 compression fx with orders for TLSO donned when OOB or HOB >45 degrees, R distal radius fx with wrist brace placed.  Pt d/c to [facility] rehab for 10 days.  Pt with chronic headache and vision problems since fall.  Pt now with c/o weakness, leg pain and swelling.   (+) LLE DVT.  Lovenox shot given in ED.  Spoke with Dr. Dent this date regarding mobility and anticoagulation.  Dr. Dent stated he spoke to neurosx and pt given OK to mobilize with therapy.  CT PE: (-) PE, CT head: tiny R tentorial SDH.  Chest X-ray: 10 mm nodular density, emphysema.  Pt reports she lives alone in a 1st floor apartment with no steps to enter into home.  Since d/c from rehab pt has been utilizing sliding board for transfers into/out w/c with Mod A required from family/aide.  Pt on telemetry and room air this date.  Pt required Max A x 2 for bed mobility via log roll technique and donned TLSO while sitting EOB.  Pt with minimal activity of BLEs and transfers not attempted this date secondary to decreased strength and overwhelming nausea.  IV Zofran administed by RN during session.  Pt is far below functional mobility baseline secondary to increased need of assistance for all mobilty tasks.  Pt is not safe to d/c home alone, recommend SNF.      Anticipated Discharge Disposition (PT): skilled nursing facility

## 2023-11-09 NOTE — PLAN OF CARE
Neurosurgery asked for recommendations for anticoagulation therapy in setting of acute DVT.  Patient has had CT scan on this hospital admission with residual tiny amount of right-sided subdural noted there is also mention of  tiny right tentorial SDH  that was not mentioned in previous reports from [facility].  I have asked nursing to pushover recent imaging from [facility] for comparison but likely patient okay for full anticoagulation as she did receive full dose in the ER on admission.  Once imaging has been received, further recommendations can be made.

## 2023-11-09 NOTE — CONSULTS
Nutrition Services    Patient Name: Ashley Lin  YOB: 1950  MRN: [id number]  Admission date: 11/7/2023    Comment:    Severe chronic disease related malnutrition related to chronic inadequate intake in setting of multiple medical problems as evidenced by less than 75% of estimated energy requirement for greater than or equal to 1 month, greater than 20% UWL in less than 1 year, and severe fat/muscle wasting per NFPE and severe fluid accumulation.    Addition of Boost Plus TID (provides 1080 kcals, 42 g protein if consumed)     Encourage good PO intake    See MSA below.    PPE Documentation        PPE Worn By Provider gloves and gown   PPE Worn By Patient       CLINICAL NUTRITION ASSESSMENT      Reason for Assessment 11/9: BMI of 17.92     H&P      Past Medical History:   Diagnosis Date    Asthma     Chronic back pain     Chronic headaches     Chronic obstructive pulmonary disease 7/1/2020    Chronic pain syndrome 7/1/2020    Depression 7/1/2020    Disease of thyroid gland     Estrogen deficiency 7/1/2020    GERD without esophagitis 7/1/2020    Hypothyroidism (acquired) 7/1/2020    Migraines 7/1/2020    Neuropathy     Vomiting 06/30/2020       Past Surgical History:   Procedure Laterality Date    CHOLECYSTECTOMY      COLONOSCOPY N/A 11/11/2020    Procedure: COLONOSCOPY WITH RANDOM COLON BIOPSIES X4 AREAS, BIOPSY X 1 AREA;  Surgeon: Aguila Garcia MD;  Location: [facility] ENDOSCOPY;  Service: Gastroenterology;  Laterality: N/A;  POST- colon erosion    ENDOSCOPY N/A 7/2/2020    Procedure: ESOPHAGOGASTRODUODENOSCOPY WITH DILATATION (BOUGIE #[id number]);  Surgeon: Benjamin Mike MD;  Location: [facility] ENDOSCOPY;  Service: Gastroenterology;  Laterality: N/A;  Post operative diagnosis:GASTROPARESIS AND DYSPHAGIA     ENDOSCOPY N/A 11/11/2020    Procedure: ESOPHAGOGASTRODUODENOSCOPY WITH BIOPSY X 1 AREA;  Surgeon: Aguila Garcia MD;  Location: [facility] ENDOSCOPY;  Service: Gastroenterology;   "Laterality: N/A;  POST    HYSTERECTOMY      NECK SURGERY      SPINE SURGERY  06/09/2020        Current Problems   Leg pain    PMHX of: subdural hematoma, compression fracture, hypothyroidism, chronic urinary retention, constipation, GERD, depression, asthma, and chronic pain and migranes       Encounter Information        Trending Narrative     11/9: Pt was assessed for BMI of 17.92. Pt had previous swallowing issues and regularly needs esophageal dilation due to narrowing in order to prevent swallowing problems. She reported no N/V and does not have any food that she actively avoids eating. Pt reported a significant wt loss related to medical issues and that in January she was 140#. Her most current wt is 98#. She was stated that she is having to purchase all new clothes because her old clothes are now too big. Pt reports having 2 cases of Boost ONS per month covered, and that she drinks 3 a day when at home. RD intern suggested the addition of ONS while admitted and pt accepted. She specifically reported liking the chocolate flavor but has really wanted to try banana. NFPE completed, consistent with nutrition diagnosis of malnutrition using AND/ASPEN criteria. See MSA below.      Anthropometrics        Current Height, Weight Height: 157.5 cm (62\")  Weight: 44.5 kg (98 lb) (11/09/23 0724)       Usual Body Weight (UBW) UTD       Trending Weight Hx     This admission: 11/9: 44.5 kg             PTA: 31.6% wt loss in 1 year    Wt Readings from Last 30 Encounters:   11/09/23 0724 44.5 kg (98 lb)   11/09/23 0659 93.4 kg (206 lb)   11/08/23 0056 93.7 kg (206 lb 9.1 oz)   11/07/23 1630 59 kg (130 lb)   11/19/22 0442 63.4 kg (139 lb 12.8 oz)   11/18/22 1550 63.9 kg (140 lb 12.8 oz)   11/18/22 0856 65.1 kg (143 lb 8.3 oz)   09/27/22 1327 56.7 kg (125 lb)   06/06/22 1246 59 kg (130 lb)   05/27/22 0936 66.7 kg (147 lb)   05/12/22 1101 61.2 kg (135 lb)   02/07/22 1129 61.2 kg (135 lb)   01/21/22 1440 61.2 kg (135 lb)   01/20/22 " 0847 61.2 kg (135 lb)   10/25/21 1030 63.5 kg (140 lb)   11/11/20 0842 61.3 kg (135 lb 2.3 oz)   11/03/20 1307 59 kg (130 lb)   07/06/20 0410 60.2 kg (132 lb 11.5 oz)   07/05/20 0336 59.1 kg (130 lb 4.7 oz)   07/04/20 0519 61.5 kg (135 lb 9.3 oz)   07/03/20 0350 60.5 kg (133 lb 6.1 oz)   07/02/20 2052 58.3 kg (128 lb 8.5 oz)   07/02/20 0427 58.3 kg (128 lb 9.6 oz)   07/01/20 0330 58.9 kg (129 lb 12.8 oz)   06/30/20 1557 60.2 kg (132 lb 11.5 oz)   06/30/20 1045 59.4 kg (131 lb)   08/30/18 1251 60.4 kg (133 lb 4 oz)   12/06/16 0848 56.7 kg (125 lb)   09/09/16 0906 58.5 kg (129 lb)   08/11/16 1432 56.2 kg (124 lb)      BMI kg/m2 Body mass index is 17.92 kg/m².       Labs        Pertinent Labs    Results from last 7 days   Lab Units 11/08/23  0531 11/07/23  1809   SODIUM mmol/L 140 142   POTASSIUM mmol/L 4.1 4.2   CHLORIDE mmol/L 106 107   CO2 mmol/L 26.0 26.0   BUN mg/dL 10 12   CREATININE mg/dL 0.52* 0.61   CALCIUM mg/dL 8.0* 8.5*   BILIRUBIN mg/dL 0.3 0.4   ALK PHOS U/L 155* 184*   ALT (SGPT) U/L 14 16   AST (SGOT) U/L 27 30   GLUCOSE mg/dL 96 100*     Results from last 7 days   Lab Units 11/08/23  0531 11/07/23  1809   MAGNESIUM mg/dL  --  1.6   HEMOGLOBIN g/dL 12.2 13.1   HEMATOCRIT % 36.3 39.9     Lab Results   Component Value Date    HGBA1C 5.4 11/19/2022        Medications    Scheduled Medications atorvastatin, 10 mg, Oral, Nightly  cetirizine, 10 mg, Oral, Daily  cycloSPORINE, 1 drop, Both Eyes, BID  estradiol, 1 mg, Oral, Daily  furosemide, 20 mg, Intravenous, Daily  gabapentin, 800 mg, Oral, Q8H  levothyroxine, 50 mcg, Oral, Q AM  metoprolol tartrate, 12.5 mg, Oral, Q12H  pantoprazole, 40 mg, Oral, BID  PATIENT SUPPLIED MEDICATION, 100 mg, Oral, BID  polyethylene glycol, 17 g, Oral, Daily  prednisoLONE acetate, 1 drop, Both Eyes, Nightly  senna-docusate sodium, 2 tablet, Oral, BID  sertraline, 50 mg, Oral, Nightly  sodium chloride, 10 mL, Intravenous, Q12H  topiramate, 100 mg, Oral, BID        Infusions       PRN Medications   albuterol    senna-docusate sodium **AND** polyethylene glycol **AND** bisacodyl **AND** bisacodyl    butalbital-acetaminophen-caffeine    hydrOXYzine    ondansetron    oxyCODONE    Potassium Replacement - Follow Nurse / BPA Driven Protocol    [COMPLETED] Insert Peripheral IV **AND** sodium chloride    sodium chloride    sodium chloride     Physical Findings        Trending Physical   Appearance, NFPE 11/9:NFPE completed, consistent with nutrition diagnosis of malnutrition using AND/ASPEN criteria. See MSA below.    --  Edema  3+ moderate legs, ankles, feet     Bowel Function Last reported BM 11/6 - Miralax ordered, PRN bowel regimen available as well     Tubes None ordered     Chewing/Swallowing Previous swallowing issue - regularly needs esophageal dilation due to narrowing     Skin Intact     --  Current Nutrition Orders & Evaluation of Intake       Oral Nutrition     Food Allergies NKFA   Current PO Diet Diet: Regular/House Diet; Texture: Regular Texture (IDDSI 7); Fluid Consistency: Thin (IDDSI 0)   Supplement None ordered   PO Evaluation     Trending % PO Intake % recorded, only 2 PO records for 11/8 currently   --  Nutritional Risk Screening        NRS-2002 Score          Nutrition Diagnosis         Nutrition Dx Problem 1 Severe chronic disease related malnutrition related to chronic inadequate intake in setting of multiple medical problems as evidenced by less than 75% of estimated energy requirement for greater than or equal to 1 month, greater than 20% UWL in less than 1 year, and severe fat/muscle wasting per NFPE and severe fluid accumulation.      Nutrition Dx Problem 2        Intervention Goal         Intervention Goal(s) >75% PO  Accept ONS  No wt loss     Nutrition Intervention        RD Action NFPE orrdered  ONS ordered     Nutrition Prescription          Diet Prescription Regular Diet   Supplement Prescription Boost Plus TID (provides 1080 kcals, 42 g protein if consumed)     --  Monitor/Evaluation        Monitor PO intake, Supplement intake, Weight, Swallow function     Malnutrition Severity Assessment      Patient meets criteria for : (P) Severe Malnutrition  Malnutrition Type (last 8 hours)       Malnutrition Severity Assessment       Row Name 11/09/23 1437       Malnutrition Severity Assessment    Malnutrition Type Chronic Disease - Related Malnutrition (P)       Row Name 11/09/23 1437       Insufficient Energy Intake     Insufficient Energy Intake Findings Severe (P)     Insufficient Energy Intake  <75% of est. energy requirement for > or equal to 1 month (P)       Row Name 11/09/23 1437       Unintentional Weight Loss     Unintentional Weight Loss Findings Severe (P)     Unintentional Weight Loss  Weight loss greater than 20% in one year (P)       Row Name 11/09/23 1437       Muscle Loss    Loss of Muscle Mass Findings Severe (P)     [place] Severe - deep hollowing/scooping, lack of muscle to touch, facial bones well defined (P)     Clavicle Bone Region Severe - protruding prominent bone (P)     Acromion Bone Region Severe - squared shoulders, bones, and acromion process protrusion prominent (P)     Dorsal Hand Region Severe - prominent depression (P)       Row Name 11/09/23 1437       Fat Loss    Subcutaneous Fat Loss Findings Severe (P)     Orbital Region  Severe - pronounced hollowness/depression, dark circles, loose saggy skin (P)     Upper Arm Region Severe - mostly skin, very little space between folds, fingers touch (P)     Thoracic & Lumbar Region Severe - ribs visible with prominent depressions, iliac crest very prominent (P)       Row Name 11/09/23 1437       Fluid Accumulation (Edema)    Fluid Acumulation Findings Severe (P)     Fluid Accumulation  Severe equals 3+ or 4+ pitting edema (P)       Row Name 11/09/23 1437       Criteria Met (Must meet criteria for severity in at least 2 of these categories: M Wasting, Fat Loss, Fluid, Secondary Signs, Wt. Status,  Intake)    Patient meets criteria for  Severe Malnutrition (P)                          Electronically signed by:  Katiana Spann  11/09/23 08:47 EST

## 2023-11-09 NOTE — THERAPY EVALUATION
Patient Name: Ashley Lin  : 1950    MRN: [id number]                              Today's Date: 2023       Admit Date: 2023    Visit Dx:     ICD-10-CM ICD-9-CM   1. Bilateral leg edema  R60.0 782.3   2. Acute venous embolism and thrombosis of deep vessels of distal end of left lower extremity  I82.4Z2 453.42   3. Tachycardia  R00.0 785.0   4. Immobility  Z74.09 799.89     Patient Active Problem List   Diagnosis    Intractable vomiting    Asthma    Cervical disc herniation    Cervical stenosis of spine    Chronic obstructive pulmonary disease    Low back pain    Pseudarthrosis following spinal fusion    History of lumbar fusion    S/P cervical spinal fusion    Hypothyroidism (acquired)    Estrogen deficiency    Depression    Migraines    Chronic pain syndrome    GERD without esophagitis    Intractable cyclical vomiting associated with migraine    Somnolence    Altered mental status, unspecified altered mental status type    Acute encephalopathy    Left leg pain    Weakness     Past Medical History:   Diagnosis Date    Asthma     Chronic back pain     Chronic headaches     Chronic obstructive pulmonary disease 2020    Chronic pain syndrome 2020    Depression 2020    Disease of thyroid gland     Estrogen deficiency 2020    GERD without esophagitis 2020    Hypothyroidism (acquired) 2020    Migraines 2020    Neuropathy     Vomiting 2020     Past Surgical History:   Procedure Laterality Date    CHOLECYSTECTOMY      COLONOSCOPY N/A 2020    Procedure: COLONOSCOPY WITH RANDOM COLON BIOPSIES X4 AREAS, BIOPSY X 1 AREA;  Surgeon: Aguila Garcia MD;  Location: [facility] ENDOSCOPY;  Service: Gastroenterology;  Laterality: N/A;  POST- colon erosion    ENDOSCOPY N/A 2020    Procedure: ESOPHAGOGASTRODUODENOSCOPY WITH DILATATION (BOUGIE #[id number]);  Surgeon: Benjamin Mike MD;  Location: [facility] ENDOSCOPY;  Service: Gastroenterology;  Laterality: N/A;  Post  operative diagnosis:GASTROPARESIS AND DYSPHAGIA     ENDOSCOPY N/A 11/11/2020    Procedure: ESOPHAGOGASTRODUODENOSCOPY WITH BIOPSY X 1 AREA;  Surgeon: Aguila Garcia MD;  Location: [facility] ENDOSCOPY;  Service: Gastroenterology;  Laterality: N/A;  POST    HYSTERECTOMY      NECK SURGERY      SPINE SURGERY  06/09/2020      General Information       Row Name 11/09/23 1136          OT Time and Intention    Document Type evaluation  -MS     Mode of Treatment occupational therapy  -MS       Row Name 11/09/23 1136          General Information    Patient Profile Reviewed yes  -MS     Prior Level of Function independent:;ADL's;all household mobility;driving  -MS     Existing Precautions/Restrictions brace worn when out of bed;fall;TLSO  brace on RUE  -MS     Barriers to Rehab medically complex;previous functional deficit;impaired sensation  -MS       Row Name 11/09/23 1136          Occupational Profile    Reason for Services/Referral (Occupational Profile) Pt is a 74 y/o F admitted to [facility] 11/7/23 c/o BLE weakness, pain and swelling. PMHx includes subdural hematoma, T12-L1 compression fracture, hypothyroidism, chronic urinary retention, constipation, GERD, depression, asthma and chronic pain and migraines. Patient was hospitalized at [facility] 10/10/2023 through 10/15/2023 after sustaining a fall at home, treated for subdural hematoma under the care of Dr. Quintanilla with neurosurgery.  It was documented that CAT scans were stable and she was to return for a follow-up CT of the head in 4 weeks. She also sustained T12 and L1 compression fracture and was placed in a TLSO brace she is to wear when OOB or of head of bed  >45 degrees until cleared. Patient also noted to have a right distal radius fracture and a wrist brace was placed. She was discharged to [facility] acute rehab for approximately 10 days and was discharged home on Friday 11/3. Once admitted to [facility], pt found to have DVT LLE, Lovenox administered in ED.   Heriberto's okay to mobilize pt this date. Prior to fall 10/10/23 pt independent with ADLs, no AD for mobility and drove short distances. Once dc home, able to complete sliding board transfers to/from  with assist. Pt lives in ground floor apartment, [address]. Pt has aide x2 week to assist with IADLs.  -MS     Environmental Supports and Barriers (Occupational Profile) supportive family  -MS       Row Name 11/09/23 1136          Living Environment    People in Home alone  -MS       Row Name 11/09/23 1136          Home Main Entrance    Number of Stairs, Main Entrance none  -MS       Row Name 11/09/23 1136          Stairs Within Home, Primary    Number of Stairs, Within Home, Primary none  -MS       Row Name 11/09/23 1136          Cognition    Orientation Status (Cognition) oriented x 4  -MS       Row Name 11/09/23 1136          Safety Issues, Functional Mobility    Impairments Affecting Function (Mobility) endurance/activity tolerance;pain;sensation/sensory awareness;range of motion (ROM);strength;motor planning  -MS               User Key  (r) = Recorded By, (t) = Taken By, (c) = Cosigned By      Initials Name Provider Type    MS Neha Dempsey, OT Occupational Therapist                     Mobility/ADL's       Row Name 11/09/23 1137          Bed Mobility    Bed Mobility supine-sit-supine  -MS     Supine-Sit-Supine [place] (Bed Mobility) maximum assist (25% patient effort);2 person assist  -MS     Comment, (Bed Mobility) pt demo log roll  -MS       Row Name 11/09/23 1137          Transfers    Transfers sit-stand transfer  -MS       Row Name 11/09/23 1137          Sit-Stand Transfer    Sit-Stand [place] (Transfers) unable to assess  -MS     Comment, (Sit-Stand Transfer) pt not safe to transfer this date  -MS       Row Name 11/09/23 1137          Activities of Daily Living    BADL Assessment/Intervention upper body dressing  -MS       Row Name 11/09/23 1137          Upper Body Dressing Assessment/Training     Position (Upper Body Dressing) edge of bed sitting  -MS     Comment, (Upper Body Dressing) max A to don TLSO brace  -MS               User Key  (r) = Recorded By, (t) = Taken By, (c) = Cosigned By      Initials Name Provider Type    Neha Barlow OT Occupational Therapist                   Obj/Interventions       Row Name 11/09/23 1139          Sensory Assessment (Somatosensory)    Sensory Assessment slight sensation present in BLE  -MS       Row Name 11/09/23 1139          Range of Motion Comprehensive    Comment, General Range of Motion BUE ~25% impacted, previous shoulder injuries  -MS       Row Name 11/09/23 1139          Strength Comprehensive (MMT)    Comment, General Manual Muscle Testing (MMT) Assessment RUE not assessed, LUE grossly 3+/5  -MS       Row Name 11/09/23 1139          Balance    Balance Assessment sitting static balance;sitting dynamic balance  -MS     Static Sitting Balance standby assist  -MS     Dynamic Sitting Balance contact guard  -MS     Position, Sitting Balance unsupported;sitting edge of bed  -MS               User Key  (r) = Recorded By, (t) = Taken By, (c) = Cosigned By      Initials Name Provider Type    Neha Barlow, OT Occupational Therapist                   Goals/Plan       Row Name 11/09/23 1322          Bed Mobility Goal 1 (OT)    Activity/Assistive Device (Bed Mobility Goal 1, OT) bed mobility activities, all  -MS     [place] Level/Cues Needed (Bed Mobility Goal 1, OT) minimum assist (75% or more patient effort)  -MS     Time Frame (Bed Mobility Goal 1, OT) long term goal (LTG);2 weeks  -MS     Progress/Outcomes (Bed Mobility Goal 1, OT) new goal  -MS       Row Name 11/09/23 1322          Transfer Goal 1 (OT)    Activity/Assistive Device (Transfer Goal 1, OT) transfers, all  -MS     [place] Level/Cues Needed (Transfer Goal 1, OT) minimum assist (75% or more patient effort)  -MS     Time Frame (Transfer Goal 1, OT) long term goal (LTG);2 weeks  -MS      Progress/Outcome (Transfer Goal 1, OT) new goal  -MS       Row Name 11/09/23 1322          Dressing Goal 1 (OT)    Activity/Device (Dressing Goal 1, OT) dressing skills, all  -MS     [place]/Cues Needed (Dressing Goal 1, OT) minimum assist (75% or more patient effort)  -MS     Time Frame (Dressing Goal 1, OT) long term goal (LTG);2 weeks  -MS     Progress/Outcome (Dressing Goal 1, OT) new goal  -MS       Row Name 11/09/23 1322          Toileting Goal 1 (OT)    Activity/Device (Toileting Goal 1, OT) toileting skills, all  -MS     [place] Level/Cues Needed (Toileting Goal 1, OT) minimum assist (75% or more patient effort)  -MS     Time Frame (Toileting Goal 1, OT) long term goal (LTG);2 weeks  -MS     Progress/Outcome (Toileting Goal 1, OT) new goal  -MS       Row Name 11/09/23 1322          Therapy Assessment/Plan (OT)    Planned Therapy Interventions (OT) activity tolerance training;adaptive equipment training;BADL retraining;functional balance retraining;IADL retraining;occupation/activity based interventions;passive ROM/stretching;patient/caregiver education/training;transfer/mobility retraining;strengthening exercise;ROM/therapeutic exercise  -MS               User Key  (r) = Recorded By, (t) = Taken By, (c) = Cosigned By      Initials Name Provider Type    Neha Barlow, RASHIDA Occupational Therapist                   Clinical Impression       Row Name 11/09/23 1140          Pain Assessment    Pretreatment Pain Rating 9/10  -MS     Posttreatment Pain Rating 9/10  -MS     Pain Location - Side/Orientation Bilateral  -MS     Pain Location lower  -MS     Pain Location - back;extremity  -MS     Pre/Posttreatment Pain Comment nausea, NSG administered zofran  -MS     Pain Intervention(s) Repositioned;Emotional support;Nursing Notified  -MS       Row Name 11/09/23 1140          Plan of Care Review    Plan of Care Reviewed With patient  -MS     Progress no change  -MS     Outcome Evaluation Pt is a 74 y/o F  admitted to [facility] 11/7/23 c/o BLE weakness, pain and swelling. PMHx includes subdural hematoma, T12-L1 compression fracture, hypothyroidism, chronic urinary retention, constipation, GERD, depression, asthma and chronic pain and migraines. Patient was hospitalized at [facility] 10/10/2023 through 10/15/2023 after sustaining a fall at home, treated for subdural hematoma under the care of Dr. Quintanilla with neurosurgery.  It was documented that CAT scans were stable and she was to return for a follow-up CT of the head in 4 weeks. She also sustained T12 and L1 compression fracture and was placed in a TLSO brace she is to wear when OOB or of head of bed  >45 degrees until cleared. Patient also noted to have a right distal radius fracture and a wrist brace was placed. She was discharged to [facility] acute rehab for approximately 10 days and was discharged home on Friday 11/3. Once admitted to [facility], pt found to have DVT LLE, Lovenox administered in ED. Dr. Louis's okay to mobilize pt this date. Prior to fall 10/10/23 pt independent with ADLs, no AD for mobility and drove short distances. Once dc home, able to complete sliding board transfers to/from  with assist. Pt lives in ground floor apartment, [address]. Pt has aide x2 week to assist with IADLs. This date, pt A&Ox4, on RA, in supine upon arrival. Pt c/o pain and nausea, NSG notified and administered zofran. Pt requires max A x2 for supine<>sit and max A to don TLSO brace sitting edge of bed. Pt reports minimal sensation and noted to have minimal activity of BLE, therefore not safe for transfers this date. Pt demo BUE ROM WFL and BUE weakness; reports limited neck rotation at baseline. Pt is far below functional baseline and will require increased assist for all transfers. OT recommend SNF when medically appropriate for d/c. OT will follow while admitted.  -MS Burch Name 11/09/23 9726          Therapy Assessment/Plan (OT)    Rehab Potential (OT) good, to achieve  stated therapy goals  -MS     Criteria for Skilled Therapeutic Interventions Met (OT) yes;meets criteria;skilled treatment is necessary  -MS     Therapy Frequency (OT) 3 times/wk  -MS     Predicted Duration of Therapy Intervention (OT) until d/c  -MS       Row Name 11/09/23 1140          Therapy Plan Review/Discharge Plan (OT)    Anticipated Discharge Disposition (OT) [facility] nursing [facility]  -MS       Row Name 11/09/23 1140          Vital Signs    O2 Delivery Pre Treatment room air  -MS     O2 Delivery Intra Treatment room air  -MS     O2 Delivery Post Treatment room air  -MS     Pre Patient Position Supine  -MS     Intra Patient Position Sitting  -MS     Post Patient Position Supine  -MS     Recovery Time VSS  -MS       Row Name 11/09/23 1140          Positioning and Restraints    Pre-Treatment Position in bed  -MS     Post Treatment Position bed  -MS     In Bed notified nsg;call light within reach;encouraged to call for assist;exit alarm on  -MS               User Key  (r) = Recorded By, (t) = Taken By, (c) = Cosigned By      Initials Name Provider Type    Neha Barlow, OT Occupational Therapist                   Outcome Measures       Row Name 11/09/23 1323          How much help from another is currently needed...    Putting on and taking off regular lower body clothing? 1  -MS     Bathing (including washing, rinsing, and drying) 1  -MS     Toileting (which includes using toilet bed pan or urinal) 1  -MS     Putting on and taking off regular upper body clothing 2  -MS     Taking care of personal grooming (such as brushing teeth) 3  -MS     Eating meals 3  -MS     AM-PAC 6 Clicks Score (OT) 11  -MS       Row Name 11/09/23 0930          How much help from another person do you currently need...    Turning from your back to your side while in flat bed without using bedrails? 4  -DP     Moving from lying on back to sitting on the side of a flat bed without bedrails? 2  -DP     Moving to and from a bed to a  chair (including a wheelchair)? 2  -DP     Standing up from a chair using your arms (e.g., wheelchair, bedside chair)? 2  -DP     Climbing 3-5 steps with a railing? 1  -DP     To walk in hospital room? 1  -DP     AM-PAC 6 Clicks Score (PT) 12  -DP     Highest level of mobility 4 --> Transferred to chair/commode  -DP       Row Name 11/09/23 1323          Functional Assessment    Outcome Measure Options AM-PAC 6 Clicks Daily Activity (OT)  -MS               User Key  (r) = Recorded By, (t) = Taken By, (c) = Cosigned By      Initials Name Provider Type    DP Suha Mcmillan, RN Registered Nurse    Neha Barlow OT Occupational Therapist                    Occupational Therapy Education       Title: PT OT SLP Therapies (Done)       Topic: Occupational Therapy (Done)       Point: ADL training (Done)       Description:   Instruct learner(s) on proper safety adaptation and remediation techniques during self care or transfers.   Instruct in proper use of assistive devices.                  Learning Progress Summary             Patient Acceptance, E,TB, VU by MS at 11/9/2023 1323                         Point: Precautions (Done)       Description:   Instruct learner(s) on prescribed precautions during self-care and functional transfers.                  Learning Progress Summary             Patient Acceptance, E,TB, VU by MS at 11/9/2023 1323                         Point: Body mechanics (Done)       Description:   Instruct learner(s) on proper positioning and spine alignment during self-care, functional mobility activities and/or exercises.                  Learning Progress Summary             Patient Acceptance, E,TB, VU by MS at 11/9/2023 1323                                         User Key       Initials Effective Dates Name Provider Type Discipline    MS 07/13/22 -  Neha Dempsey OT Occupational Therapist OT                  OT Recommendation and Plan  Planned Therapy Interventions (OT): activity tolerance  training, adaptive equipment training, BADL retraining, functional balance retraining, IADL retraining, occupation/activity based interventions, passive ROM/stretching, patient/caregiver education/training, transfer/mobility retraining, strengthening exercise, ROM/therapeutic exercise  Therapy Frequency (OT): 3 times/wk  Plan of Care Review  Plan of Care Reviewed With: patient  Progress: no change  Outcome Evaluation: Pt is a 72 y/o F admitted to [facility] 11/7/23 c/o BLE weakness, pain and swelling. PMHx includes subdural hematoma, T12-L1 compression fracture, hypothyroidism, chronic urinary retention, constipation, GERD, depression, asthma and chronic pain and migraines. Patient was hospitalized at [facility] 10/10/2023 through 10/15/2023 after sustaining a fall at home, treated for subdural hematoma under the care of Dr. Quintanilla with neurosurgery.  It was documented that CAT scans were stable and she was to return for a follow-up CT of the head in 4 weeks. She also sustained T12 and L1 compression fracture and was placed in a TLSO brace she is to wear when OOB or of head of bed  >45 degrees until cleared. Patient also noted to have a right distal radius fracture and a wrist brace was placed. She was discharged to [facility] acute rehab for approximately 10 days and was discharged home on Friday 11/3. Once admitted to [facility], pt found to have DVT LLE, Lovenox administered in ED. Dr. Louis's okay to mobilize pt this date. Prior to fall 10/10/23 pt independent with ADLs, no AD for mobility and drove short distances. Once dc home, able to complete sliding board transfers to/from  with assist. Pt lives in ground floor apartment, [address]. Pt has aide x2 week to assist with IADLs. This date, pt A&Ox4, on RA, in supine upon arrival. Pt c/o pain and nausea, NSG notified and administered zofran. Pt requires max A x2 for supine<>sit and max A to don TLSO brace sitting edge of bed. Pt reports minimal sensation and noted to have  minimal activity of BLE, therefore not safe for transfers this date. Pt demo BUE ROM WFL and BUE weakness; reports limited neck rotation at baseline. Pt is far below functional baseline and will require increased assist for all transfers. OT recommend SNF when medically appropriate for d/c. OT will follow while admitted.     Time Calculation:                   Neha Dempsey OT  11/9/2023

## 2023-11-09 NOTE — CASE MANAGEMENT/SOCIAL WORK
Continued Stay Note  VANESSA Mayfield     Patient Name: Ashley Lin  MRN: [id number]  Today's Date: 11/9/2023    Admit Date: 11/7/2023    Plan: [facility], accepted. No precert required. PASRR approved.   Discharge Plan       Row Name 11/09/23 1611       Plan    Plan [facility], accepted. No precert required. PASRR approved.    Patient/Family in Agreement with Plan yes    Plan Comments DC barriers: NeuroSx consulted to determine patient's anticoagulation with new DVT+ in LLE but with recent hx of SDH. Awaiting [facility] from [facility].             Megan Naegele, RN     Office Phone: [phone]  Office Cell: [phone]

## 2023-11-09 NOTE — PLAN OF CARE
Goal Outcome Evaluation:         Pt restless most of the night. Pt having back and neck pain relieved by PO pain medication and ice packs.

## 2023-11-09 NOTE — CONSULTS
"  [facility]   Consult Note    Patient Name: Ashley Lin  : 1950  MRN: [id number]  Primary Care Physician:  Jamey Fairchild MD  Referring Physician: Jamey Fairchild MD  Date of admission: 2023    Subjective   Subjective     Reason for Consult/ Chief Complaint: Anticoagulation use in setting of acute DVT    HPI:  Ashley Lin is a 73 y.o. female that presented to [facility] emergency department on 2023 with complaints of leg pain and swelling.  She was subsequently found to have an acute DVT in left gastrocnemius.   Patient had been hospitalized at [facility] from 10/10/2023 to 10/15/2023 after suffering a fall at home.  She was treated for right-sided convexity subdural hematoma with stable serial imagings with recommendations to follow-up in outpatient clinic with CTA head in 4 weeks.  In the ED patient was given therapeutic Lovenox injection for DVT findings.  CT head imaging was completed with possible right tentorial subdural hematoma.  Neurosurgery was consulted today to further evaluation and offer recommendations in regards to anticoagulation use in setting of acute DVT given patient's previous subdural hematoma.    On my evaluation, patient is sitting up in bed in no acute distress.  She is alert and oriented.  She reports no acute neurologic complaints but has continued with some \"brain fog \"since her fall.  She has not sustained any new falls since being discharged from hospital.  She reports no headache, dizziness, focal sensorimotor deficits, nausea/vomiting, seizure activity.    Review of Systems       Personal History     Past Medical History:   Diagnosis Date    Asthma     Chronic back pain     Chronic headaches     Chronic obstructive pulmonary disease 2020    Chronic pain syndrome 2020    Depression 2020    Disease of thyroid gland     Estrogen deficiency 2020    GERD without esophagitis 2020    Hypothyroidism " (acquired) 7/1/2020    Migraines 7/1/2020    Neuropathy     Vomiting 06/30/2020       Past Surgical History:   Procedure Laterality Date    CHOLECYSTECTOMY      COLONOSCOPY N/A 11/11/2020    Procedure: COLONOSCOPY WITH RANDOM COLON BIOPSIES X4 AREAS, BIOPSY X 1 AREA;  Surgeon: Aguila Garcia MD;  Location: [facility] ENDOSCOPY;  Service: Gastroenterology;  Laterality: N/A;  POST- colon erosion    ENDOSCOPY N/A 7/2/2020    Procedure: ESOPHAGOGASTRODUODENOSCOPY WITH DILATATION (BOUGIE #[id number]);  Surgeon: Benjamin Mike MD;  Location: [facility] ENDOSCOPY;  Service: Gastroenterology;  Laterality: N/A;  Post operative diagnosis:GASTROPARESIS AND DYSPHAGIA     ENDOSCOPY N/A 11/11/2020    Procedure: ESOPHAGOGASTRODUODENOSCOPY WITH BIOPSY X 1 AREA;  Surgeon: Aguila Garcia MD;  Location: [facility] ENDOSCOPY;  Service: Gastroenterology;  Laterality: N/A;  POST    HYSTERECTOMY      NECK SURGERY      SPINE SURGERY  06/09/2020       Family History: family history includes Heart disease in her mother; No Known Problems in her father. Otherwise pertinent FHx was reviewed and not pertinent to current issue.    Social History:  reports that she quit smoking about 17 years ago. Her smoking use included cigarettes. She has never used smokeless tobacco. She reports that she does not drink alcohol and does not use drugs.    Home Medications:  EPINEPHrine, Eluxadoline, atorvastatin, butalbital-acetaminophen-caffeine, cetirizine, docusate sodium, estradiol, fluticasone, furosemide, gabapentin, hydrOXYzine, ipratropium-albuterol, levothyroxine, metoprolol tartrate, ondansetron, oxyCODONE-acetaminophen, pantoprazole, polyethylene glycol, potassium chloride, prednisoLONE acetate, promethazine, rizatriptan MLT, sertraline, tiotropium bromide-olodaterol, topiramate, and vitamin D3    Allergies:  Allergies   Allergen Reactions    Morphine Hives    Penicillins Hives and Shortness Of Breath     Can take iv form - but not po form          Objective    Objective     Vitals:   Temp:  [97.5 °F (36.4 °C)-97.8 °F (36.6 °C)] 97.8 °F (36.6 °C)  Heart Rate:  [101-118] 104  Resp:  [15-25] 25  BP: (124-148)/(73-98) 124/79    Physical Exam:   Constitutional: Awake, alert   Eyes: PERRLA, sclerae anicteric, no conjunctival injection   HENT: NCAT, mucous membranes moist   Neck: Supple, no thyromegaly, no lymphadenopathy, trachea midline   Respiratory: Clear to auscultation bilaterally, nonlabored respirations    Cardiovascular: RRR, no murmurs, rubs, or gallops, palpable pedal pulses bilaterally   Gastrointestinal: Positive bowel sounds, soft, nontender, nondistended   Musculoskeletal: No bilateral ankle edema, no clubbing or cyanosis to extremities   Psychiatric: Appropriate affect, cooperative   Neurologic: Oriented x 3, strength symmetric in all extremities, Cranial Nerves grossly intact to confrontation, speech clear   Skin: No rashes     Result Review    Result Review:  I have personally reviewed the results from the time of this admission to 11/9/2023 17:29 EST and agree with these findings:  []  Laboratory list / accordion  []  Microbiology  [x]  Radiology  []  EKG/Telemetry   []  Cardiology/Vascular   []  Pathology  []  Old records  []  Other:  Most notable findings include: CT head 11/7/2023 with near resolution of right-sided convexity subdural hematoma.  There is some hyperintensity noted along the right tentorium suggestive of small subdural hematoma.      Assessment & Plan   Assessment / Plan     Brief Patient Summary:  Ashley Lin is a 73 y.o. female with previous right convexity subdural hematoma 1 month ago that now has developed an acute right lower extremity DVT.  There were no focal neurologic deficits appreciated and cranial nerves appear to be intact.  Right-sided convexity subdural hematoma previously mentioned in reports from [place] appears to be nearly resolved.  There is a small amount of hyperintensity along the right  tentorium may be suggestive of a subdural hematoma that was not mentioned on previous [facility] imaging and I did communicate with nursing earlier today asking for images to be pushed over from [facility] for comparison but at time of dictation, they were not available.   In regards to start of anticoagulation, patient had already received therapeutic dose of Lovenox on admission and I recommended repeat CT head.  If serial imaging is stable, it is likely she can begin an easily reversible therapeutic regimen for her DVT such as subcutaneous heparin or Lovenox ejection.  Neurosurgery does not recommend starting any other sort of oral anticoagulation for at least 2 more days pending review of imaging.  Once imaging from [place] has been pushed over and repeat CT has been completed, further recommendations can be given.  Please call for any questions or concerns.    Active Hospital Problems:  Active Hospital Problems    Diagnosis     **Weakness    Subdural hematoma  Plan:     -Await repeat CT head  - Obtain CT head imaging from [facility]  - Please call for any questions or concerns      I discussed findings with patient, nursing, Dr. Dent and Dr. Gutierrez.    HUMZA Paez

## 2023-11-09 NOTE — PLAN OF CARE
Goal Outcome Evaluation:  Plan of Care Reviewed With: patient        Progress: no change  Outcome Evaluation: Pt is a 74 y/o F admitted to [facility] 11/7/23 c/o BLE weakness, pain and swelling. PMHx includes subdural hematoma, T12-L1 compression fracture, hypothyroidism, chronic urinary retention, constipation, GERD, depression, asthma and chronic pain and migraines. Patient was hospitalized at [facility] 10/10/2023 through 10/15/2023 after sustaining a fall at home, treated for subdural hematoma under the care of Dr. Quintanilla with neurosurgery.  It was documented that CAT scans were stable and she was to return for a follow-up CT of the head in 4 weeks. She also sustained T12 and L1 compression fracture and was placed in a TLSO brace she is to wear when OOB or of head of bed  >45 degrees until cleared. Patient also noted to have a right distal radius fracture and a wrist brace was placed. She was discharged to [facility] acute rehab for approximately 10 days and was discharged home on Friday 11/3. Once admitted to [facility], pt found to have DVT LLE, Lovenox administered in ED. Dr. Louis's okay to mobilize pt this date. Prior to fall 10/10/23 pt independent with ADLs, no AD for mobility and drove short distances. Once dc home, able to complete sliding board transfers to/from  with assist. Pt lives in ground floor apartment, [address]. Pt has aide x2 week to assist with IADLs. This date, pt A&Ox4, on RA, in supine upon arrival. Pt c/o pain and nausea, NSG notified and administered zofran. Pt requires max A x2 for supine<>sit and max A to don TLSO brace sitting edge of bed. Pt reports minimal sensation and noted to have minimal activity of BLE, therefore not safe for transfers this date. Pt demo BUE ROM WFL and BUE weakness; reports limited neck rotation at baseline. Pt is far below functional baseline and will require increased assist for all transfers. OT recommend SNF when medically appropriate for d/c. OT will follow  while admitted.      Anticipated Discharge Disposition (OT): skilled nursing facility

## 2023-11-09 NOTE — PROGRESS NOTES
[facility] Medicine Services   Daily Progress Note    Patient Name: Ashley Lin  : 1950  MRN: [id number]  Primary Care Physician:  Jamey Fairchild MD  Date of admission: 2023    Subjective      Chief Complaint: LLE swelling and pain    Did okay overnight.  Lower extremity Doppler positive for left lower leg DVT.  Pending neurosurgical consultation for input regarding initiation of anticoagulation in the setting of recent subdural hematomas.  Planning for discharge to [place] for skilled rehab.  Pain is reasonably managed.  She did have a headache today, but her Fioricet seem to be helpful.  Discussed the case with the bedside nursing staff. The patient's case was also discussed with the pharmacy and case management providers at multidisciplinary rounds.  No other acute concerns.    Objective      Vitals:   Temp:  [97.5 °F (36.4 °C)] 97.5 °F (36.4 °C)  Heart Rate:  [101-118] 101  Resp:  [14-18] 15  BP: (132-148)/(83-98) 136/84    Physical Exam   Constitutional:       Appearance: She is awake and alert  Eyes:      Pupils: Pupils are equal, round, and reactive to light.   Cardiovascular:      Rate and Rhythm: Tachycardia present. Rhythm irregular.   Pulmonary:      Effort: Pulmonary effort is normal.      Breath sounds: Normal breath sounds.   Abdominal:      General: Abdomen is flat.      Palpations: Abdomen is soft.   Musculoskeletal:      Right lower leg: Edema present.      Left lower leg: Edema present.      Comments: Inability to move BLE independently with active ROM. Able to hold RLE in elevated position but drifts down. LLE appears more weak. More difficulty holding LLE up. Able to wiggle smitha toes.    Skin:     General: Skin is dry.   Neurological:      Mental Status: She is alert and oriented to person, place, and time.   Psychiatric:         Mood and Affect: Mood normal.      Result Review    Result Review:  I have personally reviewed the results from the time of this  admission to 11/9/2023 09:13 EST and agree with these findings:  [x]  Laboratory  [x]  Microbiology  [x]  Radiology  [x]  EKG/Telemetry   [x]  Cardiology/Vascular   []  Pathology  [x]  Old records  []  Other    Assessment & Plan      Brief Patient Summary:  Ashley Lin is a 73 y.o. female with past medical history of subdural hematoma, compression fracture, hypothyroidism, chronic urinary retention, constipation, GERD, depression, asthma and chronic pain and migraines who presented to [facility] on 11/7/2023 complaining of weakness and leg pain and swelling  She was hospitalized at [facility] 10/10/2023 through 10/15/2023 after sustaining a fall at home and treated for subdural hematoma under the care of Dr. Quintanilla with neurosurgery.  It was documented that CAT scans were stable and she was to return for a follow-up CT of the head in 4 weeks.  She also sustained T12 and L1 compression fracture and was placed in a back brace that she is to wear when out of bed or of head of bed greater than 45 degrees until cleared by Dr. Quintanilla.  Patient also noted to have a right distal radius fracture and a wrist brace was placed.  Ortho seen and no new interventions.  She was also seen by Dr. Bolden and urology for chronic urinary retention.  Was documented patient has a bladder stimulator ultrasound was negative for hydronephrosis and creatinine was stable and patient was without urinary symptoms or pain.  Okay to allow for retention and overflow incontinence and only straight cath if patient symptomatic.  She was discharged to [facility] acute rehab on discharge for approximately 10 days and was discharged home on Friday 11/3.  Patient reports initially went home with her son but was brought home today.  She reports is nonambulatory.  Unable to lift lower extremities off bed.  She was seen by PT and recommended return to rehab.     In the ED she is found to have sinus tachycardia verified on EKG.   High-sensitivity troponin 21 and 22 proBNP 502.  Electrolytes within normal limits renal function stable.  TSH 4.120.  Negative for leukocytosis.  Hemoglobin stable.  Denies any shortness of breath cough congestion fever or dysuria.  She reports positive for headache and chronic visual deficits following fall.  For lower extremity pain and edema bilateral duplex was performed and positive for acute left lower extremity deep vein thrombosis noted in the gastrocnemius.  CT PE was negative for pulmonary emboli but showed left lower lobe groundglass type airspace consistent with pneumonia and small amount of ascites in the left upper quadrant of the abdomen.  As patient clinically had no signs of infection antibiotics were held at this time.      atorvastatin, 10 mg, Oral, Nightly  cetirizine, 10 mg, Oral, Daily  cycloSPORINE, 1 drop, Both Eyes, BID  estradiol, 1 mg, Oral, Daily  furosemide, 20 mg, Intravenous, Daily  gabapentin, 800 mg, Oral, Q8H  levothyroxine, 50 mcg, Oral, Q AM  metoprolol tartrate, 12.5 mg, Oral, Q12H  pantoprazole, 40 mg, Oral, BID  PATIENT SUPPLIED MEDICATION, 100 mg, Oral, BID  polyethylene glycol, 17 g, Oral, Daily  prednisoLONE acetate, 1 drop, Both Eyes, Nightly  senna-docusate sodium, 2 tablet, Oral, BID  sertraline, 50 mg, Oral, Nightly  sodium chloride, 10 mL, Intravenous, Q12H  topiramate, 100 mg, Oral, BID             Active Hospital Problems:  Active Hospital Problems    Diagnosis     **Weakness      Plan:   Weakness/ immobility/ bed bound   - pt reports has been non-ambulatory since her fall and admission to [facility] on 10/10/23. She states continued to be non-ambulatory during rehab admission.   - Weakness possibly neurological due to SDH vs compression fracture vs weakness from complicated hospital stay vs pain  - BLE edema and pain appears acute on chronic  - Found to have LLE DVT  - Pt received Lovenox X1 in ED. Holding continuation of AC for treatment of DVT until cleared by  neurosurgery as pt with history of recent SDH, hopeful to start this afternoon  - Rpt CT head -tiny right tentorial subdural hematoma  - PT/OT eval   - case management/ social service for placement  - turn q2     Hx SDH  - holding AC at this time until evaluation by neurosurgery  - was seen at [facility] 10/10-10/15 by Dr. Quintanilla. Plan was to repeat CTH in 4 wks  - will repeat CT- tiny Rt tentorial SDH  - neurosurgery to see     Tachycardia  - medications adjusted at [place]  - noted pt started on BB, which she reports taking as prescribed  - EKG with sinus tachycardia  - denies CP or SOA  - cont lopressor  - resume home medications     BLE pain/ edema  - consider possibly due to DVT vs cardiac vs dependent edema   - continue home lasix   - f/u  2D echo  - troponin 21->22  - Pro      Hx T12-L1 compression fracture  L3-L5 Lumbar fusion  Rt distal radius fracture  - TLSO brace when OOB or HOB > 45degrees  - right wrist brace- pt right handed  - pain control     Hypothyroid  - TSH WNL  - continue synthroid     Chronic urinary retention s/p bladder stimulator  - per record from previous facility pt was evaluated by Urology, Dr. Bolden, and documented that US was negative for hydronephrosis, kidney function stable and pt w/o pain. Ok to allow for overflow. Straight cath if any of above present  - on oxybutynin     Chronic pain/ migraines  - resume home oxy and gabapentin  - monitor for lethargy  - prn Fioricet/ sumatriptan      GERD  - PPI     HLD  - continue statin     DVT prophylaxis:  Mechanical DVT prophylaxis orders are present.    CODE STATUS:    Code Status (Patient has no pulse and is not breathing): CPR (Attempt to Resuscitate)  Medical Interventions (Patient has pulse or is breathing): Full Support    Disposition:  I expect patient to be discharged to [place] for skilled rehab as soon as she has been started on anticoagulants.    Electronically signed by Chris Dent MD, 11/09/23, 09:13  EST.  [facility] Hospitalist Team

## 2023-11-09 NOTE — PLAN OF CARE
Problem: Pain Acute  Goal: Acceptable Pain Control and Functional Ability  Outcome: Ongoing, Progressing     Problem: Skin Injury Risk Increased  Goal: Skin Health and Integrity  Outcome: Ongoing, Progressing     Problem: Fall Injury Risk  Goal: Absence of Fall and Fall-Related Injury  Outcome: Ongoing, Progressing  Intervention: Promote Injury-Free Environment  Recent Flowsheet Documentation  Taken 11/9/2023 1800 by Suha Mcmillan RN  Safety Promotion/Fall Prevention: safety round/check completed  Taken 11/9/2023 1600 by Suha Mcmillan RN  Safety Promotion/Fall Prevention: safety round/check completed  Taken 11/9/2023 1400 by Suha Mcmillan RN  Safety Promotion/Fall Prevention: safety round/check completed  Taken 11/9/2023 1200 by Suha Mcmillan, RN  Safety Promotion/Fall Prevention: safety round/check completed  Taken 11/9/2023 1000 by Suha Mcmillan, RN  Safety Promotion/Fall Prevention: safety round/check completed  Taken 11/9/2023 0930 by Suha Mcmillan, RN  Safety Promotion/Fall Prevention: safety round/check completed     Problem: Malnutrition  Goal: Improved Nutritional Intake  Outcome: Ongoing, Progressing   Goal Outcome Evaluation:

## 2023-11-10 VITALS
RESPIRATION RATE: 22 BRPM | WEIGHT: 100.53 LBS | DIASTOLIC BLOOD PRESSURE: 84 MMHG | TEMPERATURE: 98.5 F | OXYGEN SATURATION: 93 % | HEART RATE: 110 BPM | HEIGHT: 62 IN | SYSTOLIC BLOOD PRESSURE: 123 MMHG | BODY MASS INDEX: 18.5 KG/M2

## 2023-11-10 PROBLEM — R00.0 SINUS TACHYCARDIA: Chronic | Status: ACTIVE | Noted: 2023-11-10

## 2023-11-10 PROBLEM — Z86.79 HISTORY OF SUBDURAL HEMATOMA: Chronic | Status: ACTIVE | Noted: 2023-11-10

## 2023-11-10 PROBLEM — Z79.01 CHRONIC ANTICOAGULATION: Chronic | Status: ACTIVE | Noted: 2023-11-10

## 2023-11-10 PROBLEM — S52.91XD: Status: ACTIVE | Noted: 2023-11-10

## 2023-11-10 PROBLEM — E43 SEVERE MALNUTRITION: Status: ACTIVE | Noted: 2023-11-10

## 2023-11-10 PROBLEM — R53.81 DEBILITY: Status: ACTIVE | Noted: 2023-11-07

## 2023-11-10 PROBLEM — E78.5 HYPERLIPIDEMIA: Chronic | Status: ACTIVE | Noted: 2023-11-10

## 2023-11-10 PROBLEM — I82.462 ACUTE DEEP VEIN THROMBOSIS (DVT) OF CALF MUSCLE VEIN OF LEFT LOWER EXTREMITY: Chronic | Status: ACTIVE | Noted: 2023-11-10

## 2023-11-10 LAB
QT INTERVAL: 304 MS
QTC INTERVAL: 439 MS

## 2023-11-10 PROCEDURE — 99232 SBSQ HOSP IP/OBS MODERATE 35: CPT | Performed by: NURSE PRACTITIONER

## 2023-11-10 PROCEDURE — 25010000002 ENOXAPARIN PER 10 MG: Performed by: FAMILY MEDICINE

## 2023-11-10 PROCEDURE — 25010000002 FUROSEMIDE PER 20 MG: Performed by: NURSE PRACTITIONER

## 2023-11-10 PROCEDURE — 25010000002 ONDANSETRON PER 1 MG: Performed by: NURSE PRACTITIONER

## 2023-11-10 RX ORDER — GABAPENTIN 800 MG/1
800 TABLET ORAL 4 TIMES DAILY
Qty: 12 TABLET | Refills: 0 | Status: SHIPPED | OUTPATIENT
Start: 2023-11-10

## 2023-11-10 RX ORDER — NALOXONE HYDROCHLORIDE 4 MG/.1ML
SPRAY NASAL
Qty: 2 EACH | Refills: 0 | Status: SHIPPED | OUTPATIENT
Start: 2023-11-10

## 2023-11-10 RX ORDER — CYCLOSPORINE 0.5 MG/ML
1 EMULSION OPHTHALMIC 2 TIMES DAILY
Qty: 8 EACH | Refills: 0 | Status: SHIPPED | OUTPATIENT
Start: 2023-11-10

## 2023-11-10 RX ORDER — ENOXAPARIN SODIUM 100 MG/ML
1 INJECTION SUBCUTANEOUS EVERY 12 HOURS
Status: DISCONTINUED | OUTPATIENT
Start: 2023-11-10 | End: 2023-11-10 | Stop reason: HOSPADM

## 2023-11-10 RX ORDER — ENOXAPARIN SODIUM 100 MG/ML
1 INJECTION SUBCUTANEOUS EVERY 12 HOURS
Qty: 4 ML | Refills: 0 | Status: SHIPPED | OUTPATIENT
Start: 2023-11-10 | End: 2023-11-14

## 2023-11-10 RX ORDER — BUTALBITAL, ACETAMINOPHEN AND CAFFEINE 50; 325; 40 MG/1; MG/1; MG/1
1 TABLET ORAL EVERY 4 HOURS PRN
Qty: 18 TABLET | Refills: 0 | Status: SHIPPED | OUTPATIENT
Start: 2023-11-10

## 2023-11-10 RX ORDER — OXYCODONE AND ACETAMINOPHEN 10; 325 MG/1; MG/1
1 TABLET ORAL EVERY 6 HOURS
Qty: 12 TABLET | Refills: 0 | Status: SHIPPED | OUTPATIENT
Start: 2023-11-10

## 2023-11-10 RX ORDER — ALBUTEROL SULFATE 90 UG/1
2 AEROSOL, METERED RESPIRATORY (INHALATION) EVERY 4 HOURS PRN
Qty: 6.7 G | Refills: 0 | Status: SHIPPED | OUTPATIENT
Start: 2023-11-10

## 2023-11-10 RX ADMIN — ESTRADIOL 1 MG: 1 TABLET ORAL at 09:52

## 2023-11-10 RX ADMIN — ENOXAPARIN SODIUM 50 MG: 60 INJECTION SUBCUTANEOUS at 11:48

## 2023-11-10 RX ADMIN — ONDANSETRON 4 MG: 2 INJECTION INTRAMUSCULAR; INTRAVENOUS at 08:38

## 2023-11-10 RX ADMIN — CETIRIZINE HYDROCHLORIDE 10 MG: 10 TABLET, FILM COATED ORAL at 09:51

## 2023-11-10 RX ADMIN — OXYCODONE 10 MG: 5 TABLET ORAL at 19:07

## 2023-11-10 RX ADMIN — POLYETHYLENE GLYCOL 3350 17 G: 17 POWDER, FOR SOLUTION ORAL at 09:51

## 2023-11-10 RX ADMIN — CYCLOSPORINE 1 DROP: 0.5 EMULSION OPHTHALMIC at 09:51

## 2023-11-10 RX ADMIN — OXYCODONE 10 MG: 5 TABLET ORAL at 09:52

## 2023-11-10 RX ADMIN — BUTALBITAL, ACETAMINOPHEN, AND CAFFEINE 1 TABLET: 50; 325; 40 TABLET ORAL at 12:28

## 2023-11-10 RX ADMIN — TOPIRAMATE 100 MG: 100 TABLET, FILM COATED ORAL at 09:51

## 2023-11-10 RX ADMIN — HYDROXYZINE HYDROCHLORIDE 25 MG: 25 TABLET, FILM COATED ORAL at 19:06

## 2023-11-10 RX ADMIN — ONDANSETRON 4 MG: 2 INJECTION INTRAMUSCULAR; INTRAVENOUS at 14:37

## 2023-11-10 RX ADMIN — BISACODYL 10 MG: 10 SUPPOSITORY RECTAL at 08:37

## 2023-11-10 RX ADMIN — OXYCODONE 10 MG: 5 TABLET ORAL at 05:19

## 2023-11-10 RX ADMIN — DOCUSATE SODIUM 50MG AND SENNOSIDES 8.6MG 2 TABLET: 8.6; 5 TABLET, FILM COATED ORAL at 09:51

## 2023-11-10 RX ADMIN — PANTOPRAZOLE SODIUM 40 MG: 40 TABLET, DELAYED RELEASE ORAL at 09:51

## 2023-11-10 RX ADMIN — GABAPENTIN 800 MG: 400 CAPSULE ORAL at 17:15

## 2023-11-10 RX ADMIN — LEVOTHYROXINE SODIUM 50 MCG: 0.05 TABLET ORAL at 05:19

## 2023-11-10 RX ADMIN — Medication 12.5 MG: at 09:51

## 2023-11-10 RX ADMIN — GABAPENTIN 800 MG: 400 CAPSULE ORAL at 05:19

## 2023-11-10 RX ADMIN — OXYCODONE 10 MG: 5 TABLET ORAL at 14:37

## 2023-11-10 RX ADMIN — ONDANSETRON 4 MG: 2 INJECTION INTRAMUSCULAR; INTRAVENOUS at 20:30

## 2023-11-10 RX ADMIN — Medication 10 ML: at 09:52

## 2023-11-10 RX ADMIN — BUTALBITAL, ACETAMINOPHEN, AND CAFFEINE 1 TABLET: 50; 325; 40 TABLET ORAL at 17:15

## 2023-11-10 RX ADMIN — FUROSEMIDE 20 MG: 10 INJECTION, SOLUTION INTRAMUSCULAR; INTRAVENOUS at 09:51

## 2023-11-10 RX ADMIN — ONDANSETRON 4 MG: 2 INJECTION INTRAMUSCULAR; INTRAVENOUS at 05:19

## 2023-11-10 NOTE — CASE MANAGEMENT/SOCIAL WORK
"Physicians Statement of Medical Necessity for  Ambulance Transportation    GENERAL INFORMATION     Name: Ashley Lin  YOB: 1950  Medicare #: [id number]  Transport Date: 11-10-23 (Valid for round trips this date, or for scheduled repetitive trips for 60 days from the date signed below.)  Origin: [facility]  Destination: [facility]  Is the Patient's stay covered under Medicare Part A (PPS/DRG?)Yes  Closest appropriate facility? Yes  If this a hosp-hosp transfer? No  Is this a hospice patient? No    MEDICAL NECESSITY QUESTIONAIRE    Ambulance Transportation is medically necessary only if other means of transportation are contraindicated or would be potentially harmful to the patient.  To meet this requirement, the patient must be either \"bed confined\" or suffer from a condition such that transport by means other than an ambulance is contraindicated by the patient's condition.  The following questions must be answered by the healthcare professional signing below for this form to be valid:     1) Describe the MEDICAL CONDITION (physical and/or mental) of this patient AT THE TIME OF AMBULANCE TRANSPORT that requires the patient to be transported in an ambulance, and why transport by other means is contraindicated by the patient's condition: History of Falls, back brace, use of sliding board needed, Moderate assistance  Past Medical History:   Diagnosis Date    Acute deep vein thrombosis (DVT) of calf muscle vein of left lower extremity 11/10/2023    Aftercare for healing traumatic closed fracture of right radius 11/10/2023    Asthma     Chronic anticoagulation 11/10/2023    Chronic back pain     Chronic headaches     Chronic obstructive pulmonary disease 07/01/2020    Chronic pain syndrome 07/01/2020    Depression 07/01/2020    Disease of thyroid gland     Estrogen deficiency 07/01/2020    GERD without esophagitis 07/01/2020    History of subdural hematoma 11/10/2023    Hyperlipidemia " "11/10/2023    Hypothyroidism (acquired) 07/01/2020    Migraines 07/01/2020    Neuropathy     Sinus tachycardia 11/10/2023    Vomiting 06/30/2020      Past Surgical History:   Procedure Laterality Date    CHOLECYSTECTOMY      COLONOSCOPY N/A 11/11/2020    Procedure: COLONOSCOPY WITH RANDOM COLON BIOPSIES X4 AREAS, BIOPSY X 1 AREA;  Surgeon: Aguila Garcia MD;  Location: [facility] ENDOSCOPY;  Service: Gastroenterology;  Laterality: N/A;  POST- colon erosion    ENDOSCOPY N/A 7/2/2020    Procedure: ESOPHAGOGASTRODUODENOSCOPY WITH DILATATION (BOUGIE #[id number]);  Surgeon: Benjamin Mike MD;  Location: [facility] ENDOSCOPY;  Service: Gastroenterology;  Laterality: N/A;  Post operative diagnosis:GASTROPARESIS AND DYSPHAGIA     ENDOSCOPY N/A 11/11/2020    Procedure: ESOPHAGOGASTRODUODENOSCOPY WITH BIOPSY X 1 AREA;  Surgeon: Aguila Garcia MD;  Location: [facility] ENDOSCOPY;  Service: Gastroenterology;  Laterality: N/A;  POST    HYSTERECTOMY      NECK SURGERY      SPINE SURGERY  06/09/2020      2) Is this patient \"bed confined\" as defined below?Yes   To be \"bed confined\" the patient must satisfy all three of the following criteria:  (1) unable to get up from bed without assistance; AND (2) unable to ambulate;  AND (3) unable to sit in a chair or wheelchair.  3) Can this patient safely be transported by car or wheelchair van (I.e., may safely sit during transport, without an attendant or monitoring?)No   4. In addition to completing questions 1-3 above, please check any of the following conditions that apply*:          *Note: supporting documentation for any boxes checked must be maintained in the patient's medical records History of Falls, back brace, use of sliding board needed, Moderate assistance      SIGNATURE OF PHYSICIAN OR OTHER AUTHORIZED HEALTHCARE PROFESSIONAL    I certify that the above information is true and correct based on my evaluation of this patient, and represent that the patient requires transport by " ambulance and that other forms of transport are contraindicated.  I understand that this information will be used by the Centers for Medicare and Medicaid Services (CMS) to support the determiniation of medical necessity for ambulance services, and I represent that I have personal knowledge of the patient's condition at the time of transport.    X   If this box is checked, I also certify that the patient is physically or mentally incapable of signing the ambulance service's claim form and that the institution with which I am affiliated has furnished care, services or assistance to the patient.  My signature below is made on behalf of the patient pursuant to 42 .36(b)(4). In accordance with 42 .37, the specific reason(s) that the patient is physically or mentally incapable of signing the claim for is as follows: Broken fingers    Signature of Physician or Healthcare Professional   Adri Gruber RN,  Date/Time:   11-10-23  10:58am     (For Scheduled repetitive transport, this form is not valid for transports performed more than 60 days after this date).                                                                                                                                            --------------------------------------------------------------------------------------------  Printed Name and Credentials of Physician or Authorized Healthcare Professional     *Form must be signed by patient's attending physician for scheduled, repetitive transports,.  For non-repetitive ambulance transports, if unable to obtain the signature of the attending physician, any of the following may sign (please select below):     Physician  Clinical Nurse Specialist  Registered Nurse     Physician Assistant  Discharge Planner  Licensed Practical Nurse     Nurse Practitioner

## 2023-11-10 NOTE — PROGRESS NOTES
NEUROSURGERY PROGRESS NOTE     LOS: 1 day   Patient Care Team:  Jamey Fairchild MD as PCP - General (Family Medicine)    Chief Complaint: Subacute subdural hematoma    Subjective     Interval History:     No acute events overnight.  Patient reports no acute neurologic concerns.  She did undergo recommended CT head scan.    While in the room and during my examination of the patient I wore a mask and eye protection.  I washed my hands before and after this patient encounter.  The patient was also wearing a mask.     History taken from: patient chart    Objective      Vital Signs  Temp:  [97.7 °F (36.5 °C)-98 °F (36.7 °C)] 97.7 °F (36.5 °C)  Heart Rate:  [104-122] 109  Resp:  [15-25] 15  BP: (114-151)/(76-92) 136/76  Body mass index is 18.39 kg/m².    Intake/Output last 3 shifts:  I/O last 3 completed shifts:  In: 840 [P.O.:840]  Out: 1650 [Urine:1650]    Intake/Output this shift:  I/O this shift:  In: 180 [P.O.:180]  Out: 500 [Urine:500]    Physical    General:  Awake, alert, and oriented x 3. NAD  CN III IV VI:  Extraocular movements are full , PERRL   CN V:   Normal facial sensation and strength of muscles of mastication.  CN VII:  Facial movements are symmetric. No weakness.  Motor:   Good symmetric upper and lower extremity muscle strength.  Sensation:  Normal to light touch  Station and Gait: Normal gait and station.    Coordination: Finger to nose test shows no dysmetria.    Extremities:   Patient is not wearing SCD bilaterally    Results Review:     I reviewed the patient's new clinical results.  I reviewed the patient's new imaging results and agree with the interpretation.    Labs:    Lab Results (last 24 hours)       ** No results found for the last 24 hours. **            Imaging:    I personally reviewed the images from the following radiographic studies.    CT head 11/9/2023    Redemonstration of small hyperintensity along the right tentorium that is stable in nature.    Current Medications:    Scheduled Meds:atorvastatin, 10 mg, Oral, Nightly  cetirizine, 10 mg, Oral, Daily  cycloSPORINE, 1 drop, Both Eyes, BID  enoxaparin, 1 mg/kg, Subcutaneous, Q12H  estradiol, 1 mg, Oral, Daily  furosemide, 20 mg, Intravenous, Daily  gabapentin, 800 mg, Oral, Q8H  levothyroxine, 50 mcg, Oral, Q AM  metoprolol tartrate, 12.5 mg, Oral, Q12H  pantoprazole, 40 mg, Oral, BID  polyethylene glycol, 17 g, Oral, Daily  prednisoLONE acetate, 1 drop, Both Eyes, Nightly  senna-docusate sodium, 2 tablet, Oral, BID  sertraline, 50 mg, Oral, Nightly  sodium chloride, 10 mL, Intravenous, Q12H  topiramate, 100 mg, Oral, BID      Continuous Infusions:Pharmacy to Dose enoxaparin (LOVENOX),         Assessment & Plan       Debility    Asthma    Chronic obstructive pulmonary disease    Low back pain    History of lumbar fusion    S/P cervical spinal fusion    Hypothyroidism (acquired)    Depression    Migraines    Chronic pain syndrome    GERD without esophagitis    Left leg pain    Severe malnutrition    Acute deep vein thrombosis (DVT) of calf muscle vein of left lower extremity    History of subdural hematoma    Sinus tachycardia    Aftercare for healing traumatic closed fracture of right radius    Hyperlipidemia    Chronic anticoagulation      Assessment: Patient is a 73-year-old female that neurosurgery was asked to comment on for anticoagulation recommendations in setting of subdural hematoma and acute DVT.  Repeat imaging felt to be unchanged.  There is some hyperintensity along the left tentorium that may be suggestive of a subacute subdural hematoma.  The previously noted right sided convexity subdural has all but resolved.  In regards to initiation of anticoagulation, the risk versus benefit needs to be taken into account in this situation  and if is felt that patient needs the anticoagulation then neurosurgery would recommend an easily reversible anticoagulant.   We then would recommend that she undergo a CT head scan in 2 days  prior to starting any oral medication such as Eliquis to ensure stability.  Of course, if patient should develop any acute neurologic changes after initiation, a stat CT head would be warranted.    Plan:      -Recommend easily reversible anticoagulant  - Obtain CT head in 2 days prior to start of Eliquis  - For any questions or concerns.    I discussed my findings with patient, nursing, Dr. Dent and Dr. Gutierrez.        Anitha Silva, APRN  11/10/23  11:26 EST

## 2023-11-10 NOTE — SIGNIFICANT NOTE
11/10/23 1507   OTHER   Discipline physical therapist   Rehab Time/Intention   Session Not Performed other (see comments)  (Pt with discharge orders; awaiting transportation to next level of care.)

## 2023-11-10 NOTE — CASE MANAGEMENT/SOCIAL WORK
Continued Stay Note  [facility]     Patient Name: Ashley Lin  MRN: [id number]  Today's Date: 11/10/2023    Admit Date: 11/7/2023    Plan: [facility] accepted, No precert required. PASRR approved.   Discharge Plan       Row Name 11/10/23 1100       Plan    Plan [facility] accepted, No precert required. PASRR approved.    Provided Post Acute Provider List? N/A    Provided Post Acute Provider Quality & Resource List? N/A    Plan Comments CM reviewed the IMM with the patient and left a signed copy. Patient is requiring EMS transport. CM filled out medical neccessity form for nurse. Transport will be set up once medications are delivered. Discharge orders are in. No d/c barriers.                 Expected Discharge Date and Time       Expected Discharge Date Expected Discharge Time    Nov 10, 2023             Adri Gruber RN      [address] [address]  Phone: [phone]  Fax: [phone]

## 2023-11-10 NOTE — DISCHARGE SUMMARY
[facility] Medicine Services  Discharge Summary    Date of Service: 11/10/2023  Patient Name: Ashley Lin  : 1950  MRN: [id number]    Date of Admission: 2023  Discharge Diagnosis: See below  Date of Discharge: 11/10/2023  Primary Care Physician: Jamey Fairchild MD    Presenting Problem:   Weakness [R53.1]  Tachycardia [R00.0]  Bilateral leg edema [R60.0]  Immobility [Z74.09]  Acute venous embolism and thrombosis of deep vessels of distal end of left lower extremity [I82.4Z2]    Active and Resolved Hospital Problems:  Active Hospital Problems    Diagnosis POA    **Debility [R53.81] Yes    Severe malnutrition [E43] Yes    Acute deep vein thrombosis (DVT) of calf muscle vein of left lower extremity [I82.462] Yes    History of subdural hematoma [Z86.79] Not Applicable    Sinus tachycardia [R00.0] Yes    Aftercare for healing traumatic closed fracture of right radius [S52.91XD] Not Applicable    Hyperlipidemia [E78.5] Yes    Chronic anticoagulation [Z79.01] Not Applicable    Left leg pain [M79.605] Yes    Migraines [G43.909] Yes    Hypothyroidism (acquired) [E03.9] Yes    GERD without esophagitis [K21.9] Yes    Depression [F32.A] Yes    Chronic pain syndrome [G89.4] Yes    Chronic obstructive pulmonary disease [J44.9] Yes    Asthma [J45.909] Yes    S/P cervical spinal fusion [Z98.1] Not Applicable    Low back pain [M54.50] Yes    History of lumbar fusion [Z98.1] Not Applicable      Resolved Hospital Problems   No resolved problems to display.     Hospital Course     HPI:  Ashley Lin is a 73 y.o. female with past medical history of subdural hematoma, compression fracture, hypothyroidism, chronic urinary retention, constipation, GERD, depression, asthma and chronic pain and migraines who presented to [facility] on 2023 complaining of weakness and leg pain and swelling     Patient was hospitalized at [facility] 10/10/2023 through 10/15/2023  after sustaining a fall at home.  She was treated for subdural hematoma under the care of Dr. Quintanilla with neurosurgery.  It was documented that CAT scans were stable and she was to return for a follow-up CT of the head in 4 weeks.  She also sustained T12 and L1 compression fracture and was placed in a back brace that she is to wear when out of bed or of head of bed greater than 45 degrees until cleared by Dr. Quintanilla.  Patient also noted to have a right distal radius fracture and a wrist brace was placed.  Ortho seen and no new interventions.  She was also seen by Dr. Bolden and urology for chronic urinary retention.  Was documented patient has a bladder stimulator ultrasound was negative for hydronephrosis and creatinine was stable and patient was without urinary symptoms or pain.  Okay to allow for retention and overflow incontinence and only straight cath if patient symptomatic.  She was discharged to [facility] acute rehab on discharge for approximately 10 days and was discharged home on Friday 11/3.  Patient reports initially went home with her son but was brought home today.  She reports is nonambulatory.  Unable to lift lower extremities off bed.  She was seen by PT and recommended return to rehab.     In the ED she is found to have sinus tachycardia verified on EKG.  High-sensitivity troponin 21 and 22 proBNP 502.  Electrolytes within normal limits renal function stable.  TSH 4.120.  Negative for leukocytosis.  Hemoglobin stable.  Denies any shortness of breath cough congestion fever or dysuria.  She reports positive for headache and chronic visual deficits following fall.  For lower extremity pain and edema bilateral duplex was performed and positive for acute left lower extremity deep vein thrombosis noted in the gastrocnemius.  CT PE was negative for pulmonary emboli but showed left lower lobe groundglass type airspace consistent with pneumonia and small amount of ascites in the left upper quadrant of the  abdomen.  As patient clinically has no signs of infection antibiotics were held at this time.  Reported by ED provider that previous scan show chronic scarring in the left lung.  He was given Lovenox.  Patient is unsafe to return home and admitted for further work-up    Hospital course:  She was admitted in consultation with neurosurgery for further evaluation and treatment.  She was found to have a left lower extremity DVT.  This was complicated by a recent hospitalization for subdural hematoma.  Neurosurgery was consulted with recommendations for therapeutic Lovenox and serial CT scanning with plans to initiate Eliquis in the next couple of days.  She will discharge to [facility] for skilled rehab with close outpatient multispecialty follow-up.    Day of Discharge     Vital Signs:  Temp:  [97.7 °F (36.5 °C)-98 °F (36.7 °C)] 97.7 °F (36.5 °C)  Heart Rate:  [104-122] 109  Resp:  [15-25] 15  BP: (114-151)/(73-92) 136/76    Physical Exam:  Constitutional:       Appearance: She is awake and alert  Eyes:      Pupils: Pupils are equal, round, and reactive to light.   Cardiovascular:      Rate and Rhythm: Tachycardia has improved. Rhythm irregular.   Pulmonary:      Effort: Pulmonary effort is normal.      Breath sounds: Normal breath sounds.   Abdominal:      General: Abdomen is flat.      Palpations: Abdomen is soft.   Musculoskeletal:      Right lower leg: Edema present.      Left lower leg: Edema present.      Comments: Inability to move BLE independently with active ROM. Able to hold RLE in elevated position but drifts down. LLE appears more weak. More difficulty holding LLE up.  Wiggles toes just fine.  Skin:     General: Skin is dry.   Neurological:      Mental Status: She is alert and oriented to person, place, and time.   Psychiatric:         Mood and Affect: Mood normal.          Pertinent  and/or Most Recent Results     LAB RESULTS:      Lab 11/08/23  0531 11/07/23  1809   WBC 4.90 4.70   HEMOGLOBIN 12.2 13.1    HEMATOCRIT 36.3 39.9   PLATELETS 225 239   NEUTROS ABS 2.20 2.50   LYMPHS ABS 1.80 1.40   MONOS ABS 0.60 0.50   EOS ABS 0.20 0.10   .8* 102.2*   PROTIME  --  10.5   APTT  --  25.4         Lab 11/08/23  0531 11/07/23  1809   SODIUM 140 142   POTASSIUM 4.1 4.2   CHLORIDE 106 107   CO2 26.0 26.0   ANION GAP 8.0 9.0   BUN 10 12   CREATININE 0.52* 0.61   EGFR 99.5 95.7   GLUCOSE 96 100*   CALCIUM 8.0* 8.5*   MAGNESIUM  --  1.6   TSH  --  4.120         Lab 11/08/23  0531 11/07/23  1809   TOTAL PROTEIN 4.6* 5.2*   ALBUMIN 2.1* 2.4*   GLOBULIN 2.5 2.8   ALT (SGPT) 14 16   AST (SGOT) 27 30   BILIRUBIN 0.3 0.4   ALK PHOS 155* 184*         Lab 11/08/23  0531 11/07/23 2020 11/07/23  1809   PROBNP  --   --  502.7   HSTROP T 28* 22* 21*   PROTIME  --   --  10.5   INR  --   --  0.96                 Brief Urine Lab Results       None          Microbiology Results (last 10 days)       Procedure Component Value - Date/Time    CANDIDA AURIS SCREEN - Swab, Axilla Right, Axilla Left and Groin [[id number]]  (Normal) Collected: 11/09/23 0100    Lab Status: Preliminary result Specimen: Swab from Axilla Right, Axilla Left and Groin Updated: 11/10/23 0115     Candida Auris Screen Culture No Candida auris isolated at 24 hours            CT Head Without Contrast    Result Date: 11/9/2023  Impression: Impression: Small subdural hematoma of the right tentorium is not significantly changed. Electronically Signed: Cecilia Kraft MD  11/9/2023 5:31 PM EST  Workstation ID: [id number]    CT Head Without Contrast    Result Date: 11/8/2023  Impression: Impression: Tiny right tentorial subdural hematoma No evidence of intraparenchymal bleed or mass effect Electronically Signed: Eddie Reyes MD  11/8/2023 7:30 AM EST  Workstation ID: [id number]    CT Angiogram Chest Pulmonary Embolism    Result Date: 11/7/2023  Impression: Impression: 1. No evidence of pulmonary embolus. 2. Left lower lobe groundglass type airspace consistent with pneumonia 3. Small  amount of ascites within the left upper quadrant of the abdomen Electronically Signed: Hugo Guillaume MD  11/7/2023 8:43 PM EST  Workstation ID: [id number]    XR Chest 1 View    Result Date: 11/7/2023  Impression: Impression: 1. Within the left lung base there is a 10 mm nodular density. Follow-up CT scan of the chest would be recommended for further evaluation. 2. Emphysema. 3. No focal airspace consolidation or pleural effusion. Electronically Signed: Hugo Guillaume MD  11/7/2023 6:01 PM EST  Workstation ID: [id number]     Results for orders placed during the hospital encounter of 11/07/23    Duplex Venous Lower Extremity - Bilateral CAR    Interpretation Summary    Acute left lower extremity deep vein thrombosis noted in the gastrocnemius.    All other veins appeared normal bilaterally.      Results for orders placed during the hospital encounter of 11/07/23    Duplex Venous Lower Extremity - Bilateral CAR    Interpretation Summary    Acute left lower extremity deep vein thrombosis noted in the gastrocnemius.    All other veins appeared normal bilaterally.      Results for orders placed during the hospital encounter of 11/07/23    Adult Transthoracic Echo Complete W/ Cont if Necessary Per Protocol    Interpretation Summary    Left ventricular systolic function is normal. Left ventricular ejection fraction appears to be 56 - 60%.    Left ventricular diastolic function is consistent with (grade Ia w/high LAP) impaired relaxation.    Estimated right ventricular systolic pressure from tricuspid regurgitation is normal (<35 mmHg).      Labs Pending at Discharge:  Pending Labs       Order Current Status    CANDIDA AURIS SCREEN - Swab, Axilla Right, Axilla Left and Groin Preliminary result            Consults:   Consults       Date and Time Order Name Status Description    11/7/2023 11:32 PM Inpatient Neurosurgery Consult Completed     11/7/2023 10:43 PM Hospitalist (on-call MD unless specified)              Discharge  Details        Discharge Medications        New Medications        Instructions Start Date   Enoxaparin Sodium 60 MG/0.6ML solution prefilled syringe syringe  Commonly known as: LOVENOX   1 mg/kg (50 mg), Subcutaneous, Every 12 Hours      naloxone 4 MG/0.1ML nasal spray  Commonly known as: NARCAN   Call 911. Don't prime. Morton in 1 nostril for overdose. Repeat in 2-3 minutes in other nostril if no or minimal breathing/responsiveness.             Changes to Medications        Instructions Start Date   butalbital-acetaminophen-caffeine -40 MG per tablet  Commonly known as: FIORICET, ESGIC  What changed: how much to take   1 tablet, Oral, Every 4 Hours PRN      cycloSPORINE 0.05 % ophthalmic emulsion  Commonly known as: RESTASIS  What changed: when to take this   1 drop, Both Eyes, 2 Times Daily             Continue These Medications        Instructions Start Date   albuterol sulfate  (90 Base) MCG/ACT inhaler  Commonly known as: PROVENTIL HFA;VENTOLIN HFA;PROAIR HFA   2 puffs, Inhalation, Every 4 Hours PRN, May use every 4 to 6 hours      atorvastatin 20 MG tablet  Commonly known as: LIPITOR   10 mg, Oral, Nightly, One-half tablet per dose      cetirizine 10 MG tablet  Commonly known as: zyrTEC   10 mg, Oral, Daily      docusate sodium 100 MG capsule   100 mg, Oral, 2 Times Daily      EPINEPHrine 0.3 MG/0.3ML solution auto-injector injection  Commonly known as: EPIPEN   0.3 mg, Intramuscular, Once      estradiol 0.5 MG tablet  Commonly known as: ESTRACE   1 mg, Oral, Daily      fluticasone 50 MCG/ACT nasal spray  Commonly known as: FLONASE   2 sprays, Nasal, Daily      furosemide 20 MG tablet  Commonly known as: LASIX   20 mg, Oral, Daily      gabapentin 800 MG tablet  Commonly known as: NEURONTIN   800 mg, Oral, 4 Times Daily      hydrOXYzine 25 MG tablet  Commonly known as: ATARAX   25 mg, Oral, Every 6 Hours PRN      ipratropium-albuterol 0.5-2.5 mg/3 ml nebulizer  Commonly known as: DUO-NEB   3 mL,  Nebulization, Every 6 Hours      levothyroxine 50 MCG tablet  Commonly known as: SYNTHROID, LEVOTHROID   50 mcg, Oral, Every Early Morning      metoprolol tartrate 25 MG tablet  Commonly known as: LOPRESSOR   12.5 mg, Oral, 2 Times Daily      ondansetron 4 MG tablet  Commonly known as: ZOFRAN   4 mg, Oral, Every 6 Hours PRN      oxyCODONE-acetaminophen  MG per tablet  Commonly known as: PERCOCET   1 tablet, Oral, Every 6 Hours      pantoprazole 40 MG EC tablet  Commonly known as: PROTONIX   40 mg, Oral, 2 Times Daily      polyethylene glycol 17 g packet  Commonly known as: MIRALAX   17 g, Oral, Daily      potassium chloride 10 MEQ CR tablet   10 mEq, Oral, Daily      prednisoLONE acetate 1 % ophthalmic suspension  Commonly known as: PRED FORTE   1 drop, Both Eyes, Nightly      promethazine 25 MG tablet  Commonly known as: PHENERGAN   25 mg, Oral, Every 6 Hours PRN      rizatriptan MLT 10 MG disintegrating tablet  Commonly known as: MAXALT-MLT   10 mg, Translingual, Once As Needed, May repeat in 2 hours if needed       sertraline 50 MG tablet  Commonly known as: ZOLOFT   50 mg, Oral, Nightly      Stiolto Respimat 2.5-2.5 MCG/ACT aerosol solution inhaler  Generic drug: tiotropium bromide-olodaterol   2 puffs, Inhalation, Daily - RT      topiramate 100 MG tablet  Commonly known as: TOPAMAX   100 mg, Oral, 2 Times Daily      Viberzi 100 MG tablet  Generic drug: Eluxadoline   1 tablet, Oral, 2 Times Daily      vitamin D3 125 MCG (5000 UT) capsule capsule   5,000 Units, Oral, Daily               Allergies   Allergen Reactions    Morphine Hives    Penicillins Hives and Shortness Of Breath     Can take iv form - but not po form       Discharge Disposition:   Skilled Nursing Facility (DC - External)    Diet:  Hospital:  Diet Order   Procedures    Diet: Regular/House Diet; Texture: Regular Texture (IDDSI 7); Fluid Consistency: Thin (IDDSI 0)       Discharge Activity:   Activity Instructions       Activity as Tolerated       Gradually Increase Activity Until at Pre-Hospitalization Level      Up WIth Assist              CODE STATUS:  Code Status and Medical Interventions:   Ordered at: 11/08/23 0023     Code Status (Patient has no pulse and is not breathing):    CPR (Attempt to Resuscitate)     Medical Interventions (Patient has pulse or is breathing):    Full Support       No future appointments.    Additional Instructions for the Follow-ups that You Need to Schedule       Call MD With Problems / Concerns   As directed      Instructions: Primary care versus neurosurgery as appropriate    Order Comments: Instructions: Primary care versus neurosurgery as appropriate         Discharge Follow-up with PCP   As directed       Currently Documented PCP:    Jamey Fairchild MD    PCP Phone Number:    [phone]     Follow Up Details: After discharge from rehab        Discharge Follow-up with Specialty: Neurosurgery follow-up with repeat head imaging, if any, as per their recommendations   As directed      Specialty: Neurosurgery follow-up with repeat head imaging, if any, as per their recommendations   Follow Up Details: Lovenox to be continued until follow up non-contrast head CT has been completed and reviewed by Neurosurgery, at which point we will want to start Eliquis therapy for DVT treatment        CT Head Without Contrast   Nov 13, 2023      Release to patient: Routine Release              Time spent on Discharge including face to face service: 35 minutes    Signature: Electronically signed by Chris Dent MD, 11/10/23, 08:38 EST.  [facility] Hospitalist Team

## 2023-11-13 NOTE — CASE MANAGEMENT/SOCIAL WORK
Case Management Discharge Note      Final Note: [facility].    Selected Continued Care - Discharged on 11/10/2023 Admission date: 11/7/2023 - Discharge disposition: Skilled Nursing Facility (DC - External)      Destination Coordination complete.      Service Provider Selected Services Address Phone Fax Patient Preferred    [facility] Skilled Nursing [facility] [address] [facility] [address] [facility] [place] [address] [phone] --               Transportation Services  Ambulance: [facility]    Final Discharge Disposition Code: 03 - skilled nursing facility (SNF)

## 2023-11-14 LAB — BACTERIA ISLT: NORMAL

## 2023-12-03 ENCOUNTER — APPOINTMENT (OUTPATIENT)
Dept: CT IMAGING | Facility: HOSPITAL | Age: 73
DRG: 178 | End: 2023-12-03
Payer: MEDICARE

## 2023-12-03 ENCOUNTER — HOSPITAL ENCOUNTER (INPATIENT)
Facility: HOSPITAL | Age: 73
LOS: 5 days | Discharge: NURSING, REHAB OR LONG-TERM CARE | DRG: 178 | End: 2023-12-08
Attending: EMERGENCY MEDICINE | Admitting: INTERNAL MEDICINE
Payer: MEDICARE

## 2023-12-03 ENCOUNTER — APPOINTMENT (OUTPATIENT)
Dept: GENERAL RADIOLOGY | Facility: HOSPITAL | Age: 73
DRG: 178 | End: 2023-12-03
Payer: MEDICARE

## 2023-12-03 DIAGNOSIS — R00.0 SINUS TACHYCARDIA: ICD-10-CM

## 2023-12-03 DIAGNOSIS — R10.84 GENERALIZED ABDOMINAL PAIN: ICD-10-CM

## 2023-12-03 DIAGNOSIS — K52.9 ACUTE COLITIS: ICD-10-CM

## 2023-12-03 DIAGNOSIS — U07.1 COVID-19: ICD-10-CM

## 2023-12-03 DIAGNOSIS — R11.15 PERSISTENT VOMITING: Primary | ICD-10-CM

## 2023-12-03 PROBLEM — R10.9 ABDOMINAL PAIN WITH VOMITING: Status: ACTIVE | Noted: 2023-12-03

## 2023-12-03 PROBLEM — R11.10 ABDOMINAL PAIN WITH VOMITING: Status: ACTIVE | Noted: 2023-12-03

## 2023-12-03 LAB
ALBUMIN SERPL-MCNC: 2.5 G/DL (ref 3.5–5.2)
ALBUMIN/GLOB SERPL: 0.8 G/DL
ALP SERPL-CCNC: 152 U/L (ref 39–117)
ALT SERPL W P-5'-P-CCNC: 11 U/L (ref 1–33)
ANION GAP SERPL CALCULATED.3IONS-SCNC: 20 MMOL/L (ref 5–15)
AST SERPL-CCNC: 21 U/L (ref 1–32)
BACTERIA UR QL AUTO: ABNORMAL /HPF
BASOPHILS # BLD AUTO: 0 10*3/MM3 (ref 0–0.2)
BASOPHILS NFR BLD AUTO: 0.9 % (ref 0–1.5)
BILIRUB SERPL-MCNC: 0.3 MG/DL (ref 0–1.2)
BILIRUB UR QL STRIP: ABNORMAL
BUN SERPL-MCNC: 13 MG/DL (ref 8–23)
BUN/CREAT SERPL: 21.3 (ref 7–25)
CALCIUM SPEC-SCNC: 8.4 MG/DL (ref 8.6–10.5)
CHLORIDE SERPL-SCNC: 105 MMOL/L (ref 98–107)
CLARITY UR: CLEAR
CO2 SERPL-SCNC: 18 MMOL/L (ref 22–29)
COLOR UR: ABNORMAL
CREAT SERPL-MCNC: 0.61 MG/DL (ref 0.57–1)
D-LACTATE SERPL-SCNC: 1.6 MMOL/L (ref 0.5–2)
D-LACTATE SERPL-SCNC: 2.6 MMOL/L (ref 0.3–2)
DEPRECATED RDW RBC AUTO: 52.9 FL (ref 37–54)
EGFRCR SERPLBLD CKD-EPI 2021: 95.7 ML/MIN/1.73
EOSINOPHIL # BLD AUTO: 0 10*3/MM3 (ref 0–0.4)
EOSINOPHIL NFR BLD AUTO: 0.1 % (ref 0.3–6.2)
ERYTHROCYTE [DISTWIDTH] IN BLOOD BY AUTOMATED COUNT: 14.9 % (ref 12.3–15.4)
FLUAV SUBTYP SPEC NAA+PROBE: NOT DETECTED
FLUBV RNA ISLT QL NAA+PROBE: NOT DETECTED
GLOBULIN UR ELPH-MCNC: 3.3 GM/DL
GLUCOSE SERPL-MCNC: 168 MG/DL (ref 65–99)
GLUCOSE UR STRIP-MCNC: NEGATIVE MG/DL
HCT VFR BLD AUTO: 42.1 % (ref 34–46.6)
HGB BLD-MCNC: 14 G/DL (ref 12–15.9)
HGB UR QL STRIP.AUTO: NEGATIVE
HYALINE CASTS UR QL AUTO: ABNORMAL /LPF
KETONES UR QL STRIP: ABNORMAL
LEUKOCYTE ESTERASE UR QL STRIP.AUTO: ABNORMAL
LIPASE SERPL-CCNC: 8 U/L (ref 13–60)
LYMPHOCYTES # BLD AUTO: 0.6 10*3/MM3 (ref 0.7–3.1)
LYMPHOCYTES NFR BLD AUTO: 16.9 % (ref 19.6–45.3)
MCH RBC QN AUTO: 33.8 PG (ref 26.6–33)
MCHC RBC AUTO-ENTMCNC: 33.3 G/DL (ref 31.5–35.7)
MCV RBC AUTO: 101.5 FL (ref 79–97)
MONOCYTES # BLD AUTO: 0.5 10*3/MM3 (ref 0.1–0.9)
MONOCYTES NFR BLD AUTO: 13.2 % (ref 5–12)
MUCOUS THREADS URNS QL MICRO: ABNORMAL /HPF
NEUTROPHILS NFR BLD AUTO: 2.5 10*3/MM3 (ref 1.7–7)
NEUTROPHILS NFR BLD AUTO: 68.9 % (ref 42.7–76)
NITRITE UR QL STRIP: NEGATIVE
NRBC BLD AUTO-RTO: 0.2 /100 WBC (ref 0–0.2)
PH UR STRIP.AUTO: 5.5 [PH] (ref 5–8)
PLATELET # BLD AUTO: 409 10*3/MM3 (ref 140–450)
PMV BLD AUTO: 7.4 FL (ref 6–12)
POTASSIUM SERPL-SCNC: 4.3 MMOL/L (ref 3.5–5.2)
PROT SERPL-MCNC: 5.8 G/DL (ref 6–8.5)
PROT UR QL STRIP: ABNORMAL
RBC # BLD AUTO: 4.15 10*6/MM3 (ref 3.77–5.28)
RBC # UR STRIP: ABNORMAL /HPF
REF LAB TEST METHOD: ABNORMAL
SARS-COV-2 RNA RESP QL NAA+PROBE: DETECTED
SODIUM SERPL-SCNC: 143 MMOL/L (ref 136–145)
SP GR UR STRIP: 1.02 (ref 1–1.03)
SQUAMOUS #/AREA URNS HPF: ABNORMAL /HPF
UROBILINOGEN UR QL STRIP: ABNORMAL
WBC # UR STRIP: ABNORMAL /HPF
WBC NRBC COR # BLD AUTO: 3.6 10*3/MM3 (ref 3.4–10.8)

## 2023-12-03 PROCEDURE — 25810000003 LACTATED RINGERS SOLUTION: Performed by: EMERGENCY MEDICINE

## 2023-12-03 PROCEDURE — 83690 ASSAY OF LIPASE: CPT | Performed by: EMERGENCY MEDICINE

## 2023-12-03 PROCEDURE — 83605 ASSAY OF LACTIC ACID: CPT

## 2023-12-03 PROCEDURE — 99285 EMERGENCY DEPT VISIT HI MDM: CPT

## 2023-12-03 PROCEDURE — 87040 BLOOD CULTURE FOR BACTERIA: CPT | Performed by: EMERGENCY MEDICINE

## 2023-12-03 PROCEDURE — 74176 CT ABD & PELVIS W/O CONTRAST: CPT

## 2023-12-03 PROCEDURE — 25010000002 CEFTRIAXONE PER 250 MG: Performed by: INTERNAL MEDICINE

## 2023-12-03 PROCEDURE — 80053 COMPREHEN METABOLIC PANEL: CPT | Performed by: EMERGENCY MEDICINE

## 2023-12-03 PROCEDURE — 25010000002 METOCLOPRAMIDE PER 10 MG: Performed by: INTERNAL MEDICINE

## 2023-12-03 PROCEDURE — 25010000002 FENTANYL CITRATE (PF) 50 MCG/ML SOLUTION: Performed by: EMERGENCY MEDICINE

## 2023-12-03 PROCEDURE — 71045 X-RAY EXAM CHEST 1 VIEW: CPT

## 2023-12-03 PROCEDURE — 25010000002 KETOROLAC TROMETHAMINE PER 15 MG

## 2023-12-03 PROCEDURE — 36415 COLL VENOUS BLD VENIPUNCTURE: CPT

## 2023-12-03 PROCEDURE — 25010000002 METOCLOPRAMIDE PER 10 MG: Performed by: EMERGENCY MEDICINE

## 2023-12-03 PROCEDURE — 85025 COMPLETE CBC W/AUTO DIFF WBC: CPT | Performed by: EMERGENCY MEDICINE

## 2023-12-03 PROCEDURE — 25010000002 ONDANSETRON PER 1 MG: Performed by: EMERGENCY MEDICINE

## 2023-12-03 PROCEDURE — 87636 SARSCOV2 & INF A&B AMP PRB: CPT | Performed by: EMERGENCY MEDICINE

## 2023-12-03 PROCEDURE — 93005 ELECTROCARDIOGRAM TRACING: CPT | Performed by: EMERGENCY MEDICINE

## 2023-12-03 PROCEDURE — 81001 URINALYSIS AUTO W/SCOPE: CPT | Performed by: EMERGENCY MEDICINE

## 2023-12-03 RX ORDER — KETOROLAC TROMETHAMINE 15 MG/ML
15 INJECTION, SOLUTION INTRAMUSCULAR; INTRAVENOUS EVERY 6 HOURS PRN
Status: DISCONTINUED | OUTPATIENT
Start: 2023-12-03 | End: 2023-12-08 | Stop reason: HOSPADM

## 2023-12-03 RX ORDER — ONDANSETRON 4 MG/1
4 TABLET, FILM COATED ORAL EVERY 6 HOURS PRN
Status: CANCELLED | OUTPATIENT
Start: 2023-12-03

## 2023-12-03 RX ORDER — ONDANSETRON 2 MG/ML
4 INJECTION INTRAMUSCULAR; INTRAVENOUS ONCE
Status: COMPLETED | OUTPATIENT
Start: 2023-12-03 | End: 2023-12-03

## 2023-12-03 RX ORDER — METOCLOPRAMIDE HYDROCHLORIDE 5 MG/ML
10 INJECTION INTRAMUSCULAR; INTRAVENOUS ONCE
Status: COMPLETED | OUTPATIENT
Start: 2023-12-03 | End: 2023-12-03

## 2023-12-03 RX ORDER — MONTELUKAST SODIUM 10 MG/1
10 TABLET ORAL NIGHTLY
COMMUNITY

## 2023-12-03 RX ORDER — METOPROLOL TARTRATE 1 MG/ML
5 INJECTION, SOLUTION INTRAVENOUS ONCE
Status: DISCONTINUED | OUTPATIENT
Start: 2023-12-03 | End: 2023-12-04

## 2023-12-03 RX ORDER — METOCLOPRAMIDE HYDROCHLORIDE 5 MG/ML
10 INJECTION INTRAMUSCULAR; INTRAVENOUS EVERY 6 HOURS
Status: DISCONTINUED | OUTPATIENT
Start: 2023-12-03 | End: 2023-12-08 | Stop reason: HOSPADM

## 2023-12-03 RX ORDER — FENTANYL CITRATE 50 UG/ML
25 INJECTION, SOLUTION INTRAMUSCULAR; INTRAVENOUS ONCE
Status: COMPLETED | OUTPATIENT
Start: 2023-12-03 | End: 2023-12-03

## 2023-12-03 RX ORDER — METRONIDAZOLE 500 MG/100ML
500 INJECTION, SOLUTION INTRAVENOUS ONCE
Status: COMPLETED | OUTPATIENT
Start: 2023-12-03 | End: 2023-12-03

## 2023-12-03 RX ORDER — BACLOFEN 10 MG/1
10 TABLET ORAL DAILY
COMMUNITY

## 2023-12-03 RX ORDER — SODIUM CHLORIDE 0.9 % (FLUSH) 0.9 %
10 SYRINGE (ML) INJECTION AS NEEDED
Status: DISCONTINUED | OUTPATIENT
Start: 2023-12-03 | End: 2023-12-08 | Stop reason: HOSPADM

## 2023-12-03 RX ORDER — ONDANSETRON 2 MG/ML
4 INJECTION INTRAMUSCULAR; INTRAVENOUS EVERY 6 HOURS PRN
Status: CANCELLED | OUTPATIENT
Start: 2023-12-03

## 2023-12-03 RX ORDER — METOPROLOL TARTRATE 1 MG/ML
5 INJECTION, SOLUTION INTRAVENOUS ONCE
Status: COMPLETED | OUTPATIENT
Start: 2023-12-03 | End: 2023-12-03

## 2023-12-03 RX ORDER — DICYCLOMINE HYDROCHLORIDE 10 MG/ML
20 INJECTION INTRAMUSCULAR 4 TIMES DAILY PRN
Status: DISCONTINUED | OUTPATIENT
Start: 2023-12-03 | End: 2023-12-08 | Stop reason: HOSPADM

## 2023-12-03 RX ORDER — DIPHENHYDRAMINE HYDROCHLORIDE 50 MG/ML
25 INJECTION INTRAMUSCULAR; INTRAVENOUS EVERY 6 HOURS PRN
Status: DISCONTINUED | OUTPATIENT
Start: 2023-12-03 | End: 2023-12-08 | Stop reason: HOSPADM

## 2023-12-03 RX ADMIN — CEFTRIAXONE 2000 MG: 2 INJECTION, POWDER, FOR SOLUTION INTRAMUSCULAR; INTRAVENOUS at 15:34

## 2023-12-03 RX ADMIN — FENTANYL CITRATE 25 MCG: 50 INJECTION, SOLUTION INTRAMUSCULAR; INTRAVENOUS at 13:30

## 2023-12-03 RX ADMIN — ONDANSETRON 4 MG: 2 INJECTION INTRAMUSCULAR; INTRAVENOUS at 13:31

## 2023-12-03 RX ADMIN — METOCLOPRAMIDE HYDROCHLORIDE 10 MG: 5 INJECTION INTRAMUSCULAR; INTRAVENOUS at 15:34

## 2023-12-03 RX ADMIN — KETOROLAC TROMETHAMINE 15 MG: 15 INJECTION, SOLUTION INTRAMUSCULAR; INTRAVENOUS at 21:25

## 2023-12-03 RX ADMIN — SODIUM CHLORIDE, POTASSIUM CHLORIDE, SODIUM LACTATE AND CALCIUM CHLORIDE 1000 ML: 600; 310; 30; 20 INJECTION, SOLUTION INTRAVENOUS at 10:53

## 2023-12-03 RX ADMIN — METOPROLOL TARTRATE 5 MG: 1 INJECTION, SOLUTION INTRAVENOUS at 15:34

## 2023-12-03 RX ADMIN — METRONIDAZOLE 500 MG: 500 INJECTION, SOLUTION INTRAVENOUS at 15:53

## 2023-12-03 RX ADMIN — METOCLOPRAMIDE 10 MG: 5 INJECTION, SOLUTION INTRAMUSCULAR; INTRAVENOUS at 10:53

## 2023-12-03 RX ADMIN — SODIUM CHLORIDE, POTASSIUM CHLORIDE, SODIUM LACTATE AND CALCIUM CHLORIDE 1000 ML: 600; 310; 30; 20 INJECTION, SOLUTION INTRAVENOUS at 13:31

## 2023-12-03 NOTE — PLAN OF CARE
Goal Outcome Evaluation:               Pt a/ox4, here from home reports she has been vomiting x 4 days, plan of care is IV antibiotics, IV fluids and b/p medications. Reviewed plan of care with pt, verbalizes understanding, clean and dry, call light in reach, assist x 1 with transfers.

## 2023-12-03 NOTE — ED NOTES
Pt son Seth Lin called and spoke with RN. Pt family expressed concern regarding letter pt got in the mail from spine center when had surgery within last couple weeks that stated pt had 8mm mass on lung. Pt family requested call for updates at [phone]. Provider updated.

## 2023-12-03 NOTE — Clinical Note
Level of Care: Med/Surg [1]   Diagnosis: Abdominal pain with vomiting [[id number]]   Admitting Physician: TIERA CASTELAN [[id number]]   Attending Physician: TIERA CASTELAN [[id number]]   Certification: I Certify That Inpatient Hospital Services Are Medically Necessary For Greater Than 2 Midnights

## 2023-12-03 NOTE — ED PROVIDER NOTES
Subjective   History of Present Illness  Chief complaint weakness nausea vomiting pain    History of present illness is a 73-year-old female who is several health problems who complains of 2-day history of nausea vomiting dry heaving cannot eat or drink or hold anything down and abdominal pain.  She has had hot and cold spells but no temperatures that she has not taken.  She denies any urinary complaints she denies any diarrhea bowels have been okay no bleeding.  She has had no chest pain neck arm jaw pain shortness of breath little bit of cough.  No ill exposures no foreign travels or antibiotic use or recent hospitalization.  She states she had back surgery back in January 2023 she got a letter in the mail a couple weeks ago they told her she had a mass on her lungs which was spotted on a scan she had done on her spine.  Patient has had no recent hospitalization otherwise nothing seem to trigger make it better or worse symptoms are severe and she feels extremely dehydrated.      Review of Systems   Constitutional:  Negative for chills and fever.   HENT:  Positive for congestion.    Respiratory:  Positive for cough. Negative for chest tightness and shortness of breath.    Cardiovascular:  Negative for chest pain and leg swelling.   Gastrointestinal:  Positive for abdominal pain and vomiting. Negative for blood in stool, constipation and diarrhea.   Genitourinary:  Negative for difficulty urinating and dysuria.   Musculoskeletal:  Negative for back pain and neck pain.   Skin:  Negative for rash and wound.   Neurological:  Positive for weakness and light-headedness. Negative for facial asymmetry and speech difficulty.   Psychiatric/Behavioral:  Negative for confusion.        Past Medical History:   Diagnosis Date    Acute deep vein thrombosis (DVT) of calf muscle vein of left lower extremity 11/10/2023    Aftercare for healing traumatic closed fracture of right radius 11/10/2023    Asthma     Chronic anticoagulation  11/10/2023    Chronic back pain     Chronic headaches     Chronic obstructive pulmonary disease 2020    Chronic pain syndrome 2020    Depression 2020    Disease of thyroid gland     Estrogen deficiency 2020    GERD without esophagitis 2020    History of subdural hematoma 11/10/2023    Hyperlipidemia 11/10/2023    Hypothyroidism (acquired) 2020    Migraines 2020    Neuropathy     Sinus tachycardia 11/10/2023    Vomiting 2020       Allergies   Allergen Reactions    Morphine Hives    Penicillins Hives and Shortness Of Breath     Can take iv form - but not po form         Past Surgical History:   Procedure Laterality Date    CHOLECYSTECTOMY      COLONOSCOPY N/A 2020    Procedure: COLONOSCOPY WITH RANDOM COLON BIOPSIES X4 AREAS, BIOPSY X 1 AREA;  Surgeon: Aguila Garcia MD;  Location: [facility] ENDOSCOPY;  Service: Gastroenterology;  Laterality: N/A;  POST- colon erosion    ENDOSCOPY N/A 2020    Procedure: ESOPHAGOGASTRODUODENOSCOPY WITH DILATATION (BOUGIE #[id number]);  Surgeon: Benjamin Mike MD;  Location: [facility] ENDOSCOPY;  Service: Gastroenterology;  Laterality: N/A;  Post operative diagnosis:GASTROPARESIS AND DYSPHAGIA     ENDOSCOPY N/A 2020    Procedure: ESOPHAGOGASTRODUODENOSCOPY WITH BIOPSY X 1 AREA;  Surgeon: Aguila Garcia MD;  Location: [facility] ENDOSCOPY;  Service: Gastroenterology;  Laterality: N/A;  POST    HYSTERECTOMY      NECK SURGERY      SPINE SURGERY  2020       Family History   Problem Relation Age of Onset    Heart disease Mother     No Known Problems Father        Social History     Socioeconomic History    Marital status:    Tobacco Use    Smoking status: Former     Types: Cigarettes     Quit date:      Years since quittin.9    Smokeless tobacco: Never   Vaping Use    Vaping Use: Never used   Substance and Sexual Activity    Alcohol use: Never    Drug use: Never    Sexual activity: Defer     Prior to  Admission medications    Medication Sig Start Date End Date Taking? Authorizing Provider   atorvastatin (LIPITOR) 20 MG tablet Take 0.5 tablets by mouth Every Night. One-half tablet per dose   Yes Ramonita Cason MD   butalbital-acetaminophen-caffeine (FIORICET, ESGIC) -40 MG per tablet Take 1 tablet by mouth Every 4 (Four) Hours As Needed for Headache. 11/10/23  Yes Chris Dent MD   Eluxadoline (Viberzi) 100 MG tablet Take 1 tablet by mouth 2 (Two) Times a Day.   Yes Ramonita Cason MD   estradiol (ESTRACE) 0.5 MG tablet Take 2 tablets by mouth Daily.   Yes Ramonita Cason MD   furosemide (LASIX) 20 MG tablet Take 1 tablet by mouth Daily.   Yes Ramonita Cason MD   gabapentin (NEURONTIN) 800 MG tablet Take 1 tablet by mouth 4 (Four) Times a Day. 11/10/23  Yes Chris Dent MD   hydrOXYzine (ATARAX) 25 MG tablet Take 1 tablet by mouth Every 6 (Six) Hours As Needed for Itching.   Yes Ramonita Cason MD   levothyroxine (SYNTHROID, LEVOTHROID) 50 MCG tablet Take 1 tablet by mouth Every Morning.   Yes Ramonita Cason MD   metoprolol tartrate (LOPRESSOR) 25 MG tablet Take 0.5 tablets by mouth 2 (Two) Times a Day.   Yes Ramonita Cason MD   montelukast (SINGULAIR) 10 MG tablet Take 1 tablet by mouth Every Night.   Yes Ramonita Cason MD   ondansetron (ZOFRAN) 4 MG tablet Take 1 tablet by mouth Every 6 (Six) Hours As Needed for Nausea or Vomiting.   Yes Ramonita Cason MD   oxyCODONE-acetaminophen (PERCOCET)  MG per tablet Take 1 tablet by mouth Every 6 (Six) Hours. 11/10/23  Yes Chris Dent MD   pantoprazole (PROTONIX) 40 MG EC tablet Take 1 tablet by mouth 2 (Two) Times a Day.   Yes Ramonita Cason MD   potassium chloride 10 MEQ CR tablet Take 1 tablet by mouth Daily.   Yes Ramonita Cason MD   promethazine (PHENERGAN) 25 MG tablet Take 1 tablet by mouth Every 6 (Six) Hours As Needed for Nausea or Vomiting.   Yes Yoav  MD Ramonita   rizatriptan MLT (MAXALT-MLT) 10 MG disintegrating tablet Place 1 tablet on the tongue 1 (One) Time As Needed for Migraine. May repeat in 2 hours if needed   Yes Ramonita Cason MD   sertraline (ZOLOFT) 50 MG tablet Take 1 tablet by mouth Every Night.   Yes Ramonita Cason MD   Stiolto Respimat 2.5-2.5 MCG/ACT aerosol solution inhaler Inhale 2 puffs Daily. 1/18/22  Yes Ramonita Cason MD   topiramate (TOPAMAX) 100 MG tablet Take 1 tablet by mouth 2 (Two) Times a Day. 9/30/22  Yes Jared Steven MD   albuterol sulfate  (90 Base) MCG/ACT inhaler Inhale 2 puffs Every 4 (Four) Hours As Needed for Wheezing or Shortness of Air. May use every 4 to 6 hours 11/10/23   Chris Dent MD   baclofen (LIORESAL) 10 MG tablet Take 1 tablet by mouth Daily.    Ramonita Cason MD   cetirizine (zyrTEC) 10 MG tablet Take 10 mg by mouth Daily.    Ramonita Cason MD   docusate sodium 100 MG capsule Take 1 capsule by mouth 2 (Two) Times a Day. 9/30/22   Jared Steven MD   EPINEPHrine (EPIPEN) 0.3 MG/0.3ML solution auto-injector injection Inject 0.3 mg into the appropriate muscle as directed by prescriber 1 (One) Time.    Ramonita Cason MD   fluticasone (FLONASE) 50 MCG/ACT nasal spray 2 sprays into the nostril(s) as directed by provider Daily.    Ramonita Cason MD   ipratropium-albuterol (DUO-NEB) 0.5-2.5 mg/3 ml nebulizer Take 3 mL by nebulization Every 6 (Six) Hours.    Ramonita Cason MD   naloxone (NARCAN) 4 MG/0.1ML nasal spray Call 911. Don't prime. [place] in 1 nostril for overdose. Repeat in 2-3 minutes in other nostril if no or minimal breathing/responsiveness. 11/10/23   Chris Dent MD   polyethylene glycol (MIRALAX) 17 g packet Take 17 g by mouth Daily. 10/1/22   Jared Steven MD   prednisoLONE acetate (PRED FORTE) 1 % ophthalmic suspension Administer 1 drop to both eyes Every Night.    Provider, MD Ramonita   vitamin D3 125 MCG (5000 UT)  capsule capsule Take 1 capsule by mouth Daily.    Provider, MD Ramonita   cycloSPORINE (RESTASIS) 0.05 % ophthalmic emulsion Administer 1 drop to both eyes 2 (Two) Times a Day. 11/10/23 12/3/23  Chris Dent MD          Objective   Physical Exam  Constitutional is a 73-year-old female awake alert somewhat cachectic appearing but nontoxic-appearing triage vital signs reviewed but heart rate was noted to be about 150s sinus tachycardia on the monitor.  HEENT extraocular muscles are intact pupils equal round reactive sclera clear mouth is clear but dry neck supple no adenopathy no JV no bruits no meningeal signs.  Lungs a few scattered rhonchi and wheeze but no retraction no use of accessories heart regular tachycardic without murmur abdomen soft she has some mild diffuse tenderness but no rebound no guarding good bowel sounds no peritoneal findings or pulsatile masses.  Patient only weighs 91 pounds extremities pulses equal throughout upper and lower extremities there is no edema cords or Homans' sign or evidence of DVT skin warm and dry without rashes no cellulitic changes noted anywhere neurologic awake alert and orientated x3 no Paci symmetry speech normal without focal weakness  Procedures           ED Course      Results for orders placed or performed during the hospital encounter of 12/03/23   COVID-19 and FLU A/B PCR, 1 HR TAT - Swab, Nasopharynx    Specimen: Nasopharynx; Swab   Result Value Ref Range    COVID19 Detected (C) Not Detected - Ref. Range    Influenza A PCR Not Detected Not Detected    Influenza B PCR Not Detected Not Detected   Comprehensive Metabolic Panel    Specimen: Blood   Result Value Ref Range    Glucose 168 (H) 65 - 99 mg/dL    BUN 13 8 - 23 mg/dL    Creatinine 0.61 0.57 - 1.00 mg/dL    Sodium 143 136 - 145 mmol/L    Potassium 4.3 3.5 - 5.2 mmol/L    Chloride 105 98 - 107 mmol/L    CO2 18.0 (L) 22.0 - 29.0 mmol/L    Calcium 8.4 (L) 8.6 - 10.5 mg/dL    Total Protein 5.8 (L) 6.0 - 8.5  g/dL    Albumin 2.5 (L) 3.5 - 5.2 g/dL    ALT (SGPT) 11 1 - 33 U/L    AST (SGOT) 21 1 - 32 U/L    Alkaline Phosphatase 152 (H) 39 - 117 U/L    Total Bilirubin 0.3 0.0 - 1.2 mg/dL    Globulin 3.3 gm/dL    A/G Ratio 0.8 g/dL    BUN/Creatinine Ratio 21.3 7.0 - 25.0    Anion Gap 20.0 (H) 5.0 - 15.0 mmol/L    eGFR 95.7 >60.0 mL/min/1.73   Lipase    Specimen: Blood   Result Value Ref Range    Lipase 8 (L) 13 - 60 U/L   Urinalysis With Microscopic If Indicated (No Culture) - Urine, Clean Catch    Specimen: Urine, Clean Catch   Result Value Ref Range    Color, UA Dark Yellow (A) Yellow, Straw    Appearance, UA Clear Clear    pH, UA 5.5 5.0 - 8.0    Specific Gravity, UA 1.024 1.005 - 1.030    Glucose, UA Negative Negative    Ketones, UA 40 mg/dL (2+) (A) Negative    Bilirubin, UA Small (1+) (A) Negative    Blood, UA Negative Negative    Protein, UA 30 mg/dL (1+) (A) Negative    Leuk Esterase, UA Trace (A) Negative    Nitrite, UA Negative Negative    Urobilinogen, UA 1.0 E.U./dL 0.2 - 1.0 E.U./dL   CBC Auto Differential    Specimen: Blood   Result Value Ref Range    WBC 3.60 3.40 - 10.80 10*3/mm3    RBC 4.15 3.77 - 5.28 10*6/mm3    Hemoglobin 14.0 12.0 - 15.9 g/dL    Hematocrit 42.1 34.0 - 46.6 %    .5 (H) 79.0 - 97.0 fL    MCH 33.8 (H) 26.6 - 33.0 pg    MCHC 33.3 31.5 - 35.7 g/dL    RDW 14.9 12.3 - 15.4 %    RDW-SD 52.9 37.0 - 54.0 fl    MPV 7.4 6.0 - 12.0 fL    Platelets 409 140 - 450 10*3/mm3    Neutrophil % 68.9 42.7 - 76.0 %    Lymphocyte % 16.9 (L) 19.6 - 45.3 %    Monocyte % 13.2 (H) 5.0 - 12.0 %    Eosinophil % 0.1 (L) 0.3 - 6.2 %    Basophil % 0.9 0.0 - 1.5 %    Neutrophils, Absolute 2.50 1.70 - 7.00 10*3/mm3    Lymphocytes, Absolute 0.60 (L) 0.70 - 3.10 10*3/mm3    Monocytes, Absolute 0.50 0.10 - 0.90 10*3/mm3    Eosinophils, Absolute 0.00 0.00 - 0.40 10*3/mm3    Basophils, Absolute 0.00 0.00 - 0.20 10*3/mm3    nRBC 0.2 0.0 - 0.2 /100 WBC   Urinalysis, Microscopic Only - Urine, Clean Catch    Specimen: Urine,  Clean Catch   Result Value Ref Range    RBC, UA None Seen None Seen, 0-2 /HPF    WBC, UA 3-5 (A) None Seen, 0-2 /HPF    Bacteria, UA Trace (A) None Seen /HPF    Squamous Epithelial Cells, UA 3-6 (A) None Seen, 0-2 /HPF    Hyaline Casts, UA 3-6 None Seen /LPF    Mucus, UA Moderate/2+ (A) None Seen, Trace /HPF    Methodology Manual Light Microscopy    POC Lactate    Specimen: Blood   Result Value Ref Range    Lactate 2.6 (C) 0.3 - 2.0 mmol/L   ECG 12 Lead Dyspnea   Result Value Ref Range    QT Interval 283 ms    QTC Interval 433 ms   ECG 12 Lead Tachycardia   Result Value Ref Range    QTC Interval  ms     CT Abdomen Pelvis Without Contrast    Result Date: 12/3/2023  Impression: 1.Examination is limited due to lack of IV contrast administration and extensive beam hardening artifact. 2.Diffuse colonic wall thickening raises suspicion for colitis. This may also be related to underdistention. Please correlate clinically. 3.There are mild superior endplate compression fractures of T12 and L1 which are new since 6/6/2022. Exact chronicity is uncertain. Please correlate clinically for pain/tenderness at these sites. 4.There is a catheter coiled within the vagina. If this is meant to be within the bladder, recommend repositioning. 5.Additional findings as detailed above. Electronically Signed: Frederick Al MD  12/3/2023 1:28 PM EST  Workstation ID: [id number]    XR Chest 1 View    Result Date: 12/3/2023  Impression: No acute process. Electronically Signed: Carrillo Goodman MD  12/3/2023 12:09 PM EST  Workstation ID: [id number]   Medications   sodium chloride 0.9 % flush 10 mL (has no administration in time range)   lactated ringers bolus 1,000 mL (1,000 mL Intravenous New Bag 12/3/23 1331)   cefTRIAXone (ROCEPHIN) 1,000 mg in sodium chloride 0.9 % 100 mL IVPB (has no administration in time range)   metroNIDAZOLE (FLAGYL) IVPB 500 mg (has no administration in time range)   metoprolol tartrate (LOPRESSOR) injection 5 mg (has no  administration in time range)   lactated ringers bolus 1,000 mL (1,000 mL Intravenous New Bag 12/3/23 1053)   metoclopramide (REGLAN) injection 10 mg (10 mg Intravenous Given 12/3/23 1053)   ondansetron (ZOFRAN) injection 4 mg (4 mg Intravenous Given 12/3/23 1331)   fentaNYL citrate (PF) (SUBLIMAZE) injection 25 mcg (25 mcg Intravenous Given 12/3/23 1330)                                    EKG my interpretation sinus tachycardia rate of 148 from a left atrial enlargement no evidence of Q waves noted anteriorly abnormal EKG really not changed from her previous EKG from 11/8/2023 other than its a faster rate.          Medical Decision Making  Medical decision making.  IV established monitor placement review of sinus tachycardia in the 150 range.  Patient started on 2 L lactated Ringer's and labs obtained including cultures and lactate.  Labs independent reviewed by me COVID-19 was positive flu negative comprehensive metabolic profile unremarkable other than a CO2 of 18 and glucose of 168 liver enzymes really unremarkable.  Other than albumin 2.5 urine was negative.  CBC was unremarkable.  Lactate was 2.6 and cultures are pending.  The patient underwent a chest x-ray my independent general sense pneumonia pneumothorax on CT masses or acute findings radiology was unremarkable.  CT abdomen pelvis obtained was ordered with contrast but was changed CT scan done contrast because the hospital has a contrast shortage.  The CT scan was obtained independent reviewed by me I do not see any perforation or free air or inflammatory changes but there is a lot of artifact because of these the hardware in her spine makes it difficult to me I do not see evidence of aneurysm.  Radiology reviewed exam and is limited due to lack of IV contrast and extensive beam hardening artifact diffuse colonic wall thickening raises suspicion for colitis could be underdistention of the colon as well patient has a mild spear endplate compression  fractures T12 and L1 which are new since 6/6/2022 unsure of the chronicity of this.  The patient initially had had a [place] catheter placed but they will get any urine they scanned her bladder with only a few cc in there but the catheter on the CT is noted to be in the vaginal area this was taken out and put in the bladder and the bladder drained and urine sample sent.  Patient was persistently tachycardic in the 140s and EKG obtained and reviewed by me showed a sinus tachycardia normal axis no hypertrophy some evidence Q waves noted anterior leads and abnormal EKG but it is unchanged from 11/8/2023 rate was in the 148 range.  Repeat exam heart rate was down in the 130s.  She was given Lopressor initially 5 mg IV.  We will continue IV fluids.  Patient is started on Rocephin and Flagyl as well.  Repeat exam abdomen soft with some mild diffuse tenderness but no rebound no guarding good bowel sounds no peritoneal findings I do not see evidence of pneumonia I will see evidence of an acute aneurysm I will see evidence to suggest at this point acute appendicitis cholecystitis cellulitis ischemic bowel meningitis encephalitis necrotizing fasciitis intra-abdominal infection other than may have some mild colitis based on the history and physical clinical exam although its not a complete list of all possibilities she does have COVID-19.  I do not really see any evidence that suggest DVT or pulmonary embolism she denies any chest pain or shortness of breath and there is no leg pain or swelling at this time.  We talked about these findings.  The patient vomiting for 2 days without injury or other complaints evidence of abdominal pain.  Stable otherwise unremarkable ER course improved I talked to the hospitalist case discussed.    Problems Addressed:  Acute colitis: complicated acute illness or injury  COVID-19: acute illness or injury  Generalized abdominal pain: complicated acute illness or injury  Persistent vomiting:  complicated acute illness or injury  Sinus tachycardia: acute illness or injury    Amount and/or Complexity of Data Reviewed  Labs: ordered. Decision-making details documented in ED Course.  Radiology: ordered and independent interpretation performed. Decision-making details documented in ED Course.  ECG/medicine tests: ordered and independent interpretation performed. Decision-making details documented in ED Course.    Risk  Parenteral controlled substances.  Decision regarding hospitalization.        Final diagnoses:   Persistent vomiting   Generalized abdominal pain   Acute colitis   Sinus tachycardia   COVID-19       ED Disposition  ED Disposition       ED Disposition   Decision to Admit    Condition   --    Comment   Level of Care: Telemetry [5]   Admitting Physician: TIERA CASTELAN [[id number]]   Attending Physician: TIERA CASTELAN [[id number]]                 No follow-up provider specified.       Medication List      No changes were made to your prescriptions during this visit.            Heriberto Hu MD  12/03/23 0654

## 2023-12-03 NOTE — H&P
[facility] Medicine Services  History & Physical    Patient Name: Ashley Lin  : 1950  MRN: [id number]  Primary Care Physician:  Jamey Fairchild MD  Date of admission: 12/3/2023  Date and Time of Service: 12/3/2023 at 2pm    Subjective      Chief Complaint: nausea    History of Present Illness: Ashley Lin is a 73 y.o. female with multiple medical problems, a past history significant for chronic back pain, chronic migraine, COPD, depression, hypothyroidism, GERD, history of subdural hematoma, hyperlipidemia, DVT   who presented to [facility] on 12/3/2023 with nausea and  Vomiting for last 3 days, cannot eat or drink or hold anything down.  She has had hot and cold spells but no temperatures that she has not taken.  She denies any urinary complaints she denies any diarrhea bowels have been okay no bleeding.  She has had no chest pain neck arm jaw pain shortness of breath little bit of cough.  No ill exposures no foreign travels or antibiotic use or recent hospitalization.  She states she had back surgery back in 2023 she got a letter in the mail a couple weeks ago they told her she had a mass on her lungs which was spotted on a scan she had done on her spine.  Patient has had no recent hospitalization otherwise nothing seem to trigger make it better or worse symptoms are severe and she feels extremely dehydrated. complains of abdominal pain, denies diarrhea. In the ER Heart rate was 150.  Sinus tach on monitor.  Denies chest pain.  She was started on some fluids.  Tested positive for COVID, Urine was okay, cx-ray was clear, Sats are 100%      Review of Systems   Constitutional:  Negative for chills and fever.   HENT:  Negative for ear pain and hearing loss.    Respiratory:  Negative for apnea and shortness of breath.    Cardiovascular:  Negative for chest pain and palpitations.   Gastrointestinal:  Positive for nausea. Negative for diarrhea and vomiting.    Genitourinary:  Negative for dysuria.   Musculoskeletal:  Negative for back pain and joint swelling.   Neurological:  Negative for seizures, light-headedness and headaches.   Psychiatric/Behavioral:  Negative for confusion and hallucinations.        Personal History     Past Medical History:   Diagnosis Date    Acute deep vein thrombosis (DVT) of calf muscle vein of left lower extremity 11/10/2023    Aftercare for healing traumatic closed fracture of right radius 11/10/2023    Asthma     Chronic anticoagulation 11/10/2023    Chronic back pain     Chronic headaches     Chronic obstructive pulmonary disease 07/01/2020    Chronic pain syndrome 07/01/2020    Depression 07/01/2020    Disease of thyroid gland     Estrogen deficiency 07/01/2020    GERD without esophagitis 07/01/2020    History of subdural hematoma 11/10/2023    Hyperlipidemia 11/10/2023    Hypothyroidism (acquired) 07/01/2020    Migraines 07/01/2020    Neuropathy     Sinus tachycardia 11/10/2023    Vomiting 06/30/2020       Past Surgical History:   Procedure Laterality Date    CHOLECYSTECTOMY      COLONOSCOPY N/A 11/11/2020    Procedure: COLONOSCOPY WITH RANDOM COLON BIOPSIES X4 AREAS, BIOPSY X 1 AREA;  Surgeon: Aguila Garcia MD;  Location: [facility] ENDOSCOPY;  Service: Gastroenterology;  Laterality: N/A;  POST- colon erosion    ENDOSCOPY N/A 7/2/2020    Procedure: ESOPHAGOGASTRODUODENOSCOPY WITH DILATATION (BOUGIE #[id number]);  Surgeon: Benjamin Mike MD;  Location: [facility] ENDOSCOPY;  Service: Gastroenterology;  Laterality: N/A;  Post operative diagnosis:GASTROPARESIS AND DYSPHAGIA     ENDOSCOPY N/A 11/11/2020    Procedure: ESOPHAGOGASTRODUODENOSCOPY WITH BIOPSY X 1 AREA;  Surgeon: Aguila Garcia MD;  Location: [facility] ENDOSCOPY;  Service: Gastroenterology;  Laterality: N/A;  POST    HYSTERECTOMY      NECK SURGERY      SPINE SURGERY  06/09/2020       Family History: family history includes Heart disease in her mother; No Known Problems in her  father. Otherwise pertinent FHx was reviewed and not pertinent to current issue.    Social History:  reports that she quit smoking about 17 years ago. Her smoking use included cigarettes. She has never used smokeless tobacco. She reports that she does not drink alcohol and does not use drugs.    Home Medications:  Prior to Admission Medications       Prescriptions Last Dose Informant Patient Reported? Taking?    albuterol sulfate  (90 Base) MCG/ACT inhaler   No No    Inhale 2 puffs Every 4 (Four) Hours As Needed for Wheezing or Shortness of Air. May use every 4 to 6 hours    atorvastatin (LIPITOR) 20 MG tablet   Yes No    Take 0.5 tablets by mouth Every Night. One-half tablet per dose    butalbital-acetaminophen-caffeine (FIORICET, ESGIC) -40 MG per tablet   No No    Take 1 tablet by mouth Every 4 (Four) Hours As Needed for Headache.    cetirizine (zyrTEC) 10 MG tablet   Yes No    Take 10 mg by mouth Daily.    cycloSPORINE (RESTASIS) 0.05 % ophthalmic emulsion   No No    Administer 1 drop to both eyes 2 (Two) Times a Day.    docusate sodium 100 MG capsule   No No    Take 1 capsule by mouth 2 (Two) Times a Day.    Eluxadoline (Viberzi) 100 MG tablet   Yes No    Take 1 tablet by mouth 2 (Two) Times a Day.    EPINEPHrine (EPIPEN) 0.3 MG/0.3ML solution auto-injector injection   Yes No    Inject 0.3 mg into the appropriate muscle as directed by prescriber 1 (One) Time.    estradiol (ESTRACE) 0.5 MG tablet   Yes No    Take 2 tablets by mouth Daily.    fluticasone (FLONASE) 50 MCG/ACT nasal spray   Yes No    2 sprays into the nostril(s) as directed by provider Daily.    furosemide (LASIX) 20 MG tablet   Yes No    Take 1 tablet by mouth Daily.    gabapentin (NEURONTIN) 800 MG tablet   No No    Take 1 tablet by mouth 4 (Four) Times a Day.    hydrOXYzine (ATARAX) 25 MG tablet   Yes No    Take 1 tablet by mouth Every 6 (Six) Hours As Needed for Itching.    ipratropium-albuterol (DUO-NEB) 0.5-2.5 mg/3 ml nebulizer    Yes No    Take 3 mL by nebulization Every 6 (Six) Hours.    levothyroxine (SYNTHROID, LEVOTHROID) 50 MCG tablet   Yes No    Take 1 tablet by mouth Every Morning.    metoprolol tartrate (LOPRESSOR) 25 MG tablet   Yes No    Take 0.5 tablets by mouth 2 (Two) Times a Day.    naloxone (NARCAN) 4 MG/0.1ML nasal spray   No No    Call 911. Don't prime. [place] in 1 nostril for overdose. Repeat in 2-3 minutes in other nostril if no or minimal breathing/responsiveness.    ondansetron (ZOFRAN) 4 MG tablet   Yes No    Take 1 tablet by mouth Every 6 (Six) Hours As Needed for Nausea or Vomiting.    oxyCODONE-acetaminophen (PERCOCET)  MG per tablet   No No    Take 1 tablet by mouth Every 6 (Six) Hours.    pantoprazole (PROTONIX) 40 MG EC tablet   Yes No    Take 1 tablet by mouth 2 (Two) Times a Day.    polyethylene glycol (MIRALAX) 17 g packet   No No    Take 17 g by mouth Daily.    potassium chloride 10 MEQ CR tablet   Yes No    Take 1 tablet by mouth Daily.    prednisoLONE acetate (PRED FORTE) 1 % ophthalmic suspension   Yes No    Administer 1 drop to both eyes Every Night.    promethazine (PHENERGAN) 25 MG tablet   Yes No    Take 1 tablet by mouth Every 6 (Six) Hours As Needed for Nausea or Vomiting.    rizatriptan MLT (MAXALT-MLT) 10 MG disintegrating tablet   Yes No    Place 1 tablet on the tongue 1 (One) Time As Needed for Migraine. May repeat in 2 hours if needed    sertraline (ZOLOFT) 50 MG tablet   Yes No    Take 1 tablet by mouth Every Night.    Stiolto Respimat 2.5-2.5 MCG/ACT aerosol solution inhaler   Yes No    Inhale 2 puffs Daily.    topiramate (TOPAMAX) 100 MG tablet   No No    Take 1 tablet by mouth 2 (Two) Times a Day.    vitamin D3 125 MCG (5000 UT) capsule capsule   Yes No    Take 1 capsule by mouth Daily.              Allergies:  Allergies   Allergen Reactions    Morphine Hives    Penicillins Hives and Shortness Of Breath     Can take iv form - but not po form         Objective      Vitals:   Temp:  [98.1  °F (36.7 °C)-98.7 °F (37.1 °C)] 98.1 °F (36.7 °C)  Heart Rate:  [] 140  Resp:  [20-28] 28  BP: (126-150)/(86-89) 150/89  Body mass index is 15.62 kg/m².  Physical Exam  Vitals and nursing note reviewed.   Constitutional:       General: She is not in acute distress.     Appearance: She is well-developed. She is ill-appearing. She is not toxic-appearing or diaphoretic.   HENT:      Head: Normocephalic and atraumatic.      Nose: Nose normal. No congestion or rhinorrhea.      Mouth/Throat:      Mouth: Mucous membranes are moist.      Pharynx: No oropharyngeal exudate.   Eyes:      General: No scleral icterus.        Right eye: No discharge.         Left eye: No discharge.      Extraocular Movements: Extraocular movements intact.      Conjunctiva/sclera: Conjunctivae normal.      Pupils: Pupils are equal, round, and reactive to light.   Neck:      Thyroid: No thyromegaly.      Vascular: No carotid bruit or JVD.      Trachea: No tracheal deviation.   Cardiovascular:      Rate and Rhythm: Normal rate and regular rhythm.      Pulses: Normal pulses.      Heart sounds: Normal heart sounds. No murmur heard.     No friction rub. No gallop.   Pulmonary:      Effort: Pulmonary effort is normal. No respiratory distress.      Breath sounds: Normal breath sounds. No stridor. No wheezing, rhonchi or rales.   Chest:      Chest wall: No tenderness.   Abdominal:      General: Bowel sounds are normal. There is no distension.      Palpations: Abdomen is soft. There is no mass.      Tenderness: There is abdominal tenderness. There is no guarding or rebound.      Hernia: No hernia is present.   Musculoskeletal:         General: No swelling, tenderness, deformity or signs of injury. Normal range of motion.      Cervical back: Normal range of motion and neck supple. No rigidity. No muscular tenderness.      Right lower leg: No edema.      Left lower leg: No edema.   Lymphadenopathy:      Cervical: No cervical adenopathy.   Skin:      General: Skin is warm and dry.      Coloration: Skin is pale. Skin is not jaundiced.      Findings: No bruising, erythema or rash.   Neurological:      General: No focal deficit present.      Mental Status: She is alert and oriented to person, place, and time. Mental status is at baseline.      Cranial Nerves: No cranial nerve deficit.      Sensory: No sensory deficit.      Motor: Weakness present. No abnormal muscle tone.      Coordination: Coordination normal.   Psychiatric:         Mood and Affect: Mood normal.         Behavior: Behavior normal.         Thought Content: Thought content normal.         Diagnostic Data:  Lab Results (last 24 hours)       Procedure Component Value Units Date/Time    Urinalysis, Microscopic Only - Urine, Clean Catch [[id number]]  (Abnormal) Collected: 12/03/23 1315    Specimen: Urine, Clean Catch Updated: 12/03/23 1339     RBC, UA None Seen /HPF      WBC, UA 3-5 /HPF      Bacteria, UA Trace /HPF      Squamous Epithelial Cells, UA 3-6 /HPF      Hyaline Casts, UA 3-6 /LPF      Mucus, UA Moderate/2+ /HPF      Methodology Manual Light Microscopy    Blood Culture - Blood, Arm, Right [[id number]] Collected: 12/03/23 1329    Specimen: Blood from Arm, Right Updated: 12/03/23 1334    POC Lactate [[id number]]  (Abnormal) Collected: 12/03/23 1333    Specimen: Blood Updated: 12/03/23 1334     Lactate 2.6 mmol/L      Comment: Serial Number: [id number]:  [id number]       Urinalysis With Microscopic If Indicated (No Culture) - Urine, Clean Catch [[id number]]  (Abnormal) Collected: 12/03/23 1315    Specimen: Urine, Clean Catch Updated: 12/03/23 1323     Color, UA Dark Yellow     Appearance, UA Clear     pH, UA 5.5     Specific Gravity, UA 1.024     Glucose, UA Negative     Ketones, UA 40 mg/dL (2+)     Bilirubin, UA Small (1+)     Comment: Confirmation testing is unavailable.  A serum bilirubin is recommended for further assessment.        Blood, UA Negative     Protein, UA 30 mg/dL (1+)     Leuk  Esterase, UA Trace     Nitrite, UA Negative     Urobilinogen, UA 1.0 E.U./dL    Comprehensive Metabolic Panel [[id number]]  (Abnormal) Collected: 12/03/23 1029    Specimen: Blood Updated: 12/03/23 1108     Glucose 168 mg/dL      BUN 13 mg/dL      Creatinine 0.61 mg/dL      Sodium 143 mmol/L      Potassium 4.3 mmol/L      Comment: Slight hemolysis detected by analyzer. Result may be falsely elevated.        Chloride 105 mmol/L      CO2 18.0 mmol/L      Calcium 8.4 mg/dL      Total Protein 5.8 g/dL      Albumin 2.5 g/dL      ALT (SGPT) 11 U/L      AST (SGOT) 21 U/L      Alkaline Phosphatase 152 U/L      Total Bilirubin 0.3 mg/dL      Globulin 3.3 gm/dL      A/G Ratio 0.8 g/dL      BUN/Creatinine Ratio 21.3     Anion Gap 20.0 mmol/L      eGFR 95.7 mL/min/1.73     Narrative:      GFR Normal >60  Chronic Kidney Disease <60  Kidney Failure <15    The GFR formula is only valid for adults with stable renal function between ages 18 and 70.    Lipase [[id number]]  (Abnormal) Collected: 12/03/23 1029    Specimen: Blood Updated: 12/03/23 1108     Lipase 8 U/L     CBC & Differential [[id number]]  (Abnormal) Collected: 12/03/23 1029    Specimen: Blood Updated: 12/03/23 1036    Narrative:      The following orders were created for panel order CBC & Differential.  Procedure                               Abnormality         Status                     ---------                               -----------         ------                     CBC Auto Differential[[id number]]        Abnormal            Final result                 Please view results for these tests on the individual orders.    CBC Auto Differential [[id number]]  (Abnormal) Collected: 12/03/23 1029    Specimen: Blood Updated: 12/03/23 1036     WBC 3.60 10*3/mm3      RBC 4.15 10*6/mm3      Hemoglobin 14.0 g/dL      Hematocrit 42.1 %      .5 fL      MCH 33.8 pg      MCHC 33.3 g/dL      RDW 14.9 %      RDW-SD 52.9 fl      MPV 7.4 fL      Platelets 409 10*3/mm3       Neutrophil % 68.9 %      Lymphocyte % 16.9 %      Monocyte % 13.2 %      Eosinophil % 0.1 %      Basophil % 0.9 %      Neutrophils, Absolute 2.50 10*3/mm3      Lymphocytes, Absolute 0.60 10*3/mm3      Monocytes, Absolute 0.50 10*3/mm3      Eosinophils, Absolute 0.00 10*3/mm3      Basophils, Absolute 0.00 10*3/mm3      nRBC 0.2 /100 WBC     COVID-19 and FLU A/B PCR, 1 HR TAT - Swab, Nasopharynx [[id number]]  (Abnormal) Collected: 12/03/23 0950    Specimen: Swab from Nasopharynx Updated: 12/03/23 1015     COVID19 Detected     Influenza A PCR Not Detected     Influenza B PCR Not Detected    Narrative:      Fact sheet for providers: https://www.fda.gov/media/656443/download    Fact sheet for patients: https://www.fda.gov/media/455680/download    Test performed by PCR.  Influenza A and Influenza B negative results should be considered presumptive in samples that have a positive SARS-CoV-2 result.    Competitive inhibition studies showed that SARS-CoV-2 virus, when present at concentrations above 3.6E+04 copies/mL, can inhibit the detection and amplification of influenza A and influenza B virus RNA if present at or below 1.8E+02 copies/mL or 4.9E+02 copies/mL, respectively, and may lead to false negative influenza virus results. If co-infection with influenza A or influenza B virus is suspected in samples with a positive SARS-CoV-2 result, the sample should be re-tested with another FDA cleared, approved, or authorized influenza test, if influenza virus detection would change clinical management.             Imaging Results (Last 24 Hours)       Procedure Component Value Units Date/Time    CT Abdomen Pelvis Without Contrast [[id number]] Collected: 12/03/23 1315     Updated: 12/03/23 1330    Narrative:      CT ABDOMEN PELVIS WO CONTRAST    Date of Exam: 12/3/2023 12:53 PM EST    Indication: Diffuse abdominal pain vomiting.    Comparison: CT of the abdomen and pelvis dated 6/6/2022    Technique: Axial CT images were obtained  of the abdomen and pelvis without the administration of contrast. Sagittal and coronal reconstructions were performed.  Automated exposure control and iterative reconstruction methods were used.    Findings:    Examination is limited due to lack of IV contrast administration and extensive beam hardening artifact.    Liver: The liver is unremarkable in morphology. Evaluation for focal liver lesions is limited without IV contrast. Mildly prominent CBD may be related to prior cholecystectomy.    Gallbladder: Surgically absent.    Pancreas: Unremarkable.    Spleen: Unremarkable.    Adrenal glands: Unremarkable.    Genitourinary tract: Kidneys are unremarkable. No hydronephrosis is seen. Visualized portions of the ureters appear grossly unremarkable. Urinary bladder is grossly unremarkable. There is a catheter coiled within the vagina.    Gastrointestinal tract: There is limited visualization of the hollow viscera due to lack of IV contrast and due to extensive beam hardening artifact within much of the study. Colonic wall thickening may be related to underdistention. Colitis may be   considered in the appropriate clinical setting. No findings to suggest bowel obstruction. There are surgical changes of the stomach with gastrojejunostomy.    Appendix: The appendix is not definitely identified.    Other findings: No free air or free fluid is seen. No pathologically enlarged lymph nodes are seen. Vascular calcifications are present.    Bones and soft tissues: Bones are demineralized. There are mild superior endplate compression fractures of T12 and L1 which are new since 6/6/2022. Exact chronicity is uncertain. Please correlate clinically for pain/tenderness at these sites. There are   surgical changes of the lumbar spine from L3-S1. There are chronic changes of the bilateral iliac bones. There is a sclerotic focus within the left inferior pubic ramus which appears similar since 6/6/2022, possibly a bone island. Right-sided  InterStim   is noted. Superficial soft tissues demonstrate no acute abnormality.    Lung bases: The visualized lung bases are clear.      Impression:      Impression:  1.Examination is limited due to lack of IV contrast administration and extensive beam hardening artifact.  2.Diffuse colonic wall thickening raises suspicion for colitis. This may also be related to underdistention. Please correlate clinically.  3.There are mild superior endplate compression fractures of T12 and L1 which are new since 6/6/2022. Exact chronicity is uncertain. Please correlate clinically for pain/tenderness at these sites.   4.There is a catheter coiled within the vagina. If this is meant to be within the bladder, recommend repositioning.  5.Additional findings as detailed above.    Electronically Signed: Frederick Al MD    12/3/2023 1:28 PM EST    Workstation ID: [id number]    XR Chest 1 View [[id number]] Collected: 12/03/23 1209     Updated: 12/03/23 1211    Narrative:      XR CHEST 1 VW    Date of Exam: 12/3/2023 10:30 AM EST    Indication: Vomiting weakness recent back surgery    Comparison: CT chest with contrast 11/7/2023    Findings:  The cardiomediastinal silhouette is within normal limits. Lungs are clear. No focal consolidation, pneumothorax, or significant pleural effusion. Osseous structures grossly intact. Surgical anchors overlie the left and right humeral head. Multilevel   cervical fixation.      Impression:      Impression:  No acute process.            Electronically Signed: Carrillo Goodman MD    12/3/2023 12:09 PM EST    Workstation ID: [id number]              Assessment & Plan        This is a 73 y.o. female with:    Active and Resolved Problems  Active Hospital Problems    Diagnosis  POA    **Intractable vomiting [R11.10]  Yes     Priority: High    Colitis [K52.9]  Unknown     Priority: High    Generalized abdominal pain [R10.84]  Unknown     Priority: High    Sinus tachycardia [R00.0]  Yes     Priority: High    Chronic  obstructive pulmonary disease [J44.9]  Yes     Priority: Medium    Intractable cyclical vomiting associated with migraine [G43.A1]  Yes     Priority: Medium    Chronic anticoagulation [Z79.01]  Not Applicable     Priority: Low    Severe malnutrition [E43]  Yes     Priority: Low    Debility [R53.81]  Yes     Priority: Low    Depression [F32.A]  Yes     Priority: Low    Chronic pain syndrome [G89.4]  Yes     Priority: Low    GERD without esophagitis [K21.9]  Yes     Priority: Low    Cervical disc herniation [M50.20]  Yes     Priority: Low    Low back pain [M54.50]  Yes     Priority: Low    History of lumbar fusion [Z98.1]  Not Applicable     Priority: Low    History of subdural hematoma [Z86.79]  Not Applicable    Hyperlipidemia [E78.5]  Yes    Hypothyroidism (acquired) [E03.9]  Yes    Migraines [G43.909]  Yes      Resolved Hospital Problems   No resolved problems to display.     intractable vomiting, abdominal pain. acute colitis   plan  Admit to [facility]  Telemetry unit  IV fluids  Zofran IV  GI consult  Reglan iv    COVID-19 Symbicort albuterol vitamin C vitamin D zinc.    Hyperlipidemia on Lipitor.  Check CPK.    Migraine on Fioricet.  Max as needed.    Chronic back and neck pain continue Neurontin.  Oxycodone    GERD continue Protonix    Depression continue Zoloft    COPD DuoNeb as needed      DVT prophylaxis:  No DVT prophylaxis order currently exists.    The patient desires to be as follows:    CODE STATUS:           NATHALY MARTINEZ (Father)[phone] (Home Phone) , who can be contacted at, is the designated person to make medical decisions on the patient's behalf if She is incapable of doing so. This was clarified with patient and/or next of kin on 12/3/2023 during the course of this H&P.    Admission Status:  I believe this patient meets admit status.    Expected Length of Stay: 3 days    PDMP and Medication Dispenses via Sidebar reviewed and consistent with patient reported medications.    I discussed the  patient's findings and my recommendations with patient.      Signature:     This document has been electronically signed by Jonatan Bueno MD on December 3, 2023 14:07 [facility] Hospitalist Team

## 2023-12-04 ENCOUNTER — INPATIENT HOSPITAL (AMBULATORY)
Dept: URBAN - METROPOLITAN AREA HOSPITAL 84 | Facility: HOSPITAL | Age: 73
End: 2023-12-04
Payer: MEDICAID

## 2023-12-04 DIAGNOSIS — Z90.49 ACQUIRED ABSENCE OF OTHER SPECIFIED PARTS OF DIGESTIVE TRACT: ICD-10-CM

## 2023-12-04 DIAGNOSIS — K63.89 OTHER SPECIFIED DISEASES OF INTESTINE: ICD-10-CM

## 2023-12-04 DIAGNOSIS — R93.3 ABNORMAL FINDINGS ON DIAGNOSTIC IMAGING OF OTHER PARTS OF DI: ICD-10-CM

## 2023-12-04 DIAGNOSIS — R11.2 NAUSEA WITH VOMITING, UNSPECIFIED: ICD-10-CM

## 2023-12-04 DIAGNOSIS — R10.816 EPIGASTRIC ABDOMINAL TENDERNESS: ICD-10-CM

## 2023-12-04 DIAGNOSIS — Z98.84 BARIATRIC SURGERY STATUS: ICD-10-CM

## 2023-12-04 DIAGNOSIS — U07.1 COVID-19: ICD-10-CM

## 2023-12-04 LAB
AMYLASE SERPL-CCNC: 41 U/L (ref 28–100)
ANION GAP SERPL CALCULATED.3IONS-SCNC: 14 MMOL/L (ref 5–15)
BASOPHILS # BLD AUTO: 0 10*3/MM3 (ref 0–0.2)
BASOPHILS NFR BLD AUTO: 0.5 % (ref 0–1.5)
BUN SERPL-MCNC: 14 MG/DL (ref 8–23)
BUN/CREAT SERPL: 24.1 (ref 7–25)
CA-I SERPL ISE-MCNC: 1.2 MMOL/L (ref 1.2–1.3)
CALCIUM SPEC-SCNC: 8.2 MG/DL (ref 8.6–10.5)
CHLORIDE SERPL-SCNC: 111 MMOL/L (ref 98–107)
CHOLEST SERPL-MCNC: 136 MG/DL (ref 0–200)
CK SERPL-CCNC: 85 U/L (ref 20–180)
CO2 SERPL-SCNC: 22 MMOL/L (ref 22–29)
CREAT SERPL-MCNC: 0.58 MG/DL (ref 0.57–1)
D-LACTATE SERPL-SCNC: 0.9 MMOL/L (ref 0.5–2)
DEPRECATED RDW RBC AUTO: 51.2 FL (ref 37–54)
EGFRCR SERPLBLD CKD-EPI 2021: 96.9 ML/MIN/1.73
EOSINOPHIL # BLD AUTO: 0 10*3/MM3 (ref 0–0.4)
EOSINOPHIL NFR BLD AUTO: 0.5 % (ref 0.3–6.2)
ERYTHROCYTE [DISTWIDTH] IN BLOOD BY AUTOMATED COUNT: 14.8 % (ref 12.3–15.4)
FERRITIN SERPL-MCNC: 351.9 NG/ML (ref 13–150)
GEN 5 2HR TROPONIN T REFLEX: 23 NG/L
GLUCOSE SERPL-MCNC: 121 MG/DL (ref 65–99)
HBA1C MFR BLD: 5 % (ref 4.8–5.6)
HCT VFR BLD AUTO: 32.8 % (ref 34–46.6)
HDLC SERPL-MCNC: 65 MG/DL (ref 40–60)
HGB BLD-MCNC: 10.9 G/DL (ref 12–15.9)
HOLD SPECIMEN: NORMAL
IRON 24H UR-MRATE: 46 MCG/DL (ref 37–145)
IRON SATN MFR SERPL: 31 % (ref 20–50)
LDLC SERPL CALC-MCNC: 52 MG/DL (ref 0–100)
LDLC/HDLC SERPL: 0.76 {RATIO}
LYMPHOCYTES # BLD AUTO: 1.2 10*3/MM3 (ref 0.7–3.1)
LYMPHOCYTES NFR BLD AUTO: 28.1 % (ref 19.6–45.3)
MAGNESIUM SERPL-MCNC: 1.8 MG/DL (ref 1.6–2.4)
MCH RBC QN AUTO: 33.7 PG (ref 26.6–33)
MCHC RBC AUTO-ENTMCNC: 33.4 G/DL (ref 31.5–35.7)
MCV RBC AUTO: 100.8 FL (ref 79–97)
MONOCYTES # BLD AUTO: 0.9 10*3/MM3 (ref 0.1–0.9)
MONOCYTES NFR BLD AUTO: 19.5 % (ref 5–12)
NEUTROPHILS NFR BLD AUTO: 2.3 10*3/MM3 (ref 1.7–7)
NEUTROPHILS NFR BLD AUTO: 51.4 % (ref 42.7–76)
NRBC BLD AUTO-RTO: 0.1 /100 WBC (ref 0–0.2)
NT-PROBNP SERPL-MCNC: 2679 PG/ML (ref 0–900)
PHOSPHATE SERPL-MCNC: 2.2 MG/DL (ref 2.5–4.5)
PLATELET # BLD AUTO: 365 10*3/MM3 (ref 140–450)
PMV BLD AUTO: 6.9 FL (ref 6–12)
POTASSIUM SERPL-SCNC: 3.1 MMOL/L (ref 3.5–5.2)
QT INTERVAL: 283 MS
QTC INTERVAL: 433 MS
QTC INTERVAL: NORMAL MS
RBC # BLD AUTO: 3.25 10*6/MM3 (ref 3.77–5.28)
SODIUM SERPL-SCNC: 147 MMOL/L (ref 136–145)
TIBC SERPL-MCNC: 148 MCG/DL (ref 298–536)
TRANSFERRIN SERPL-MCNC: 99 MG/DL (ref 200–360)
TRIGL SERPL-MCNC: 108 MG/DL (ref 0–150)
TROPONIN T DELTA: -5 NG/L
TROPONIN T SERPL HS-MCNC: 28 NG/L
TSH SERPL DL<=0.05 MIU/L-ACNC: 2.63 UIU/ML (ref 0.27–4.2)
VLDLC SERPL-MCNC: 19 MG/DL (ref 5–40)
WBC NRBC COR # BLD AUTO: 4.4 10*3/MM3 (ref 3.4–10.8)

## 2023-12-04 PROCEDURE — 84466 ASSAY OF TRANSFERRIN: CPT | Performed by: INTERNAL MEDICINE

## 2023-12-04 PROCEDURE — 83690 ASSAY OF LIPASE: CPT | Performed by: NURSE PRACTITIONER

## 2023-12-04 PROCEDURE — 82728 ASSAY OF FERRITIN: CPT | Performed by: INTERNAL MEDICINE

## 2023-12-04 PROCEDURE — 82550 ASSAY OF CK (CPK): CPT | Performed by: INTERNAL MEDICINE

## 2023-12-04 PROCEDURE — 25010000002 METOCLOPRAMIDE PER 10 MG: Performed by: INTERNAL MEDICINE

## 2023-12-04 PROCEDURE — 25010000002 ONDANSETRON PER 1 MG

## 2023-12-04 PROCEDURE — 84100 ASSAY OF PHOSPHORUS: CPT | Performed by: INTERNAL MEDICINE

## 2023-12-04 PROCEDURE — 83735 ASSAY OF MAGNESIUM: CPT | Performed by: INTERNAL MEDICINE

## 2023-12-04 PROCEDURE — 83036 HEMOGLOBIN GLYCOSYLATED A1C: CPT | Performed by: INTERNAL MEDICINE

## 2023-12-04 PROCEDURE — 25010000002 POTASSIUM CHLORIDE 10 MEQ/100ML SOLUTION: Performed by: INTERNAL MEDICINE

## 2023-12-04 PROCEDURE — 93010 ELECTROCARDIOGRAM REPORT: CPT | Performed by: INTERNAL MEDICINE

## 2023-12-04 PROCEDURE — 83540 ASSAY OF IRON: CPT | Performed by: INTERNAL MEDICINE

## 2023-12-04 PROCEDURE — 25010000002 ONDANSETRON PER 1 MG: Performed by: NURSE PRACTITIONER

## 2023-12-04 PROCEDURE — 93005 ELECTROCARDIOGRAM TRACING: CPT | Performed by: INTERNAL MEDICINE

## 2023-12-04 PROCEDURE — 80061 LIPID PANEL: CPT | Performed by: INTERNAL MEDICINE

## 2023-12-04 PROCEDURE — 25010000002 HYDROMORPHONE 1 MG/ML SOLUTION: Performed by: NURSE PRACTITIONER

## 2023-12-04 PROCEDURE — 82150 ASSAY OF AMYLASE: CPT | Performed by: INTERNAL MEDICINE

## 2023-12-04 PROCEDURE — 25010000002 KETOROLAC TROMETHAMINE PER 15 MG

## 2023-12-04 PROCEDURE — 25810000003 DEXTROSE-NACL PER 500 ML: Performed by: INTERNAL MEDICINE

## 2023-12-04 PROCEDURE — 83605 ASSAY OF LACTIC ACID: CPT | Performed by: INTERNAL MEDICINE

## 2023-12-04 PROCEDURE — 84443 ASSAY THYROID STIM HORMONE: CPT | Performed by: INTERNAL MEDICINE

## 2023-12-04 PROCEDURE — 82330 ASSAY OF CALCIUM: CPT | Performed by: INTERNAL MEDICINE

## 2023-12-04 PROCEDURE — 80053 COMPREHEN METABOLIC PANEL: CPT | Performed by: INTERNAL MEDICINE

## 2023-12-04 PROCEDURE — 84484 ASSAY OF TROPONIN QUANT: CPT | Performed by: INTERNAL MEDICINE

## 2023-12-04 PROCEDURE — 99222 1ST HOSP IP/OBS MODERATE 55: CPT | Mod: FS | Performed by: NURSE PRACTITIONER

## 2023-12-04 PROCEDURE — 25810000003 SODIUM CHLORIDE 0.9 % SOLUTION: Performed by: INTERNAL MEDICINE

## 2023-12-04 PROCEDURE — 25010000002 DIPHENHYDRAMINE PER 50 MG

## 2023-12-04 PROCEDURE — 85025 COMPLETE CBC W/AUTO DIFF WBC: CPT | Performed by: NURSE PRACTITIONER

## 2023-12-04 PROCEDURE — 83880 ASSAY OF NATRIURETIC PEPTIDE: CPT | Performed by: INTERNAL MEDICINE

## 2023-12-04 PROCEDURE — 99222 1ST HOSP IP/OBS MODERATE 55: CPT | Performed by: INTERNAL MEDICINE

## 2023-12-04 PROCEDURE — 25010000002 CEFTRIAXONE PER 250 MG: Performed by: INTERNAL MEDICINE

## 2023-12-04 PROCEDURE — 36415 COLL VENOUS BLD VENIPUNCTURE: CPT | Performed by: NURSE PRACTITIONER

## 2023-12-04 RX ORDER — ATORVASTATIN CALCIUM 10 MG/1
10 TABLET, FILM COATED ORAL NIGHTLY
Status: DISCONTINUED | OUTPATIENT
Start: 2023-12-04 | End: 2023-12-08 | Stop reason: HOSPADM

## 2023-12-04 RX ORDER — FENTANYL/ROPIVACAINE/NS/PF 2-625MCG/1
15 PLASTIC BAG, INJECTION (ML) EPIDURAL ONCE
Status: COMPLETED | OUTPATIENT
Start: 2023-12-04 | End: 2023-12-04

## 2023-12-04 RX ORDER — GABAPENTIN 300 MG/1
300 CAPSULE ORAL DAILY
Status: DISCONTINUED | OUTPATIENT
Start: 2023-12-04 | End: 2023-12-08 | Stop reason: HOSPADM

## 2023-12-04 RX ORDER — LEVOTHYROXINE SODIUM 0.05 MG/1
50 TABLET ORAL
Status: DISCONTINUED | OUTPATIENT
Start: 2023-12-04 | End: 2023-12-08 | Stop reason: HOSPADM

## 2023-12-04 RX ORDER — POTASSIUM CHLORIDE 7.45 MG/ML
10 INJECTION INTRAVENOUS
Status: COMPLETED | OUTPATIENT
Start: 2023-12-04 | End: 2023-12-04

## 2023-12-04 RX ORDER — METOPROLOL TARTRATE 50 MG/1
50 TABLET, FILM COATED ORAL 2 TIMES DAILY
Status: DISCONTINUED | OUTPATIENT
Start: 2023-12-04 | End: 2023-12-04

## 2023-12-04 RX ORDER — DEXTROSE AND SODIUM CHLORIDE 5; .9 G/100ML; G/100ML
100 INJECTION, SOLUTION INTRAVENOUS CONTINUOUS
Status: DISCONTINUED | OUTPATIENT
Start: 2023-12-04 | End: 2023-12-05

## 2023-12-04 RX ORDER — TOPIRAMATE 100 MG/1
100 TABLET, FILM COATED ORAL 2 TIMES DAILY
Status: DISCONTINUED | OUTPATIENT
Start: 2023-12-04 | End: 2023-12-08 | Stop reason: HOSPADM

## 2023-12-04 RX ORDER — POLYETHYLENE GLYCOL 3350 17 G/17G
17 POWDER, FOR SOLUTION ORAL DAILY
Status: DISCONTINUED | OUTPATIENT
Start: 2023-12-04 | End: 2023-12-08 | Stop reason: HOSPADM

## 2023-12-04 RX ORDER — SUMATRIPTAN 25 MG/1
25 TABLET, FILM COATED ORAL
Status: DISCONTINUED | OUTPATIENT
Start: 2023-12-04 | End: 2023-12-08 | Stop reason: HOSPADM

## 2023-12-04 RX ORDER — BUTALBITAL, ACETAMINOPHEN AND CAFFEINE 50; 325; 40 MG/1; MG/1; MG/1
1 TABLET ORAL EVERY 4 HOURS PRN
Status: DISCONTINUED | OUTPATIENT
Start: 2023-12-04 | End: 2023-12-08 | Stop reason: HOSPADM

## 2023-12-04 RX ORDER — ESTRADIOL 1 MG/1
1 TABLET ORAL DAILY
Status: DISCONTINUED | OUTPATIENT
Start: 2023-12-04 | End: 2023-12-08 | Stop reason: HOSPADM

## 2023-12-04 RX ORDER — POTASSIUM CHLORIDE 750 MG/1
10 TABLET, FILM COATED, EXTENDED RELEASE ORAL DAILY
Status: DISCONTINUED | OUTPATIENT
Start: 2023-12-04 | End: 2023-12-08 | Stop reason: HOSPADM

## 2023-12-04 RX ORDER — PANTOPRAZOLE SODIUM 40 MG/1
40 TABLET, DELAYED RELEASE ORAL
Status: DISCONTINUED | OUTPATIENT
Start: 2023-12-04 | End: 2023-12-08 | Stop reason: HOSPADM

## 2023-12-04 RX ORDER — AMOXICILLIN 250 MG
2 CAPSULE ORAL 2 TIMES DAILY
Status: DISCONTINUED | OUTPATIENT
Start: 2023-12-04 | End: 2023-12-08 | Stop reason: HOSPADM

## 2023-12-04 RX ORDER — NITROGLYCERIN 0.4 MG/1
0.4 TABLET SUBLINGUAL
Status: DISCONTINUED | OUTPATIENT
Start: 2023-12-04 | End: 2023-12-08 | Stop reason: HOSPADM

## 2023-12-04 RX ORDER — BISACODYL 10 MG
10 SUPPOSITORY, RECTAL RECTAL DAILY PRN
Status: DISCONTINUED | OUTPATIENT
Start: 2023-12-04 | End: 2023-12-08 | Stop reason: HOSPADM

## 2023-12-04 RX ORDER — ONDANSETRON 4 MG/1
4 TABLET, FILM COATED ORAL EVERY 4 HOURS PRN
Status: DISCONTINUED | OUTPATIENT
Start: 2023-12-04 | End: 2023-12-08 | Stop reason: HOSPADM

## 2023-12-04 RX ORDER — ALBUTEROL SULFATE 90 UG/1
2 AEROSOL, METERED RESPIRATORY (INHALATION) EVERY 4 HOURS PRN
Status: DISCONTINUED | OUTPATIENT
Start: 2023-12-04 | End: 2023-12-08 | Stop reason: HOSPADM

## 2023-12-04 RX ORDER — FUROSEMIDE 40 MG/1
20 TABLET ORAL DAILY
Status: DISCONTINUED | OUTPATIENT
Start: 2023-12-04 | End: 2023-12-04

## 2023-12-04 RX ORDER — METOPROLOL TARTRATE 1 MG/ML
5 INJECTION, SOLUTION INTRAVENOUS EVERY 4 HOURS PRN
Status: DISCONTINUED | OUTPATIENT
Start: 2023-12-04 | End: 2023-12-08 | Stop reason: HOSPADM

## 2023-12-04 RX ORDER — ONDANSETRON 2 MG/ML
4 INJECTION INTRAMUSCULAR; INTRAVENOUS ONCE
Status: COMPLETED | OUTPATIENT
Start: 2023-12-04 | End: 2023-12-04

## 2023-12-04 RX ORDER — POTASSIUM CHLORIDE 7.45 MG/ML
10 INJECTION INTRAVENOUS
Status: DISPENSED | OUTPATIENT
Start: 2023-12-04 | End: 2023-12-04

## 2023-12-04 RX ORDER — OXYCODONE HYDROCHLORIDE 5 MG/1
5 TABLET ORAL EVERY 4 HOURS PRN
Status: DISCONTINUED | OUTPATIENT
Start: 2023-12-04 | End: 2023-12-08 | Stop reason: HOSPADM

## 2023-12-04 RX ORDER — SODIUM CHLORIDE 9 MG/ML
40 INJECTION, SOLUTION INTRAVENOUS AS NEEDED
Status: DISCONTINUED | OUTPATIENT
Start: 2023-12-04 | End: 2023-12-08 | Stop reason: HOSPADM

## 2023-12-04 RX ORDER — PROMETHAZINE HYDROCHLORIDE 25 MG/1
25 TABLET ORAL EVERY 6 HOURS PRN
Status: DISCONTINUED | OUTPATIENT
Start: 2023-12-04 | End: 2023-12-08 | Stop reason: HOSPADM

## 2023-12-04 RX ORDER — DILTIAZEM HCL/D5W 125 MG/125
5-15 PLASTIC BAG, INJECTION (ML) INTRAVENOUS
Status: DISCONTINUED | OUTPATIENT
Start: 2023-12-04 | End: 2023-12-04

## 2023-12-04 RX ORDER — BISACODYL 5 MG/1
5 TABLET, DELAYED RELEASE ORAL DAILY PRN
Status: DISCONTINUED | OUTPATIENT
Start: 2023-12-04 | End: 2023-12-08 | Stop reason: HOSPADM

## 2023-12-04 RX ORDER — ONDANSETRON 2 MG/ML
4 INJECTION INTRAMUSCULAR; INTRAVENOUS EVERY 4 HOURS PRN
Status: DISCONTINUED | OUTPATIENT
Start: 2023-12-04 | End: 2023-12-08 | Stop reason: HOSPADM

## 2023-12-04 RX ORDER — CETIRIZINE HYDROCHLORIDE 10 MG/1
10 TABLET ORAL DAILY
Status: DISCONTINUED | OUTPATIENT
Start: 2023-12-04 | End: 2023-12-08 | Stop reason: HOSPADM

## 2023-12-04 RX ORDER — POLYETHYLENE GLYCOL 3350 17 G/17G
17 POWDER, FOR SOLUTION ORAL DAILY PRN
Status: DISCONTINUED | OUTPATIENT
Start: 2023-12-04 | End: 2023-12-08 | Stop reason: HOSPADM

## 2023-12-04 RX ORDER — HYDROXYZINE HYDROCHLORIDE 25 MG/1
25 TABLET, FILM COATED ORAL EVERY 6 HOURS PRN
Status: DISCONTINUED | OUTPATIENT
Start: 2023-12-04 | End: 2023-12-08 | Stop reason: HOSPADM

## 2023-12-04 RX ORDER — METOPROLOL TARTRATE 1 MG/ML
2.5 INJECTION, SOLUTION INTRAVENOUS EVERY 6 HOURS
Status: DISCONTINUED | OUTPATIENT
Start: 2023-12-04 | End: 2023-12-05

## 2023-12-04 RX ORDER — SODIUM CHLORIDE 0.9 % (FLUSH) 0.9 %
10 SYRINGE (ML) INJECTION AS NEEDED
Status: DISCONTINUED | OUTPATIENT
Start: 2023-12-04 | End: 2023-12-08 | Stop reason: HOSPADM

## 2023-12-04 RX ORDER — ONDANSETRON 4 MG/1
4 TABLET, FILM COATED ORAL EVERY 6 HOURS PRN
Status: DISCONTINUED | OUTPATIENT
Start: 2023-12-04 | End: 2023-12-04

## 2023-12-04 RX ORDER — IPRATROPIUM BROMIDE AND ALBUTEROL SULFATE 2.5; .5 MG/3ML; MG/3ML
3 SOLUTION RESPIRATORY (INHALATION)
Status: DISCONTINUED | OUTPATIENT
Start: 2023-12-04 | End: 2023-12-04

## 2023-12-04 RX ORDER — DOCUSATE SODIUM 100 MG/1
100 CAPSULE, LIQUID FILLED ORAL 2 TIMES DAILY
Status: DISCONTINUED | OUTPATIENT
Start: 2023-12-04 | End: 2023-12-08 | Stop reason: HOSPADM

## 2023-12-04 RX ORDER — SODIUM CHLORIDE 0.9 % (FLUSH) 0.9 %
10 SYRINGE (ML) INJECTION EVERY 12 HOURS SCHEDULED
Status: DISCONTINUED | OUTPATIENT
Start: 2023-12-04 | End: 2023-12-08 | Stop reason: HOSPADM

## 2023-12-04 RX ORDER — CYCLOSPORINE 0.5 MG/ML
1 EMULSION OPHTHALMIC 2 TIMES DAILY
Status: DISCONTINUED | OUTPATIENT
Start: 2023-12-04 | End: 2023-12-08 | Stop reason: HOSPADM

## 2023-12-04 RX ORDER — CHOLECALCIFEROL (VITAMIN D3) 125 MCG
5 CAPSULE ORAL NIGHTLY PRN
Status: DISCONTINUED | OUTPATIENT
Start: 2023-12-04 | End: 2023-12-08 | Stop reason: HOSPADM

## 2023-12-04 RX ORDER — PREDNISOLONE ACETATE 10 MG/ML
1 SUSPENSION/ DROPS OPHTHALMIC NIGHTLY
Status: DISCONTINUED | OUTPATIENT
Start: 2023-12-04 | End: 2023-12-08 | Stop reason: HOSPADM

## 2023-12-04 RX ADMIN — PREDNISOLONE ACETATE 1 DROP: 10 SUSPENSION/ DROPS OPHTHALMIC at 22:08

## 2023-12-04 RX ADMIN — DIPHENHYDRAMINE HYDROCHLORIDE 25 MG: 50 INJECTION, SOLUTION INTRAMUSCULAR; INTRAVENOUS at 03:43

## 2023-12-04 RX ADMIN — POTASSIUM CHLORIDE 10 MEQ: 7.46 INJECTION, SOLUTION INTRAVENOUS at 20:51

## 2023-12-04 RX ADMIN — DEXTROSE AND SODIUM CHLORIDE 100 ML/HR: 5; 900 INJECTION, SOLUTION INTRAVENOUS at 15:07

## 2023-12-04 RX ADMIN — METOCLOPRAMIDE HYDROCHLORIDE 10 MG: 5 INJECTION INTRAMUSCULAR; INTRAVENOUS at 03:44

## 2023-12-04 RX ADMIN — METOPROLOL TARTRATE 5 MG: 1 INJECTION, SOLUTION INTRAVENOUS at 12:55

## 2023-12-04 RX ADMIN — METOPROLOL TARTRATE 2.5 MG: 1 INJECTION, SOLUTION INTRAVENOUS at 20:50

## 2023-12-04 RX ADMIN — Medication 10 ML: at 01:38

## 2023-12-04 RX ADMIN — Medication 10 ML: at 08:09

## 2023-12-04 RX ADMIN — POTASSIUM PHOSPHATE, MONOBASIC POTASSIUM PHOSPHATE, DIBASIC 15 MMOL: 224; 236 INJECTION, SOLUTION, CONCENTRATE INTRAVENOUS at 15:07

## 2023-12-04 RX ADMIN — KETOROLAC TROMETHAMINE 15 MG: 15 INJECTION, SOLUTION INTRAMUSCULAR; INTRAVENOUS at 17:08

## 2023-12-04 RX ADMIN — HYDROMORPHONE HYDROCHLORIDE 0.25 MG: 1 INJECTION, SOLUTION INTRAMUSCULAR; INTRAVENOUS; SUBCUTANEOUS at 22:07

## 2023-12-04 RX ADMIN — METOCLOPRAMIDE HYDROCHLORIDE 10 MG: 5 INJECTION INTRAMUSCULAR; INTRAVENOUS at 10:48

## 2023-12-04 RX ADMIN — KETOROLAC TROMETHAMINE 15 MG: 15 INJECTION, SOLUTION INTRAMUSCULAR; INTRAVENOUS at 03:45

## 2023-12-04 RX ADMIN — CYCLOSPORINE 1 DROP: 0.5 EMULSION OPHTHALMIC at 20:51

## 2023-12-04 RX ADMIN — DIPHENHYDRAMINE HYDROCHLORIDE 25 MG: 50 INJECTION, SOLUTION INTRAMUSCULAR; INTRAVENOUS at 10:48

## 2023-12-04 RX ADMIN — KETOROLAC TROMETHAMINE 15 MG: 15 INJECTION, SOLUTION INTRAMUSCULAR; INTRAVENOUS at 10:48

## 2023-12-04 RX ADMIN — POTASSIUM CHLORIDE 10 MEQ: 7.46 INJECTION, SOLUTION INTRAVENOUS at 14:03

## 2023-12-04 RX ADMIN — POTASSIUM CHLORIDE 10 MEQ: 7.46 INJECTION, SOLUTION INTRAVENOUS at 18:20

## 2023-12-04 RX ADMIN — ONDANSETRON 4 MG: 2 INJECTION INTRAMUSCULAR; INTRAVENOUS at 18:09

## 2023-12-04 RX ADMIN — METOCLOPRAMIDE HYDROCHLORIDE 10 MG: 5 INJECTION INTRAMUSCULAR; INTRAVENOUS at 17:08

## 2023-12-04 RX ADMIN — POTASSIUM CHLORIDE 10 MEQ: 7.46 INJECTION, SOLUTION INTRAVENOUS at 19:54

## 2023-12-04 RX ADMIN — ONDANSETRON 4 MG: 2 INJECTION INTRAMUSCULAR; INTRAVENOUS at 01:20

## 2023-12-04 RX ADMIN — ONDANSETRON 4 MG: 2 INJECTION INTRAMUSCULAR; INTRAVENOUS at 12:44

## 2023-12-04 RX ADMIN — METOPROLOL TARTRATE 2.5 MG: 1 INJECTION, SOLUTION INTRAVENOUS at 17:08

## 2023-12-04 RX ADMIN — ONDANSETRON 4 MG: 2 INJECTION INTRAMUSCULAR; INTRAVENOUS at 07:57

## 2023-12-04 RX ADMIN — METOCLOPRAMIDE HYDROCHLORIDE 10 MG: 5 INJECTION INTRAMUSCULAR; INTRAVENOUS at 22:07

## 2023-12-04 RX ADMIN — ONDANSETRON 4 MG: 2 INJECTION INTRAMUSCULAR; INTRAVENOUS at 22:07

## 2023-12-04 RX ADMIN — CEFTRIAXONE 2000 MG: 2 INJECTION, POWDER, FOR SOLUTION INTRAMUSCULAR; INTRAVENOUS at 17:08

## 2023-12-04 RX ADMIN — DIPHENHYDRAMINE HYDROCHLORIDE 25 MG: 50 INJECTION, SOLUTION INTRAMUSCULAR; INTRAVENOUS at 17:08

## 2023-12-04 NOTE — PLAN OF CARE
Goal Outcome Evaluation:  Problem: Adult Inpatient Plan of Care  Goal: Plan of Care Review  Outcome: Ongoing, Progressing  Goal: Patient-Specific Goal (Individualized)  Outcome: Ongoing, Progressing  Goal: Absence of Hospital-Acquired Illness or Injury  Outcome: Ongoing, Progressing  Intervention: Identify and Manage Fall Risk  Recent Flowsheet Documentation  Taken 12/4/2023 0600 by Blossom Ascencio RN  Safety Promotion/Fall Prevention:   activity supervised   assistive device/personal items within reach   clutter free environment maintained   safety round/check completed   room organization consistent   fall prevention program maintained  Taken 12/4/2023 0200 by Blossom Ascencio RN  Safety Promotion/Fall Prevention:   activity supervised   assistive device/personal items within reach   clutter free environment maintained   safety round/check completed   room organization consistent  Taken 12/4/2023 0000 by Blossom Ascencio RN  Safety Promotion/Fall Prevention:   activity supervised   assistive device/personal items within reach   clutter free environment maintained   safety round/check completed   room organization consistent  Taken 12/3/2023 2200 by Blossom Ascencio RN  Safety Promotion/Fall Prevention:   activity supervised   assistive device/personal items within reach   clutter free environment maintained   fall prevention program maintained   safety round/check completed   room organization consistent  Taken 12/3/2023 2020 by Blossom Ascencio RN  Safety Promotion/Fall Prevention:   activity supervised   assistive device/personal items within reach   clutter free environment maintained   fall prevention program maintained   safety round/check completed   room organization consistent  Intervention: Prevent Skin Injury  Recent Flowsheet Documentation  Taken 12/4/2023 0600 by Blossom Ascencio RN  Body Position: position changed independently  Taken 12/4/2023 0200 by Blossom Ascencio RN  Body  Position: position changed independently  Taken 12/4/2023 0000 by Blossom Ascencio RN  Body Position: position changed independently  Taken 12/3/2023 2200 by Blossom Ascecnio RN  Body Position: position changed independently  Taken 12/3/2023 2020 by Blossom Ascencio RN  Body Position: position changed independently  Skin Protection:   adhesive use limited   tubing/devices free from skin contact   transparent dressing maintained  Intervention: Prevent and Manage VTE (Venous Thromboembolism) Risk  Recent Flowsheet Documentation  Taken 12/3/2023 2020 by Blossom Ascencio RN  VTE Prevention/Management: sequential compression devices off  Range of Motion: active ROM (range of motion) encouraged  Intervention: Prevent Infection  Recent Flowsheet Documentation  Taken 12/4/2023 0600 by Blossom Ascencio RN  Infection Prevention:   single patient room provided   rest/sleep promoted   environmental surveillance performed   equipment surfaces disinfected  Taken 12/4/2023 0200 by Blossom Ascencio RN  Infection Prevention:   single patient room provided   rest/sleep promoted   environmental surveillance performed   equipment surfaces disinfected  Taken 12/4/2023 0000 by Blossom Ascencio RN  Infection Prevention:   single patient room provided   rest/sleep promoted   environmental surveillance performed   equipment surfaces disinfected  Taken 12/3/2023 2200 by Blossom Ascencio RN  Infection Prevention:   rest/sleep promoted   single patient room provided   environmental surveillance performed   equipment surfaces disinfected  Taken 12/3/2023 2020 by Blossom Ascencio RN  Infection Prevention:   single patient room provided   environmental surveillance performed   equipment surfaces disinfected   hand hygiene promoted  Goal: Optimal Comfort and Wellbeing  Outcome: Ongoing, Progressing  Intervention: Provide Person-Centered Care  Recent Flowsheet Documentation  Taken 12/3/2023 2020 by Blossom Ascencio RN  Trust  Relationship/Rapport:   care explained   choices provided   emotional support provided   empathic listening provided   questions answered   thoughts/feelings acknowledged   reassurance provided  Goal: Readiness for Transition of Care  Outcome: Ongoing, Progressing     Problem: Fall Injury Risk  Goal: Absence of Fall and Fall-Related Injury  Outcome: Ongoing, Progressing  Intervention: Promote Injury-Free Environment  Recent Flowsheet Documentation  Taken 12/4/2023 0600 by Blossom Ascencio RN  Safety Promotion/Fall Prevention:   activity supervised   assistive device/personal items within reach   clutter free environment maintained   safety round/check completed   room organization consistent   fall prevention program maintained  Taken 12/4/2023 0200 by Blossom Ascencio RN  Safety Promotion/Fall Prevention:   activity supervised   assistive device/personal items within reach   clutter free environment maintained   safety round/check completed   room organization consistent  Taken 12/4/2023 0000 by Blossom Ascencio RN  Safety Promotion/Fall Prevention:   activity supervised   assistive device/personal items within reach   clutter free environment maintained   safety round/check completed   room organization consistent  Taken 12/3/2023 2200 by Blossom Ascencio RN  Safety Promotion/Fall Prevention:   activity supervised   assistive device/personal items within reach   clutter free environment maintained   fall prevention program maintained   safety round/check completed   room organization consistent  Taken 12/3/2023 2020 by Blossom Ascencio, RN  Safety Promotion/Fall Prevention:   activity supervised   assistive device/personal items within reach   clutter free environment maintained   fall prevention program maintained   safety round/check completed   room organization consistent     Problem: Pain Acute  Goal: Acceptable Pain Control and Functional Ability  Outcome: Ongoing, Progressing  Intervention:  Optimize Psychosocial Wellbeing  Recent Flowsheet Documentation  Taken 12/3/2023 2020 by Blossom Ascencio RN  Supportive Measures: active listening utilized    Patient has needed nausea and pain medication throughout the night.  She continuous to be on oxygen at 2liters via [place] and has been in no distress

## 2023-12-04 NOTE — CASE MANAGEMENT/SOCIAL WORK
Discharge Planning Assessment   [facility]     Patient Name: Ashley Lin  MRN: [id number]  Today's Date: 12/4/2023    Admit Date: 12/3/2023    Plan: Covid +, Home, Caretenders  accepted (Will need order at DC) , Watch for Oxygen   Discharge Needs Assessment       Row Name 12/04/23 1039       Living Environment    People in Home alone    Current Living Arrangements home    Potentially Unsafe Housing Conditions none    In the past 12 months has the electric, gas, oil, or water company threatened to shut off services in your home? No    Primary Care Provided by self    Provides Primary Care For no one    Family Caregiver if Needed parent(s)    Family Caregiver Names Father Lupe    Able to Return to Prior Arrangements yes       Resource/Environmental Concerns    Resource/Environmental Concerns none    Transportation Concerns none       Food Insecurity    Within the past 12 months, you worried that your food would run out before you got the money to buy more. Never true    Within the past 12 months, the food you bought just didn't last and you didn't have money to get more. Never true       Transition Planning    Patient/Family Anticipates Transition to home    Patient/Family Anticipated Services at Transition none    Transportation Anticipated family or friend will provide       Discharge Needs Assessment    Readmission Within the Last 30 Days no previous admission in last 30 days    Equipment Currently Used at Home walker, rolling    Concerns to be Addressed denies needs/concerns at this time    Anticipated Changes Related to Illness none    Equipment Needed After Discharge none                   Discharge Plan       Row Name 12/04/23 1046       Plan    Plan Covid +, Home, Caretenders  accepted (Will need order at DC) , Watch for Oxygen    Plan Comments SPoke with father over phone and Patient at bedside using covid precautions. Patient lives at home alone. Recently DC from [facility] and stated she was  doing well until 4 days ago. IADL's PCP and Pharmacy verified, able to afford medications. Father provides transportation. Referral to [facility] and Liaison N[facility]hi Accepted. DC barriers: GI COnsult, IVF, IV Zofran                  Continued Care and Services - Admitted Since 12/3/2023       Home Medical Care       Service Provider Request Status Selected Services Address Phone [facility]ax Patient [facility] [address] [facility] [place] [address] [phone] --                           Expected Discharge Date and Time       Expected Discharge Date Expected Discharge Time    Dec 6, 2023            Demographic Summary       Row Name 12/04/23 1039       General Information    Admission Type inpatient    Arrived From emergency department    Required Notices Provided Important Message from Medicare    Referral Source admission list    Reason for Consult discharge planning    Preferred Language English                   Functional Status       Row Name 12/04/23 1039       Functional Status    Usual Activity Tolerance good    Current Activity Tolerance fair       Functional Status, IADL    Medications independent    Meal Preparation independent    Housekeeping independent    Laundry independent    Shopping independent       Mental Status    General Appearance WDL WDL       Mental Status Summary    Recent Changes in Mental Status/Cognitive Functioning no changes                            Patient Forms       Row Name 12/04/23 1038       Patient Forms    Important Message from Medicare (IMM) --  12/3 IMM per reg                  Case Management Readmission Assessment Note    Case Management Readmission Assessment (all recorded)       Readmission Interview       Row Name 12/04/23 1053             Readmission Indications    Is this hospitalization related to the prior hospital diagnosis? No      What was the reason you were admitted? Covid +,  Colitis, Vomitting        Row Name 12/04/23 1053             Recommendation for rehospitalization    Did you speak with your physician prior to coming to the hospital No      Did you seek care elsewhere prior to coming to the hospital? No        Row Name 12/04/23 1053             Follow up appointment    Do you have a PCP? Yes      Did you have an appointment with PCP/specialist after hospitalization within 7 days? --  Seen provider at [facility] but has not seen PCP since release from [facility]        Row Name 12/04/23 1053             Medications    Did you have newly prescribed medications at discharge? Yes      Did you understand the reasons for your medications at discharge and how to take them? Yes      Did you understand the side effects of your medications? Yes        Row Name 12/04/23 1053             Discharge Instructions    Did you understand your discharge instructions? Yes      Did your family/caregiver hear your instructions? --  Does not remember      Were you told to eat a special diet? No        Row Name 12/04/23 1053             Index discharge location/services    Where did you go upon discharge? Skilled Nursing Facility  DC to [facility] from hospital then DC home from there        Row Name 12/04/23 1053             Discharge Readiness    On a scale of 1-5 (5 being well prepared), how ready were you for discharge 5                      Leah Burris, RN

## 2023-12-04 NOTE — DISCHARGE PLACEMENT REQUEST
"Ashley Kapoor (73 y.o. Female)       Date of Birth   1950    Social Security Number       Address   [address] [place] [address]    Home Phone   [phone]    MRN   [id number]       Sikh   [facility]    Marital Status                               Admission Date   12/3/23    Admission Type   Emergency    Admitting Provider   Jonatan Bueno MD    Attending Provider   Jonatan Bueno MD    Department, Room/Bed   [facility] EMERGENCY DEPARTMENT, 20/20       Discharge Date       Discharge Disposition       Discharge Destination                                 Attending Provider: Jonatan Bueno MD    Allergies: Morphine, Penicillins    Isolation: Enh Drop/Con   Infection: COVID (confirmed) (12/03/23)   Code Status: Prior    Ht: 162.6 cm (64.02\")   Wt: 41.3 kg (91 lb)    Admission Cmt: None   Principal Problem: Intractable vomiting [R11.10]                   Active Insurance as of 12/3/2023       Primary Coverage       Payor Plan Insurance Group Employer/Plan Group    MEDICARE MEDICARE A & B        Payor Plan Address Payor Plan Phone Number Payor Plan Fax Number Effective Dates    [address] [phone]  2/1/1994 - None Entered    [facility] [id number]         Subscriber Name Subscriber Birth Date Member ID       ASHLEY KAPOOR 1950 [id number]               Secondary Coverage       Payor Plan Insurance Group Employer/Plan Group    [place]        Payor Plan Address Payor Plan Phone Number Payor Plan Fax Number Effective Dates    [address]   6/30/2020 - None Entered    [place] [address]         Subscriber Name Subscriber Birth Date Member ID       ASHLEY KAPOOR 1950 [id number]                     Emergency Contacts        (Rel.) Home Phone Work Phone Mobile Phone    MARTINEZNATHALY (Father) [phone] -- [phone]    CARLOSSUSANA LAW (Son) [phone] -- --      "

## 2023-12-04 NOTE — PROGRESS NOTES
[facility] MEDICINE SERVICE  DAILY PROGRESS NOTE    NAME: Ashley Lin  : 1950  MRN: [id number]      LOS: 1 day     PROVIDER OF SERVICE: Jonatan Bueno MD    Chief Complaint: Intractable vomiting    Subjective:     Interval History:  History taken from: patient  Patient Complaints:   Patient Denies:   nausea     History of Present Illness: Ashley Lin is a 73 y.o. female with multiple medical problems, a past history significant for chronic back pain, chronic migraine, COPD, depression, hypothyroidism, GERD, history of subdural hematoma, hyperlipidemia, DVT   who [facility] with nausea and  Vomiting for last 3 days, cannot eat or drink or hold anything down.  She has had hot and cold spells but no temperatures that she has not taken.  She denies any urinary complaints she denies any diarrhea bowels have been okay no bleeding.  She has had no chest pain neck arm jaw pain shortness of breath little bit of cough.  No ill exposures no foreign travels or antibiotic use or recent hospitalization.  She states she had back surgery back in 2023 she got a letter in the mail a couple weeks ago they told her she had a mass on her lungs which was spotted on a scan she had done on her spine.  Patient has had no recent hospitalization otherwise nothing seem to trigger make it better or worse symptoms are severe and she feels extremely dehydrated. complains of abdominal pain, denies diarrhea. In the ER Heart rate was 150.  Sinus tach on monitor.  Denies chest pain.  She was started on some fluids.  Tested positive for COVID, Urine was okay, cx-ray was clear, Sats are 100%     23-patient seen and examined in bed no acute distress, vital signs heart rate 138.  Discussed with RN, started Cardizem drip.  Patient is still complaining of significant pain.      Review of Systems   Constitutional:  Negative for chills and fever.   HENT:  Negative for  ear pain and hearing loss.    Respiratory:  Negative for apnea and shortness of breath.    Cardiovascular:  Negative for chest pain and palpitations.   Gastrointestinal:  Positive for nausea, vomiting, abd pain. Negative for diarrhea.   Genitourinary:  Negative for dysuria.   Musculoskeletal:  Negative for back pain and joint swelling.   Neurological:  Negative for seizures, light-headedness and headaches.   Psychiatric/Behavioral:  Negative for confusion and hallucinations.      Objective:     Vital Signs  Temp:  [97 °F (36.1 °C)-98.7 °F (37.1 °C)] 97.2 °F (36.2 °C)  Heart Rate:  [] 138  Resp:  [15-28] 18  BP: (126-155)/(76-99) 154/99  Flow (L/min):  [2-2.5] 2.5   Body mass index is 15.61 kg/m².      Physical ExamConstitutional:       General: She is not in acute distress.     Appearance: She is well-developed. She is ill-appearing. She is not toxic-appearing or diaphoretic.   HENT:      Head: Normocephalic and atraumatic.      Nose: Nose normal. No congestion or rhinorrhea.      Mouth/Throat:      Mouth: Mucous membranes are moist.      Pharynx: No oropharyngeal exudate.   Eyes:      General: No scleral icterus.        Right eye: No discharge.         Left eye: No discharge.      Extraocular Movements: Extraocular movements intact.      Conjunctiva/sclera: Conjunctivae normal.      Pupils: Pupils are equal, round, and reactive to light.   Neck:      Thyroid: No thyromegaly.      Vascular: No carotid bruit or JVD.      Trachea: No tracheal deviation.   Cardiovascular:      Rate and Rhythm: Normal rate and regular rhythm.      Pulses: Normal pulses.      Heart sounds: Normal heart sounds. No murmur heard.     No friction rub. No gallop.   Pulmonary:      Effort: Pulmonary effort is normal. No respiratory distress.      Breath sounds: Normal breath sounds. No stridor. No wheezing, rhonchi or rales.   Chest:      Chest wall: No tenderness.   Abdominal:      General: Bowel sounds are normal. There is no  distension.      Palpations: Abdomen is soft. There is no mass.      Tenderness: There is abdominal tenderness. There is no guarding or rebound.      Hernia: No hernia is present.   Musculoskeletal:         General: No swelling, tenderness, deformity or signs of injury. Normal range of motion.      Cervical back: Normal range of motion and neck supple. No rigidity. No muscular tenderness.      Right lower leg: No edema.      Left lower leg: No edema.   Lymphadenopathy:      Cervical: No cervical adenopathy.   Skin:     General: Skin is warm and dry.      Coloration: Skin is pale. Skin is not jaundiced.      Findings: No bruising, erythema or rash.   Neurological:      General: No focal deficit present.      Mental Status: She is alert and oriented to person, place, and time. Mental status is at baseline.      Cranial Nerves: No cranial nerve deficit.      Sensory: No sensory deficit.      Motor: Weakness present. No abnormal muscle tone.      Coordination: Coordination normal.   Psychiatric:         Mood and Affect: Mood normal.         Behavior: Behavior normal.         Thought Content: Thought content normal.        Scheduled Meds   atorvastatin, 10 mg, Oral, Nightly  cefTRIAXone, 2,000 mg, Intravenous, Q24H  cetirizine, 10 mg, Oral, Daily  cycloSPORINE, 1 drop, Both Eyes, BID  docusate sodium, 100 mg, Oral, BID  estradiol, 1 mg, Oral, Daily  furosemide, 20 mg, Oral, Daily  gabapentin, 300 mg, Oral, Daily  levothyroxine, 50 mcg, Oral, Q AM  metoclopramide, 10 mg, Intravenous, Q6H  metoprolol tartrate, 12.5 mg, Oral, BID  metoprolol tartrate, 5 mg, Intravenous, Once  pantoprazole, 40 mg, Oral, BID AC  polyethylene glycol, 17 g, Oral, Daily  potassium chloride, 10 mEq, Oral, Daily  prednisoLONE acetate, 1 drop, Both Eyes, Nightly  senna-docusate sodium, 2 tablet, Oral, BID  sertraline, 50 mg, Oral, Nightly  sodium chloride, 10 mL, Intravenous, Q12H  topiramate, 100 mg, Oral, BID  vitamin D3, 5,000 Units, Oral,  Daily       PRN Meds     albuterol sulfate HFA    senna-docusate sodium **AND** polyethylene glycol **AND** bisacodyl **AND** bisacodyl    butalbital-acetaminophen-caffeine    Calcium Replacement - Follow Nurse / BPA Driven Protocol    dicyclomine    diphenhydrAMINE    hydrOXYzine    ketorolac    Magnesium Standard Dose Replacement - Follow Nurse / BPA Driven Protocol    melatonin    nitroglycerin    ondansetron **OR** ondansetron    oxyCODONE    Phosphorus Replacement - Follow Nurse / BPA Driven Protocol    Potassium Replacement - Follow Nurse / BPA Driven Protocol    promethazine    [COMPLETED] Insert Peripheral IV **AND** sodium chloride    sodium chloride    sodium chloride    SUMAtriptan   Infusions         Diagnostic Data    Results from last 7 days   Lab Units 12/03/23  1029   WBC 10*3/mm3 3.60   HEMOGLOBIN g/dL 14.0   HEMATOCRIT % 42.1   PLATELETS 10*3/mm3 409   GLUCOSE mg/dL 168*   CREATININE mg/dL 0.61   BUN mg/dL 13   SODIUM mmol/L 143   POTASSIUM mmol/L 4.3   AST (SGOT) U/L 21   ALT (SGPT) U/L 11   ALK PHOS U/L 152*   BILIRUBIN mg/dL 0.3   ANION GAP mmol/L 20.0*       CT Abdomen Pelvis Without Contrast    Result Date: 12/3/2023  Impression: 1.Examination is limited due to lack of IV contrast administration and extensive beam hardening artifact. 2.Diffuse colonic wall thickening raises suspicion for colitis. This may also be related to underdistention. Please correlate clinically. 3.There are mild superior endplate compression fractures of T12 and L1 which are new since 6/6/2022. Exact chronicity is uncertain. Please correlate clinically for pain/tenderness at these sites. 4.There is a catheter coiled within the vagina. If this is meant to be within the bladder, recommend repositioning. 5.Additional findings as detailed above. Electronically Signed: Frederick Al MD  12/3/2023 1:28 PM EST  Workstation ID: [id number]    XR Chest 1 View    Result Date: 12/3/2023  Impression: No acute process. Electronically  Signed: Carrillo Goodman MD  12/3/2023 12:09 PM EST  Workstation ID: [id number]       I reviewed the patient's new clinical results.    Assessment/Plan:     Active and Resolved Problems  Active Hospital Problems    Diagnosis  POA    **Intractable vomiting [R11.10]  Yes     Priority: High    Colitis [K52.9]  Unknown     Priority: High    Generalized abdominal pain [R10.84]  Unknown     Priority: High    Sinus tachycardia [R00.0]  Yes     Priority: High    Chronic obstructive pulmonary disease [J44.9]  Yes     Priority: Medium    Intractable cyclical vomiting associated with migraine [G43.A1]  Yes     Priority: Medium    Chronic anticoagulation [Z79.01]  Not Applicable     Priority: Low    Severe malnutrition [E43]  Yes     Priority: Low    Debility [R53.81]  Yes     Priority: Low    Depression [F32.A]  Yes     Priority: Low    Chronic pain syndrome [G89.4]  Yes     Priority: Low    GERD without esophagitis [K21.9]  Yes     Priority: Low    Cervical disc herniation [M50.20]  Yes     Priority: Low    Low back pain [M54.50]  Yes     Priority: Low    History of lumbar fusion [Z98.1]  Not Applicable     Priority: Low    Abdominal pain with vomiting [R10.9, R11.10]  Yes    History of subdural hematoma [Z86.79]  Not Applicable    Hyperlipidemia [E78.5]  Yes    Hypothyroidism (acquired) [E03.9]  Yes    Migraines [G43.909]  Yes      Resolved Hospital Problems   No resolved problems to display.       Intractable vomiting, abdominal pain. acute colitis   [facility]  IV fluids  Zofran IV  GI consulted-Suspect patient nausea vomiting as well as some diarrhea could be related to either COVID infection versus COVID-related infectious colitis with the diarrhea and thickening of bowel.  We will check stool studies.  Will hold off on any antibiotic for GI tract.  Because of her history of gastroparesis she will be placed on Reglan as well as PPI.  She has been taking significant amount of ibuprofen possibility of  NSAID induced gastritis ulceration leading to above symptoms in the differential as well   Reglan iv     COVID-19 Symbicort albuterol vitamin C vitamin D zinc.     Hyperlipidemia on Lipitor.  Check CPK.     Migraine on Fioricet.  Max as needed.     Chronic back and neck pain continue Neurontin.  Oxycodone     GERD continue Protonix     Depression continue Zoloft     COPD DuoNeb as needed          DVT prophylaxis:  Mechanical DVT prophylaxis orders are present.     Code status is   There are no questions and answers to display.       Plan for disposition:home in 2 days    Time: 30 minutes    Signature: Electronically signed by Jonatan Bueno MD, 12/04/23, 08:36 EST.  [facility]ist Team

## 2023-12-04 NOTE — PLAN OF CARE
Problem: Adult Inpatient Plan of Care  Goal: Plan of Care Review  Outcome: Ongoing, Progressing  Goal: Patient-Specific Goal (Individualized)  Outcome: Ongoing, Progressing  Goal: Absence of Hospital-Acquired Illness or Injury  Outcome: Ongoing, Progressing  Intervention: Identify and Manage Fall Risk  Recent Flowsheet Documentation  Taken 12/4/2023 1114 by Dumas, Olivia A, LPN  Safety Promotion/Fall Prevention:   activity supervised   assistive device/personal items within reach   clutter free environment maintained   fall prevention program maintained   nonskid shoes/slippers when out of bed   room organization consistent   safety round/check completed  Taken 12/4/2023 1000 by Dumas, Olivia A, LPN  Safety Promotion/Fall Prevention:   activity supervised   assistive device/personal items within reach   clutter free environment maintained   fall prevention program maintained   nonskid shoes/slippers when out of bed   room organization consistent   safety round/check completed  Taken 12/4/2023 0800 by Olivia Goss LPN  Safety Promotion/Fall Prevention:   activity supervised   assistive device/personal items within reach   clutter free environment maintained   fall prevention program maintained   nonskid shoes/slippers when out of bed   room organization consistent   safety round/check completed  Intervention: Prevent and Manage VTE (Venous Thromboembolism) Risk  Recent Flowsheet Documentation  Taken 12/4/2023 1114 by Olivia Goss LPN  VTE Prevention/Management:   bilateral   sequential compression devices off   patient refused intervention  Taken 12/4/2023 0800 by Olivia Goss LPN  VTE Prevention/Management:   bilateral   sequential compression devices off   patient refused intervention  Range of Motion: active ROM (range of motion) encouraged  Intervention: Prevent Infection  Recent Flowsheet Documentation  Taken 12/4/2023 1114 by Olivia Goss LPN  Infection Prevention: hand  hygiene promoted  Taken 12/4/2023 1000 by Olivia Goss LPN  Infection Prevention: hand hygiene promoted  Taken 12/4/2023 0800 by Olivia Goss LPN  Infection Prevention: hand hygiene promoted  Goal: Optimal Comfort and Wellbeing  Outcome: Ongoing, Progressing  Intervention: Provide Person-Centered Care  Recent Flowsheet Documentation  Taken 12/4/2023 1114 by Olivia Goss LPN  Trust Relationship/Rapport:   care explained   thoughts/feelings acknowledged  Taken 12/4/2023 0800 by Olivia Goss LPN  Trust Relationship/Rapport:   care explained   thoughts/feelings acknowledged  Goal: Readiness for Transition of Care  Outcome: Ongoing, Progressing     Problem: Fall Injury Risk  Goal: Absence of Fall and Fall-Related Injury  Outcome: Ongoing, Progressing  Intervention: Identify and Manage Contributors  Recent Flowsheet Documentation  Taken 12/4/2023 1114 by Olivia Goss LPN  Medication Review/Management: medications reviewed  Taken 12/4/2023 1000 by Olivia Goss LPN  Medication Review/Management: medications reviewed  Taken 12/4/2023 0800 by Olivia Goss LPN  Medication Review/Management: medications reviewed  Intervention: Promote Injury-Free Environment  Recent Flowsheet Documentation  Taken 12/4/2023 1114 by [place], Olivia A, LPN  Safety Promotion/Fall Prevention:   activity supervised   assistive device/personal items within reach   clutter free environment maintained   fall prevention program maintained   nonskid shoes/slippers when out of bed   room organization consistent   safety round/check completed  Taken 12/4/2023 1000 by [place], Olivia A, LPN  Safety Promotion/Fall Prevention:   activity supervised   assistive device/personal items within reach   clutter free environment maintained   fall prevention program maintained   nonskid shoes/slippers when out of bed   room organization consistent   safety round/check completed  Taken 12/4/2023 0800 by Olivia Goss  A, LPN  Safety Promotion/Fall Prevention:   activity supervised   assistive device/personal items within reach   clutter free environment maintained   fall prevention program maintained   nonskid shoes/slippers when out of bed   room organization consistent   safety round/check completed     Problem: Pain Acute  Goal: Acceptable Pain Control and Functional Ability  Outcome: Ongoing, Progressing  Intervention: Prevent or Manage Pain  Recent Flowsheet Documentation  Taken 12/4/2023 1114 by Olivia Goss LPN  Medication Review/Management: medications reviewed  Taken 12/4/2023 1000 by Olivia Goss LPN  Medication Review/Management: medications reviewed  Taken 12/4/2023 0800 by Olivia Goss LPN  Sensory Stimulation Regulation: care clustered  Medication Review/Management: medications reviewed   Goal Outcome Evaluation:

## 2023-12-04 NOTE — CONSULTS
GI CONSULT  NOTE:    Referring Provider:  Dr. Bueno    Chief complaint: Nausea/vomiting    Subjective .     History of present illness: Patient is a 73-year-old female with history of back surgery in January 2023, gastric bypass surgery with Billroth I anatomy, cholecystectomy, and gastroparesis.  She presented to the hospital yesterday with nausea/vomiting that had worsened over the past 4 days.  She normally has nausea and vomiting intermittently but definitely was worse a few days ago.  She denies diarrhea but is having more frequent stools lately.  Denies hematemesis.  No rectal bleeding.  For the past few days since feeling ill she has been using at least 4 ibuprofen daily.  Reports chills but no fevers.  Also has some generalized abdominal pain.      Endo History:  10/2023 EGD by Dr. Mike -previous gastric surgery with Billroth I anatomy, food in the stomach.  2020 EGD/colonoscopy -empiric esophageal dilation performed, gastroparesis, superficial erosions in the sigmoid colon with benign biopsies, benign random colon biopsies.    Past Medical History:  Past Medical History:   Diagnosis Date    Acute deep vein thrombosis (DVT) of calf muscle vein of left lower extremity 11/10/2023    Aftercare for healing traumatic closed fracture of right radius 11/10/2023    Asthma     Chronic anticoagulation 11/10/2023    Chronic back pain     Chronic headaches     Chronic obstructive pulmonary disease 07/01/2020    Chronic pain syndrome 07/01/2020    Depression 07/01/2020    Disease of thyroid gland     Estrogen deficiency 07/01/2020    GERD without esophagitis 07/01/2020    History of subdural hematoma 11/10/2023    Hyperlipidemia 11/10/2023    Hypothyroidism (acquired) 07/01/2020    Migraines 07/01/2020    Neuropathy     Sinus tachycardia 11/10/2023    Vomiting 06/30/2020       Past Surgical History:  Past Surgical History:   Procedure Laterality Date    CHOLECYSTECTOMY      COLONOSCOPY N/A 11/11/2020    Procedure:  COLONOSCOPY WITH RANDOM COLON BIOPSIES X4 AREAS, BIOPSY X 1 AREA;  Surgeon: Aguila Garcia MD;  Location: [facility] ENDOSCOPY;  Service: Gastroenterology;  Laterality: N/A;  POST- colon erosion    ENDOSCOPY N/A 2020    Procedure: ESOPHAGOGASTRODUODENOSCOPY WITH DILATATION (BOUGIE #[id number]);  Surgeon: Benjamin Mike MD;  Location: [facility] ENDOSCOPY;  Service: Gastroenterology;  Laterality: N/A;  Post operative diagnosis:GASTROPARESIS AND DYSPHAGIA     ENDOSCOPY N/A 2020    Procedure: ESOPHAGOGASTRODUODENOSCOPY WITH BIOPSY X 1 AREA;  Surgeon: Aguila Garcia MD;  Location: [facility] ENDOSCOPY;  Service: Gastroenterology;  Laterality: N/A;  POST    HYSTERECTOMY      NECK SURGERY      SPINE SURGERY  2020       Social History:  Social History     Tobacco Use    Smoking status: Former     Types: Cigarettes     Quit date:      Years since quittin.9     Passive exposure: Current    Smokeless tobacco: Never   Vaping Use    Vaping Use: Never used   Substance Use Topics    Alcohol use: Never    Drug use: Never       Family History:  Family History   Problem Relation Age of Onset    Heart disease Mother     No Known Problems Father        Medications:  (Not in a hospital admission)      Scheduled Meds:atorvastatin, 10 mg, Oral, Nightly  cefTRIAXone, 2,000 mg, Intravenous, Q24H  cetirizine, 10 mg, Oral, Daily  cycloSPORINE, 1 drop, Both Eyes, BID  docusate sodium, 100 mg, Oral, BID  estradiol, 1 mg, Oral, Daily  furosemide, 20 mg, Oral, Daily  gabapentin, 300 mg, Oral, Daily  levothyroxine, 50 mcg, Oral, Q AM  metoclopramide, 10 mg, Intravenous, Q6H  metoprolol tartrate, 12.5 mg, Oral, BID  metoprolol tartrate, 5 mg, Intravenous, Once  pantoprazole, 40 mg, Oral, BID AC  polyethylene glycol, 17 g, Oral, Daily  potassium chloride, 10 mEq, Oral, Daily  prednisoLONE acetate, 1 drop, Both Eyes, Nightly  senna-docusate sodium, 2 tablet, Oral, BID  sertraline, 50 mg, Oral, Nightly  sodium chloride, 10 mL,  "Intravenous, Q12H  topiramate, 100 mg, Oral, BID  vitamin D3, 5,000 Units, Oral, Daily      Continuous Infusions:   PRN Meds:.  albuterol sulfate HFA    senna-docusate sodium **AND** polyethylene glycol **AND** bisacodyl **AND** bisacodyl    butalbital-acetaminophen-caffeine    Calcium Replacement - Follow Nurse / BPA Driven Protocol    dicyclomine    diphenhydrAMINE    hydrOXYzine    ketorolac    Magnesium Standard Dose Replacement - Follow Nurse / BPA Driven Protocol    melatonin    nitroglycerin    ondansetron **OR** ondansetron    oxyCODONE    Phosphorus Replacement - Follow Nurse / BPA Driven Protocol    Potassium Replacement - Follow Nurse / BPA Driven Protocol    promethazine    [COMPLETED] Insert Peripheral IV **AND** sodium chloride    sodium chloride    sodium chloride    SUMAtriptan    ALLERGIES:  Morphine and Penicillins    ROS:  Review of Systems   Constitutional:  Positive for chills. Negative for fever.   Respiratory:  Negative for cough and shortness of breath.    Cardiovascular:  Negative for chest pain and palpitations.   Gastrointestinal:  Positive for abdominal pain, nausea and vomiting. Negative for blood in stool and constipation.   Musculoskeletal:  Positive for arthralgias and back pain.   Neurological:  Positive for weakness. Negative for dizziness.   Psychiatric/Behavioral:  Negative for agitation and confusion.        Objective     Vital Signs:   Visit Vitals  /99 (BP Location: Right arm, Patient Position: Lying)   Pulse (!) 138   Temp 97.2 °F (36.2 °C) (Oral)   Resp 18   Ht 162.6 cm (64.02\")   Wt 41.3 kg (91 lb)   SpO2 100%   BMI 15.61 kg/m²       Physical Exam:      General Appearance:    Awake and alert, in no acute distress   Head:    Normocephalic, without obvious abnormality, atraumatic   Eyes:            Conjunctivae normal, anicteric sclera   Ears:    Ears appear intact with no abnormalities noted   Throat:   No oral lesions, no thrush, oral mucosa moist   Neck:   No " adenopathy, supple, no thyromegaly, no JVD   Lungs:     Respirations regular, even and unlabored       Chest Wall:    No abnormalities observed   Abdomen:     Soft, upper abdominal tenderness, no rebound or guarding, non-distended, no hepatosplenomegaly   Rectal:     Deferred   Extremities:   Moves all extremities well, no edema, no cyanosis, no redness   Pulses:   Pulses palpable and equal bilaterally   Skin:   No bleeding, bruising or rash, no jaundice   Lymph nodes:   No palpable adenopathy   Neurologic:   Sensation intact       Results Review:   I reviewed the patient's labs and imaging.  CBC  Results from last 7 days   Lab Units 12/03/23  1029   RBC 10*6/mm3 4.15   WBC 10*3/mm3 3.60   HEMOGLOBIN g/dL 14.0   PLATELETS 10*3/mm3 409       CMP  Results from last 7 days   Lab Units 12/03/23  1029   SODIUM mmol/L 143   POTASSIUM mmol/L 4.3   CHLORIDE mmol/L 105   CO2 mmol/L 18.0*   BUN mg/dL 13   CREATININE mg/dL 0.61   GLUCOSE mg/dL 168*   ALBUMIN g/dL 2.5*   BILIRUBIN mg/dL 0.3   ALK PHOS U/L 152*   AST (SGOT) U/L 21   ALT (SGPT) U/L 11       Amylase and Lipase  Results from last 7 days   Lab Units 12/03/23  1029   LIPASE U/L 8*       CRP         Imaging Results (Last 24 Hours)       Procedure Component Value Units Date/Time    CT Abdomen Pelvis Without Contrast [[id number]] Collected: 12/03/23 1315     Updated: 12/03/23 1330    Narrative:      CT ABDOMEN PELVIS WO CONTRAST    Date of Exam: 12/3/2023 12:53 PM EST    Indication: Diffuse abdominal pain vomiting.    Comparison: CT of the abdomen and pelvis dated 6/6/2022    Technique: Axial CT images were obtained of the abdomen and pelvis without the administration of contrast. Sagittal and coronal reconstructions were performed.  Automated exposure control and iterative reconstruction methods were used.    Findings:    Examination is limited due to lack of IV contrast administration and extensive beam hardening artifact.    Liver: The liver is unremarkable in  morphology. Evaluation for focal liver lesions is limited without IV contrast. Mildly prominent CBD may be related to prior cholecystectomy.    Gallbladder: Surgically absent.    Pancreas: Unremarkable.    Spleen: Unremarkable.    Adrenal glands: Unremarkable.    Genitourinary tract: Kidneys are unremarkable. No hydronephrosis is seen. Visualized portions of the ureters appear grossly unremarkable. Urinary bladder is grossly unremarkable. There is a catheter coiled within the vagina.    Gastrointestinal tract: There is limited visualization of the hollow viscera due to lack of IV contrast and due to extensive beam hardening artifact within much of the study. Colonic wall thickening may be related to underdistention. Colitis may be   considered in the appropriate clinical setting. No findings to suggest bowel obstruction. There are surgical changes of the stomach with gastrojejunostomy.    Appendix: The appendix is not definitely identified.    Other findings: No free air or free fluid is seen. No pathologically enlarged lymph nodes are seen. Vascular calcifications are present.    Bones and soft tissues: Bones are demineralized. There are mild superior endplate compression fractures of T12 and L1 which are new since 6/6/2022. Exact chronicity is uncertain. Please correlate clinically for pain/tenderness at these sites. There are   surgical changes of the lumbar spine from L3-S1. There are chronic changes of the bilateral iliac bones. There is a sclerotic focus within the left inferior pubic ramus which appears similar since 6/6/2022, possibly a bone island. Right-sided InterStim   is noted. Superficial soft tissues demonstrate no acute abnormality.    Lung bases: The visualized lung bases are clear.      Impression:      Impression:  1.Examination is limited due to lack of IV contrast administration and extensive beam hardening artifact.  2.Diffuse colonic wall thickening raises suspicion for colitis. This may also  be related to underdistention. Please correlate clinically.  3.There are mild superior endplate compression fractures of T12 and L1 which are new since 6/6/2022. Exact chronicity is uncertain. Please correlate clinically for pain/tenderness at these sites.   4.There is a catheter coiled within the vagina. If this is meant to be within the bladder, recommend repositioning.  5.Additional findings as detailed above.    Electronically Signed: Frederick Al MD    12/3/2023 1:28 PM EST    Workstation ID: [id number]    XR Chest 1 View [[id number]] Collected: 12/03/23 1209     Updated: 12/03/23 1211    Narrative:      XR CHEST 1 VW    Date of Exam: 12/3/2023 10:30 AM EST    Indication: Vomiting weakness recent back surgery    Comparison: CT chest with contrast 11/7/2023    Findings:  The cardiomediastinal silhouette is within normal limits. Lungs are clear. No focal consolidation, pneumothorax, or significant pleural effusion. Osseous structures grossly intact. Surgical anchors overlie the left and right humeral head. Multilevel   cervical fixation.      Impression:      Impression:  No acute process.            Electronically Signed: Carrillo Goodman MD    12/3/2023 12:09 PM EST    Workstation ID: [id number]              ASSESSMENT AND PLAN:  73-year-old female presented to the hospital on 12/3/2023 with worsening nausea/vomiting and positive for COVID-19.  CT shows diffuse colonic thickening but patient denies diarrhea.    Nausea/vomiting with history of gastroparesis  Abnormal CT suggesting diffuse colitis  COVID-19  COPD  History of gastric bypass  History of cholecystectomy  Status post back surgery January 2023    Principal Problem:    Intractable vomiting  Active Problems:    Cervical disc herniation    Chronic obstructive pulmonary disease    Low back pain    History of lumbar fusion    Hypothyroidism (acquired)    Depression    Migraines    Chronic pain syndrome    GERD without esophagitis    Intractable cyclical vomiting  associated with migraine    Debility    Severe malnutrition    History of subdural hematoma    Sinus tachycardia    Hyperlipidemia    Chronic anticoagulation    Colitis    Generalized abdominal pain    Abdominal pain with vomiting     Plan:  73-year-old female presented to the hospital yesterday with complaints of worsening nausea/vomiting and has COVID-19.  CT shows diffuse colonic thickening.  She does report more frequent stools but no diarrhea.  She has previous EGDs that suggest gastroparesis.  She has also been using ibuprofen lately.  Consider nausea/vomiting worse related to COVID-19 infection versus peptic ulcer disease.  Plan to start PPI twice daily along with Reglan and use Zofran as needed.  Also follow-up on stool studies due to thickening of colon on CT.  Okay for clear liquid diet.  Monitor labs.  Stop NSAIDs    I discussed the patients findings and my recommendations with the patient.  Lynda Garces, HUMZA  12/04/23  09:29 EST

## 2023-12-04 NOTE — CONSULTS
[facility]        Subjective:     Encounter Date:12/03/2023      Patient ID: Ashley Lin is a 73 y.o. female.    Chief Complaint: nausea/ vomiting  Cardiology Consult: tachycardia    Referring Physician: Dr. Bueno    HPI: Agree with narrative is discussed with nurse practitioner after face-to-face encounter scribed findings below  Ashley Lin is a 73 y.o. female who presents with nausea vomiting. Ms. Lin does not routinely see a cardiologist. Pmh includes HLD, hypothyroidism, prior DVT, COPD, h/o subdural hematoma, sinus tachycardia.    Ms. Lin presented to ER with vomiting and inability to hold down food or liquid for the last 3 days. She tested positive for COVID 19. GI consulted for nausea and vomiting. She is sinus tach on ECG with HR in the 130s. She was started on a cardizem gtt per primary team for sinus tachycardia with elevated heart rates. Denies chest pain. She is on 2.5L NC, she had clear CXR. Cardiology consulted for tachycardia.     Review of systems otherwise negative x14 point review of systems except as mentioned above  Historical data copied forward from previous encounters in EMR is unchanged      EKG sinus tachycardia, no evidence of atrial fibrillation  Past Medical History:   Diagnosis Date    Acute deep vein thrombosis (DVT) of calf muscle vein of left lower extremity 11/10/2023    Aftercare for healing traumatic closed fracture of right radius 11/10/2023    Asthma     Chronic anticoagulation 11/10/2023    Chronic back pain     Chronic headaches     Chronic obstructive pulmonary disease 07/01/2020    Chronic pain syndrome 07/01/2020    Depression 07/01/2020    Disease of thyroid gland     Estrogen deficiency 07/01/2020    GERD without esophagitis 07/01/2020    History of subdural hematoma 11/10/2023    Hyperlipidemia 11/10/2023    Hypothyroidism (acquired) 07/01/2020    Migraines 07/01/2020    Neuropathy     Sinus tachycardia 11/10/2023    Vomiting  2020       Past Surgical History:   Procedure Laterality Date    CHOLECYSTECTOMY      COLONOSCOPY N/A 2020    Procedure: COLONOSCOPY WITH RANDOM COLON BIOPSIES X4 AREAS, BIOPSY X 1 AREA;  Surgeon: Aguila Garcia MD;  Location: [facility] ENDOSCOPY;  Service: Gastroenterology;  Laterality: N/A;  POST- colon erosion    ENDOSCOPY N/A 2020    Procedure: ESOPHAGOGASTRODUODENOSCOPY WITH DILATATION (BOUGIE #[id number]);  Surgeon: Benjamin Mike MD;  Location: [facility] ENDOSCOPY;  Service: Gastroenterology;  Laterality: N/A;  Post operative diagnosis:GASTROPARESIS AND DYSPHAGIA     ENDOSCOPY N/A 2020    Procedure: ESOPHAGOGASTRODUODENOSCOPY WITH BIOPSY X 1 AREA;  Surgeon: Aguila Garcia MD;  Location: [facility] ENDOSCOPY;  Service: Gastroenterology;  Laterality: N/A;  POST    HYSTERECTOMY      NECK SURGERY      SPINE SURGERY  2020       Family History   Problem Relation Age of Onset    Heart disease Mother     No Known Problems Father        Social History     Socioeconomic History    Marital status:    Tobacco Use    Smoking status: Former     Types: Cigarettes     Quit date:      Years since quittin.9     Passive exposure: Current    Smokeless tobacco: Never   Vaping Use    Vaping Use: Never used   Substance and Sexual Activity    Alcohol use: Never    Drug use: Never    Sexual activity: Defer         Allergies   Allergen Reactions    Morphine Hives    Penicillins Hives and Shortness Of Breath     Can take iv form - but not po form         Current Medications:   Scheduled Meds:atorvastatin, 10 mg, Oral, Nightly  cefTRIAXone, 2,000 mg, Intravenous, Q24H  cetirizine, 10 mg, Oral, Daily  cycloSPORINE, 1 drop, Both Eyes, BID  docusate sodium, 100 mg, Oral, BID  estradiol, 1 mg, Oral, Daily  furosemide, 20 mg, Oral, Daily  gabapentin, 300 mg, Oral, Daily  levothyroxine, 50 mcg, Oral, Q AM  metoclopramide, 10 mg, Intravenous, Q6H  metoprolol tartrate, 5 mg, Intravenous,  "Once  metoprolol tartrate, 50 mg, Oral, BID  pantoprazole, 40 mg, Oral, BID AC  polyethylene glycol, 17 g, Oral, Daily  potassium chloride, 10 mEq, Oral, Daily  prednisoLONE acetate, 1 drop, Both Eyes, Nightly  senna-docusate sodium, 2 tablet, Oral, BID  sertraline, 50 mg, Oral, Nightly  sodium chloride, 10 mL, Intravenous, Q12H  topiramate, 100 mg, Oral, BID  vitamin D3, 5,000 Units, Oral, Daily      Continuous Infusions:dilTIAZem, 5-15 mg/hr        Review of Systems   Constitutional: Positive for malaise/fatigue. Negative for chills and diaphoresis.   Cardiovascular:  Negative for chest pain, dyspnea on exertion, irregular heartbeat, leg swelling, near-syncope, orthopnea, palpitations, paroxysmal nocturnal dyspnea and syncope.   Respiratory:  Negative for cough, shortness of breath, sleep disturbances due to breathing and sputum production.    Gastrointestinal:  Positive for nausea and vomiting. Negative for change in bowel habit.   Genitourinary:  Negative for urgency.   Neurological:  Negative for dizziness and headaches.   Psychiatric/Behavioral:  Negative for altered mental status.             Objective:         /85 (BP Location: Right arm, Patient Position: Lying)   Pulse (!) 137   Temp 98 °F (36.7 °C) (Oral)   Resp 18   Ht 162.6 cm (64.02\")   Wt 41.3 kg (91 lb)   SpO2 100%   BMI 15.61 kg/m²     Physical Exam:  General Appearance:    Alert, cooperative, in no acute distress                                Head: Atraumatic, normocephalic, PERRLA               Neck:   supple,  no JVD   Lungs:    Supplemental O2, dim bases    Heart:    Regular rhythm and normal rate, normal S1 and S2   Abdomen:     Normal bowel sounds, no masses, no organomegaly, soft  nontender, nondistended, no guarding, no rebound  tenderness   Extremities:   Moves all extremities well, no edema, no cyanosis, no  redness   Pulses:   Pulses palpable and equal bilaterally   Skin:   No bleeding, bruising or rash   Neurologic:   " "Awake, alert, oriented x3           Agree with exam discussed with nurse practitioner after face-to-face counter scribed findings above      ASCVD Risk Score::  The 10-year ASCVD risk score (Aislinn PAZ, et al., 2019) is: 12.8%    Values used to calculate the score:      Age: 73 years      Sex: Female      Is Non- : No      Diabetic: No      Tobacco smoker: No      Systolic Blood Pressure: 148 mmHg      Is BP treated: No      HDL Cholesterol: 63 mg/dL      Total Cholesterol: 160 mg/dL      Lab Review:     Results from last 7 days   Lab Units 12/03/23  1029   SODIUM mmol/L 143   POTASSIUM mmol/L 4.3   CHLORIDE mmol/L 105   CO2 mmol/L 18.0*   BUN mg/dL 13   CREATININE mg/dL 0.61   GLUCOSE mg/dL 168*   CALCIUM mg/dL 8.4*   AST (SGOT) U/L 21   ALT (SGPT) U/L 11     Results from last 7 days   Lab Units 12/04/23  1222   HSTROP T ng/L 28*     Results from last 7 days   Lab Units 12/03/23  1029   WBC 10*3/mm3 3.60   HEMOGLOBIN g/dL 14.0   HEMATOCRIT % 42.1   PLATELETS 10*3/mm3 409                   Invalid input(s): \"LDLCALC\"            Recent Radiology:  Imaging Results (Most Recent)       Procedure Component Value Units Date/Time    CT Abdomen Pelvis Without Contrast [[id number]] Collected: 12/03/23 1315     Updated: 12/03/23 1330    Narrative:      CT ABDOMEN PELVIS WO CONTRAST    Date of Exam: 12/3/2023 12:53 PM EST    Indication: Diffuse abdominal pain vomiting.    Comparison: CT of the abdomen and pelvis dated 6/6/2022    Technique: Axial CT images were obtained of the abdomen and pelvis without the administration of contrast. Sagittal and coronal reconstructions were performed.  Automated exposure control and iterative reconstruction methods were used.    Findings:    Examination is limited due to lack of IV contrast administration and extensive beam hardening artifact.    Liver: The liver is unremarkable in morphology. Evaluation for focal liver lesions is limited without IV contrast. Mildly " prominent CBD may be related to prior cholecystectomy.    Gallbladder: Surgically absent.    Pancreas: Unremarkable.    Spleen: Unremarkable.    Adrenal glands: Unremarkable.    Genitourinary tract: Kidneys are unremarkable. No hydronephrosis is seen. Visualized portions of the ureters appear grossly unremarkable. Urinary bladder is grossly unremarkable. There is a catheter coiled within the vagina.    Gastrointestinal tract: There is limited visualization of the hollow viscera due to lack of IV contrast and due to extensive beam hardening artifact within much of the study. Colonic wall thickening may be related to underdistention. Colitis may be   considered in the appropriate clinical setting. No findings to suggest bowel obstruction. There are surgical changes of the stomach with gastrojejunostomy.    Appendix: The appendix is not definitely identified.    Other findings: No free air or free fluid is seen. No pathologically enlarged lymph nodes are seen. Vascular calcifications are present.    Bones and soft tissues: Bones are demineralized. There are mild superior endplate compression fractures of T12 and L1 which are new since 6/6/2022. Exact chronicity is uncertain. Please correlate clinically for pain/tenderness at these sites. There are   surgical changes of the lumbar spine from L3-S1. There are chronic changes of the bilateral iliac bones. There is a sclerotic focus within the left inferior pubic ramus which appears similar since 6/6/2022, possibly a bone island. Right-sided InterStim   is noted. Superficial soft tissues demonstrate no acute abnormality.    Lung bases: The visualized lung bases are clear.      Impression:      Impression:  1.Examination is limited due to lack of IV contrast administration and extensive beam hardening artifact.  2.Diffuse colonic wall thickening raises suspicion for colitis. This may also be related to underdistention. Please correlate clinically.  3.There are mild superior  endplate compression fractures of T12 and L1 which are new since 6/6/2022. Exact chronicity is uncertain. Please correlate clinically for pain/tenderness at these sites.   4.There is a catheter coiled within the vagina. If this is meant to be within the bladder, recommend repositioning.  5.Additional findings as detailed above.    Electronically Signed: Frederick Al MD    12/3/2023 1:28 PM EST    Workstation ID: [id number]    XR Chest 1 View [[id number]] Collected: 12/03/23 1209     Updated: 12/03/23 1211    Narrative:      XR CHEST 1 VW    Date of Exam: 12/3/2023 10:30 AM EST    Indication: Vomiting weakness recent back surgery    Comparison: CT chest with contrast 11/7/2023    Findings:  The cardiomediastinal silhouette is within normal limits. Lungs are clear. No focal consolidation, pneumothorax, or significant pleural effusion. Osseous structures grossly intact. Surgical anchors overlie the left and right humeral head. Multilevel   cervical fixation.      Impression:      Impression:  No acute process.            Electronically Signed: Crarillo Goodman MD    12/3/2023 12:09 PM EST    Workstation ID: [id number]              ECHOCARDIOGRAM:    Results for orders placed during the hospital encounter of 11/07/23    Adult Transthoracic Echo Complete W/ Cont if Necessary Per Protocol    Interpretation Summary    Left ventricular systolic function is normal. Left ventricular ejection fraction appears to be 56 - 60%.    Left ventricular diastolic function is consistent with (grade Ia w/high LAP) impaired relaxation.    Estimated right ventricular systolic pressure from tricuspid regurgitation is normal (<35 mmHg).                  Assessment:         Active Hospital Problems    Diagnosis  POA    **Intractable vomiting [R11.10]  Yes    COVID-19 [U07.1]  Yes    Colitis [K52.9]  Unknown    Generalized abdominal pain [R10.84]  Unknown    Abdominal pain with vomiting [R10.9, R11.10]  Yes    History of subdural hematoma [Z86.79]   Not Applicable    Sinus tachycardia [R00.0]  Yes    Hyperlipidemia [E78.5]  Yes    Chronic anticoagulation [Z79.01]  Not Applicable    Severe malnutrition [E43]  Yes    Debility [R53.81]  Yes    Chronic obstructive pulmonary disease [J44.9]  Yes    Hypothyroidism (acquired) [E03.9]  Yes    Depression [F32.A]  Yes    Migraines [G43.909]  Yes    Chronic pain syndrome [G89.4]  Yes    GERD without esophagitis [K21.9]  Yes    Intractable cyclical vomiting associated with migraine [G43.A1]  Yes     Added automatically from request for surgery [id number]      Cervical disc herniation [M50.20]  Yes    Low back pain [M54.50]  Yes     2015 IMO UPDATE      History of lumbar fusion [Z98.1]  Not Applicable     Nausea / vomiting, intractable- GI evaluated, possibly related to COVID vs. Colitis, checking stool studies  COVID 19  Sinus tachycardia  H/o DVT  HLD  Migraines  H/o sinus tachycardia  ECHO last month normal LVEF, grade 1 DD     Plan:   Ms. Lin presents with several day history of nausea / vomiting, she has tested + for COVID 19  She is sinus tach with HR 130s.  Likely compensatory heart rates in the setting of acute illness, viral syndrome, high inflammation, nausea vomiting, she is on beta blockers at home  Recommend IVF for fluid/volume resuscitation  Stop lasix  Okay for IV beta blockers IVP as she can not keep down food/liquid at this time, 1 dose metoprolol every 8 hours  GI on board  Sinus tachycardia compensatory of underlying illness  2D ECHO last mo showed normal LVEF, no indication to repeat at this time further recommendation follow findings clinical course  Continue telemetry  Correct electrolytes for potassium greater than 4 magnesium greater than 2          Fabian Helton MD, PhD             Shadia Campbell, HUMZA  12/04/23  13:09 EST

## 2023-12-05 ENCOUNTER — INPATIENT HOSPITAL (AMBULATORY)
Dept: URBAN - METROPOLITAN AREA HOSPITAL 84 | Facility: HOSPITAL | Age: 73
End: 2023-12-05
Payer: MEDICAID

## 2023-12-05 DIAGNOSIS — R93.3 ABNORMAL FINDINGS ON DIAGNOSTIC IMAGING OF OTHER PARTS OF DI: ICD-10-CM

## 2023-12-05 DIAGNOSIS — Z98.84 BARIATRIC SURGERY STATUS: ICD-10-CM

## 2023-12-05 DIAGNOSIS — Z90.49 ACQUIRED ABSENCE OF OTHER SPECIFIED PARTS OF DIGESTIVE TRACT: ICD-10-CM

## 2023-12-05 DIAGNOSIS — U07.1 COVID-19: ICD-10-CM

## 2023-12-05 DIAGNOSIS — R10.817 GENERALIZED ABDOMINAL TENDERNESS: ICD-10-CM

## 2023-12-05 LAB
ALBUMIN SERPL-MCNC: 2.3 G/DL (ref 3.5–5.2)
ALBUMIN/GLOB SERPL: 0.9 G/DL
ALP SERPL-CCNC: 122 U/L (ref 39–117)
ALT SERPL W P-5'-P-CCNC: 11 U/L (ref 1–33)
ANION GAP SERPL CALCULATED.3IONS-SCNC: 11 MMOL/L (ref 5–15)
AST SERPL-CCNC: 27 U/L (ref 1–32)
BILIRUB SERPL-MCNC: 0.2 MG/DL (ref 0–1.2)
BUN SERPL-MCNC: 8 MG/DL (ref 8–23)
BUN/CREAT SERPL: 16.7 (ref 7–25)
CALCIUM SPEC-SCNC: 7.4 MG/DL (ref 8.6–10.5)
CHLORIDE SERPL-SCNC: 109 MMOL/L (ref 98–107)
CO2 SERPL-SCNC: 23 MMOL/L (ref 22–29)
CREAT SERPL-MCNC: 0.48 MG/DL (ref 0.57–1)
EGFRCR SERPLBLD CKD-EPI 2021: 101.4 ML/MIN/1.73
GLOBULIN UR ELPH-MCNC: 2.7 GM/DL
GLUCOSE SERPL-MCNC: 134 MG/DL (ref 65–99)
LIPASE SERPL-CCNC: 19 U/L (ref 13–60)
MAGNESIUM SERPL-MCNC: 1.6 MG/DL (ref 1.6–2.4)
PHOSPHATE SERPL-MCNC: 2.1 MG/DL (ref 2.5–4.5)
POTASSIUM SERPL-SCNC: 3.2 MMOL/L (ref 3.5–5.2)
POTASSIUM SERPL-SCNC: 3.4 MMOL/L (ref 3.5–5.2)
POTASSIUM SERPL-SCNC: 3.6 MMOL/L (ref 3.5–5.2)
PROT SERPL-MCNC: 5 G/DL (ref 6–8.5)
SODIUM SERPL-SCNC: 143 MMOL/L (ref 136–145)

## 2023-12-05 PROCEDURE — 25810000003 DEXTROSE-NACL PER 500 ML: Performed by: INTERNAL MEDICINE

## 2023-12-05 PROCEDURE — 99232 SBSQ HOSP IP/OBS MODERATE 35: CPT | Mod: FS | Performed by: NURSE PRACTITIONER

## 2023-12-05 PROCEDURE — 25010000002 CEFTRIAXONE PER 250 MG: Performed by: INTERNAL MEDICINE

## 2023-12-05 PROCEDURE — 99233 SBSQ HOSP IP/OBS HIGH 50: CPT | Performed by: INTERNAL MEDICINE

## 2023-12-05 PROCEDURE — 97162 PT EVAL MOD COMPLEX 30 MIN: CPT

## 2023-12-05 PROCEDURE — 25010000002 MAGNESIUM SULFATE 2 GM/50ML SOLUTION: Performed by: INTERNAL MEDICINE

## 2023-12-05 PROCEDURE — 84132 ASSAY OF SERUM POTASSIUM: CPT | Performed by: INTERNAL MEDICINE

## 2023-12-05 PROCEDURE — 25010000002 ONDANSETRON PER 1 MG

## 2023-12-05 PROCEDURE — 25010000002 POTASSIUM CHLORIDE 10 MEQ/100ML SOLUTION: Performed by: INTERNAL MEDICINE

## 2023-12-05 PROCEDURE — 25010000002 KETOROLAC TROMETHAMINE PER 15 MG

## 2023-12-05 PROCEDURE — 25010000002 METOCLOPRAMIDE PER 10 MG: Performed by: INTERNAL MEDICINE

## 2023-12-05 RX ORDER — DEXTROSE MONOHYDRATE, SODIUM CHLORIDE, AND POTASSIUM CHLORIDE 50; 2.98; 9 G/1000ML; G/1000ML; G/1000ML
100 INJECTION, SOLUTION INTRAVENOUS CONTINUOUS
Status: DISCONTINUED | OUTPATIENT
Start: 2023-12-05 | End: 2023-12-08 | Stop reason: HOSPADM

## 2023-12-05 RX ORDER — METOPROLOL TARTRATE 1 MG/ML
5 INJECTION, SOLUTION INTRAVENOUS EVERY 8 HOURS
Status: DISCONTINUED | OUTPATIENT
Start: 2023-12-05 | End: 2023-12-06

## 2023-12-05 RX ORDER — MAGNESIUM SULFATE HEPTAHYDRATE 40 MG/ML
2 INJECTION, SOLUTION INTRAVENOUS ONCE
Status: COMPLETED | OUTPATIENT
Start: 2023-12-05 | End: 2023-12-05

## 2023-12-05 RX ORDER — AMITRIPTYLINE HYDROCHLORIDE 150 MG/1
150 TABLET ORAL NIGHTLY
COMMUNITY
End: 2023-12-08 | Stop reason: HOSPADM

## 2023-12-05 RX ORDER — METOCLOPRAMIDE 10 MG/1
10 TABLET ORAL 4 TIMES DAILY PRN
COMMUNITY

## 2023-12-05 RX ORDER — POTASSIUM CHLORIDE 7.45 MG/ML
10 INJECTION INTRAVENOUS
Status: COMPLETED | OUTPATIENT
Start: 2023-12-05 | End: 2023-12-05

## 2023-12-05 RX ADMIN — OXYCODONE HYDROCHLORIDE 5 MG: 5 TABLET ORAL at 22:44

## 2023-12-05 RX ADMIN — Medication 10 ML: at 21:24

## 2023-12-05 RX ADMIN — METOPROLOL TARTRATE 2.5 MG: 1 INJECTION, SOLUTION INTRAVENOUS at 04:13

## 2023-12-05 RX ADMIN — POTASSIUM CHLORIDE, DEXTROSE MONOHYDRATE AND SODIUM CHLORIDE 100 ML/HR: 300; 5; 900 INJECTION, SOLUTION INTRAVENOUS at 23:57

## 2023-12-05 RX ADMIN — KETOROLAC TROMETHAMINE 15 MG: 15 INJECTION, SOLUTION INTRAMUSCULAR; INTRAVENOUS at 09:00

## 2023-12-05 RX ADMIN — METOCLOPRAMIDE HYDROCHLORIDE 10 MG: 5 INJECTION INTRAMUSCULAR; INTRAVENOUS at 04:13

## 2023-12-05 RX ADMIN — TOPIRAMATE 100 MG: 100 TABLET, FILM COATED ORAL at 21:24

## 2023-12-05 RX ADMIN — METOCLOPRAMIDE HYDROCHLORIDE 10 MG: 5 INJECTION INTRAMUSCULAR; INTRAVENOUS at 16:33

## 2023-12-05 RX ADMIN — METOCLOPRAMIDE HYDROCHLORIDE 10 MG: 5 INJECTION INTRAMUSCULAR; INTRAVENOUS at 10:27

## 2023-12-05 RX ADMIN — POTASSIUM CHLORIDE 10 MEQ: 7.46 INJECTION, SOLUTION INTRAVENOUS at 10:27

## 2023-12-05 RX ADMIN — POTASSIUM CHLORIDE 10 MEQ: 7.46 INJECTION, SOLUTION INTRAVENOUS at 12:30

## 2023-12-05 RX ADMIN — ONDANSETRON 4 MG: 2 INJECTION INTRAMUSCULAR; INTRAVENOUS at 09:00

## 2023-12-05 RX ADMIN — Medication 10 ML: at 09:12

## 2023-12-05 RX ADMIN — PANTOPRAZOLE SODIUM 40 MG: 40 TABLET, DELAYED RELEASE ORAL at 16:34

## 2023-12-05 RX ADMIN — DEXTROSE AND SODIUM CHLORIDE 100 ML/HR: 5; 900 INJECTION, SOLUTION INTRAVENOUS at 01:52

## 2023-12-05 RX ADMIN — MAGNESIUM SULFATE HEPTAHYDRATE 2 G: 2 INJECTION, SOLUTION INTRAVENOUS at 09:16

## 2023-12-05 RX ADMIN — KETOROLAC TROMETHAMINE 15 MG: 15 INJECTION, SOLUTION INTRAMUSCULAR; INTRAVENOUS at 14:28

## 2023-12-05 RX ADMIN — CYCLOSPORINE 1 DROP: 0.5 EMULSION OPHTHALMIC at 21:23

## 2023-12-05 RX ADMIN — ATORVASTATIN CALCIUM 10 MG: 10 TABLET, FILM COATED ORAL at 21:24

## 2023-12-05 RX ADMIN — METOPROLOL TARTRATE 5 MG: 1 INJECTION, SOLUTION INTRAVENOUS at 21:22

## 2023-12-05 RX ADMIN — METOCLOPRAMIDE HYDROCHLORIDE 10 MG: 5 INJECTION INTRAMUSCULAR; INTRAVENOUS at 22:44

## 2023-12-05 RX ADMIN — SERTRALINE HYDROCHLORIDE 50 MG: 50 TABLET ORAL at 21:28

## 2023-12-05 RX ADMIN — OXYCODONE HYDROCHLORIDE 5 MG: 5 TABLET ORAL at 18:13

## 2023-12-05 RX ADMIN — ONDANSETRON 4 MG: 2 INJECTION INTRAMUSCULAR; INTRAVENOUS at 04:13

## 2023-12-05 RX ADMIN — PREDNISOLONE ACETATE 1 DROP: 10 SUSPENSION/ DROPS OPHTHALMIC at 21:23

## 2023-12-05 RX ADMIN — CEFTRIAXONE 2000 MG: 2 INJECTION, POWDER, FOR SOLUTION INTRAMUSCULAR; INTRAVENOUS at 13:43

## 2023-12-05 RX ADMIN — ONDANSETRON 4 MG: 2 INJECTION INTRAMUSCULAR; INTRAVENOUS at 22:44

## 2023-12-05 RX ADMIN — KETOROLAC TROMETHAMINE 15 MG: 15 INJECTION, SOLUTION INTRAMUSCULAR; INTRAVENOUS at 01:54

## 2023-12-05 RX ADMIN — POTASSIUM CHLORIDE, DEXTROSE MONOHYDRATE AND SODIUM CHLORIDE 100 ML/HR: 300; 5; 900 INJECTION, SOLUTION INTRAVENOUS at 14:27

## 2023-12-05 RX ADMIN — POTASSIUM CHLORIDE 10 MEQ: 7.46 INJECTION, SOLUTION INTRAVENOUS at 11:26

## 2023-12-05 RX ADMIN — OXYCODONE HYDROCHLORIDE 5 MG: 5 TABLET ORAL at 12:47

## 2023-12-05 RX ADMIN — POTASSIUM CHLORIDE 10 MEQ: 7.46 INJECTION, SOLUTION INTRAVENOUS at 09:06

## 2023-12-05 NOTE — PROGRESS NOTES
[facility] MEDICINE SERVICE  DAILY PROGRESS NOTE    NAME: Ashley Lin  : 1950  MRN: [id number]      LOS: 2 days     PROVIDER OF SERVICE: Jonatan Bueno MD    Chief Complaint: Intractable vomiting    Subjective:     Interval History:  History taken from: patient  Patient Complaints:   Patient Denies:   nausea     History of Present Illness: Ashley Lin is a 73 y.o. female with multiple medical problems, a past history significant for chronic back pain, chronic migraine, COPD, depression, hypothyroidism, GERD, history of subdural hematoma, hyperlipidemia, DVT   who presented to [facility] on 12/3/2023 with nausea and  Vomiting for last 3 days, cannot eat or drink or hold anything down.  She has had hot and cold spells but no temperatures that she has not taken.  She denies any urinary complaints she denies any diarrhea bowels have been okay no bleeding.  She has had no chest pain neck arm jaw pain shortness of breath little bit of cough.  No ill exposures no foreign travels or antibiotic use or recent hospitalization.  She states she had back surgery back in 2023 she got a letter in the mail a couple weeks ago they told her she had a mass on her lungs which was spotted on a scan she had done on her spine.  Patient has had no recent hospitalization otherwise nothing seem to trigger make it better or worse symptoms are severe and she feels extremely dehydrated. complains of abdominal pain, denies diarrhea. In the ER Heart rate was 150.  Sinus tach on monitor.  Denies chest pain.  She was started on some fluids.  Tested positive for COVID, Urine was okay, cx-ray was clear, Sats are 100%     23-patient seen and examined in bed no acute distress, vital signs heart rate 138.  Discussed with RN, started Cardizem drip.  Patient is still complaining of significant pain.  23 patient seen and examined in bed no acute distress, vital s blood pressure stable.   Heart rate 109.  Still complaining abdominal pain.    Review of Systems   Constitutional:  Negative for chills and fever.   HENT:  Negative for ear pain and hearing loss.    Respiratory:  Negative for apnea and shortness of breath.    Cardiovascular:  Negative for chest pain and palpitations.   Gastrointestinal:  Positive for nausea, vomiting, abd pain. Negative for diarrhea.   Genitourinary:  Negative for dysuria.   Musculoskeletal:  Negative for back pain and joint swelling.   Neurological:  Negative for seizures, light-headedness and headaches.   Psychiatric/Behavioral:  Negative for confusion and hallucinations.      Objective:     Vital Signs  Temp:  [94.9 °F (34.9 °C)-97.3 °F (36.3 °C)] 94.9 °F (34.9 °C)  Heart Rate:  [100-120] 109  Resp:  [16-22] 22  BP: (132-155)/(74-98) 132/90  Flow (L/min):  [3] 3   Body mass index is 15.85 kg/m².      Physical ExamConstitutional:       General: She is not in acute distress.     Appearance: She is well-developed. She is ill-appearing. She is not toxic-appearing or diaphoretic.   HENT:      Head: Normocephalic and atraumatic.      Nose: Nose normal. No congestion or rhinorrhea.      Mouth/Throat:      Mouth: Mucous membranes are moist.      Pharynx: No oropharyngeal exudate.   Eyes:      General: No scleral icterus.        Right eye: No discharge.         Left eye: No discharge.      Extraocular Movements: Extraocular movements intact.      Conjunctiva/sclera: Conjunctivae normal.      Pupils: Pupils are equal, round, and reactive to light.   Neck:      Thyroid: No thyromegaly.      Vascular: No carotid bruit or JVD.      Trachea: No tracheal deviation.   Cardiovascular:      Rate and Rhythm: Normal rate and regular rhythm.      Pulses: Normal pulses.      Heart sounds: Normal heart sounds. No murmur heard.     No friction rub. No gallop.   Pulmonary:      Effort: Pulmonary effort is normal. No respiratory distress.      Breath sounds: Normal breath sounds. No stridor. No  wheezing, rhonchi or rales.   Chest:      Chest wall: No tenderness.   Abdominal:      General: Bowel sounds are normal. There is no distension.      Palpations: Abdomen is soft. There is no mass.      Tenderness: There is abdominal tenderness. There is no guarding or rebound.      Hernia: No hernia is present.   Musculoskeletal:         General: No swelling, tenderness, deformity or signs of injury. Normal range of motion.      Cervical back: Normal range of motion and neck supple. No rigidity. No muscular tenderness.      Right lower leg: No edema.      Left lower leg: No edema.   Lymphadenopathy:      Cervical: No cervical adenopathy.   Skin:     General: Skin is warm and dry.      Coloration: Skin is pale. Skin is not jaundiced.      Findings: No bruising, erythema or rash.   Neurological:      General: No focal deficit present.      Mental Status: She is alert and oriented to person, place, and time. Mental status is at baseline.      Cranial Nerves: No cranial nerve deficit.      Sensory: No sensory deficit.      Motor: Weakness present. No abnormal muscle tone.      Coordination: Coordination normal.   Psychiatric:         Mood and Affect: Mood normal.         Behavior: Behavior normal.         Thought Content: Thought content normal.        Scheduled Meds   atorvastatin, 10 mg, Oral, Nightly  cetirizine, 10 mg, Oral, Daily  cycloSPORINE, 1 drop, Both Eyes, BID  docusate sodium, 100 mg, Oral, BID  estradiol, 1 mg, Oral, Daily  gabapentin, 300 mg, Oral, Daily  levothyroxine, 50 mcg, Oral, Q AM  metoclopramide, 10 mg, Intravenous, Q6H  metoprolol tartrate, 5 mg, Intravenous, Q8H  pantoprazole, 40 mg, Oral, BID AC  polyethylene glycol, 17 g, Oral, Daily  potassium chloride, 10 mEq, Oral, Daily  prednisoLONE acetate, 1 drop, Both Eyes, Nightly  senna-docusate sodium, 2 tablet, Oral, BID  sertraline, 50 mg, Oral, Nightly  sodium chloride, 10 mL, Intravenous, Q12H  topiramate, 100 mg, Oral, BID  vitamin D3, 5,000  Units, Oral, Daily       PRN Meds     albuterol sulfate HFA    senna-docusate sodium **AND** polyethylene glycol **AND** bisacodyl **AND** bisacodyl    butalbital-acetaminophen-caffeine    Calcium Replacement - Follow Nurse / BPA Driven Protocol    dicyclomine    diphenhydrAMINE    hydrOXYzine    ketorolac    Magnesium Standard Dose Replacement - Follow Nurse / BPA Driven Protocol    melatonin    metoprolol tartrate    nitroglycerin    ondansetron **OR** ondansetron    oxyCODONE    Phosphorus Replacement - Follow Nurse / BPA Driven Protocol    Potassium Replacement - Follow Nurse / BPA Driven Protocol    promethazine    [COMPLETED] Insert Peripheral IV **AND** sodium chloride    sodium chloride    sodium chloride    SUMAtriptan   Infusions  dextrose 5% and sodium chloride 0.9% with KCl 40 mEq/L, 100 mL/hr, Last Rate: 100 mL/hr (12/05/23 1427)          Diagnostic Data    Results from last 7 days   Lab Units 12/05/23  0130 12/04/23  2339   WBC 10*3/mm3  --  4.40   HEMOGLOBIN g/dL  --  10.9*   HEMATOCRIT %  --  32.8*   PLATELETS 10*3/mm3  --  365   GLUCOSE mg/dL  --  134*   CREATININE mg/dL  --  0.48*   BUN mg/dL  --  8   SODIUM mmol/L  --  143   POTASSIUM mmol/L 3.2* 3.4*   AST (SGOT) U/L  --  27   ALT (SGPT) U/L  --  11   ALK PHOS U/L  --  122*   BILIRUBIN mg/dL  --  0.2   ANION GAP mmol/L  --  11.0       No radiology results for the last day      I reviewed the patient's new clinical results.    Assessment/Plan:     Active and Resolved Problems  Active Hospital Problems    Diagnosis  POA    **Intractable vomiting [R11.10]  Yes     Priority: High    Colitis [K52.9]  Unknown     Priority: High    Generalized abdominal pain [R10.84]  Unknown     Priority: High    Sinus tachycardia [R00.0]  Yes     Priority: High    Chronic obstructive pulmonary disease [J44.9]  Yes     Priority: Medium    Intractable cyclical vomiting associated with migraine [G43.A1]  Yes     Priority: Medium    Chronic anticoagulation [Z79.01]  Not  Applicable     Priority: Low    Severe malnutrition [E43]  Yes     Priority: Low    Debility [R53.81]  Yes     Priority: Low    Depression [F32.A]  Yes     Priority: Low    Chronic pain syndrome [G89.4]  Yes     Priority: Low    GERD without esophagitis [K21.9]  Yes     Priority: Low    Cervical disc herniation [M50.20]  Yes     Priority: Low    Low back pain [M54.50]  Yes     Priority: Low    History of lumbar fusion [Z98.1]  Not Applicable     Priority: Low    COVID-19 [U07.1]  Yes    Abdominal pain with vomiting [R10.9, R11.10]  Yes    History of subdural hematoma [Z86.79]  Not Applicable    Hyperlipidemia [E78.5]  Yes    Hypothyroidism (acquired) [E03.9]  Yes    Migraines [G43.909]  Yes      Resolved Hospital Problems   No resolved problems to display.       Intractable vomiting, abdominal pain. acute colitis   Continue supportive care  Telemetry unit  IV fluids  Zofran IV  GI consulted-Suspect patient nausea vomiting as well as some diarrhea could be related to either COVID infection versus COVID-related infectious colitis with the diarrhea and thickening of bowel.  We will check stool studies.  Will hold off on any antibiotic for GI tract.  Because of her history of gastroparesis she will be placed on Reglan as well as PPI.  She has been taking significant amount of ibuprofen possibility of NSAID induced gastritis ulceration leading to above symptoms in the differential as well   Reglan iv     COVID-19 Symbicort albuterol vitamin C vitamin D zinc.     Hyperlipidemia on Lipitor.  Check CPK.     Migraine on Fioricet.  Max as needed.     Chronic back and neck pain continue Neurontin.  Oxycodone     GERD continue Protonix     Depression continue Zoloft     COPD DuoNeb as needed       DVT prophylaxis:  Mechanical DVT prophylaxis orders are present.     Code status is   There are no questions and answers to display.       Plan for disposition:home in 2 days    Time: 30 minutes    Signature: Electronically signed by  Jonatan Bueno MD, 12/05/23, 14:41 EST.  [facility]ist [facility]

## 2023-12-05 NOTE — CONSULTS
[facility] [place]        Subjective:     Encounter Date:12/03/2023      Patient ID: Ashley Lin is a 73 y.o. female.    Chief Complaint: nausea/ vomiting  Cardiology Consult: tachycardia    Referring Physician: Dr. Bueno    HPI: Agree with narrative is discussed with nurse practitioner after face-to-face encounter scribed findings below  Ashley Lin is a 73 y.o. female who presents with nausea vomiting. Ms. Lin does not routinely see a cardiologist. Pmh includes HLD, hypothyroidism, prior DVT, COPD, h/o subdural hematoma, sinus tachycardia.    Ms. Lin presented to ER with vomiting and inability to hold down food or liquid for the last 3 days. She tested positive for COVID 19. GI consulted for nausea and vomiting. She is sinus tach on ECG with HR in the 130s. She was started on a cardizem gtt per primary team for sinus tachycardia with elevated heart rates. Denies chest pain. She is on 2.5L NC, she had clear CXR. Cardiology consulted for tachycardia.   ============================================  Remains sinus rhythm 90s to 110s  Change metoprolol to 5 every 8 hours  Antiemetics okay, QT is okay  Replacing potassium aggressively  Replace magnesium  Change IV fluids to cover potassium with nausea vomiting and hypokalemia despite replacing yesterday    2D echo last month with normal EF, no indication to repeat at this time    Review of systems otherwise negative x14 point review of systems except as mentioned above  Historical data copied forward from previous encounters in EMR is unchanged      EKG sinus tachycardia, no evidence of atrial fibrillation  Past Medical History:   Diagnosis Date    Acute deep vein thrombosis (DVT) of calf muscle vein of left lower extremity 11/10/2023    Aftercare for healing traumatic closed fracture of right radius 11/10/2023    Asthma     Chronic anticoagulation 11/10/2023    Chronic back pain     Chronic headaches     Chronic obstructive pulmonary  disease 2020    Chronic pain syndrome 2020    Depression 2020    Disease of thyroid gland     Estrogen deficiency 2020    GERD without esophagitis 2020    History of subdural hematoma 11/10/2023    Hyperlipidemia 11/10/2023    Hypothyroidism (acquired) 2020    Migraines 2020    Neuropathy     Sinus tachycardia 11/10/2023    Vomiting 2020       Past Surgical History:   Procedure Laterality Date    CHOLECYSTECTOMY      COLONOSCOPY N/A 2020    Procedure: COLONOSCOPY WITH RANDOM COLON BIOPSIES X4 AREAS, BIOPSY X 1 AREA;  Surgeon: Aguila Garcia MD;  Location: [facility] ENDOSCOPY;  Service: Gastroenterology;  Laterality: N/A;  POST- colon erosion    ENDOSCOPY N/A 2020    Procedure: ESOPHAGOGASTRODUODENOSCOPY WITH DILATATION (BOUGIE #[id number]);  Surgeon: Benjamin Mike MD;  Location: [facility] ENDOSCOPY;  Service: Gastroenterology;  Laterality: N/A;  Post operative diagnosis:GASTROPARESIS AND DYSPHAGIA     ENDOSCOPY N/A 2020    Procedure: ESOPHAGOGASTRODUODENOSCOPY WITH BIOPSY X 1 AREA;  Surgeon: Aguila Garcia MD;  Location: [facility] ENDOSCOPY;  Service: Gastroenterology;  Laterality: N/A;  POST    HYSTERECTOMY      NECK SURGERY      SPINE SURGERY  2020       Family History   Problem Relation Age of Onset    Heart disease Mother     No Known Problems Father        Social History     Socioeconomic History    Marital status:    Tobacco Use    Smoking status: Former     Types: Cigarettes     Quit date:      Years since quittin.9     Passive exposure: Current    Smokeless tobacco: Never   Vaping Use    Vaping Use: Never used   Substance and Sexual Activity    Alcohol use: Never    Drug use: Never    Sexual activity: Defer         Allergies   Allergen Reactions    Morphine Hives    Penicillins Hives and Shortness Of Breath     Can take iv form - but not po form         Current Medications:   Scheduled Meds:atorvastatin, 10 mg, Oral,  "Nightly  cefTRIAXone, 2,000 mg, Intravenous, Q24H  cetirizine, 10 mg, Oral, Daily  cycloSPORINE, 1 drop, Both Eyes, BID  docusate sodium, 100 mg, Oral, BID  estradiol, 1 mg, Oral, Daily  gabapentin, 300 mg, Oral, Daily  levothyroxine, 50 mcg, Oral, Q AM  magnesium sulfate, 2 g, Intravenous, Once  metoclopramide, 10 mg, Intravenous, Q6H  metoprolol tartrate, 5 mg, Intravenous, Q8H  pantoprazole, 40 mg, Oral, BID AC  polyethylene glycol, 17 g, Oral, Daily  potassium chloride, 10 mEq, Oral, Daily  potassium chloride, 10 mEq, Intravenous, Q1H  prednisoLONE acetate, 1 drop, Both Eyes, Nightly  senna-docusate sodium, 2 tablet, Oral, BID  sertraline, 50 mg, Oral, Nightly  sodium chloride, 10 mL, Intravenous, Q12H  topiramate, 100 mg, Oral, BID  vitamin D3, 5,000 Units, Oral, Daily      Continuous Infusions:dextrose 5% and sodium chloride 0.9% with KCl 40 mEq/L, 100 mL/hr        Review of Systems   Constitutional: Positive for malaise/fatigue. Negative for chills and diaphoresis.   Cardiovascular:  Negative for chest pain, dyspnea on exertion, irregular heartbeat, leg swelling, near-syncope, orthopnea, palpitations, paroxysmal nocturnal dyspnea and syncope.   Respiratory:  Negative for cough, shortness of breath, sleep disturbances due to breathing and sputum production.    Gastrointestinal:  Positive for nausea and vomiting. Negative for change in bowel habit.   Genitourinary:  Negative for urgency.   Neurological:  Negative for dizziness and headaches.   Psychiatric/Behavioral:  Negative for altered mental status.             Objective:         /74 (BP Location: Left arm, Patient Position: Lying)   Pulse 100   Temp 96.1 °F (35.6 °C) (Oral)   Resp 22   Ht 162.6 cm (64.02\")   Wt 41.9 kg (92 lb 6 oz)   SpO2 98%   BMI 15.85 kg/m²     Physical Exam:  General Appearance:    Alert, cooperative, in no acute distress                                Head: Atraumatic, normocephalic, PERRLA               Neck:   supple,  " no JVD   Lungs:    Supplemental O2, dim bases    Heart:    Regular rhythm and normal rate, normal S1 and S2   Abdomen:     Normal bowel sounds, no masses, no organomegaly, soft  nontender, nondistended, no guarding, no rebound  tenderness   Extremities:   Moves all extremities well, no edema, no cyanosis, no  redness   Pulses:   Pulses palpable and equal bilaterally   Skin:   No bleeding, bruising or rash   Neurologic:   Awake, alert, oriented x3       Unchanged from prior encounter    Agree with exam discussed with nurse practitioner after face-to-face counter scribed findings above      ASCVD Risk Score::  The 10-year ASCVD risk score (Aislinn PAZ, et al., 2019) is: 10.6%    Values used to calculate the score:      Age: 73 years      Sex: Female      Is Non- : No      Diabetic: No      Tobacco smoker: No      Systolic Blood Pressure: 137 mmHg      Is BP treated: No      HDL Cholesterol: 65 mg/dL      Total Cholesterol: 136 mg/dL      Lab Review:     Results from last 7 days   Lab Units 12/05/23  0130 12/04/23  2339 12/04/23  1222 12/03/23  1029   SODIUM mmol/L  --  143 147* 143   POTASSIUM mmol/L 3.2* 3.4* 3.1* 4.3   CHLORIDE mmol/L  --  109* 111* 105   CO2 mmol/L  --  23.0 22.0 18.0*   BUN mg/dL  --  8 14 13   CREATININE mg/dL  --  0.48* 0.58 0.61   GLUCOSE mg/dL  --  134* 121* 168*   CALCIUM mg/dL  --  7.4* 8.2* 8.4*   AST (SGOT) U/L  --  27  --  21   ALT (SGPT) U/L  --  11  --  11     Results from last 7 days   Lab Units 12/04/23  1436 12/04/23  1222   CK TOTAL U/L  --  85   HSTROP T ng/L 23* 28*     Results from last 7 days   Lab Units 12/04/23  2339 12/03/23  1029   WBC 10*3/mm3 4.40 3.60   HEMOGLOBIN g/dL 10.9* 14.0   HEMATOCRIT % 32.8* 42.1   PLATELETS 10*3/mm3 365 409         Results from last 7 days   Lab Units 12/04/23  2339 12/04/23  1222   MAGNESIUM mg/dL 1.6 1.8     Results from last 7 days   Lab Units 12/04/23  1222   CHOLESTEROL mg/dL 136   TRIGLYCERIDES mg/dL 108   HDL CHOL  mg/dL 65*     Results from last 7 days   Lab Units 12/04/23  1222   PROBNP pg/mL 2,679.0*     Results from last 7 days   Lab Units 12/04/23  1222   TSH uIU/mL 2.630       Recent Radiology:  Imaging Results (Most Recent)       Procedure Component Value Units Date/Time    CT Abdomen Pelvis Without Contrast [[id number]] Collected: 12/03/23 1315     Updated: 12/03/23 1330    Narrative:      CT ABDOMEN PELVIS WO CONTRAST    Date of Exam: 12/3/2023 12:53 PM EST    Indication: Diffuse abdominal pain vomiting.    Comparison: CT of the abdomen and pelvis dated 6/6/2022    Technique: Axial CT images were obtained of the abdomen and pelvis without the administration of contrast. Sagittal and coronal reconstructions were performed.  Automated exposure control and iterative reconstruction methods were used.    Findings:    Examination is limited due to lack of IV contrast administration and extensive beam hardening artifact.    Liver: The liver is unremarkable in morphology. Evaluation for focal liver lesions is limited without IV contrast. Mildly prominent CBD may be related to prior cholecystectomy.    Gallbladder: Surgically absent.    Pancreas: Unremarkable.    Spleen: Unremarkable.    Adrenal glands: Unremarkable.    Genitourinary tract: Kidneys are unremarkable. No hydronephrosis is seen. Visualized portions of the ureters appear grossly unremarkable. Urinary bladder is grossly unremarkable. There is a catheter coiled within the vagina.    Gastrointestinal tract: There is limited visualization of the hollow viscera due to lack of IV contrast and due to extensive beam hardening artifact within much of the study. Colonic wall thickening may be related to underdistention. Colitis may be   considered in the appropriate clinical setting. No findings to suggest bowel obstruction. There are surgical changes of the stomach with gastrojejunostomy.    Appendix: The appendix is not definitely identified.    Other findings: No free air  or free fluid is seen. No pathologically enlarged lymph nodes are seen. Vascular calcifications are present.    Bones and soft tissues: Bones are demineralized. There are mild superior endplate compression fractures of T12 and L1 which are new since 6/6/2022. Exact chronicity is uncertain. Please correlate clinically for pain/tenderness at these sites. There are   surgical changes of the lumbar spine from L3-S1. There are chronic changes of the bilateral iliac bones. There is a sclerotic focus within the left inferior pubic ramus which appears similar since 6/6/2022, possibly a bone island. Right-sided InterStim   is noted. Superficial soft tissues demonstrate no acute abnormality.    Lung bases: The visualized lung bases are clear.      Impression:      Impression:  1.Examination is limited due to lack of IV contrast administration and extensive beam hardening artifact.  2.Diffuse colonic wall thickening raises suspicion for colitis. This may also be related to underdistention. Please correlate clinically.  3.There are mild superior endplate compression fractures of T12 and L1 which are new since 6/6/2022. Exact chronicity is uncertain. Please correlate clinically for pain/tenderness at these sites.   4.There is a catheter coiled within the vagina. If this is meant to be within the bladder, recommend repositioning.  5.Additional findings as detailed above.    Electronically Signed: Frederick Al MD    12/3/2023 1:28 PM EST    Workstation ID: [id number]    XR Chest 1 View [[id number]] Collected: 12/03/23 1209     Updated: 12/03/23 1211    Narrative:      XR CHEST 1 VW    Date of Exam: 12/3/2023 10:30 AM EST    Indication: Vomiting weakness recent back surgery    Comparison: CT chest with contrast 11/7/2023    Findings:  The cardiomediastinal silhouette is within normal limits. Lungs are clear. No focal consolidation, pneumothorax, or significant pleural effusion. Osseous structures grossly intact. Surgical anchors  overlie the left and right humeral head. Multilevel   cervical fixation.      Impression:      Impression:  No acute process.            Electronically Signed: Carrillo Goodman MD    12/3/2023 12:09 PM EST    Workstation ID: [id number]              ECHOCARDIOGRAM:    Results for orders placed during the hospital encounter of 11/07/23    Adult Transthoracic Echo Complete W/ Cont if Necessary Per Protocol    Interpretation Summary    Left ventricular systolic function is normal. Left ventricular ejection fraction appears to be 56 - 60%.    Left ventricular diastolic function is consistent with (grade Ia w/high LAP) impaired relaxation.    Estimated right ventricular systolic pressure from tricuspid regurgitation is normal (<35 mmHg).                  Assessment:         Active Hospital Problems    Diagnosis  POA    **Intractable vomiting [R11.10]  Yes    COVID-19 [U07.1]  Yes    Colitis [K52.9]  Unknown    Generalized abdominal pain [R10.84]  Unknown    Abdominal pain with vomiting [R10.9, R11.10]  Yes    History of subdural hematoma [Z86.79]  Not Applicable    Sinus tachycardia [R00.0]  Yes    Hyperlipidemia [E78.5]  Yes    Chronic anticoagulation [Z79.01]  Not Applicable    Severe malnutrition [E43]  Yes    Debility [R53.81]  Yes    Chronic obstructive pulmonary disease [J44.9]  Yes    Hypothyroidism (acquired) [E03.9]  Yes    Depression [F32.A]  Yes    Migraines [G43.909]  Yes    Chronic pain syndrome [G89.4]  Yes    GERD without esophagitis [K21.9]  Yes    Intractable cyclical vomiting associated with migraine [G43.A1]  Yes     Added automatically from request for surgery [id number]      Cervical disc herniation [M50.20]  Yes    Low back pain [M54.50]  Yes     2015 IMO UPDATE      History of lumbar fusion [Z98.1]  Not Applicable     Nausea / vomiting, intractable- GI evaluated, possibly related to COVID vs. Colitis, checking stool studies  COVID 19  Sinus tachycardia  H/o DVT  HLD  Migraines  H/o sinus tachycardia  ECHO  last month normal LVEF, grade 1 DD     Plan:   Ms. Lni presents with several day history of nausea / vomiting, she has tested + for COVID 19  Sinus tachycardia with no evidence of atrial or ventricular tachyarrhythmia  Normal EF by 2D echo  No sign of heart failure or acute coronary syndrome    Today  Aggressive potassium and magnesium replacement  Alter IV fluids to incorporate potassium with D5 saline  Antiemetics are okay  QT is okay  Heart rates are better today, change metoprolol to 5 mg every 8 hours IV, systolic blood pressures in the 120s to 130s which is adequate  Continue telemetry    Further recommendation follow findings and clinical course      Fabian Helton MD, PhD             Fabian Helton MD  12/04/23  08:26 EST

## 2023-12-05 NOTE — PLAN OF CARE
Goal Outcome Evaluation:  Plan of Care Reviewed With: patient           Outcome Evaluation: 72 y/o F with h/o chronic back pain, chronic migraine, COPD, subdural hematoma, DVT 11/10/23, chronic back pain, fx of R radius 11/10/23, and back surgery Jan 2023 with mass on lungs spotted on scan of spine presented to [facility] on 12/3/2023 with n/v for last 3 days.  in ER with pt COVID+. CT abdomen Pelvis 12/3/23 shows suspicion for colitis and mild superior endplate compression fractures of T12 and L1 which are new since 6/6/2022. At functional baseline, pt lives at home alone with son spending 5 night/ wk with pt and is Mod I with functional mobility/ADLs. This date, pt requires Min A for bed mobility and Min A for STS transfer at EOB unable to perform any further activity d/t BLE weakness/instability with HR increasing to 136 bpm standing at EOB. PT d/c recommendation of continued skilled PT services at SNF in order to obtain highest functional potential, decrease fall risk, and to ensure safe d/c d/t inability to safely be left at home alone at this time.      Anticipated Discharge Disposition (PT): skilled nursing facility

## 2023-12-05 NOTE — THERAPY EVALUATION
Patient Name: Ashley Lin  : 1950    MRN: [id number]                              Today's Date: 2023       Admit Date: 12/3/2023    Visit Dx:     ICD-10-CM ICD-9-CM   1. Persistent vomiting  R11.15 536.2   2. Generalized abdominal pain  R10.84 789.07   3. Acute colitis  K52.9 558.9   4. Sinus tachycardia  R00.0 427.89   5. COVID-19  U07.1 079.89     Patient Active Problem List   Diagnosis    Intractable vomiting    Asthma    Cervical disc herniation    Cervical stenosis of spine    Chronic obstructive pulmonary disease    Low back pain    Pseudarthrosis following spinal fusion    History of lumbar fusion    S/P cervical spinal fusion    Hypothyroidism (acquired)    Estrogen deficiency    Depression    Migraines    Chronic pain syndrome    GERD without esophagitis    Intractable cyclical vomiting associated with migraine    Somnolence    Altered mental status, unspecified altered mental status type    Acute encephalopathy    Left leg pain    Debility    Severe malnutrition    Acute deep vein thrombosis (DVT) of calf muscle vein of left lower extremity    History of subdural hematoma    Sinus tachycardia    Aftercare for healing traumatic closed fracture of right radius    Hyperlipidemia    Chronic anticoagulation    Colitis    Generalized abdominal pain    Abdominal pain with vomiting    COVID-19     Past Medical History:   Diagnosis Date    Acute deep vein thrombosis (DVT) of calf muscle vein of left lower extremity 11/10/2023    Aftercare for healing traumatic closed fracture of right radius 11/10/2023    Asthma     Chronic anticoagulation 11/10/2023    Chronic back pain     Chronic headaches     Chronic obstructive pulmonary disease 2020    Chronic pain syndrome 2020    Depression 2020    Disease of thyroid gland     Estrogen deficiency 2020    GERD without esophagitis 2020    History of subdural hematoma 11/10/2023    Hyperlipidemia 11/10/2023    Hypothyroidism  (acquired) 07/01/2020    Migraines 07/01/2020    Neuropathy     Sinus tachycardia 11/10/2023    Vomiting 06/30/2020     Past Surgical History:   Procedure Laterality Date    CHOLECYSTECTOMY      COLONOSCOPY N/A 11/11/2020    Procedure: COLONOSCOPY WITH RANDOM COLON BIOPSIES X4 AREAS, BIOPSY X 1 AREA;  Surgeon: Aguila Garcia MD;  Location: [facility] ENDOSCOPY;  Service: Gastroenterology;  Laterality: N/A;  POST- colon erosion    ENDOSCOPY N/A 7/2/2020    Procedure: ESOPHAGOGASTRODUODENOSCOPY WITH DILATATION (BOUGIE #54);  Surgeon: Benjamin Mike MD;  Location: [facility] ENDOSCOPY;  Service: Gastroenterology;  Laterality: N/A;  Post operative diagnosis:GASTROPARESIS AND DYSPHAGIA     ENDOSCOPY N/A 11/11/2020    Procedure: ESOPHAGOGASTRODUODENOSCOPY WITH BIOPSY X 1 AREA;  Surgeon: Aguila Garcia MD;  Location: [facility] ENDOSCOPY;  Service: Gastroenterology;  Laterality: N/A;  POST    HYSTERECTOMY      NECK SURGERY      SPINE SURGERY  06/09/2020      General Information       Row Name 12/05/23 1023          Physical Therapy Time and Intention    Document Type evaluation  -RM     Mode of Treatment physical therapy  -       Row Name 12/05/23 1023          General Information    Patient Profile Reviewed yes  -RM     Prior Level of Function --  Mod I with functional mobility/ ADLs at OF. Son spends 5 nights/ wk with pt. Pt has  2 x week. Father provides transportation needs.  -RM     Existing Precautions/Restrictions fall;other (see comments);spinal  R radius fx 11/10/23--NWB RUE until clarification received d/t no f/u appt per pt report; enhanced droplet/ contact precautions  -RM       Row Name 12/05/23 1023          Living Environment    People in Home --  Pt was living at home alone with no MEETA/ no steps inside home.  -       Row Name 12/05/23 1023          Home Main Entrance    Stair Railings, Main Entrance none  -RM       Row Name 12/05/23 1023          Stairs Within Home, Primary    Stair  Railings, Within Home, Primary none  -RM       Row Name 12/05/23 1023          Cognition    Orientation Status (Cognition) oriented x 4  -RM       Row Name 12/05/23 1023          Safety Issues, Functional Mobility    Impairments Affecting Function (Mobility) balance;coordination;endurance/activity tolerance;pain  -RM               User Key  (r) = Recorded By, (t) = Taken By, (c) = Cosigned By      Initials Name Provider Type     Kandi Jo, PT Physical Therapist                   Mobility       Row Name 12/05/23 1028          Bed Mobility    Bed Mobility bed mobility (all) activities;rolling left;supine-sit;sit-supine  -RM     Rolling Left [place] (Bed Mobility) minimum assist (75% patient effort)  -RM     Supine-Sit [place] (Bed Mobility) minimum assist (75% patient effort)  -RM     Sit-Supine [place] (Bed Mobility) minimum assist (75% patient effort)  -RM     Comment, (Bed Mobility) Log roll technique; PT education provided for controlled descent with sit to supine; NWB RUE  -RM       Row Name 12/05/23 1028          Sit-Stand Transfer    Sit-Stand [place] (Transfers) minimum assist (75% patient effort)  -RM     Comment, (Sit-Stand Transfer) PT cuing provided for proper technique; NWB RUE; unable to remain standing d/t BLE instability/weakness with HR increasing to 136 bpm  -RM       Row Name 12/05/23 1028          Gait/Stairs (Locomotion)    Distance in Feet (Gait) n/t d/t safety concerns  -RM               User Key  (r) = Recorded By, (t) = Taken By, (c) = Cosigned By      Initials Name Provider Type     Kandi Jo, PT Physical Therapist                   Obj/Interventions       Row Name 12/05/23 1031          Range of Motion Comprehensive    General Range of Motion bilateral lower extremity ROM WFL  -RM       Row Name 12/05/23 1031          Strength Comprehensive (MMT)    Comment, General Manual Muscle Testing (MMT) Assessment Not formally assessed, however no specific strength  deficits observed  -RM       Row Name 12/05/23 1031          Balance    Comment, Balance BLE instability observed when standing at EOB  -RM               User Key  (r) = Recorded By, (t) = Taken By, (c) = Cosigned By      Initials Name Provider Type    Kandi Singh, PT Physical Therapist                   Goals/Plan       Row Name 12/05/23 1038          Bed Mobility Goal 1 (PT)    Activity/Assistive Device (Bed Mobility Goal 1, PT) bed mobility activities, all  -RM     [place] Level/Cues Needed (Bed Mobility Goal 1, PT) modified independence  -RM     Time Frame (Bed Mobility Goal 1, PT) long term goal (LTG);2 weeks  -RM     Strategies/Barriers (Bed Mobility Goal 1, PT) Log roll technique; NWB RUE  -RM       Row Name 12/05/23 1038          Transfer Goal 1 (PT)    Activity/Assistive Device (Transfer Goal 1, PT) transfers, all  -RM     [place] Level/Cues Needed (Transfer Goal 1, PT) modified independence  -RM     Time Frame (Transfer Goal 1, PT) long term goal (LTG);2 weeks  -RM     Strategies/Barriers (Transfers Goal 1, PT) NWB RUE  -RM       Row Name 12/05/23 1038          Gait Training Goal 1 (PT)    Activity/Assistive Device (Gait Training Goal 1, PT) gait (walking locomotion)  -RM     [place] Level (Gait Training Goal 1, PT) modified independence  -RM     Distance (Gait Training Goal 1, PT) Use of most appropriate AD; NWB RUE; 50 ft  -RM     Time Frame (Gait Training Goal 1, PT) long term goal (LTG);2 weeks  -RM       Row Name 12/05/23 1038          Therapy Assessment/Plan (PT)    Planned Therapy Interventions (PT) balance training;bed mobility training;gait training;strengthening;transfer training  -RM               User Key  (r) = Recorded By, (t) = Taken By, (c) = Cosigned By      Initials Name Provider Type    Kandi Singh, MARIELLE Physical Therapist                   Clinical Impression       Row Name 12/05/23 1033          Pain    Pre/Posttreatment Pain Comment Pain of abdomen and back--did  not give pain level  -RM     Pain Intervention(s) Repositioned  -RM       Row Name 12/05/23 1033          Plan of Care Review    Plan of Care Reviewed With patient  -RM     Outcome Evaluation 74 y/o F with h/o chronic back pain, chronic migraine, COPD, subdural hematoma, DVT 11/10/23, chronic back pain, fx of R radius 11/10/23, and back surgery Jan 2023 with mass on lungs spotted on scan of spine presented to [facility] on 12/3/2023 with n/v for last 3 days.  in ER with pt COVID+. CT abdomen Pelvis 12/3/23 shows suspicion for colitis and mild superior endplate compression fractures of T12 and L1 which are new since 6/6/2022. At functional baseline, pt lives at home alone with son spending 5 night/ wk with pt and is Mod I with functional mobility/ADLs. This date, pt requires Min A for bed mobility and Min A for STS transfer at EOB unable to perform any further activity d/t BLE weakness/instability with HR increasing to 136 bpm standing at EOB. PT d/c recommendation of continued skilled PT services at [facility] in order to obtain highest functional potential, decrease fall risk, and to ensure safe d/c d/t inability to safely be left at home alone at this time.  -RM       Row Name 12/05/23 1033          Therapy Assessment/Plan (PT)    Criteria for Skilled Interventions Met (PT) yes;skilled treatment is necessary  -RM     Therapy Frequency (PT) 5 times/wk  -RM     Predicted Duration of Therapy Intervention (PT) Until d/c  -RM       Row Name 12/05/23 1033          Positioning and Restraints    Pre-Treatment Position in bed  -RM     Post Treatment Position bed  -RM     In Bed call light within reach;encouraged to call for assist;exit alarm on  -RM               User Key  (r) = Recorded By, (t) = Taken By, (c) = Cosigned By      Initials Name Provider Type    Kandi Singh, PT Physical Therapist                   Outcome Measures       Row Name 12/05/23 1039          How much help from another person do you  currently need...    Turning from your back to your side while in flat bed without using bedrails? 3  -RM     Moving from lying on back to sitting on the side of a flat bed without bedrails? 3  -RM     Moving to and from a bed to a chair (including a wheelchair)? 3  -RM     Standing up from a chair using your arms (e.g., wheelchair, bedside chair)? 3  -RM     Climbing 3-5 steps with a railing? 2  -RM     To walk in hospital room? 3  -RM     AM-PAC 6 Clicks Score (PT) 17  -RM     Highest Level of Mobility Goal 5 --> Static standing  -RM               User Key  (r) = Recorded By, (t) = Taken By, (c) = Cosigned By      Initials Name Provider Type     Kandi Jo, PT Physical Therapist                                 Physical Therapy Education       Title: PT OT SLP Therapies (Done)       Topic: Physical Therapy (Done)       Point: Mobility training (Done)       Learning Progress Summary             Patient Acceptance, E, VU by  at 12/5/2023 1039                                         User Key       Initials Effective Dates Name Provider Type Discipline     03/27/23 -  Kandi Jo, MARIELLE Physical Therapist PT                  PT Recommendation and Plan  Planned Therapy Interventions (PT): balance training, bed mobility training, gait training, strengthening, transfer training  Plan of Care Reviewed With: patient  Outcome Evaluation: 74 y/o F with h/o chronic back pain, chronic migraine, COPD, subdural hematoma, DVT 11/10/23, chronic back pain, fx of R radius 11/10/23, and back surgery Jan 2023 with mass on lungs spotted on scan of spine presented to [facility] on 12/3/2023 with n/v for last 3 days.  in ER with pt COVID+. CT abdomen Pelvis 12/3/23 shows suspicion for colitis and mild superior endplate compression fractures of T12 and L1 which are new since 6/6/2022. At functional baseline, pt lives at home alone with son spending 5 night/ wk with pt and is Mod I with functional mobility/ADLs. This  date, pt requires Min A for bed mobility and Min A for STS transfer at EOB unable to perform any further activity d/t BLE weakness/instability with HR increasing to 136 bpm standing at EOB. PT d/c recommendation of continued skilled PT services at SNF in order to obtain highest functional potential, decrease fall risk, and to ensure safe d/c d/t inability to safely be left at home alone at this time.     Time Calculation:         PT Charges       Row Name 12/05/23 1040             Time Calculation    Start Time 1005  -RM      Stop Time 1021  -RM      Time Calculation (min) 16 min  -RM      PT Received On 12/05/23  -RM      PT - Next Appointment 12/06/23  -RM      PT Goal Re-Cert Due Date 12/19/23  -RM         Time Calculation- PT    Total Timed Code Minutes- PT 0 minute(s)  -RM                User Key  (r) = Recorded By, (t) = Taken By, (c) = Cosigned By      Initials Name Provider Type    Kandi Singh, PT Physical Therapist                  Therapy Charges for Today       Code Description Service Date Service Provider Modifiers Qty    [id number] HC PT EVAL MOD COMPLEXITY 4 12/5/2023 Kandi Jo, PT GP 1            PT G-Codes  AM-PAC 6 Clicks Score (PT): 17  PT Discharge Summary  Anticipated Discharge Disposition (PT): skilled nursing facility    Kandi Jo PT  12/5/2023

## 2023-12-05 NOTE — PLAN OF CARE
Goal Outcome Evaluation:   Pt still having abdominal pain with intermittent nausea. Pt still unable to tolerate a diet.

## 2023-12-05 NOTE — CASE MANAGEMENT/SOCIAL WORK
Continued Stay Note   [facility]     Patient Name: Ashley Lin  MRN: [id number]  Today's Date: 12/5/2023    Admit Date: 12/3/2023    Plan: Referral to [facility] for SNF pending. PASRR QFR, No precert required.   Discharge Plan       Row Name 12/05/23 1525       Plan    Plan Referral to [facility] for SNF pending. PASRR QFR, No precert required.    Plan Comments CM called pt in room due to Covid + isolation. Reviewed PT recommendations for SNF at [facility]. Pt in agreement with plan. Choices obtained: 1) [facility](declined- no private room availble per liashaquille Marshall) 2) [facility](pending-referral sent to mindy Howell) 3) [facility]. PASRR QFR, no precert required.             Tracy Oseguera RN      Office phone: [phone]  Office fax: [phone]

## 2023-12-05 NOTE — PROGRESS NOTES
LOS: 2 days   Patient Care Team:  Jamey Fairchild MD as PCP - General (Family Medicine)      Subjective     Subjective: Patient with minimal abdominal pain.  No further diarrhea.  She complains mostly of her back pain.      ROS:   Review of Systems   Cardiovascular:  Negative for chest pain.   Gastrointestinal:  Positive for abdominal pain. Negative for diarrhea, nausea and vomiting.   Musculoskeletal:  Positive for arthralgias and back pain.   Psychiatric/Behavioral:  Negative for agitation and confusion.         Medication Review:   Scheduled Meds:atorvastatin, 10 mg, Oral, Nightly  cefTRIAXone, 2,000 mg, Intravenous, Q24H  cetirizine, 10 mg, Oral, Daily  cycloSPORINE, 1 drop, Both Eyes, BID  docusate sodium, 100 mg, Oral, BID  estradiol, 1 mg, Oral, Daily  gabapentin, 300 mg, Oral, Daily  levothyroxine, 50 mcg, Oral, Q AM  metoclopramide, 10 mg, Intravenous, Q6H  metoprolol tartrate, 5 mg, Intravenous, Q8H  pantoprazole, 40 mg, Oral, BID AC  polyethylene glycol, 17 g, Oral, Daily  potassium chloride, 10 mEq, Oral, Daily  potassium chloride, 10 mEq, Intravenous, Q1H  prednisoLONE acetate, 1 drop, Both Eyes, Nightly  senna-docusate sodium, 2 tablet, Oral, BID  sertraline, 50 mg, Oral, Nightly  sodium chloride, 10 mL, Intravenous, Q12H  topiramate, 100 mg, Oral, BID  vitamin D3, 5,000 Units, Oral, Daily      Continuous Infusions:dextrose 5% and sodium chloride 0.9% with KCl 40 mEq/L, 100 mL/hr      PRN Meds:.  albuterol sulfate HFA    senna-docusate sodium **AND** polyethylene glycol **AND** bisacodyl **AND** bisacodyl    butalbital-acetaminophen-caffeine    Calcium Replacement - Follow Nurse / BPA Driven Protocol    dicyclomine    diphenhydrAMINE    hydrOXYzine    ketorolac    Magnesium Standard Dose Replacement - Follow Nurse / BPA Driven Protocol    melatonin    metoprolol tartrate    nitroglycerin    ondansetron **OR** ondansetron    oxyCODONE    Phosphorus Replacement - Follow Nurse / BPA Driven Protocol     Potassium Replacement - Follow Nurse / BPA Driven Protocol    promethazine    [COMPLETED] Insert Peripheral IV **AND** sodium chloride    sodium chloride    sodium chloride    SUMAtriptan      Objective     Vital Signs  Temp:  [96.1 °F (35.6 °C)-98 °F (36.7 °C)] 96.1 °F (35.6 °C)  Heart Rate:  [100-137] 100  Resp:  [16-22] 22  BP: (137-155)/(74-98) 137/74    Physical Exam:    General Appearance:    Awake and alert, in no acute distress   Head:    Normocephalic, without obvious abnormality   Eyes:          Conjunctivae normal, anicteric sclera   Ears:    Hearing intact   Throat:   No oral lesions, no thrush, oral mucosa moist   Neck:   No adenopathy, supple, no JVD   Lungs:     Respirations regular, even and unlabored       Abdomen:     Soft, minimal generalized tenderness, no rebound or guarding, non-distended   Rectal:     Deferred   Extremities:   No edema, no cyanosis, no redness   Skin:   No bleeding, bruising or rash, no jaundice   Neurologic:   Sensation intact        Results Review:    CBC  Results from last 7 days   Lab Units 12/04/23  2339 12/03/23  1029   RBC 10*6/mm3 3.25* 4.15   WBC 10*3/mm3 4.40 3.60   HEMOGLOBIN g/dL 10.9* 14.0   PLATELETS 10*3/mm3 365 409       CMP  Results from last 7 days   Lab Units 12/05/23  0130 12/04/23  2339 12/04/23  1222 12/03/23  1029   SODIUM mmol/L  --  143 147* 143   POTASSIUM mmol/L 3.2* 3.4* 3.1* 4.3   CHLORIDE mmol/L  --  109* 111* 105   CO2 mmol/L  --  23.0 22.0 18.0*   BUN mg/dL  --  8 14 13   CREATININE mg/dL  --  0.48* 0.58 0.61   GLUCOSE mg/dL  --  134* 121* 168*   ALBUMIN g/dL  --  2.3*  --  2.5*   BILIRUBIN mg/dL  --  0.2  --  0.3   ALK PHOS U/L  --  122*  --  152*   AST (SGOT) U/L  --  27  --  21   ALT (SGPT) U/L  --  11  --  11       Amylase and Lipase  Results from last 7 days   Lab Units 12/04/23  2339 12/04/23  1222 12/03/23  1029   AMYLASE U/L  --  41  --    LIPASE U/L 19  --  8*       CRP         Imaging Results (Last 24 Hours)       ** No results found  for the last 24 hours. **              Assessment & Plan   73-year-old female presented to the hospital on 12/3/2023 with worsening nausea/vomiting and positive for COVID-19.  CT shows diffuse colonic thickening but patient denies diarrhea.     Nausea/vomiting with history of gastroparesis  Abnormal CT suggesting diffuse colitis  COVID-19  COPD  History of gastric bypass  History of cholecystectomy  Status post back surgery January 2023        Plan:  Patient with no further diarrhea.  Minimal abdominal pain.  She mostly complains of her back pain.  Unable to get stool study due to no bowel movements.  WBC 4.4.  Hemoglobin 10.9 from 14.  Recommend that she continue on PPI twice daily due to concern for underlying ulcer related to NSAIDs.  Recommend she completely stop NSAIDs.  Also continue antiemetics.  Potassium 3.2 and is being replaced.  Will advance diet to full liquids and okay to advance to low residue if she is tolerating.  GI will see as needed in the hospital and she can follow-up in the office as outpatient.      Lynda Garces, HUMZA  12/05/23  10:35 EST

## 2023-12-05 NOTE — PLAN OF CARE
Goal Outcome Evaluation: Patient is from home and reported to ER with N&V. Patient will discharge home when medically stable.

## 2023-12-06 LAB
MAGNESIUM SERPL-MCNC: 1.9 MG/DL (ref 1.6–2.4)
WHOLE BLOOD HOLD SPECIMEN: NORMAL

## 2023-12-06 PROCEDURE — 83735 ASSAY OF MAGNESIUM: CPT | Performed by: INTERNAL MEDICINE

## 2023-12-06 PROCEDURE — 99232 SBSQ HOSP IP/OBS MODERATE 35: CPT | Performed by: INTERNAL MEDICINE

## 2023-12-06 PROCEDURE — 25010000002 METOCLOPRAMIDE PER 10 MG: Performed by: INTERNAL MEDICINE

## 2023-12-06 RX ORDER — METOPROLOL SUCCINATE 25 MG/1
25 TABLET, EXTENDED RELEASE ORAL EVERY 12 HOURS SCHEDULED
Status: DISCONTINUED | OUTPATIENT
Start: 2023-12-06 | End: 2023-12-08 | Stop reason: HOSPADM

## 2023-12-06 RX ORDER — POTASSIUM CHLORIDE 20 MEQ/1
40 TABLET, EXTENDED RELEASE ORAL ONCE
Status: COMPLETED | OUTPATIENT
Start: 2023-12-06 | End: 2023-12-06

## 2023-12-06 RX ORDER — METOPROLOL SUCCINATE 25 MG/1
25 TABLET, EXTENDED RELEASE ORAL
Status: DISCONTINUED | OUTPATIENT
Start: 2023-12-06 | End: 2023-12-06

## 2023-12-06 RX ADMIN — TOPIRAMATE 100 MG: 100 TABLET, FILM COATED ORAL at 21:20

## 2023-12-06 RX ADMIN — Medication 10 ML: at 09:45

## 2023-12-06 RX ADMIN — METOPROLOL TARTRATE 5 MG: 1 INJECTION, SOLUTION INTRAVENOUS at 04:10

## 2023-12-06 RX ADMIN — METOCLOPRAMIDE HYDROCHLORIDE 10 MG: 5 INJECTION INTRAMUSCULAR; INTRAVENOUS at 23:40

## 2023-12-06 RX ADMIN — ATORVASTATIN CALCIUM 10 MG: 10 TABLET, FILM COATED ORAL at 21:21

## 2023-12-06 RX ADMIN — CYCLOSPORINE 1 DROP: 0.5 EMULSION OPHTHALMIC at 21:21

## 2023-12-06 RX ADMIN — ESTRADIOL 1 MG: 1 TABLET ORAL at 09:43

## 2023-12-06 RX ADMIN — POTASSIUM CHLORIDE 40 MEQ: 1500 TABLET, EXTENDED RELEASE ORAL at 12:33

## 2023-12-06 RX ADMIN — METOCLOPRAMIDE HYDROCHLORIDE 10 MG: 5 INJECTION INTRAMUSCULAR; INTRAVENOUS at 12:34

## 2023-12-06 RX ADMIN — LEVOTHYROXINE SODIUM 50 MCG: 50 TABLET ORAL at 04:10

## 2023-12-06 RX ADMIN — METOCLOPRAMIDE HYDROCHLORIDE 10 MG: 5 INJECTION INTRAMUSCULAR; INTRAVENOUS at 16:50

## 2023-12-06 RX ADMIN — Medication 10 ML: at 21:21

## 2023-12-06 RX ADMIN — TOPIRAMATE 100 MG: 100 TABLET, FILM COATED ORAL at 09:43

## 2023-12-06 RX ADMIN — CETIRIZINE HYDROCHLORIDE 10 MG: 10 TABLET, FILM COATED ORAL at 09:43

## 2023-12-06 RX ADMIN — OXYCODONE HYDROCHLORIDE 5 MG: 5 TABLET ORAL at 12:42

## 2023-12-06 RX ADMIN — PANTOPRAZOLE SODIUM 40 MG: 40 TABLET, DELAYED RELEASE ORAL at 09:44

## 2023-12-06 RX ADMIN — OXYCODONE HYDROCHLORIDE 5 MG: 5 TABLET ORAL at 23:46

## 2023-12-06 RX ADMIN — PANTOPRAZOLE SODIUM 40 MG: 40 TABLET, DELAYED RELEASE ORAL at 16:51

## 2023-12-06 RX ADMIN — GABAPENTIN 300 MG: 300 CAPSULE ORAL at 09:43

## 2023-12-06 RX ADMIN — OXYCODONE HYDROCHLORIDE 5 MG: 5 TABLET ORAL at 04:10

## 2023-12-06 RX ADMIN — METOCLOPRAMIDE HYDROCHLORIDE 10 MG: 5 INJECTION INTRAMUSCULAR; INTRAVENOUS at 04:10

## 2023-12-06 RX ADMIN — Medication 5000 UNITS: at 09:44

## 2023-12-06 RX ADMIN — SERTRALINE HYDROCHLORIDE 50 MG: 50 TABLET ORAL at 21:21

## 2023-12-06 RX ADMIN — PREDNISOLONE ACETATE 1 DROP: 10 SUSPENSION/ DROPS OPHTHALMIC at 21:21

## 2023-12-06 RX ADMIN — CYCLOSPORINE 1 DROP: 0.5 EMULSION OPHTHALMIC at 09:44

## 2023-12-06 RX ADMIN — METOPROLOL SUCCINATE 25 MG: 25 TABLET, EXTENDED RELEASE ORAL at 21:20

## 2023-12-06 RX ADMIN — POTASSIUM CHLORIDE 10 MEQ: 750 TABLET, EXTENDED RELEASE ORAL at 09:43

## 2023-12-06 NOTE — PLAN OF CARE
Goal Outcome Evaluation:      Pt alert and orient x 4. Pt having back pain this shift. Pain medication effective. Pt makes needs known verbally. Pt on thin liquid diet at this time. Pt able to transfer to bedside toilet with assist x 1.

## 2023-12-06 NOTE — PLAN OF CARE
Goal Outcome Evaluation:      Pt still having abdominal pain and intermittent nausea.

## 2023-12-06 NOTE — PROGRESS NOTES
[facility] MEDICINE SERVICE  DAILY PROGRESS NOTE    NAME: Ashley Lin  : 1950  MRN: [id number]      LOS: 3 days     PROVIDER OF SERVICE: Jonatan Bueno MD    Chief Complaint: Intractable vomiting    Subjective:     Interval History:  History taken from: patient  Patient Complaints:   Patient Denies:   nausea     History of Present Illness: Ashley Lin is a 73 y.o. female with multiple medical problems, a past history significant for chronic back pain, chronic migraine, COPD, depression, hypothyroidism, GERD, history of subdural hematoma, hyperlipidemia, DVT   who presented to [facility] on 12/3/2023 with nausea and  Vomiting for last 3 days, cannot eat or drink or hold anything down.  She has had hot and cold spells but no temperatures that she has not taken.  She denies any urinary complaints she denies any diarrhea bowels have been okay no bleeding.  She has had no chest pain neck arm jaw pain shortness of breath little bit of cough.  No ill exposures no foreign travels or antibiotic use or recent hospitalization.  She states she had back surgery back in 2023 she got a letter in the mail a couple weeks ago they told her she had a mass on her lungs which was spotted on a scan she had done on her spine.  Patient has had no recent hospitalization otherwise nothing seem to trigger make it better or worse symptoms are severe and she feels extremely dehydrated. complains of abdominal pain, denies diarrhea. In the ER Heart rate was 150.  Sinus tach on monitor.  Denies chest pain.  She was started on some fluids.  Tested positive for COVID, Urine was okay, cx-ray was clear, Sats are 100%     23-patient seen and examined in bed no acute distress, vital signs heart rate 138.  Discussed with RN, started Cardizem drip.  Patient is still complaining of significant pain.  23 patient seen and examined in bed no acute distress, vital s blood pressure stable.   Heart rate 109.  Still complaining abdominal pain.  12/6/23 patient seen and examined in bed no acute distress, vital signs stable, discussed with RN, says feels better today, awaiting placement.    Review of Systems   Constitutional:  Negative for chills and fever.   HENT:  Negative for ear pain and hearing loss.    Respiratory:  Negative for apnea and shortness of breath.    Cardiovascular:  Negative for chest pain and palpitations.   Gastrointestinal:  Positive for nausea, vomiting, abd pain. Negative for diarrhea.   Genitourinary:  Negative for dysuria.   Musculoskeletal:  Negative for back pain and joint swelling.   Neurological:  Negative for seizures, light-headedness and headaches.   Psychiatric/Behavioral:  Negative for confusion and hallucinations.      Objective:     Vital Signs  Temp:  [94.9 °F (34.9 °C)-97.4 °F (36.3 °C)] 97.1 °F (36.2 °C)  Heart Rate:  [] 92  Resp:  [14-22] 15  BP: (132-161)/(80-95) 141/80  Flow (L/min):  [3] 3   Body mass index is 17.1 kg/m².      Physical ExamConstitutional:       General: She is not in acute distress.     Appearance: She is well-developed. She is ill-appearing. She is not toxic-appearing or diaphoretic.   HENT:      Head: Normocephalic and atraumatic.      Nose: Nose normal. No congestion or rhinorrhea.      Mouth/Throat:      Mouth: Mucous membranes are moist.      Pharynx: No oropharyngeal exudate.   Eyes:      General: No scleral icterus.        Right eye: No discharge.         Left eye: No discharge.      Extraocular Movements: Extraocular movements intact.      Conjunctiva/sclera: Conjunctivae normal.      Pupils: Pupils are equal, round, and reactive to light.   Neck:      Thyroid: No thyromegaly.      Vascular: No carotid bruit or JVD.      Trachea: No tracheal deviation.   Cardiovascular:      Rate and Rhythm: Normal rate and regular rhythm.      Pulses: Normal pulses.      Heart sounds: Normal heart sounds. No murmur heard.     No friction rub. No  gallop.   Pulmonary:      Effort: Pulmonary effort is normal. No respiratory distress.      Breath sounds: Normal breath sounds. No stridor. No wheezing, rhonchi or rales.   Chest:      Chest wall: No tenderness.   Abdominal:      General: Bowel sounds are normal. There is no distension.      Palpations: Abdomen is soft. There is no mass.      Tenderness: There is abdominal tenderness. There is no guarding or rebound.      Hernia: No hernia is present.   Musculoskeletal:         General: No swelling, tenderness, deformity or signs of injury. Normal range of motion.      Cervical back: Normal range of motion and neck supple. No rigidity. No muscular tenderness.      Right lower leg: No edema.      Left lower leg: No edema.   Lymphadenopathy:      Cervical: No cervical adenopathy.   Skin:     General: Skin is warm and dry.      Coloration: Skin is pale. Skin is not jaundiced.      Findings: No bruising, erythema or rash.   Neurological:      General: No focal deficit present.      Mental Status: She is alert and oriented to person, place, and time. Mental status is at baseline.      Cranial Nerves: No cranial nerve deficit.      Sensory: No sensory deficit.      Motor: Weakness present. No abnormal muscle tone.      Coordination: Coordination normal.   Psychiatric:         Mood and Affect: Mood normal.         Behavior: Behavior normal.         Thought Content: Thought content normal.        Scheduled Meds   atorvastatin, 10 mg, Oral, Nightly  cetirizine, 10 mg, Oral, Daily  cycloSPORINE, 1 drop, Both Eyes, BID  docusate sodium, 100 mg, Oral, BID  estradiol, 1 mg, Oral, Daily  gabapentin, 300 mg, Oral, Daily  levothyroxine, 50 mcg, Oral, Q AM  metoclopramide, 10 mg, Intravenous, Q6H  metoprolol tartrate, 5 mg, Intravenous, Q8H  pantoprazole, 40 mg, Oral, BID AC  polyethylene glycol, 17 g, Oral, Daily  potassium chloride, 10 mEq, Oral, Daily  prednisoLONE acetate, 1 drop, Both Eyes, Nightly  senna-docusate sodium, 2  tablet, Oral, BID  sertraline, 50 mg, Oral, Nightly  sodium chloride, 10 mL, Intravenous, Q12H  topiramate, 100 mg, Oral, BID  vitamin D3, 5,000 Units, Oral, Daily       PRN Meds     albuterol sulfate HFA    senna-docusate sodium **AND** polyethylene glycol **AND** bisacodyl **AND** bisacodyl    butalbital-acetaminophen-caffeine    Calcium Replacement - Follow Nurse / BPA Driven Protocol    dicyclomine    diphenhydrAMINE    hydrOXYzine    ketorolac    Magnesium Standard Dose Replacement - Follow Nurse / BPA Driven Protocol    melatonin    metoprolol tartrate    nitroglycerin    ondansetron **OR** ondansetron    oxyCODONE    Phosphorus Replacement - Follow Nurse / BPA Driven Protocol    Potassium Replacement - Follow Nurse / BPA Driven Protocol    promethazine    [COMPLETED] Insert Peripheral IV **AND** sodium chloride    sodium chloride    sodium chloride    SUMAtriptan   Infusions  dextrose 5% and sodium chloride 0.9% with KCl 40 mEq/L, 100 mL/hr, Last Rate: 100 mL/hr (12/05/23 4488)          Diagnostic Data    Results from last 7 days   Lab Units 12/05/23  1949 12/05/23  0130 12/04/23  2339   WBC 10*3/mm3  --   --  4.40   HEMOGLOBIN g/dL  --   --  10.9*   HEMATOCRIT %  --   --  32.8*   PLATELETS 10*3/mm3  --   --  365   GLUCOSE mg/dL  --   --  134*   CREATININE mg/dL  --   --  0.48*   BUN mg/dL  --   --  8   SODIUM mmol/L  --   --  143   POTASSIUM mmol/L 3.6   < > 3.4*   AST (SGOT) U/L  --   --  27   ALT (SGPT) U/L  --   --  11   ALK PHOS U/L  --   --  122*   BILIRUBIN mg/dL  --   --  0.2   ANION GAP mmol/L  --   --  11.0    < > = values in this interval not displayed.       No radiology results for the last day      I reviewed the patient's new clinical results.    Assessment/Plan:     Active and Resolved Problems  Active Hospital Problems    Diagnosis  POA    **Intractable vomiting [R11.10]  Yes     Priority: High    Colitis [K52.9]  Unknown     Priority: High    Generalized abdominal pain [R10.84]  Unknown      Priority: High    Sinus tachycardia [R00.0]  Yes     Priority: High    Chronic obstructive pulmonary disease [J44.9]  Yes     Priority: Medium    Intractable cyclical vomiting associated with migraine [G43.A1]  Yes     Priority: Medium    Chronic anticoagulation [Z79.01]  Not Applicable     Priority: Low    Severe malnutrition [E43]  Yes     Priority: Low    Debility [R53.81]  Yes     Priority: Low    Depression [F32.A]  Yes     Priority: Low    Chronic pain syndrome [G89.4]  Yes     Priority: Low    GERD without esophagitis [K21.9]  Yes     Priority: Low    Cervical disc herniation [M50.20]  Yes     Priority: Low    Low back pain [M54.50]  Yes     Priority: Low    History of lumbar fusion [Z98.1]  Not Applicable     Priority: Low    COVID-19 [U07.1]  Yes    Abdominal pain with vomiting [R10.9, R11.10]  Yes    History of subdural hematoma [Z86.79]  Not Applicable    Hyperlipidemia [E78.5]  Yes    Hypothyroidism (acquired) [E03.9]  Yes    Migraines [G43.909]  Yes      Resolved Hospital Problems   No resolved problems to display.       Intractable vomiting, abdominal pain. acute colitis   Continue supportive care  Telemetry unit  IV fluids  Zofran IV  GI consulted-Suspect patient nausea vomiting as well as some diarrhea could be related to either COVID infection versus COVID-related infectious colitis with the diarrhea and thickening of bowel.  We will check stool studies.  Will hold off on any antibiotic for GI tract.  Because of her history of gastroparesis she will be placed on Reglan as well as PPI.  She has been taking significant amount of ibuprofen possibility of NSAID induced gastritis ulceration leading to above symptoms in the differential as well   Reglan iv     COVID-19 Symbicort albuterol vitamin C vitamin D zinc.     Hyperlipidemia on Lipitor.  Check CPK.     Migraine on Fioricet.  Max as needed.     Chronic back and neck pain continue Neurontin.  Oxycodone     GERD continue Protonix     Depression  continue Zoloft     COPD DuoNeb as needed       DVT prophylaxis:  Mechanical DVT prophylaxis orders are present.     Code status is   There are no questions and answers to display.       Plan for disposition:home in 2 days    Time: 30 minutes    Signature: Electronically signed by Jonatan Bueno MD, 12/06/23, 10:11 EST.  [facility], [place] Hospitalist Team

## 2023-12-06 NOTE — CONSULTS
Cardiology [place]        Subjective:     Encounter Date:12/03/2023      Patient ID: Ashley Lin is a 73 y.o. female.    Chief Complaint: nausea/ vomiting  Cardiology Consult: tachycardia    Referring Physician: Dr. Bueno    HPI: Agree with narrative is discussed with nurse practitioner after face-to-face encounter scribed findings below  Ashley Lin is a 73 y.o. female who presents with nausea vomiting. Ms. Lin does not routinely see a cardiologist. Pmh includes HLD, hypothyroidism, prior DVT, COPD, h/o subdural hematoma, sinus tachycardia.    Ms. Lin presented to ER with vomiting and inability to hold down food or liquid for the last 3 days. She tested positive for COVID 19. GI consulted for nausea and vomiting. She is sinus tach on ECG with HR in the 130s. She was started on a cardizem gtt per primary team for sinus tachycardia with elevated heart rates. Denies chest pain. She is on 2.5L NC, she had clear CXR. Cardiology consulted for tachycardia.   ============================================  Remains sinus rhythm 90s, resting comfortably distress early this morning on encounter  Metoprolol on board without issues, blood pressure is very stable 1 30-1 40 systolic  Antiemetics okay, QT is okay  Replacing potassium today again, keep greater than 4 magnesium greater than 2  Replace magnesium      Continue IV fluids gently  Advance p.o. diet  Antibiotics continue  Increase metoprolol XL 25 twice a day and discontinue IV metoprolol  Additional afterload reduction if needed or can titrate up beta-blocker or transition to carvedilol    2D echo last month with normal EF, no indication to repeat at this time    Review of systems otherwise negative x14 point review of systems except as mentioned above  Historical data copied forward from previous encounters in EMR is unchanged      EKG sinus tachycardia, no evidence of atrial fibrillation  Past Medical History:   Diagnosis Date     Acute deep vein thrombosis (DVT) of calf muscle vein of left lower extremity 11/10/2023    Aftercare for healing traumatic closed fracture of right radius 11/10/2023    Asthma     Chronic anticoagulation 11/10/2023    Chronic back pain     Chronic headaches     Chronic obstructive pulmonary disease 2020    Chronic pain syndrome 2020    Depression 2020    Disease of thyroid gland     Estrogen deficiency 2020    GERD without esophagitis 2020    History of subdural hematoma 11/10/2023    Hyperlipidemia 11/10/2023    Hypothyroidism (acquired) 2020    Migraines 2020    Neuropathy     Sinus tachycardia 11/10/2023    Vomiting 2020       Past Surgical History:   Procedure Laterality Date    CHOLECYSTECTOMY      COLONOSCOPY N/A 2020    Procedure: COLONOSCOPY WITH RANDOM COLON BIOPSIES X4 AREAS, BIOPSY X 1 AREA;  Surgeon: Aguila Garcia MD;  Location: [facility] ENDOSCOPY;  Service: Gastroenterology;  Laterality: N/A;  POST- colon erosion    ENDOSCOPY N/A 2020    Procedure: ESOPHAGOGASTRODUODENOSCOPY WITH DILATATION (BOUGIE #[id number]);  Surgeon: Benjamin Mike MD;  Location: [facility] ENDOSCOPY;  Service: Gastroenterology;  Laterality: N/A;  Post operative diagnosis:GASTROPARESIS AND DYSPHAGIA     ENDOSCOPY N/A 2020    Procedure: ESOPHAGOGASTRODUODENOSCOPY WITH BIOPSY X 1 AREA;  Surgeon: Aguila Garcia MD;  Location: [facility] ENDOSCOPY;  Service: Gastroenterology;  Laterality: N/A;  POST    HYSTERECTOMY      NECK SURGERY      SPINE SURGERY  2020       Family History   Problem Relation Age of Onset    Heart disease Mother     No Known Problems Father        Social History     Socioeconomic History    Marital status:    Tobacco Use    Smoking status: Former     Types: Cigarettes     Quit date:      Years since quittin.9     Passive exposure: Current    Smokeless tobacco: Never   Vaping Use    Vaping Use: Never used   Substance and Sexual  "Activity    Alcohol use: Never    Drug use: Never    Sexual activity: Defer         Allergies   Allergen Reactions    Morphine Hives    Penicillins Hives and Shortness Of Breath     Can take iv form - but not po form         Current Medications:   Scheduled Meds:atorvastatin, 10 mg, Oral, Nightly  cetirizine, 10 mg, Oral, Daily  cycloSPORINE, 1 drop, Both Eyes, BID  docusate sodium, 100 mg, Oral, BID  estradiol, 1 mg, Oral, Daily  gabapentin, 300 mg, Oral, Daily  levothyroxine, 50 mcg, Oral, Q AM  metoclopramide, 10 mg, Intravenous, Q6H  metoprolol succinate XL, 25 mg, Oral, Q24H  pantoprazole, 40 mg, Oral, BID AC  polyethylene glycol, 17 g, Oral, Daily  potassium chloride, 10 mEq, Oral, Daily  prednisoLONE acetate, 1 drop, Both Eyes, Nightly  senna-docusate sodium, 2 tablet, Oral, BID  sertraline, 50 mg, Oral, Nightly  sodium chloride, 10 mL, Intravenous, Q12H  topiramate, 100 mg, Oral, BID  vitamin D3, 5,000 Units, Oral, Daily      Continuous Infusions:dextrose 5% and sodium chloride 0.9% with KCl 40 mEq/L, 100 mL/hr, Last Rate: 100 mL/hr (12/05/23 0973)        Review of Systems   Constitutional: Positive for malaise/fatigue. Negative for chills and diaphoresis.   Cardiovascular:  Negative for chest pain, dyspnea on exertion, irregular heartbeat, leg swelling, near-syncope, orthopnea, palpitations, paroxysmal nocturnal dyspnea and syncope.   Respiratory:  Negative for cough, shortness of breath, sleep disturbances due to breathing and sputum production.    Gastrointestinal:  Positive for nausea and vomiting. Negative for change in bowel habit.   Genitourinary:  Negative for urgency.   Neurological:  Negative for dizziness and headaches.   Psychiatric/Behavioral:  Negative for altered mental status.             Objective:         /80 (BP Location: Right arm, Patient Position: Lying)   Pulse 92   Temp 97.1 °F (36.2 °C) (Oral)   Resp 15   Ht 162.6 cm (64.02\")   Wt 45.2 kg (99 lb 10.4 oz)   SpO2 96%   BMI " 17.10 kg/m²     Physical Exam:  General Appearance:    Alert, cooperative, in no acute distress                                Head: Atraumatic, normocephalic, PERRLA               Neck:   supple,  no JVD   Lungs:    Supplemental O2, dim bases    Heart:    Regular rhythm and normal rate, normal S1 and S2   Abdomen:     Normal bowel sounds, no masses, no organomegaly, soft  nontender, nondistended, no guarding, no rebound  tenderness   Extremities:   Moves all extremities well, no edema, no cyanosis, no  redness   Pulses:   Pulses palpable and equal bilaterally   Skin:   No bleeding, bruising or rash   Neurologic:   Awake, alert, oriented x3       Unchanged from prior encounter          ASCVD Risk Score::  The 10-year ASCVD risk score (Aislinn PAZ, et al., 2019) is: 11.2%    Values used to calculate the score:      Age: 73 years      Sex: Female      Is Non- : No      Diabetic: No      Tobacco smoker: No      Systolic Blood Pressure: 141 mmHg      Is BP treated: No      HDL Cholesterol: 65 mg/dL      Total Cholesterol: 136 mg/dL      Lab Review:     Results from last 7 days   Lab Units 12/05/23  1949 12/05/23  0130 12/04/23  2339 12/04/23  1222 12/03/23  1029   SODIUM mmol/L  --   --  143 147* 143   POTASSIUM mmol/L 3.6 3.2* 3.4* 3.1* 4.3   CHLORIDE mmol/L  --   --  109* 111* 105   CO2 mmol/L  --   --  23.0 22.0 18.0*   BUN mg/dL  --   --  8 14 13   CREATININE mg/dL  --   --  0.48* 0.58 0.61   GLUCOSE mg/dL  --   --  134* 121* 168*   CALCIUM mg/dL  --   --  7.4* 8.2* 8.4*   AST (SGOT) U/L  --   --  27  --  21   ALT (SGPT) U/L  --   --  11  --  11     Results from last 7 days   Lab Units 12/04/23  1436 12/04/23  1222   CK TOTAL U/L  --  85   HSTROP T ng/L 23* 28*     Results from last 7 days   Lab Units 12/04/23  2339 12/03/23  1029   WBC 10*3/mm3 4.40 3.60   HEMOGLOBIN g/dL 10.9* 14.0   HEMATOCRIT % 32.8* 42.1   PLATELETS 10*3/mm3 365 409         Results from last 7 days   Lab Units  12/06/23  0152 12/04/23  2339   MAGNESIUM mg/dL 1.9 1.6     Results from last 7 days   Lab Units 12/04/23  1222   CHOLESTEROL mg/dL 136   TRIGLYCERIDES mg/dL 108   HDL CHOL mg/dL 65*     Results from last 7 days   Lab Units 12/04/23  1222   PROBNP pg/mL 2,679.0*     Results from last 7 days   Lab Units 12/04/23  1222   TSH uIU/mL 2.630       Recent Radiology:  Imaging Results (Most Recent)       Procedure Component Value Units Date/Time    CT Abdomen Pelvis Without Contrast [[id number]] Collected: 12/03/23 1315     Updated: 12/03/23 1330    Narrative:      CT ABDOMEN PELVIS WO CONTRAST    Date of Exam: 12/3/2023 12:53 PM EST    Indication: Diffuse abdominal pain vomiting.    Comparison: CT of the abdomen and pelvis dated 6/6/2022    Technique: Axial CT images were obtained of the abdomen and pelvis without the administration of contrast. Sagittal and coronal reconstructions were performed.  Automated exposure control and iterative reconstruction methods were used.    Findings:    Examination is limited due to lack of IV contrast administration and extensive beam hardening artifact.    Liver: The liver is unremarkable in morphology. Evaluation for focal liver lesions is limited without IV contrast. Mildly prominent CBD may be related to prior cholecystectomy.    Gallbladder: Surgically absent.    Pancreas: Unremarkable.    Spleen: Unremarkable.    Adrenal glands: Unremarkable.    Genitourinary tract: Kidneys are unremarkable. No hydronephrosis is seen. Visualized portions of the ureters appear grossly unremarkable. Urinary bladder is grossly unremarkable. There is a catheter coiled within the vagina.    Gastrointestinal tract: There is limited visualization of the hollow viscera due to lack of IV contrast and due to extensive beam hardening artifact within much of the study. Colonic wall thickening may be related to underdistention. Colitis may be   considered in the appropriate clinical setting. No findings to  suggest bowel obstruction. There are surgical changes of the stomach with gastrojejunostomy.    Appendix: The appendix is not definitely identified.    Other findings: No free air or free fluid is seen. No pathologically enlarged lymph nodes are seen. Vascular calcifications are present.    Bones and soft tissues: Bones are demineralized. There are mild superior endplate compression fractures of T12 and L1 which are new since 6/6/2022. Exact chronicity is uncertain. Please correlate clinically for pain/tenderness at these sites. There are   surgical changes of the lumbar spine from L3-S1. There are chronic changes of the bilateral iliac bones. There is a sclerotic focus within the left inferior pubic ramus which appears similar since 6/6/2022, possibly a bone island. Right-sided InterStim   is noted. Superficial soft tissues demonstrate no acute abnormality.    Lung bases: The visualized lung bases are clear.      Impression:      Impression:  1.Examination is limited due to lack of IV contrast administration and extensive beam hardening artifact.  2.Diffuse colonic wall thickening raises suspicion for colitis. This may also be related to underdistention. Please correlate clinically.  3.There are mild superior endplate compression fractures of T12 and L1 which are new since 6/6/2022. Exact chronicity is uncertain. Please correlate clinically for pain/tenderness at these sites.   4.There is a catheter coiled within the vagina. If this is meant to be within the bladder, recommend repositioning.  5.Additional findings as detailed above.    Electronically Signed: Frederick Al MD    12/3/2023 1:28 PM EST    Workstation ID: [id number]    XR Chest 1 View [[id number]] Collected: 12/03/23 1209     Updated: 12/03/23 1211    Narrative:      XR CHEST 1 VW    Date of Exam: 12/3/2023 10:30 AM EST    Indication: Vomiting weakness recent back surgery    Comparison: CT chest with contrast 11/7/2023    Findings:  The  cardiomediastinal silhouette is within normal limits. Lungs are clear. No focal consolidation, pneumothorax, or significant pleural effusion. Osseous structures grossly intact. Surgical anchors overlie the left and right humeral head. Multilevel   cervical fixation.      Impression:      Impression:  No acute process.            Electronically Signed: Carrillo Goodman MD    12/3/2023 12:09 PM EST    Workstation ID: [id number]              ECHOCARDIOGRAM:    Results for orders placed during the hospital encounter of 11/07/23    Adult Transthoracic Echo Complete W/ Cont if Necessary Per Protocol    Interpretation Summary    Left ventricular systolic function is normal. Left ventricular ejection fraction appears to be 56 - 60%.    Left ventricular diastolic function is consistent with (grade Ia w/high LAP) impaired relaxation.    Estimated right ventricular systolic pressure from tricuspid regurgitation is normal (<35 mmHg).                  Assessment:         Active Hospital Problems    Diagnosis  POA    **Intractable vomiting [R11.10]  Yes    COVID-19 [U07.1]  Yes    Colitis [K52.9]  Unknown    Generalized abdominal pain [R10.84]  Unknown    Abdominal pain with vomiting [R10.9, R11.10]  Yes    History of subdural hematoma [Z86.79]  Not Applicable    Sinus tachycardia [R00.0]  Yes    Hyperlipidemia [E78.5]  Yes    Chronic anticoagulation [Z79.01]  Not Applicable    Severe malnutrition [E43]  Yes    Debility [R53.81]  Yes    Chronic obstructive pulmonary disease [J44.9]  Yes    Hypothyroidism (acquired) [E03.9]  Yes    Depression [F32.A]  Yes    Migraines [G43.909]  Yes    Chronic pain syndrome [G89.4]  Yes    GERD without esophagitis [K21.9]  Yes    Intractable cyclical vomiting associated with migraine [G43.A1]  Yes     Added automatically from request for surgery [id number]      Cervical disc herniation [M50.20]  Yes    Low back pain [M54.50]  Yes     2015 IMO UPDATE      History of lumbar fusion [Z98.1]  Not Applicable      Nausea / vomiting, intractable- GI evaluated, possibly related to COVID vs. Colitis, checking stool studies  COVID 19  Sinus tachycardia  H/o DVT  HLD  Migraines  H/o sinus tachycardia  ECHO last month normal LVEF, grade 1 DD     Plan:   Ms. Lin presents with several day history of nausea / vomiting, she has tested + for COVID 19  Sinus tachycardia with no evidence of atrial or ventricular tachyarrhythmia  Normal EF by 2D echo  No sign of heart failure or acute coronary syndrome    Today  Potassium replacement  Metoprolol XL 25 twice daily  Continue gentle IV fluids  Advance diet  Antiemetics are okay from CV standpoint, QT is normal    Continue telemetry    Further recommendation follow findings and clinical course      Fabian Helton MD, PhD             Fabian Helton MD  12/04/23  12:23 EST

## 2023-12-06 NOTE — SIGNIFICANT NOTE
[facility] [place]  [address] [facility] [address]  [phone]      Kellen Garcia : 1984 MRN: [id number]        3/25/2019  Time Session Began: 7p  Time Session Ended:  8:10p    Session Type:  Family (27595)    Others Present:  , Lawrence    Intervention:  Behavioral, Cognitive, Insight, Supportive, Systemic    Suicide/Homicide/Violence Ideation:  No    If Yes, explain:  NA    Current Outpatient Medications   Medication Sig   • cyclobenzaprine (FLEXERIL) 10 MG tablet Take 1 tablet by mouth nightly as needed for Muscle spasms. Take it for next 5 nights.   • triamcinolone (ARISTOCORT) 0.1 % cream Apply twice a day   • ibuprofen (MOTRIN) 600 MG tablet Take 1 tablet by mouth every 6 hours as needed for Pain.   • albuterol (VENTOLIN HFA) 108 (90 Base) MCG/ACT inhaler Inhale 2 puffs into the lungs every 4 hours as needed for Wheezing.     No current facility-administered medications for this visit.        Change in Medication(s) Reported:  No  If Yes, explain:      Patient/Family Education Provided:  Yes  Patient/Family Displays Understanding:  Yes    If No, explain:  NA    Progress Note containing chief complaint and symptoms/problems related to the complaint:    Chief complaint in patient's own words:  Communication issues    DATA:  Clients talked about several issues. Lawrence would like Kellen to make his lunch but she does not like doing it so she does it inconsistently. Lawrence feels like everyone else's needs are met but not his.  Kellen complained that Lawrence never communicates with her.  He admits he does not like to communicate very much. Kellen likes to communicate in a lot more detail than he needs.  Kellen said he will roll his eyes when she is communicating something with him and she feels disrespected.  She does not feel like he has her back. Lawrence also talked about how he thinks the     12/06/23 1451   PASRR   PASRR Status Approved/Complete        time they should try and communicate is after the kids are in bed but he said she wants to do her own things by that time at night.  ACTION:  Processed feelings and provided support. Suggested they do a weekly check-in to resolve things from the past week and to let each other know what they need for the coming week. Also told them they need to stop pointing at the other person and talking about everything the other person I doing wrong and start looking at them selves and what they can do to make the relationship better.  That is the only thing that is in their control.  RESPONSE:  Clients were open and talkative and engaged in therapy but were very negative towards each other throughout the session.  PLAN:  Follow up appointment in two weeks.    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

## 2023-12-06 NOTE — CASE MANAGEMENT/SOCIAL WORK
Continued Stay Note   [facility]     Patient Name: Ashley Lin  MRN: [id number]  Today's Date: 12/6/2023    Admit Date: 12/3/2023    Plan: Referral to [facility] for SNF pending. PASRR approved. No precert required.    CM followed up with [facility] liaison Lynda that referral was received. hospitalsRR approved. Possible DC tomorrow- 12/7.     Tracy Oseguera RN      Office phone: [phone]  Office fax: [phone]

## 2023-12-07 LAB
ALBUMIN SERPL-MCNC: 2.3 G/DL (ref 3.5–5.2)
ALBUMIN/GLOB SERPL: 1 G/DL
ALP SERPL-CCNC: 126 U/L (ref 39–117)
ALT SERPL W P-5'-P-CCNC: 9 U/L (ref 1–33)
ANION GAP SERPL CALCULATED.3IONS-SCNC: 7 MMOL/L (ref 5–15)
AST SERPL-CCNC: 17 U/L (ref 1–32)
BASOPHILS # BLD AUTO: 0 10*3/MM3 (ref 0–0.2)
BASOPHILS NFR BLD AUTO: 0.5 % (ref 0–1.5)
BILIRUB SERPL-MCNC: 0.2 MG/DL (ref 0–1.2)
BUN SERPL-MCNC: 3 MG/DL (ref 8–23)
BUN/CREAT SERPL: 4.3 (ref 7–25)
CA-I SERPL ISE-MCNC: 1.28 MMOL/L (ref 1.2–1.3)
CALCIUM SPEC-SCNC: 8.2 MG/DL (ref 8.6–10.5)
CHLORIDE SERPL-SCNC: 112 MMOL/L (ref 98–107)
CO2 SERPL-SCNC: 21 MMOL/L (ref 22–29)
CREAT SERPL-MCNC: 0.7 MG/DL (ref 0.57–1)
DEPRECATED RDW RBC AUTO: 52.5 FL (ref 37–54)
EGFRCR SERPLBLD CKD-EPI 2021: 92.6 ML/MIN/1.73
EOSINOPHIL # BLD AUTO: 0.4 10*3/MM3 (ref 0–0.4)
EOSINOPHIL NFR BLD AUTO: 5.8 % (ref 0.3–6.2)
ERYTHROCYTE [DISTWIDTH] IN BLOOD BY AUTOMATED COUNT: 14.9 % (ref 12.3–15.4)
GLOBULIN UR ELPH-MCNC: 2.4 GM/DL
GLUCOSE SERPL-MCNC: 101 MG/DL (ref 65–99)
HCT VFR BLD AUTO: 32.5 % (ref 34–46.6)
HGB BLD-MCNC: 10.7 G/DL (ref 12–15.9)
LYMPHOCYTES # BLD AUTO: 1.3 10*3/MM3 (ref 0.7–3.1)
LYMPHOCYTES NFR BLD AUTO: 19.2 % (ref 19.6–45.3)
MCH RBC QN AUTO: 33.8 PG (ref 26.6–33)
MCHC RBC AUTO-ENTMCNC: 33 G/DL (ref 31.5–35.7)
MCV RBC AUTO: 102.6 FL (ref 79–97)
MONOCYTES # BLD AUTO: 1 10*3/MM3 (ref 0.1–0.9)
MONOCYTES NFR BLD AUTO: 15.7 % (ref 5–12)
NEUTROPHILS NFR BLD AUTO: 3.9 10*3/MM3 (ref 1.7–7)
NEUTROPHILS NFR BLD AUTO: 58.8 % (ref 42.7–76)
NRBC BLD AUTO-RTO: 0 /100 WBC (ref 0–0.2)
PLATELET # BLD AUTO: 304 10*3/MM3 (ref 140–450)
PMV BLD AUTO: 7.3 FL (ref 6–12)
POTASSIUM SERPL-SCNC: 4.2 MMOL/L (ref 3.5–5.2)
PROT SERPL-MCNC: 4.7 G/DL (ref 6–8.5)
QT INTERVAL: 285 MS
QTC INTERVAL: 436 MS
RBC # BLD AUTO: 3.17 10*6/MM3 (ref 3.77–5.28)
SODIUM SERPL-SCNC: 140 MMOL/L (ref 136–145)
WBC NRBC COR # BLD AUTO: 6.6 10*3/MM3 (ref 3.4–10.8)

## 2023-12-07 PROCEDURE — 87324 CLOSTRIDIUM AG IA: CPT | Performed by: INTERNAL MEDICINE

## 2023-12-07 PROCEDURE — 63710000001 ONDANSETRON PER 8 MG

## 2023-12-07 PROCEDURE — 25010000002 METOCLOPRAMIDE PER 10 MG: Performed by: INTERNAL MEDICINE

## 2023-12-07 PROCEDURE — 97110 THERAPEUTIC EXERCISES: CPT

## 2023-12-07 PROCEDURE — 85025 COMPLETE CBC W/AUTO DIFF WBC: CPT | Performed by: INTERNAL MEDICINE

## 2023-12-07 PROCEDURE — 87449 NOS EACH ORGANISM AG IA: CPT | Performed by: INTERNAL MEDICINE

## 2023-12-07 PROCEDURE — 80053 COMPREHEN METABOLIC PANEL: CPT | Performed by: INTERNAL MEDICINE

## 2023-12-07 PROCEDURE — 97530 THERAPEUTIC ACTIVITIES: CPT

## 2023-12-07 PROCEDURE — 82330 ASSAY OF CALCIUM: CPT | Performed by: INTERNAL MEDICINE

## 2023-12-07 PROCEDURE — 99232 SBSQ HOSP IP/OBS MODERATE 35: CPT | Performed by: INTERNAL MEDICINE

## 2023-12-07 PROCEDURE — 97116 GAIT TRAINING THERAPY: CPT

## 2023-12-07 RX ORDER — METOCLOPRAMIDE 5 MG/1
5 TABLET ORAL 3 TIMES DAILY
Start: 2023-12-07 | End: 2023-12-08 | Stop reason: HOSPADM

## 2023-12-07 RX ORDER — METOPROLOL SUCCINATE 25 MG/1
25 TABLET, EXTENDED RELEASE ORAL EVERY 12 HOURS SCHEDULED
Start: 2023-12-07

## 2023-12-07 RX ORDER — CYCLOSPORINE 0.5 MG/ML
1 EMULSION OPHTHALMIC 2 TIMES DAILY
Qty: 8 EACH | Refills: 0 | Status: SHIPPED | OUTPATIENT
Start: 2023-12-07

## 2023-12-07 RX ORDER — DICYCLOMINE HYDROCHLORIDE 10 MG/1
10 CAPSULE ORAL 3 TIMES DAILY
Qty: 21 CAPSULE | Refills: 0 | Status: SHIPPED | OUTPATIENT
Start: 2023-12-07

## 2023-12-07 RX ORDER — ONDANSETRON 4 MG/1
4 TABLET, FILM COATED ORAL EVERY 4 HOURS PRN
Qty: 30 TABLET | Refills: 0 | Status: SHIPPED | OUTPATIENT
Start: 2023-12-07

## 2023-12-07 RX ORDER — LOPERAMIDE HYDROCHLORIDE 2 MG/1
2 CAPSULE ORAL 4 TIMES DAILY PRN
Status: DISCONTINUED | OUTPATIENT
Start: 2023-12-07 | End: 2023-12-08 | Stop reason: HOSPADM

## 2023-12-07 RX ORDER — CHOLESTYRAMINE LIGHT 4 G/5.7G
1 POWDER, FOR SUSPENSION ORAL DAILY
Status: DISCONTINUED | OUTPATIENT
Start: 2023-12-07 | End: 2023-12-08 | Stop reason: HOSPADM

## 2023-12-07 RX ADMIN — LEVOTHYROXINE SODIUM 50 MCG: 50 TABLET ORAL at 04:00

## 2023-12-07 RX ADMIN — GABAPENTIN 300 MG: 300 CAPSULE ORAL at 09:04

## 2023-12-07 RX ADMIN — TOPIRAMATE 100 MG: 100 TABLET, FILM COATED ORAL at 09:04

## 2023-12-07 RX ADMIN — Medication 5000 UNITS: at 09:04

## 2023-12-07 RX ADMIN — PANTOPRAZOLE SODIUM 40 MG: 40 TABLET, DELAYED RELEASE ORAL at 09:04

## 2023-12-07 RX ADMIN — METOCLOPRAMIDE HYDROCHLORIDE 10 MG: 5 INJECTION INTRAMUSCULAR; INTRAVENOUS at 16:44

## 2023-12-07 RX ADMIN — PANTOPRAZOLE SODIUM 40 MG: 40 TABLET, DELAYED RELEASE ORAL at 16:44

## 2023-12-07 RX ADMIN — METOPROLOL SUCCINATE 25 MG: 25 TABLET, EXTENDED RELEASE ORAL at 09:04

## 2023-12-07 RX ADMIN — ESTRADIOL 1 MG: 1 TABLET ORAL at 09:04

## 2023-12-07 RX ADMIN — CETIRIZINE HYDROCHLORIDE 10 MG: 10 TABLET, FILM COATED ORAL at 09:04

## 2023-12-07 RX ADMIN — CHOLESTYRAMINE 4 G: 4 POWDER, FOR SUSPENSION ORAL at 20:14

## 2023-12-07 RX ADMIN — POTASSIUM CHLORIDE 10 MEQ: 750 TABLET, EXTENDED RELEASE ORAL at 09:04

## 2023-12-07 RX ADMIN — METOCLOPRAMIDE HYDROCHLORIDE 10 MG: 5 INJECTION INTRAMUSCULAR; INTRAVENOUS at 23:51

## 2023-12-07 RX ADMIN — SERTRALINE HYDROCHLORIDE 50 MG: 50 TABLET ORAL at 20:15

## 2023-12-07 RX ADMIN — Medication 10 ML: at 20:15

## 2023-12-07 RX ADMIN — TOPIRAMATE 100 MG: 100 TABLET, FILM COATED ORAL at 20:15

## 2023-12-07 RX ADMIN — METOPROLOL SUCCINATE 25 MG: 25 TABLET, EXTENDED RELEASE ORAL at 20:15

## 2023-12-07 RX ADMIN — ONDANSETRON HYDROCHLORIDE 4 MG: 4 TABLET, FILM COATED ORAL at 20:15

## 2023-12-07 RX ADMIN — ATORVASTATIN CALCIUM 10 MG: 10 TABLET, FILM COATED ORAL at 20:15

## 2023-12-07 RX ADMIN — Medication 10 ML: at 09:05

## 2023-12-07 RX ADMIN — CYCLOSPORINE 1 DROP: 0.5 EMULSION OPHTHALMIC at 09:04

## 2023-12-07 RX ADMIN — OXYCODONE HYDROCHLORIDE 5 MG: 5 TABLET ORAL at 20:15

## 2023-12-07 RX ADMIN — METOCLOPRAMIDE HYDROCHLORIDE 10 MG: 5 INJECTION INTRAMUSCULAR; INTRAVENOUS at 11:31

## 2023-12-07 RX ADMIN — PREDNISOLONE ACETATE 1 DROP: 10 SUSPENSION/ DROPS OPHTHALMIC at 20:14

## 2023-12-07 RX ADMIN — METOCLOPRAMIDE HYDROCHLORIDE 10 MG: 5 INJECTION INTRAMUSCULAR; INTRAVENOUS at 04:00

## 2023-12-07 NOTE — PROGRESS NOTES
[facility] MEDICINE SERVICE  DAILY PROGRESS NOTE    NAME: Ashley Lin  : 1950  MRN: [id number]      LOS: 4 days     PROVIDER OF SERVICE: Jonatan Bueno MD    Chief Complaint: Intractable vomiting    Subjective:     Interval History:  History taken from: patient  Patient Complaints:   Patient Denies:   nausea     History of Present Illness: Ashley Lin is a 73 y.o. female with multiple medical problems, a past history significant for chronic back pain, chronic migraine, COPD, depression, hypothyroidism, GERD, history of subdural hematoma, hyperlipidemia, DVT   who presented to [facility] on 12/3/2023 with nausea and  Vomiting for last 3 days, cannot eat or drink or hold anything down.  She has had hot and cold spells but no temperatures that she has not taken.  She denies any urinary complaints she denies any diarrhea bowels have been okay no bleeding.  She has had no chest pain neck arm jaw pain shortness of breath little bit of cough.  No ill exposures no foreign travels or antibiotic use or recent hospitalization.  She states she had back surgery back in 2023 she got a letter in the mail a couple weeks ago they told her she had a mass on her lungs which was spotted on a scan she had done on her spine.  Patient has had no recent hospitalization otherwise nothing seem to trigger make it better or worse symptoms are severe and she feels extremely dehydrated. complains of abdominal pain, denies diarrhea. In the ER Heart rate was 150.  Sinus tach on monitor.  Denies chest pain.  She was started on some fluids.  Tested positive for COVID, Urine was okay, cx-ray was clear, Sats are 100%     23-patient seen and examined in bed no acute distress, vital signs heart rate 138.  Discussed with RN, started Cardizem drip.  Patient is still complaining of significant pain.  23 patient seen and examined in bed no acute distress, vital s blood pressure stable.   Heart rate 109.  Still complaining abdominal pain.  12/6/23 patient seen and examined in bed no acute distress, vital signs stable, discussed with RN, says feels better today, awaiting placement.  12/7/2023 patient seen and examined in bed no acute distress, vital signs stable, says feels better today, will discharge to nursing home when bed available.  Discussed with RN.    Review of Systems   Constitutional:  Negative for chills and fever.   HENT:  Negative for ear pain and hearing loss.    Respiratory:  Negative for apnea and shortness of breath.    Cardiovascular:  Negative for chest pain and palpitations.   Gastrointestinal:  Positive for nausea, vomiting, abd pain. Negative for diarrhea.   Genitourinary:  Negative for dysuria.   Musculoskeletal:  Negative for back pain and joint swelling.   Neurological:  Negative for seizures, light-headedness and headaches.   Psychiatric/Behavioral:  Negative for confusion and hallucinations.      Objective:     Vital Signs  Temp:  [97 °F (36.1 °C)-98.7 °F (37.1 °C)] 98.3 °F (36.8 °C)  Heart Rate:  [] 90  Resp:  [16-21] 16  BP: (107-134)/(72-82) 107/72   Body mass index is 17.1 kg/m².      Physical ExamConstitutional:       General: She is not in acute distress.     Appearance: She is well-developed. She is ill-appearing. She is not toxic-appearing or diaphoretic.   HENT:      Head: Normocephalic and atraumatic.      Nose: Nose normal. No congestion or rhinorrhea.      Mouth/Throat:      Mouth: Mucous membranes are moist.      Pharynx: No oropharyngeal exudate.   Eyes:      General: No scleral icterus.        Right eye: No discharge.         Left eye: No discharge.      Extraocular Movements: Extraocular movements intact.      Conjunctiva/sclera: Conjunctivae normal.      Pupils: Pupils are equal, round, and reactive to light.   Neck:      Thyroid: No thyromegaly.      Vascular: No carotid bruit or JVD.      Trachea: No tracheal deviation.   Cardiovascular:      Rate  and Rhythm: Normal rate and regular rhythm.      Pulses: Normal pulses.      Heart sounds: Normal heart sounds. No murmur heard.     No friction rub. No gallop.   Pulmonary:      Effort: Pulmonary effort is normal. No respiratory distress.      Breath sounds: Normal breath sounds. No stridor. No wheezing, rhonchi or rales.   Chest:      Chest wall: No tenderness.   Abdominal:      General: Bowel sounds are normal. There is no distension.      Palpations: Abdomen is soft. There is no mass.      Tenderness: There is abdominal tenderness. There is no guarding or rebound.      Hernia: No hernia is present.   Musculoskeletal:         General: No swelling, tenderness, deformity or signs of injury. Normal range of motion.      Cervical back: Normal range of motion and neck supple. No rigidity. No muscular tenderness.      Right lower leg: No edema.      Left lower leg: No edema.   Lymphadenopathy:      Cervical: No cervical adenopathy.   Skin:     General: Skin is warm and dry.      Coloration: Skin is pale. Skin is not jaundiced.      Findings: No bruising, erythema or rash.   Neurological:      General: No focal deficit present.      Mental Status: She is alert and oriented to person, place, and time. Mental status is at baseline.      Cranial Nerves: No cranial nerve deficit.      Sensory: No sensory deficit.      Motor: Weakness present. No abnormal muscle tone.      Coordination: Coordination normal.   Psychiatric:         Mood and Affect: Mood normal.         Behavior: Behavior normal.         Thought Content: Thought content normal.        Scheduled Meds   atorvastatin, 10 mg, Oral, Nightly  cetirizine, 10 mg, Oral, Daily  cycloSPORINE, 1 drop, Both Eyes, BID  docusate sodium, 100 mg, Oral, BID  estradiol, 1 mg, Oral, Daily  gabapentin, 300 mg, Oral, Daily  levothyroxine, 50 mcg, Oral, Q AM  metoclopramide, 10 mg, Intravenous, Q6H  metoprolol succinate XL, 25 mg, Oral, Q12H  pantoprazole, 40 mg, Oral, BID  AC  polyethylene glycol, 17 g, Oral, Daily  potassium chloride, 10 mEq, Oral, Daily  prednisoLONE acetate, 1 drop, Both Eyes, Nightly  senna-docusate sodium, 2 tablet, Oral, BID  sertraline, 50 mg, Oral, Nightly  sodium chloride, 10 mL, Intravenous, Q12H  topiramate, 100 mg, Oral, BID  vitamin D3, 5,000 Units, Oral, Daily       PRN Meds     albuterol sulfate HFA    senna-docusate sodium **AND** polyethylene glycol **AND** bisacodyl **AND** bisacodyl    butalbital-acetaminophen-caffeine    Calcium Replacement - Follow Nurse / BPA Driven Protocol    dicyclomine    diphenhydrAMINE    hydrOXYzine    ketorolac    Magnesium Standard Dose Replacement - Follow Nurse / BPA Driven Protocol    melatonin    metoprolol tartrate    nitroglycerin    ondansetron **OR** ondansetron    oxyCODONE    Phosphorus Replacement - Follow Nurse / BPA Driven Protocol    Potassium Replacement - Follow Nurse / BPA Driven Protocol    promethazine    [COMPLETED] Insert Peripheral IV **AND** sodium chloride    sodium chloride    sodium chloride    SUMAtriptan   Infusions  dextrose 5% and sodium chloride 0.9% with KCl 40 mEq/L, 100 mL/hr, Last Rate: 100 mL/hr (12/05/23 3500)          Diagnostic Data    Results from last 7 days   Lab Units 12/05/23  1949 12/05/23  0130 12/04/23  2339   WBC 10*3/mm3  --   --  4.40   HEMOGLOBIN g/dL  --   --  10.9*   HEMATOCRIT %  --   --  32.8*   PLATELETS 10*3/mm3  --   --  365   GLUCOSE mg/dL  --   --  134*   CREATININE mg/dL  --   --  0.48*   BUN mg/dL  --   --  8   SODIUM mmol/L  --   --  143   POTASSIUM mmol/L 3.6   < > 3.4*   AST (SGOT) U/L  --   --  27   ALT (SGPT) U/L  --   --  11   ALK PHOS U/L  --   --  122*   BILIRUBIN mg/dL  --   --  0.2   ANION GAP mmol/L  --   --  11.0    < > = values in this interval not displayed.       No radiology results for the last day      I reviewed the patient's new clinical results.    Assessment/Plan:     Active and Resolved Problems  Active Hospital Problems    Diagnosis   POA    **Intractable vomiting [R11.10]  Yes     Priority: High    Colitis [K52.9]  Unknown     Priority: High    Generalized abdominal pain [R10.84]  Unknown     Priority: High    Sinus tachycardia [R00.0]  Yes     Priority: High    Chronic obstructive pulmonary disease [J44.9]  Yes     Priority: Medium    Intractable cyclical vomiting associated with migraine [G43.A1]  Yes     Priority: Medium    Chronic anticoagulation [Z79.01]  Not Applicable     Priority: Low    Severe malnutrition [E43]  Yes     Priority: Low    Debility [R53.81]  Yes     Priority: Low    Depression [F32.A]  Yes     Priority: Low    Chronic pain syndrome [G89.4]  Yes     Priority: Low    GERD without esophagitis [K21.9]  Yes     Priority: Low    Cervical disc herniation [M50.20]  Yes     Priority: Low    Low back pain [M54.50]  Yes     Priority: Low    History of lumbar fusion [Z98.1]  Not Applicable     Priority: Low    COVID-19 [U07.1]  Yes    Abdominal pain with vomiting [R10.9, R11.10]  Yes    History of subdural hematoma [Z86.79]  Not Applicable    Hyperlipidemia [E78.5]  Yes    Hypothyroidism (acquired) [E03.9]  Yes    Migraines [G43.909]  Yes      Resolved Hospital Problems   No resolved problems to display.       Intractable vomiting, abdominal pain. acute colitis   Continue supportive care  Telemetry unit  IV fluids  Zofran IV  GI consulted-Suspect patient nausea vomiting as well as some diarrhea could be related to either COVID infection versus COVID-related infectious colitis with the diarrhea and thickening of bowel.  We will check stool studies.  Will hold off on any antibiotic for GI tract.  Because of her history of gastroparesis she will be placed on Reglan as well as PPI.  She has been taking significant amount of ibuprofen possibility of NSAID induced gastritis ulceration leading to above symptoms in the differential as well   Reglan iv     COVID-19 Symbicort albuterol vitamin C vitamin D zinc.     Hyperlipidemia on Lipitor.   Check CPK.     Migraine on Fioricet.  Max as needed.     Chronic back and neck pain continue Neurontin.  Oxycodone     GERD continue Protonix     Depression continue Zoloft     COPD DuoNeb as needed       DVT prophylaxis:  Mechanical DVT prophylaxis orders are present.     Code status is   There are no questions and answers to display.       Plan for disposition:home in 2 days    Time: 30 minutes    Signature: Electronically signed by Jonatan Bueno MD, 12/07/23, 12:47 EST.  [facility] Hospitalist Team

## 2023-12-07 NOTE — PLAN OF CARE
Assessment: Ashley Lin presents with functional mobility impairments which indicate the need for skilled intervention.Patient reports no pain with R wrist and no longer needing brace. Patient able to ambulate 20 ft this session using rw and min A with cues to widen YUE and inc feet clearance. Fatigued after gait bout and 30 seconds of standing.  Tolerating session today without incident. Will continue to follow and progress as tolerated. Patient needs continued skilled rehab services for strength training unless she has 24/7 assistance available at home as she is at a high risk for falls.

## 2023-12-07 NOTE — PROGRESS NOTES
Cardiology [place]        LOS:  LOS: 4 days   Patient Name: Ashley Lin  Age/Sex: 73 y.o. female  : 1950  MRN: [id number]    Day of Service: 23   Length of Stay: 4  Encounter Provider: HUMZA Regalado  Place of Service: [facility] CARDIOLOGY  Patient Care Team:  Jamey Fairchild MD as PCP - General (Family Medicine)    Subjective:     Chief Complaint: f/u sinus tachycardia    Subjective: heart rates improved, back on BB, no acute CV Events, no CV complaints         Current Medications:   Scheduled Meds:atorvastatin, 10 mg, Oral, Nightly  cetirizine, 10 mg, Oral, Daily  cycloSPORINE, 1 drop, Both Eyes, BID  docusate sodium, 100 mg, Oral, BID  estradiol, 1 mg, Oral, Daily  gabapentin, 300 mg, Oral, Daily  levothyroxine, 50 mcg, Oral, Q AM  metoclopramide, 10 mg, Intravenous, Q6H  metoprolol succinate XL, 25 mg, Oral, Q12H  pantoprazole, 40 mg, Oral, BID AC  polyethylene glycol, 17 g, Oral, Daily  potassium chloride, 10 mEq, Oral, Daily  prednisoLONE acetate, 1 drop, Both Eyes, Nightly  senna-docusate sodium, 2 tablet, Oral, BID  sertraline, 50 mg, Oral, Nightly  sodium chloride, 10 mL, Intravenous, Q12H  topiramate, 100 mg, Oral, BID  vitamin D3, 5,000 Units, Oral, Daily      Continuous Infusions:dextrose 5% and sodium chloride 0.9% with KCl 40 mEq/L, 100 mL/hr, Last Rate: 100 mL/hr (23 3820)        Allergies:  Allergies   Allergen Reactions    Morphine Hives    Penicillins Hives and Shortness Of Breath     Can take iv form - but not po form         Review of Systems   Constitutional: Positive for malaise/fatigue. Negative for chills and diaphoresis.   Cardiovascular:  Negative for chest pain, dyspnea on exertion, irregular heartbeat, leg swelling, near-syncope, orthopnea, palpitations, paroxysmal nocturnal dyspnea and syncope.   Respiratory:  Negative for cough, shortness of breath, sleep disturbances due to breathing and sputum production.     Gastrointestinal:  Negative for change in bowel habit.   Genitourinary:  Negative for urgency.   Neurological:  Negative for dizziness and headaches.   Psychiatric/Behavioral:  Negative for altered mental status.        Objective:     Temp:  [97.4 °F (36.3 °C)-98.7 °F (37.1 °C)] 98.3 °F (36.8 °C)  Heart Rate:  [] 90  Resp:  [16-21] 16  BP: (107-134)/(72-82) 107/72     Intake/Output Summary (Last 24 hours) at 12/7/2023 1332  Last data filed at 12/6/2023 2300  Gross per 24 hour   Intake 250 ml   Output 600 ml   Net -350 ml     Body mass index is 17.1 kg/m².      12/03/23  1521 12/04/23 2018 12/06/23  0411   Weight: 41.3 kg (91 lb) 41.9 kg (92 lb 6 oz) 45.2 kg (99 lb 10.4 oz)         General Appearance:    Alert, cooperative, in no acute distress                                Head: Atraumatic, normocephalic, PERRLA               Neck:   supple, no JVD   Lungs:     Clear to auscultation, respirations regular, even and               unlabored    Heart:    Regular rhythm and normal rate, normal S1 and S2   Abdomen:     Normal bowel sounds, no masses, no organomegaly, soft  nontender, nondistended, no guarding, no rebound  tenderness   Extremities:   Moves all extremities well, no edema, no cyanosis, no  redness   Pulses:   Pulses palpable and equal bilaterally   Skin:   No bleeding, bruising or rash   Neurologic:   Awake, alert, oriented x3     Unchanged from prior encounter    Lab Review:   Results from last 7 days   Lab Units 12/05/23  1949 12/05/23  0130 12/04/23  2339 12/04/23  1222 12/03/23  1029   SODIUM mmol/L  --   --  143 147* 143   POTASSIUM mmol/L 3.6 3.2* 3.4* 3.1* 4.3   CHLORIDE mmol/L  --   --  109* 111* 105   CO2 mmol/L  --   --  23.0 22.0 18.0*   BUN mg/dL  --   --  8 14 13   CREATININE mg/dL  --   --  0.48* 0.58 0.61   GLUCOSE mg/dL  --   --  134* 121* 168*   CALCIUM mg/dL  --   --  7.4* 8.2* 8.4*   AST (SGOT) U/L  --   --  27  --  21   ALT (SGPT) U/L  --   --  11  --  11     Results from last 7  days   Lab Units 12/04/23  1436 12/04/23  1222   CK TOTAL U/L  --  85   HSTROP T ng/L 23* 28*     Results from last 7 days   Lab Units 12/04/23  2339 12/03/23  1029   WBC 10*3/mm3 4.40 3.60   HEMOGLOBIN g/dL 10.9* 14.0   HEMATOCRIT % 32.8* 42.1   PLATELETS 10*3/mm3 365 409         Results from last 7 days   Lab Units 12/06/23  0152 12/04/23  2339   MAGNESIUM mg/dL 1.9 1.6     Results from last 7 days   Lab Units 12/04/23  1222   CHOLESTEROL mg/dL 136   TRIGLYCERIDES mg/dL 108   HDL CHOL mg/dL 65*     Results from last 7 days   Lab Units 12/04/23  1222   PROBNP pg/mL 2,679.0*     Results from last 7 days   Lab Units 12/04/23  1222   TSH uIU/mL 2.630       Recent Radiology:  Imaging Results (Most Recent)       Procedure Component Value Units Date/Time    CT Abdomen Pelvis Without Contrast [[id number]] Collected: 12/03/23 1315     Updated: 12/03/23 1330    Narrative:      CT ABDOMEN PELVIS WO CONTRAST    Date of Exam: 12/3/2023 12:53 PM EST    Indication: Diffuse abdominal pain vomiting.    Comparison: CT of the abdomen and pelvis dated 6/6/2022    Technique: Axial CT images were obtained of the abdomen and pelvis without the administration of contrast. Sagittal and coronal reconstructions were performed.  Automated exposure control and iterative reconstruction methods were used.    Findings:    Examination is limited due to lack of IV contrast administration and extensive beam hardening artifact.    Liver: The liver is unremarkable in morphology. Evaluation for focal liver lesions is limited without IV contrast. Mildly prominent CBD may be related to prior cholecystectomy.    Gallbladder: Surgically absent.    Pancreas: Unremarkable.    Spleen: Unremarkable.    Adrenal glands: Unremarkable.    Genitourinary tract: Kidneys are unremarkable. No hydronephrosis is seen. Visualized portions of the ureters appear grossly unremarkable. Urinary bladder is grossly unremarkable. There is a catheter coiled within the  vagina.    Gastrointestinal tract: There is limited visualization of the hollow viscera due to lack of IV contrast and due to extensive beam hardening artifact within much of the study. Colonic wall thickening may be related to underdistention. Colitis may be   considered in the appropriate clinical setting. No findings to suggest bowel obstruction. There are surgical changes of the stomach with gastrojejunostomy.    Appendix: The appendix is not definitely identified.    Other findings: No free air or free fluid is seen. No pathologically enlarged lymph nodes are seen. Vascular calcifications are present.    Bones and soft tissues: Bones are demineralized. There are mild superior endplate compression fractures of T12 and L1 which are new since 6/6/2022. Exact chronicity is uncertain. Please correlate clinically for pain/tenderness at these sites. There are   surgical changes of the lumbar spine from L3-S1. There are chronic changes of the bilateral iliac bones. There is a sclerotic focus within the left inferior pubic ramus which appears similar since 6/6/2022, possibly a bone island. Right-sided InterStim   is noted. Superficial soft tissues demonstrate no acute abnormality.    Lung bases: The visualized lung bases are clear.      Impression:      Impression:  1.Examination is limited due to lack of IV contrast administration and extensive beam hardening artifact.  2.Diffuse colonic wall thickening raises suspicion for colitis. This may also be related to underdistention. Please correlate clinically.  3.There are mild superior endplate compression fractures of T12 and L1 which are new since 6/6/2022. Exact chronicity is uncertain. Please correlate clinically for pain/tenderness at these sites.   4.There is a catheter coiled within the vagina. If this is meant to be within the bladder, recommend repositioning.  5.Additional findings as detailed above.    Electronically Signed: Frederick Al MD    12/3/2023 1:28  PM EST    Workstation ID: [id number]    XR Chest 1 View [[id number]] Collected: 12/03/23 1209     Updated: 12/03/23 1211    Narrative:      XR CHEST 1 VW    Date of Exam: 12/3/2023 10:30 AM EST    Indication: Vomiting weakness recent back surgery    Comparison: CT chest with contrast 11/7/2023    Findings:  The cardiomediastinal silhouette is within normal limits. Lungs are clear. No focal consolidation, pneumothorax, or significant pleural effusion. Osseous structures grossly intact. Surgical anchors overlie the left and right humeral head. Multilevel   cervical fixation.      Impression:      Impression:  No acute process.            Electronically Signed: Carrillo Goodman MD    12/3/2023 12:09 PM EST    Workstation ID: [id number]            ECHOCARDIOGRAM:    Results for orders placed during the hospital encounter of 11/07/23    Adult Transthoracic Echo Complete W/ Cont if Necessary Per Protocol    Interpretation Summary    Left ventricular systolic function is normal. Left ventricular ejection fraction appears to be 56 - 60%.    Left ventricular diastolic function is consistent with (grade Ia w/high LAP) impaired relaxation.    Estimated right ventricular systolic pressure from tricuspid regurgitation is normal (<35 mmHg).        I reviewed the patient's new clinical results.    EKG:      Assessment:       Intractable vomiting    Cervical disc herniation    Chronic obstructive pulmonary disease    Low back pain    History of lumbar fusion    Hypothyroidism (acquired)    Depression    Migraines    Chronic pain syndrome    GERD without esophagitis    Intractable cyclical vomiting associated with migraine    Debility    Severe malnutrition    History of subdural hematoma    Sinus tachycardia    Hyperlipidemia    Chronic anticoagulation    Colitis    Generalized abdominal pain    Abdominal pain with vomiting    COVID-19    Nausea / vomiting, intractable- GI evaluated, possibly related to COVID vs. Colitis  COVID 19  Sinus  tachycardia  H/o DVT  HLD  Migraines  H/o sinus tachycardia  ECHO last month normal LVEF, grade 1 DD    Plan:   Continue beta blockers  Maintain K at 4 and Mg at 2  Recent ECHO within last 1 mo showing normal LVEF, grade 1DD  Okay to d/c to rehab when bed available     Continue to follow peripherally  Fabian Helton MD, PhD  No plans for further CV intervention at this time        Shadia Campbell, HUMZA  12/07/23  13:32 EST

## 2023-12-07 NOTE — CASE MANAGEMENT/SOCIAL WORK
Continued Stay Note   [place]     Patient Name: Ashley Lin  MRN: [id number]  Today's Date: 12/7/2023    Admit Date: 12/3/2023    Plan: Referral to [facility] remains pending. Additional referral made to [facility]. No precert required. PASRR approved.   Discharge Plan       Row Name 12/07/23 1641       Plan    Plan Referral to [facility] remains pending. Additional referral made to [facility]. No precert required. PASRR approved.    Patient/Family in Agreement with Plan yes    Plan Comments CM followed up with [facility] liaison Lynda multiple times throughout day to confirm pt would be able to admit today. Notified VENITA Pedroza of delay. Referral made to patient's 3rd choice [facility] and liashaquille Hartley notified, pending response. Will place avoidable day d/t lack of accepting facility.             Megan Naegele, RN     Office Phone: [phone]  Office Cell: [phone]

## 2023-12-07 NOTE — THERAPY TREATMENT NOTE
"Subjective: Pt agreeable to therapeutic plan of care.    Objective:     Bed mobility - SBA sit to supine  Transfers - CGA and with rolling walker  Ambulation - 20 feet Min-A and with rolling walker    Therapeutic Exercise - 10 Reps B LE AROM unsupported sitting / EOB;  5 x STS; standing tolerance 30 seconds x 2    Vitals: WNL    Pain: 3 VAS   Location: back   Intervention for pain: Increased Activity    Education: Provided education on the importance of mobility in the acute care setting, Verbal/Tactile Cues, Transfer Training, and Gait Training    Assessment: Ashley Lin presents with functional mobility impairments which indicate the need for skilled intervention.Patient reports no pain with R wrist and no longer needing brace. Patient able to ambulate 20 ft this session using rw and min A with cues to widen YUE and inc feet clearance. Fatigued after gait bout and 30 seconds of standing.  Tolerating session today without incident. Will continue to follow and progress as tolerated. Patient needs continued skilled rehab services for strength training unless she has 24/7 assistance available at home as she is at a high risk for falls.     Plan/Recommendations:   Moderate Intensity Therapy recommended post-acute care. This is recommended as therapy feels the patient would require 3-4 days per week and wouldn't tolerate \"3 hour daily\" rehab intensity. SNF would be the preferred choice. If the patient does not agree to SNF, arrange HH or OP depending on home bound status. If patient is medically complex, consider LTACH.. Pt requires no DME at discharge.     Pt desires Skilled Rehab placement at discharge. Pt cooperative; agreeable to therapeutic recommendations and plan of care.         Basic Mobility 6-click:  Rollin = Total, A lot = 2, A little = 3; 4 = None  Supine>Sit:   1 = Total, A lot = 2, A little = 3; 4 = None   Sit>Stand with arms:  1 = Total, A lot = 2, A little = 3; 4 = None  Bed>Chair:  "  1 = Total, A lot = 2, A little = 3; 4 = None  Ambulate in room:  1 = Total, A lot = 2, A little = 3; 4 = None  3-5 Steps with railin = Total, A lot = 2, A little = 3; 4 = None  Score: 19    Post-Tx Position: Supine with HOB Elevated, Alarms activated, and Call light and personal items within reach  PPE: gloves, gown, eye protection, and N95

## 2023-12-07 NOTE — PLAN OF CARE
Goal Outcome Evaluation:  Plan of Care Reviewed With: patient        Progress: improving  Outcome Evaluation: Patient has had an uneventful day. Has had a few smal episodes of diarrhea. Advanced diet to regular today, and she is tolerating. She is just waiting on approval to [facility].

## 2023-12-08 VITALS
HEIGHT: 64 IN | OXYGEN SATURATION: 99 % | RESPIRATION RATE: 20 BRPM | BODY MASS INDEX: 16.75 KG/M2 | SYSTOLIC BLOOD PRESSURE: 120 MMHG | TEMPERATURE: 97.4 F | DIASTOLIC BLOOD PRESSURE: 90 MMHG | HEART RATE: 99 BPM | WEIGHT: 98.11 LBS

## 2023-12-08 LAB
BACTERIA SPEC AEROBE CULT: NORMAL
BACTERIA SPEC AEROBE CULT: NORMAL
C DIFF GDH + TOXINS A+B STL QL IA.RAPID: NEGATIVE
C DIFF GDH + TOXINS A+B STL QL IA.RAPID: NEGATIVE

## 2023-12-08 PROCEDURE — 99232 SBSQ HOSP IP/OBS MODERATE 35: CPT | Performed by: INTERNAL MEDICINE

## 2023-12-08 PROCEDURE — 25010000002 METOCLOPRAMIDE PER 10 MG: Performed by: INTERNAL MEDICINE

## 2023-12-08 RX ORDER — COLESEVELAM 180 1/1
1875 TABLET ORAL 2 TIMES DAILY WITH MEALS
Start: 2023-12-08

## 2023-12-08 RX ORDER — LOPERAMIDE HYDROCHLORIDE 2 MG/1
2 CAPSULE ORAL 4 TIMES DAILY PRN
Qty: 90 CAPSULE | Refills: 0 | Status: SHIPPED | OUTPATIENT
Start: 2023-12-08

## 2023-12-08 RX ADMIN — GABAPENTIN 300 MG: 300 CAPSULE ORAL at 09:43

## 2023-12-08 RX ADMIN — OXYCODONE HYDROCHLORIDE 5 MG: 5 TABLET ORAL at 09:43

## 2023-12-08 RX ADMIN — Medication 10 ML: at 09:46

## 2023-12-08 RX ADMIN — METOCLOPRAMIDE HYDROCHLORIDE 10 MG: 5 INJECTION INTRAMUSCULAR; INTRAVENOUS at 05:52

## 2023-12-08 RX ADMIN — CYCLOSPORINE 1 DROP: 0.5 EMULSION OPHTHALMIC at 12:26

## 2023-12-08 RX ADMIN — OXYCODONE HYDROCHLORIDE 5 MG: 5 TABLET ORAL at 14:58

## 2023-12-08 RX ADMIN — CHOLESTYRAMINE 4 G: 4 POWDER, FOR SUSPENSION ORAL at 09:44

## 2023-12-08 RX ADMIN — METOPROLOL SUCCINATE 25 MG: 25 TABLET, EXTENDED RELEASE ORAL at 10:55

## 2023-12-08 RX ADMIN — ESTRADIOL 1 MG: 1 TABLET ORAL at 09:44

## 2023-12-08 RX ADMIN — METOCLOPRAMIDE HYDROCHLORIDE 10 MG: 5 INJECTION INTRAMUSCULAR; INTRAVENOUS at 12:26

## 2023-12-08 RX ADMIN — Medication 5000 UNITS: at 12:28

## 2023-12-08 RX ADMIN — TOPIRAMATE 100 MG: 100 TABLET, FILM COATED ORAL at 10:55

## 2023-12-08 RX ADMIN — CETIRIZINE HYDROCHLORIDE 10 MG: 10 TABLET, FILM COATED ORAL at 09:44

## 2023-12-08 RX ADMIN — LEVOTHYROXINE SODIUM 50 MCG: 50 TABLET ORAL at 05:52

## 2023-12-08 RX ADMIN — PANTOPRAZOLE SODIUM 40 MG: 40 TABLET, DELAYED RELEASE ORAL at 10:55

## 2023-12-08 RX ADMIN — POTASSIUM CHLORIDE 10 MEQ: 750 TABLET, EXTENDED RELEASE ORAL at 10:55

## 2023-12-08 NOTE — CASE MANAGEMENT/SOCIAL WORK
Case Management Discharge Note      Final Note: [facility]         Selected Continued Care - Discharged on 12/8/2023 Admission date: 12/3/2023 - Discharge disposition: Skilled Nursing Facility (DC - External)      Destination Coordination complete.      Service Provider Selected Services Address Phone Fax Patient Preferred    [facility] Skilled Nursing [address] [place] [phone] --                    Transportation Services  Private: Car    Final Discharge Disposition Code: 03 - skilled nursing facility (SNF)

## 2023-12-08 NOTE — DISCHARGE SUMMARY
[facility] Medicine Services  DISCHARGE SUMMARY    Patient Name: sAhley Lin  : 1950  MRN: [id number]    Date of Admission: 12/3/2023  Discharge Diagnosis: Intractable vomiting, abdominal pain. acute colitis   Date of Discharge:  23  Primary Care Physician: Jamey Fairchild MD      Presenting Problem:   Persistent vomiting [R11.15]  Sinus tachycardia [R00.0]  Generalized abdominal pain [R10.84]  Acute colitis [K52.9]  Abdominal pain with vomiting [R10.9, R11.10]  COVID-19 [U07.1]    Active and Resolved Hospital Problems:  Active Hospital Problems    Diagnosis POA    **Intractable vomiting [R11.10] Yes     Priority: High    Colitis [K52.9] Unknown     Priority: High    Generalized abdominal pain [R10.84] Unknown     Priority: High    Sinus tachycardia [R00.0] Yes     Priority: High    Chronic obstructive pulmonary disease [J44.9] Yes     Priority: Medium    Intractable cyclical vomiting associated with migraine [G43.A1] Yes     Priority: Medium    Chronic anticoagulation [Z79.01] Not Applicable     Priority: Low    Severe malnutrition [E43] Yes     Priority: Low    Debility [R53.81] Yes     Priority: Low    Depression [F32.A] Yes     Priority: Low    Chronic pain syndrome [G89.4] Yes     Priority: Low    GERD without esophagitis [K21.9] Yes     Priority: Low    Cervical disc herniation [M50.20] Yes     Priority: Low    Low back pain [M54.50] Yes     Priority: Low    History of lumbar fusion [Z98.1] Not Applicable     Priority: Low    COVID-19 [U07.1] Yes    Abdominal pain with vomiting [R10.9, R11.10] Yes    History of subdural hematoma [Z86.79] Not Applicable    Hyperlipidemia [E78.5] Yes    Hypothyroidism (acquired) [E03.9] Yes    Migraines [G43.909] Yes      Resolved Hospital Problems   No resolved problems to display.     Intractable vomiting, abdominal pain. acute colitis   Continue supportive care  Telemetry unit  IV fluids dc  Zofran  GI  consulted-Suspect patient nausea vomiting as well as some diarrhea could be related to either COVID infection versus COVID-related infectious colitis with the diarrhea and thickening of bowel.  We will check stool studies.  Will hold off on any antibiotic for GI tract.  Because of her history of gastroparesis she will be placed on Reglan as well as PPI.  She has been taking significant amount of ibuprofen possibility of NSAID induced gastritis ulceration leading to above symptoms in the differential as well   Reglan     COVID-19 Symbicort albuterol vitamin C vitamin D zinc.     Hyperlipidemia on Lipitor.  Check CPK.     Migraine on Fioricet.  Max as needed.     Chronic back and neck pain continue Neurontin.  Oxycodone     GERD continue Protonix     Depression continue Zoloft     COPD DuoNeb as needed     Hospital Course     History of Present Illness: Ashley Lin is a 73 y.o. female with multiple medical problems, a past history significant for chronic back pain, chronic migraine, COPD, depression, hypothyroidism, GERD, history of subdural hematoma, hyperlipidemia, DVT   who presented to [facility] on 12/3/2023 with nausea and  Vomiting for last 3 days, cannot eat or drink or hold anything down.  She has had hot and cold spells but no temperatures that she has not taken.  She denies any urinary complaints she denies any diarrhea bowels have been okay no bleeding.  She has had no chest pain neck arm jaw pain shortness of breath little bit of cough.  No ill exposures no foreign travels or antibiotic use or recent hospitalization.  She states she had back surgery back in January 2023 she got a letter in the mail a couple weeks ago they told her she had a mass on her lungs which was spotted on a scan she had done on her spine.  Patient has had no recent hospitalization otherwise nothing seem to trigger make it better or worse symptoms are severe and she feels extremely dehydrated. complains of  abdominal pain, denies diarrhea. In the ER Heart rate was 150.  Sinus tach on monitor.  Denies chest pain.  She was started on some fluids.  Tested positive for COVID, Urine was okay, cx-ray was clear, Sats are 100%     12/4/23-patient seen and examined in bed no acute distress, vital signs heart rate 138.  Discussed with RN, started Cardizem drip.  Patient is still complaining of significant pain.  12/5/23 patient seen and examined in bed no acute distress, vital s blood pressure stable.  Heart rate 109.  Still complaining abdominal pain.  12/6/23 patient seen and examined in bed no acute distress, vital signs stable, discussed with RN, says feels better today, awaiting placement.  12/7/2023 patient seen and examined in bed no acute distress, vital signs stable, says feels better today, will discharge to nursing home when bed available.  Discussed with RN.  12/8/23 seen in bed NAD, no new complaints, will DC to nursing home.  Condition at discharge stable.  MILDRED RN    DISCHARGE Follow Up Recommendations for labs and diagnostics: Follow-up with PCP in a week.      Reasons For Change In Medications and Indications for New Medications:      Day of Discharge     Vital Signs:  Temp:  [97 °F (36.1 °C)-98.5 °F (36.9 °C)] 98.5 °F (36.9 °C)  Heart Rate:  [] 106  Resp:  [13-26] 26  BP: (101-138)/(57-82) 129/78      Physical Exam Constitutional:       General: She is not in acute distress.     Appearance: She is well-developed. She is ill-appearing. She is not toxic-appearing or diaphoretic.   HENT:      Head: Normocephalic and atraumatic.      Nose: Nose normal. No congestion or rhinorrhea.      Mouth/Throat:      Mouth: Mucous membranes are moist.      Pharynx: No oropharyngeal exudate.   Eyes:      General: No scleral icterus.        Right eye: No discharge.         Left eye: No discharge.      Extraocular Movements: Extraocular movements intact.      Conjunctiva/sclera: Conjunctivae normal.      Pupils: Pupils are  equal, round, and reactive to light.   Neck:      Thyroid: No thyromegaly.      Vascular: No carotid bruit or JVD.      Trachea: No tracheal deviation.   Cardiovascular:      Rate and Rhythm: Normal rate and regular rhythm.      Pulses: Normal pulses.      Heart sounds: Normal heart sounds. No murmur heard.     No friction rub. No gallop.   Pulmonary:      Effort: Pulmonary effort is normal. No respiratory distress.      Breath sounds: Normal breath sounds. No stridor. No wheezing, rhonchi or rales.   Chest:      Chest wall: No tenderness.   Abdominal:      General: Bowel sounds are normal. There is no distension.      Palpations: Abdomen is soft. There is no mass.      Tenderness: There is abdominal tenderness. There is no guarding or rebound.      Hernia: No hernia is present.   Musculoskeletal:         General: No swelling, tenderness, deformity or signs of injury. Normal range of motion.      Cervical back: Normal range of motion and neck supple. No rigidity. No muscular tenderness.      Right lower leg: No edema.      Left lower leg: No edema.   Lymphadenopathy:      Cervical: No cervical adenopathy.   Skin:     General: Skin is warm and dry.      Coloration: Skin is pale. Skin is not jaundiced.      Findings: No bruising, erythema or rash.   Neurological:      General: No focal deficit present.      Mental Status: She is alert and oriented to person, place, and time. Mental status is at baseline.      Cranial Nerves: No cranial nerve deficit.      Sensory: No sensory deficit.      Motor: Weakness present. No abnormal muscle tone.      Coordination: Coordination normal.   Psychiatric:         Mood and Affect: Mood normal.         Behavior: Behavior normal.         Thought Content: Thought content normal.          Pertinent  and/or Most Recent Results     LAB RESULTS:      Lab 12/07/23  1622 12/04/23  2339 12/04/23  1222 12/03/23  1648 12/03/23  1333 12/03/23  1029   WBC 6.60 4.40  --   --   --  3.60    HEMOGLOBIN 10.7* 10.9*  --   --   --  14.0   HEMATOCRIT 32.5* 32.8*  --   --   --  42.1   PLATELETS 304 365  --   --   --  409   NEUTROS ABS 3.90 2.30  --   --   --  2.50   LYMPHS ABS 1.30 1.20  --   --   --  0.60*   MONOS ABS 1.00* 0.90  --   --   --  0.50   EOS ABS 0.40 0.00  --   --   --  0.00   .6* 100.8*  --   --   --  101.5*   LACTATE  --   --  0.9 1.6 2.6*  --          Lab 12/07/23  1622 12/06/23  0152 12/05/23 1949 12/05/23 0130 12/04/23 2339 12/04/23 1222 12/03/23  1029   SODIUM 140  --   --   --  143 147* 143   POTASSIUM 4.2  --  3.6 3.2* 3.4* 3.1* 4.3   CHLORIDE 112*  --   --   --  109* 111* 105   CO2 21.0*  --   --   --  23.0 22.0 18.0*   ANION GAP 7.0  --   --   --  11.0 14.0 20.0*   BUN 3*  --   --   --  8 14 13   CREATININE 0.70  --   --   --  0.48* 0.58 0.61   EGFR 92.6  --   --   --  101.4 96.9 95.7   GLUCOSE 101*  --   --   --  134* 121* 168*   CALCIUM 8.2*  --   --   --  7.4* 8.2* 8.4*   IONIZED CALCIUM 1.28  --   --   --   --  1.20  --    MAGNESIUM  --  1.9  --   --  1.6 1.8  --    PHOSPHORUS  --   --   --   --  2.1* 2.2*  --    HEMOGLOBIN A1C  --   --   --   --   --  5.00  --    TSH  --   --   --   --   --  2.630  --          Lab 12/07/23  1622 12/04/23 2339 12/04/23 1222 12/03/23  1029   TOTAL PROTEIN 4.7* 5.0*  --  5.8*   ALBUMIN 2.3* 2.3*  --  2.5*   GLOBULIN 2.4 2.7  --  3.3   ALT (SGPT) 9 11  --  11   AST (SGOT) 17 27  --  21   BILIRUBIN 0.2 0.2  --  0.3   ALK PHOS 126* 122*  --  152*   AMYLASE  --   --  41  --    LIPASE  --  19  --  8*         Lab 12/04/23  1436 12/04/23  1222   PROBNP  --  2,679.0*   HSTROP T 23* 28*         Lab 12/04/23  1222   CHOLESTEROL 136   LDL CHOL 52   HDL CHOL 65*   TRIGLYCERIDES 108         Lab 12/04/23  1222   IRON 46   IRON SATURATION (TSAT) 31   TIBC 148*   TRANSFERRIN 99*   FERRITIN 351.90*         Brief Urine Lab Results  (Last result in the past 365 days)        Color   Clarity   Blood   Leuk Est   Nitrite   Protein   CREAT   Urine HCG         12/03/23 1315 Dark Yellow   Clear   Negative   Trace   Negative   30 mg/dL (1+)                 Microbiology Results (last 10 days)       Procedure Component Value - Date/Time    Clostridioides difficile Toxin - Stool, Per Rectum [[id number]]  (Normal) Collected: 12/07/23 2033    Lab Status: Final result Specimen: Stool from Per Rectum Updated: 12/08/23 0837    Narrative:      The following orders were created for panel order Clostridioides difficile Toxin - Stool, Per Rectum.  Procedure                               Abnormality         Status                     ---------                               -----------         ------                     Clostridioides difficile...[[id number]]  Normal              Final result                 Please view results for these tests on the individual orders.    Clostridioides difficile EIA - Stool, Per Rectum [[id number]]  (Normal) Collected: 12/07/23 2033    Lab Status: Final result Specimen: Stool from Per Rectum Updated: 12/08/23 0837     C Diff GDH Ag Negative     C.diff Toxin Ag Negative    Narrative:      The result indicates the absence of toxigenic C.difficile from stool specimen.    Blood Culture - Blood, Arm, Left [[id number]]  (Normal) Collected: 12/03/23 1451    Lab Status: Preliminary result Specimen: Blood from Arm, Left Updated: 12/07/23 1500     Blood Culture No growth at 4 days    Blood Culture - Blood, Arm, Right [[id number]]  (Normal) Collected: 12/03/23 1329    Lab Status: Preliminary result Specimen: Blood from Arm, Right Updated: 12/07/23 1345     Blood Culture No growth at 4 days    Narrative:      Less than seven (7) mL's of blood was collected.  Insufficient quantity may yield false negative results.    COVID-19 and FLU A/B PCR, 1 HR TAT - Swab, Nasopharynx [[id number]]  (Abnormal) Collected: 12/03/23 0950    Lab Status: Final result Specimen: Swab from Nasopharynx Updated: 12/03/23 1015     COVID19 Detected     Influenza A PCR Not Detected     Influenza  B PCR Not Detected    Narrative:      Fact sheet for providers: https://www.fda.gov/media/999019/download    Fact sheet for patients: https://www.fda.gov/media/394111/download    Test performed by PCR.  Influenza A and Influenza B negative results should be considered presumptive in samples that have a positive SARS-CoV-2 result.    Competitive inhibition studies showed that SARS-CoV-2 virus, when present at concentrations above 3.6E+04 copies/mL, can inhibit the detection and amplification of influenza A and influenza B virus RNA if present at or below 1.8E+02 copies/mL or 4.9E+02 copies/mL, respectively, and may lead to false negative influenza virus results. If co-infection with influenza A or influenza B virus is suspected in samples with a positive SARS-CoV-2 result, the sample should be re-tested with another FDA cleared, approved, or authorized influenza test, if influenza virus detection would change clinical management.            CT Abdomen Pelvis Without Contrast    Result Date: 12/3/2023  Impression: Impression: 1.Examination is limited due to lack of IV contrast administration and extensive beam hardening artifact. 2.Diffuse colonic wall thickening raises suspicion for colitis. This may also be related to underdistention. Please correlate clinically. 3.There are mild superior endplate compression fractures of T12 and L1 which are new since 6/6/2022. Exact chronicity is uncertain. Please correlate clinically for pain/tenderness at these sites. 4.There is a catheter coiled within the vagina. If this is meant to be within the bladder, recommend repositioning. 5.Additional findings as detailed above. Electronically Signed: Frederick Al MD  12/3/2023 1:28 PM EST  Workstation ID: [id number]    XR Chest 1 View    Result Date: 12/3/2023  Impression: Impression: No acute process. Electronically Signed: Carrillo Goodman MD  12/3/2023 12:09 PM EST  Workstation ID: [id number]    CT Head Without Contrast    Result Date:  11/9/2023  Impression: Impression: Small subdural hematoma of the right tentorium is not significantly changed. Electronically Signed: Cecilia Kraft MD  11/9/2023 5:31 PM EST  Workstation ID: [id number]     Results for orders placed during the hospital encounter of 11/07/23    Duplex Venous Lower Extremity - Bilateral CAR    Interpretation Summary    Acute left lower extremity deep vein thrombosis noted in the gastrocnemius.    All other veins appeared normal bilaterally.      Results for orders placed during the hospital encounter of 11/07/23    Duplex Venous Lower Extremity - Bilateral CAR    Interpretation Summary    Acute left lower extremity deep vein thrombosis noted in the gastrocnemius.    All other veins appeared normal bilaterally.      Results for orders placed during the hospital encounter of 11/07/23    Adult Transthoracic Echo Complete W/ Cont if Necessary Per Protocol    Interpretation Summary    Left ventricular systolic function is normal. Left ventricular ejection fraction appears to be 56 - 60%.    Left ventricular diastolic function is consistent with (grade Ia w/high LAP) impaired relaxation.    Estimated right ventricular systolic pressure from tricuspid regurgitation is normal (<35 mmHg).      Labs Pending at Discharge:  Pending Labs       Order Current Status    Blood Culture - Blood, Arm, Left Preliminary result    Blood Culture - Blood, Arm, Right Preliminary result            Procedures Performed           Consults:   Consults       Date and Time Order Name Status Description    12/4/2023 12:02 PM Inpatient Cardiology Consult Completed     12/4/2023 12:34 AM Inpatient Gastroenterology Consult Completed     12/3/2023  1:50 PM Hospitalist (on-call MD unless specified)      11/7/2023 11:32 PM Inpatient Neurosurgery Consult Completed           Assessment:        Intractable vomiting    Cervical disc herniation    Chronic obstructive pulmonary disease    Low back pain    History of lumbar fusion     Hypothyroidism (acquired)    Depression    Migraines    Chronic pain syndrome    GERD without esophagitis    Intractable cyclical vomiting associated with migraine    Debility    Severe malnutrition    History of subdural hematoma    Sinus tachycardia    Hyperlipidemia    Chronic anticoagulation    Colitis    Generalized abdominal pain    Abdominal pain with vomiting    COVID-19     Nausea / vomiting, intractable- GI evaluated, possibly related to COVID vs. Colitis  COVID 19  Sinus tachycardia  H/o DVT  HLD  Migraines  H/o sinus tachycardia  ECHO last month normal LVEF, grade 1 DD     Plan:   Continue beta blockers  Maintain K at 4 and Mg at 2  Recent ECHO within last 1 mo showing normal LVEF, grade 1DD  Okay to d/c to rehab when bed available      Continue to follow peripherally  Fabian Helton MD, PhD  No plans for further CV intervention at this time         Shadia Campbell, HUMZA  12/07/23  ========================================================  Attestation signed by Michael Orr MD at 12/05/23 1312     I have reviewed this documentation and agree.  I, the attending physician, have performed the medical decision making for this patient.  Patient seen and examined.  Nurse practitioner findings verified.  Labs and imaging personally reviewed.  No further nausea vomiting or diarrhea reported by nursing staff.  Patient also feeling better.  However wants pain medication because of her back which is hurting.  No significant new GI symptoms.  Resting comfortably.  No significant respiratory distress.  Examination abdominous soft nondistended minimally tender.  Labs shows WBC 4 hemoglobin 10 platelet 365.  Alk phos improved from 1 52-2 122 AST ALT normal lipase 19.  CT scan yesterday showed diffuse colitis.        Patient have no further diarrhea.  Suspect her symptom of nausea vomiting and diarrhea multifactorial both related to gastroparesis as well as possible COVID induced GI symptoms with colitis.  Currently no  further diarrhea clinically doing fairly well.  Will replace electrolytes.  She will be started on a soft diet while continuing on a PPI and Reglan.  Call if needed.       Discharge Details        Discharge Medications        New Medications        Instructions Start Date   colesevelam 625 MG tablet  Commonly known as: Welchol   1,875 mg, Oral, 2 Times Daily With Meals      dicyclomine 10 MG capsule  Commonly known as: BENTYL   10 mg, Oral, 3 times daily      loperamide 2 MG capsule  Commonly known as: IMODIUM   2 mg, Oral, 4 Times Daily PRN      metoprolol succinate XL 25 MG 24 hr tablet  Commonly known as: TOPROL-XL   25 mg, Oral, Every 12 Hours Scheduled             Changes to Medications        Instructions Start Date   ondansetron 4 MG tablet  Commonly known as: ZOFRAN  What changed: when to take this   4 mg, Oral, Every 4 Hours PRN             Continue These Medications        Instructions Start Date   albuterol sulfate  (90 Base) MCG/ACT inhaler  Commonly known as: PROVENTIL HFA;VENTOLIN HFA;PROAIR HFA   2 puffs, Inhalation, Every 4 Hours PRN, May use every 4 to 6 hours      atorvastatin 20 MG tablet  Commonly known as: LIPITOR   10 mg, Oral, Nightly      baclofen 10 MG tablet  Commonly known as: LIORESAL   10 mg, Oral, Daily      butalbital-acetaminophen-caffeine -40 MG per tablet  Commonly known as: FIORICET, ESGIC   1 tablet, Oral, Every 4 Hours PRN      cetirizine 10 MG tablet  Commonly known as: zyrTEC   10 mg, Oral, Daily      cycloSPORINE 0.05 % ophthalmic emulsion  Commonly known as: RESTASIS   1 drop, Both Eyes, 2 Times Daily      EPINEPHrine 0.3 MG/0.3ML solution auto-injector injection  Commonly known as: EPIPEN   0.3 mg, Intramuscular, Once      estradiol 1 MG tablet  Commonly known as: ESTRACE   1 mg, Oral, Daily      fluticasone 50 MCG/ACT nasal spray  Commonly known as: FLONASE   2 sprays, Nasal, Daily      gabapentin 800 MG tablet  Commonly known as: NEURONTIN   800 mg, Oral, 4  Times Daily      hydrOXYzine 25 MG tablet  Commonly known as: ATARAX   25 mg, Oral, Every 6 Hours PRN      levothyroxine 50 MCG tablet  Commonly known as: SYNTHROID, LEVOTHROID   50 mcg, Oral, Every Early Morning      metoclopramide 10 MG tablet  Commonly known as: REGLAN   10 mg, Oral, 4 Times Daily PRN      montelukast 10 MG tablet  Commonly known as: SINGULAIR   10 mg, Oral, Nightly      naloxone 4 MG/0.1ML nasal spray  Commonly known as: NARCAN   Call 911. Don't prime. [place] in 1 nostril for overdose. Repeat in 2-3 minutes in other nostril if no or minimal breathing/responsiveness.      oxyCODONE-acetaminophen  MG per tablet  Commonly known as: PERCOCET   1 tablet, Oral, Every 6 Hours      pantoprazole 40 MG EC tablet  Commonly known as: PROTONIX   40 mg, Oral, 2 Times Daily      polyethylene glycol 17 g packet  Commonly known as: MIRALAX   17 g, Oral, Daily      potassium chloride 10 MEQ CR tablet   10 mEq, Oral, Daily      prednisoLONE acetate 1 % ophthalmic suspension  Commonly known as: PRED FORTE   1 drop, Both Eyes, Nightly      rizatriptan MLT 10 MG disintegrating tablet  Commonly known as: MAXALT-MLT   10 mg, Translingual, Once As Needed, May repeat in 2 hours if needed       sertraline 50 MG tablet  Commonly known as: ZOLOFT   50 mg, Oral, Nightly      Stiolto Respimat 2.5-2.5 MCG/ACT aerosol solution inhaler  Generic drug: tiotropium bromide-olodaterol   2 puffs, Inhalation, Daily - RT      topiramate 100 MG tablet  Commonly known as: TOPAMAX   100 mg, Oral, 2 Times Daily      Viberzi 100 MG tablet  Generic drug: Eluxadoline   1 tablet, Oral, 2 Times Daily      vitamin D3 125 MCG (5000 UT) tablet tablet   5,000 Units, Oral, Daily             Stop These Medications      amitriptyline 150 MG tablet  Commonly known as: ELAVIL     docusate sodium 100 MG capsule     furosemide 20 MG tablet  Commonly known as: LASIX     metoprolol tartrate 25 MG tablet  Commonly known as: LOPRESSOR     promethazine 25  MG tablet  Commonly known as: PHENERGAN              Allergies   Allergen Reactions    Morphine Hives    Penicillins Hives and Shortness Of Breath     Can take iv form - but not po form           Discharge Disposition:   Skilled Nursing Facility (DC - External)    Diet:  Hospital:  Diet Order   Procedures    Diet: Regular/House Diet; Texture: Regular Texture (IDDSI 7); Fluid Consistency: Thin (IDDSI 0)         Discharge Activity:   Activity Instructions       Gradually Increase Activity Until at Pre-Hospitalization Level      Gradually Increase Activity Until at Pre-Hospitalization Level                CODE STATUS:  There are no questions and answers to display.     I have utilized all available, immediate resources to obtain, update, or review the patient's current medications including all prescriptions, over-the-counter products, herbals, cannabis/cannabidiol products, and vitamin.mineral/dietary (nutritional) supplements.         Next of kin of Power of :         Admission Status:  I believe this patient meets admit status.          No future appointments.    Additional Instructions for the Follow-ups that You Need to Schedule       Discharge Follow-up with PCP   As directed       Currently Documented PCP:    Jamey Fairchild MD    PCP Phone Number:    [phone]     Follow Up Details: 1 week        Discharge Follow-up with PCP   As directed       Currently Documented PCP:    Jamey Fairchild MD    PCP Phone Number:    [phone]     Follow Up Details: 1 week                Time spent on Discharge including face to face service:  34 minutes      Signature: Electronically signed by Jonatan Bueno MD, 12/08/23, 12:18 PM EST.

## 2023-12-08 NOTE — CASE MANAGEMENT/SOCIAL WORK
Continued Stay Note  [facility]     Patient Name: Ashley Lin  MRN: [id number]  Today's Date: 12/8/2023    Admit Date: 12/3/2023    Plan: [facility] SNF (accepted). No precert required. PASRR approved.   Discharge Plan       Row Name 12/08/23 1216       Plan    Plan [facility]F (accepted). No precert required. PASRR approved.    Plan Comments CM spoke with pt over additional SNF choices due to [facility], and [place] did not have a private room. Pt asked for a referral to [facility] in [place] (referral made and mindy Cm contacted.) Mindy More ([facility]) contacted  to inform that [place] did not have a bed available, but [facility] had a bed available today. CM called pt in room due to Covid isolation status, to discuss if the bed at [facility] was an option. Pt states she agrees to placement at [facility]. Liaison updated. Pharmacy updated. Father to provide transport at [place].             Tracy Oseguera RN

## 2023-12-08 NOTE — PROGRESS NOTES
Cardiology [place]        LOS:  LOS: 5 days   Patient Name: Ashley Lin  Age/Sex: 73 y.o. female  : 1950  MRN: [id number]    Day of Service: 23   Length of Stay: 5  Encounter Provider: Fabian Helton MD  Place of Service: [facility] CARDIOLOGY  Patient Care Team:  Jamey Fairchild MD as PCP - General (Family Medicine)    Subjective:     Chief Complaint: f/u sinus tachycardia    Subjective: heart rates improved, back on BB, no acute CV Events, no CV complaints     Discharging today        Current Medications:   Scheduled Meds:atorvastatin, 10 mg, Oral, Nightly  cetirizine, 10 mg, Oral, Daily  cholestyramine light, 1 packet, Oral, Daily  cycloSPORINE, 1 drop, Both Eyes, BID  docusate sodium, 100 mg, Oral, BID  estradiol, 1 mg, Oral, Daily  gabapentin, 300 mg, Oral, Daily  levothyroxine, 50 mcg, Oral, Q AM  metoclopramide, 10 mg, Intravenous, Q6H  metoprolol succinate XL, 25 mg, Oral, Q12H  pantoprazole, 40 mg, Oral, BID AC  polyethylene glycol, 17 g, Oral, Daily  potassium chloride, 10 mEq, Oral, Daily  prednisoLONE acetate, 1 drop, Both Eyes, Nightly  senna-docusate sodium, 2 tablet, Oral, BID  sertraline, 50 mg, Oral, Nightly  sodium chloride, 10 mL, Intravenous, Q12H  topiramate, 100 mg, Oral, BID  vitamin D3, 5,000 Units, Oral, Daily      Continuous Infusions:dextrose 5% and sodium chloride 0.9% with KCl 40 mEq/L, 100 mL/hr, Last Rate: Stopped (23 1318)        Allergies:  Allergies   Allergen Reactions    Morphine Hives    Penicillins Hives and Shortness Of Breath     Can take iv form - but not po form         Review of Systems   Constitutional: Positive for malaise/fatigue. Negative for chills and diaphoresis.   Cardiovascular:  Negative for chest pain, dyspnea on exertion, irregular heartbeat, leg swelling, near-syncope, orthopnea, palpitations, paroxysmal nocturnal dyspnea and syncope.   Respiratory:  Negative for cough, shortness of  breath, sleep disturbances due to breathing and sputum production.    Gastrointestinal:  Negative for change in bowel habit.   Genitourinary:  Negative for urgency.   Neurological:  Negative for dizziness and headaches.   Psychiatric/Behavioral:  Negative for altered mental status.        Objective:     Temp:  [97 °F (36.1 °C)-98.5 °F (36.9 °C)] 97.4 °F (36.3 °C)  Heart Rate:  [] 99  Resp:  [13-26] 20  BP: (101-138)/(57-90) 120/90     Intake/Output Summary (Last 24 hours) at 12/8/2023 1437  Last data filed at 12/8/2023 0900  Gross per 24 hour   Intake 1280 ml   Output --   Net 1280 ml     Body mass index is 16.83 kg/m².      12/04/23 2018 12/06/23  0411 12/08/23  0601   Weight: 41.9 kg (92 lb 6 oz) 45.2 kg (99 lb 10.4 oz) 44.5 kg (98 lb 1.7 oz)         General Appearance:    Alert, cooperative, in no acute distress                                Head: Atraumatic, normocephalic, PERRLA               Neck:   supple, no JVD   Lungs:     Clear to auscultation, respirations regular, even and               unlabored    Heart:    Regular rhythm and normal rate, normal S1 and S2   Abdomen:     Normal bowel sounds, no masses, no organomegaly, soft  nontender, nondistended, no guarding, no rebound  tenderness   Extremities:   Moves all extremities well, no edema, no cyanosis, no  redness   Pulses:   Pulses palpable and equal bilaterally   Skin:   No bleeding, bruising or rash   Neurologic:   Awake, alert, oriented x3     Unchanged from prior encounter    Lab Review:   Results from last 7 days   Lab Units 12/07/23  1622 12/05/23  1949 12/05/23  0130 12/04/23  2339   SODIUM mmol/L 140  --   --  143   POTASSIUM mmol/L 4.2 3.6   < > 3.4*   CHLORIDE mmol/L 112*  --   --  109*   CO2 mmol/L 21.0*  --   --  23.0   BUN mg/dL 3*  --   --  8   CREATININE mg/dL 0.70  --   --  0.48*   GLUCOSE mg/dL 101*  --   --  134*   CALCIUM mg/dL 8.2*  --   --  7.4*   AST (SGOT) U/L 17  --   --  27   ALT (SGPT) U/L 9  --   --  11    < > =  values in this interval not displayed.     Results from last 7 days   Lab Units 12/04/23  1436 12/04/23  1222   CK TOTAL U/L  --  85   HSTROP T ng/L 23* 28*     Results from last 7 days   Lab Units 12/07/23  1622 12/04/23  2339   WBC 10*3/mm3 6.60 4.40   HEMOGLOBIN g/dL 10.7* 10.9*   HEMATOCRIT % 32.5* 32.8*   PLATELETS 10*3/mm3 304 365         Results from last 7 days   Lab Units 12/06/23  0152 12/04/23  2339   MAGNESIUM mg/dL 1.9 1.6     Results from last 7 days   Lab Units 12/04/23  1222   CHOLESTEROL mg/dL 136   TRIGLYCERIDES mg/dL 108   HDL CHOL mg/dL 65*     Results from last 7 days   Lab Units 12/04/23  1222   PROBNP pg/mL 2,679.0*     Results from last 7 days   Lab Units 12/04/23  1222   TSH uIU/mL 2.630       Recent Radiology:  Imaging Results (Most Recent)       Procedure Component Value Units Date/Time    CT Abdomen Pelvis Without Contrast [[id number]] Collected: 12/03/23 1315     Updated: 12/03/23 1330    Narrative:      CT ABDOMEN PELVIS WO CONTRAST    Date of Exam: 12/3/2023 12:53 PM EST    Indication: Diffuse abdominal pain vomiting.    Comparison: CT of the abdomen and pelvis dated 6/6/2022    Technique: Axial CT images were obtained of the abdomen and pelvis without the administration of contrast. Sagittal and coronal reconstructions were performed.  Automated exposure control and iterative reconstruction methods were used.    Findings:    Examination is limited due to lack of IV contrast administration and extensive beam hardening artifact.    Liver: The liver is unremarkable in morphology. Evaluation for focal liver lesions is limited without IV contrast. Mildly prominent CBD may be related to prior cholecystectomy.    Gallbladder: Surgically absent.    Pancreas: Unremarkable.    Spleen: Unremarkable.    Adrenal glands: Unremarkable.    Genitourinary tract: Kidneys are unremarkable. No hydronephrosis is seen. Visualized portions of the ureters appear grossly unremarkable. Urinary bladder is grossly  unremarkable. There is a catheter coiled within the vagina.    Gastrointestinal tract: There is limited visualization of the hollow viscera due to lack of IV contrast and due to extensive beam hardening artifact within much of the study. Colonic wall thickening may be related to underdistention. Colitis may be   considered in the appropriate clinical setting. No findings to suggest bowel obstruction. There are surgical changes of the stomach with gastrojejunostomy.    Appendix: The appendix is not definitely identified.    Other findings: No free air or free fluid is seen. No pathologically enlarged lymph nodes are seen. Vascular calcifications are present.    Bones and soft tissues: Bones are demineralized. There are mild superior endplate compression fractures of T12 and L1 which are new since 6/6/2022. Exact chronicity is uncertain. Please correlate clinically for pain/tenderness at these sites. There are   surgical changes of the lumbar spine from L3-S1. There are chronic changes of the bilateral iliac bones. There is a sclerotic focus within the left inferior pubic ramus which appears similar since 6/6/2022, possibly a bone island. Right-sided InterStim   is noted. Superficial soft tissues demonstrate no acute abnormality.    Lung bases: The visualized lung bases are clear.      Impression:      Impression:  1.Examination is limited due to lack of IV contrast administration and extensive beam hardening artifact.  2.Diffuse colonic wall thickening raises suspicion for colitis. This may also be related to underdistention. Please correlate clinically.  3.There are mild superior endplate compression fractures of T12 and L1 which are new since 6/6/2022. Exact chronicity is uncertain. Please correlate clinically for pain/tenderness at these sites.   4.There is a catheter coiled within the vagina. If this is meant to be within the bladder, recommend repositioning.  5.Additional findings as detailed  above.    Electronically Signed: Frederick Al MD    12/3/2023 1:28 PM EST    Workstation ID: [id number]    XR Chest 1 View [[id number]] Collected: 12/03/23 1209     Updated: 12/03/23 1211    Narrative:      XR CHEST 1 VW    Date of Exam: 12/3/2023 10:30 AM EST    Indication: Vomiting weakness recent back surgery    Comparison: CT chest with contrast 11/7/2023    Findings:  The cardiomediastinal silhouette is within normal limits. Lungs are clear. No focal consolidation, pneumothorax, or significant pleural effusion. Osseous structures grossly intact. Surgical anchors overlie the left and right humeral head. Multilevel   cervical fixation.      Impression:      Impression:  No acute process.            Electronically Signed: Carrillo Goodman MD    12/3/2023 12:09 PM EST    Workstation ID: [id number]            ECHOCARDIOGRAM:    Results for orders placed during the hospital encounter of 11/07/23    Adult Transthoracic Echo Complete W/ Cont if Necessary Per Protocol    Interpretation Summary    Left ventricular systolic function is normal. Left ventricular ejection fraction appears to be 56 - 60%.    Left ventricular diastolic function is consistent with (grade Ia w/high LAP) impaired relaxation.    Estimated right ventricular systolic pressure from tricuspid regurgitation is normal (<35 mmHg).        I reviewed the patient's new clinical results.    EKG:      Assessment:       Intractable vomiting    Cervical disc herniation    Chronic obstructive pulmonary disease    Low back pain    History of lumbar fusion    Hypothyroidism (acquired)    Depression    Migraines    Chronic pain syndrome    GERD without esophagitis    Intractable cyclical vomiting associated with migraine    Debility    Severe malnutrition    History of subdural hematoma    Sinus tachycardia    Hyperlipidemia    Chronic anticoagulation    Colitis    Generalized abdominal pain    Abdominal pain with vomiting    COVID-19    Nausea / vomiting, intractable-  GI evaluated, possibly related to COVID vs. Colitis  COVID 19  Sinus tachycardia  H/o DVT  HLD  Migraines  H/o sinus tachycardia  ECHO last month normal LVEF, grade 1 DD    Plan:   Continue beta blockers  Maintain K at 4 and Mg at 2  Recent ECHO within last 1 mo showing normal LVEF, grade 1DD  Okay to d/c to rehab when bed available     Continue to follow peripherally if remains in house, okay to discharge today  Fabian Helton MD, PhD  No plans for further CV intervention at this time        Fabian Helton MD  12/08/23  14:37 EST

## 2023-12-08 NOTE — PROGRESS NOTES
[facility] MEDICINE SERVICE  DAILY PROGRESS NOTE    NAME: Ashley Lin  : 1950  MRN: [id number]      LOS: 5 days     PROVIDER OF SERVICE: Jonatan Bueno MD    Chief Complaint: Intractable vomiting    Subjective:     Interval History:  History taken from: patient  Patient Complaints:   Patient Denies:   nausea     History of Present Illness: Ashley Lin is a 73 y.o. female with multiple medical problems, a past history significant for chronic back pain, chronic migraine, COPD, depression, hypothyroidism, GERD, history of subdural hematoma, hyperlipidemia, DVT   who presented to [facility] on 12/3/2023 with nausea and  Vomiting for last 3 days, cannot eat or drink or hold anything down.  She has had hot and cold spells but no temperatures that she has not taken.  She denies any urinary complaints she denies any diarrhea bowels have been okay no bleeding.  She has had no chest pain neck arm jaw pain shortness of breath little bit of cough.  No ill exposures no foreign travels or antibiotic use or recent hospitalization.  She states she had back surgery back in 2023 she got a letter in the mail a couple weeks ago they told her she had a mass on her lungs which was spotted on a scan she had done on her spine.  Patient has had no recent hospitalization otherwise nothing seem to trigger make it better or worse symptoms are severe and she feels extremely dehydrated. complains of abdominal pain, denies diarrhea. In the ER Heart rate was 150.  Sinus tach on monitor.  Denies chest pain.  She was started on some fluids.  Tested positive for COVID, Urine was okay, cx-ray was clear, Sats are 100%     23-patient seen and examined in bed no acute distress, vital signs heart rate 138.  Discussed with RN, started Cardizem drip.  Patient is still complaining of significant pain.  23 patient seen and examined in bed no acute distress, vital s blood pressure stable.   Heart rate 109.  Still complaining abdominal pain.  12/6/23 patient seen and examined in bed no acute distress, vital signs stable, discussed with RN, says feels better today, awaiting placement.  12/7/2023 patient seen and examined in bed no acute distress, vital signs stable, says feels better today, will discharge to nursing home when bed available.  Discussed with RN.  12/8/23 seen in bed NAD, no new complaints, awaiting placement, MILDRED RN    Review of Systems   Constitutional:  Negative for chills and fever.   HENT:  Negative for ear pain and hearing loss.    Respiratory:  Negative for apnea and shortness of breath.    Cardiovascular:  Negative for chest pain and palpitations.   Gastrointestinal:  Positive for nausea, vomiting, abd pain. Negative for diarrhea.   Genitourinary:  Negative for dysuria.   Musculoskeletal:  Negative for back pain and joint swelling.   Neurological:  Negative for seizures, light-headedness and headaches.   Psychiatric/Behavioral:  Negative for confusion and hallucinations.      Objective:     Vital Signs  Temp:  [97 °F (36.1 °C)-98.5 °F (36.9 °C)] 98.5 °F (36.9 °C)  Heart Rate:  [] 106  Resp:  [13-26] 26  BP: (101-138)/(57-82) 129/78   Body mass index is 16.83 kg/m².      Physical ExamConstitutional:       General: She is not in acute distress.     Appearance: She is well-developed. She is ill-appearing. She is not toxic-appearing or diaphoretic.   HENT:      Head: Normocephalic and atraumatic.      Nose: Nose normal. No congestion or rhinorrhea.      Mouth/Throat:      Mouth: Mucous membranes are moist.      Pharynx: No oropharyngeal exudate.   Eyes:      General: No scleral icterus.        Right eye: No discharge.         Left eye: No discharge.      Extraocular Movements: Extraocular movements intact.      Conjunctiva/sclera: Conjunctivae normal.      Pupils: Pupils are equal, round, and reactive to light.   Neck:      Thyroid: No thyromegaly.      Vascular: No carotid bruit  or JVD.      Trachea: No tracheal deviation.   Cardiovascular:      Rate and Rhythm: Normal rate and regular rhythm.      Pulses: Normal pulses.      Heart sounds: Normal heart sounds. No murmur heard.     No friction rub. No gallop.   Pulmonary:      Effort: Pulmonary effort is normal. No respiratory distress.      Breath sounds: Normal breath sounds. No stridor. No wheezing, rhonchi or rales.   Chest:      Chest wall: No tenderness.   Abdominal:      General: Bowel sounds are normal. There is no distension.      Palpations: Abdomen is soft. There is no mass.      Tenderness: There is abdominal tenderness. There is no guarding or rebound.      Hernia: No hernia is present.   Musculoskeletal:         General: No swelling, tenderness, deformity or signs of injury. Normal range of motion.      Cervical back: Normal range of motion and neck supple. No rigidity. No muscular tenderness.      Right lower leg: No edema.      Left lower leg: No edema.   Lymphadenopathy:      Cervical: No cervical adenopathy.   Skin:     General: Skin is warm and dry.      Coloration: Skin is pale. Skin is not jaundiced.      Findings: No bruising, erythema or rash.   Neurological:      General: No focal deficit present.      Mental Status: She is alert and oriented to person, place, and time. Mental status is at baseline.      Cranial Nerves: No cranial nerve deficit.      Sensory: No sensory deficit.      Motor: Weakness present. No abnormal muscle tone.      Coordination: Coordination normal.   Psychiatric:         Mood and Affect: Mood normal.         Behavior: Behavior normal.         Thought Content: Thought content normal.        Scheduled Meds   atorvastatin, 10 mg, Oral, Nightly  cetirizine, 10 mg, Oral, Daily  cholestyramine light, 1 packet, Oral, Daily  cycloSPORINE, 1 drop, Both Eyes, BID  docusate sodium, 100 mg, Oral, BID  estradiol, 1 mg, Oral, Daily  gabapentin, 300 mg, Oral, Daily  levothyroxine, 50 mcg, Oral, Q  AM  metoclopramide, 10 mg, Intravenous, Q6H  metoprolol succinate XL, 25 mg, Oral, Q12H  pantoprazole, 40 mg, Oral, BID AC  polyethylene glycol, 17 g, Oral, Daily  potassium chloride, 10 mEq, Oral, Daily  prednisoLONE acetate, 1 drop, Both Eyes, Nightly  senna-docusate sodium, 2 tablet, Oral, BID  sertraline, 50 mg, Oral, Nightly  sodium chloride, 10 mL, Intravenous, Q12H  topiramate, 100 mg, Oral, BID  vitamin D3, 5,000 Units, Oral, Daily       PRN Meds     albuterol sulfate HFA    senna-docusate sodium **AND** polyethylene glycol **AND** bisacodyl **AND** bisacodyl    butalbital-acetaminophen-caffeine    Calcium Replacement - Follow Nurse / BPA Driven Protocol    dicyclomine    diphenhydrAMINE    hydrOXYzine    ketorolac    loperamide    Magnesium Standard Dose Replacement - Follow Nurse / BPA Driven Protocol    melatonin    metoprolol tartrate    nitroglycerin    ondansetron **OR** ondansetron    oxyCODONE    Phosphorus Replacement - Follow Nurse / BPA Driven Protocol    Potassium Replacement - Follow Nurse / BPA Driven Protocol    promethazine    [COMPLETED] Insert Peripheral IV **AND** sodium chloride    sodium chloride    sodium chloride    SUMAtriptan   Infusions  dextrose 5% and sodium chloride 0.9% with KCl 40 mEq/L, 100 mL/hr, Last Rate: 100 mL/hr (12/05/23 4457)          Diagnostic Data    Results from last 7 days   Lab Units 12/07/23  1622   WBC 10*3/mm3 6.60   HEMOGLOBIN g/dL 10.7*   HEMATOCRIT % 32.5*   PLATELETS 10*3/mm3 304   GLUCOSE mg/dL 101*   CREATININE mg/dL 0.70   BUN mg/dL 3*   SODIUM mmol/L 140   POTASSIUM mmol/L 4.2   AST (SGOT) U/L 17   ALT (SGPT) U/L 9   ALK PHOS U/L 126*   BILIRUBIN mg/dL 0.2   ANION GAP mmol/L 7.0       No radiology results for the last day      I reviewed the patient's new clinical results.    Assessment/Plan:     Active and Resolved Problems  Active Hospital Problems    Diagnosis  POA    **Intractable vomiting [R11.10]  Yes     Priority: High    Colitis [K52.9]   Unknown     Priority: High    Generalized abdominal pain [R10.84]  Unknown     Priority: High    Sinus tachycardia [R00.0]  Yes     Priority: High    Chronic obstructive pulmonary disease [J44.9]  Yes     Priority: Medium    Intractable cyclical vomiting associated with migraine [G43.A1]  Yes     Priority: Medium    Chronic anticoagulation [Z79.01]  Not Applicable     Priority: Low    Severe malnutrition [E43]  Yes     Priority: Low    Debility [R53.81]  Yes     Priority: Low    Depression [F32.A]  Yes     Priority: Low    Chronic pain syndrome [G89.4]  Yes     Priority: Low    GERD without esophagitis [K21.9]  Yes     Priority: Low    Cervical disc herniation [M50.20]  Yes     Priority: Low    Low back pain [M54.50]  Yes     Priority: Low    History of lumbar fusion [Z98.1]  Not Applicable     Priority: Low    COVID-19 [U07.1]  Yes    Abdominal pain with vomiting [R10.9, R11.10]  Yes    History of subdural hematoma [Z86.79]  Not Applicable    Hyperlipidemia [E78.5]  Yes    Hypothyroidism (acquired) [E03.9]  Yes    Migraines [G43.909]  Yes      Resolved Hospital Problems   No resolved problems to display.       Intractable vomiting, abdominal pain. acute colitis   Continue supportive care  Telemetry unit  IV fluids  Zofran IV  GI consulted-Suspect patient nausea vomiting as well as some diarrhea could be related to either COVID infection versus COVID-related infectious colitis with the diarrhea and thickening of bowel.  We will check stool studies.  Will hold off on any antibiotic for GI tract.  Because of her history of gastroparesis she will be placed on Reglan as well as PPI.  She has been taking significant amount of ibuprofen possibility of NSAID induced gastritis ulceration leading to above symptoms in the differential as well   Reglan iv     COVID-19 Symbicort albuterol vitamin C vitamin D zinc.     Hyperlipidemia on Lipitor.  Check CPK.     Migraine on Fioricet.  Max as needed.     Chronic back and neck pain  continue Neurontin.  Oxycodone     GERD continue Protonix     Depression continue Zoloft     COPD DuoNeb as needed       DVT prophylaxis:  Mechanical DVT prophylaxis orders are present.     Code status is   There are no questions and answers to display.       Plan for disposition:home in 2 days    Time: 30 minutes    Signature: Electronically signed by Jonatan Bueno MD, 12/08/23, 09:02 EST.  [facility] Hospitalist Team

## 2023-12-08 NOTE — PLAN OF CARE
Problem: Adult Inpatient Plan of Care  Goal: Plan of Care Review  Outcome: Adequate for Care Transition  Flowsheets (Taken 12/8/2023 1322)  Progress: no change  Plan of Care Reviewed With:   patient   father  Goal: Patient-Specific Goal (Individualized)  Outcome: Adequate for Care Transition  Goal: Absence of Hospital-Acquired Illness or Injury  Outcome: Adequate for Care Transition  Intervention: Identify and Manage Fall Risk  Recent Flowsheet Documentation  Taken 12/8/2023 1230 by Yuni Gooden RN  Safety Promotion/Fall Prevention:   assistive device/personal items within reach   clutter free environment maintained   fall prevention program maintained   nonskid shoes/slippers when out of bed   room organization consistent   safety round/check completed  Taken 12/8/2023 1017 by Yuni Gooden RN  Safety Promotion/Fall Prevention:   assistive device/personal items within reach   clutter free environment maintained   fall prevention program maintained   nonskid shoes/slippers when out of bed   room organization consistent   safety round/check completed  Taken 12/8/2023 0834 by Yuni Gooden RN  Safety Promotion/Fall Prevention:   assistive device/personal items within reach   clutter free environment maintained   fall prevention program maintained   nonskid shoes/slippers when out of bed   room organization consistent   safety round/check completed  Intervention: Prevent Skin Injury  Recent Flowsheet Documentation  Taken 12/8/2023 0834 by Yuni Gooden RN  Body Position:   position changed independently   supine  Intervention: Prevent and Manage VTE (Venous Thromboembolism) Risk  Recent Flowsheet Documentation  Taken 12/8/2023 0834 by Yuni Gooden RN  Activity Management: activity encouraged  VTE Prevention/Management: sequential compression devices off  Range of Motion: active ROM (range of motion) encouraged  Intervention: Prevent Infection  Recent Flowsheet Documentation  Taken 12/8/2023 1230 by  Yuni Gooden RN  Infection Prevention:   environmental surveillance performed   hand hygiene promoted   personal protective equipment utilized   single patient room provided  Taken 12/8/2023 1017 by Yuni Gooden RN  Infection Prevention:   environmental surveillance performed   hand hygiene promoted   personal protective equipment utilized   single patient room provided  Taken 12/8/2023 0834 by Yuni Gooden RN  Infection Prevention:   environmental surveillance performed   hand hygiene promoted   personal protective equipment utilized   single patient room provided  Goal: Optimal Comfort and Wellbeing  Outcome: Adequate for Care Transition  Intervention: Monitor Pain and Promote Comfort  Recent Flowsheet Documentation  Taken 12/8/2023 0834 by Yuni Gooden RN  Pain Management Interventions:   breathing exercises   diversional activity provided   care clustered  Intervention: Provide Person-Centered Care  Recent Flowsheet Documentation  Taken 12/8/2023 0834 by Yuni Gooden RN  Trust Relationship/Rapport:   care explained   choices provided  Goal: Readiness for Transition of Care  Outcome: Adequate for Care Transition     Problem: Fall Injury Risk  Goal: Absence of Fall and Fall-Related Injury  Outcome: Adequate for Care Transition  Intervention: Identify and Manage Contributors  Recent Flowsheet Documentation  Taken 12/8/2023 1230 by Yuni Gooden RN  Medication Review/Management:   medications reviewed   high-risk medications identified  Taken 12/8/2023 1017 by Yuni Gooden RN  Medication Review/Management:   medications reviewed   high-risk medications identified  Taken 12/8/2023 0834 by Yuni Gooden RN  Medication Review/Management:   medications reviewed   high-risk medications identified  Self-Care Promotion: independence encouraged  Intervention: Promote Injury-Free Environment  Recent Flowsheet Documentation  Taken 12/8/2023 1230 by Yuni Gooden RN  Safety Promotion/Fall Prevention:    assistive device/personal items within reach   clutter free environment maintained   fall prevention program maintained   nonskid shoes/slippers when out of bed   room organization consistent   safety round/check completed  Taken 12/8/2023 1017 by Yuni Gooden RN  Safety Promotion/Fall Prevention:   assistive device/personal items within reach   clutter free environment maintained   fall prevention program maintained   nonskid shoes/slippers when out of bed   room organization consistent   safety round/check completed  Taken 12/8/2023 0834 by Yuni Gooden RN  Safety Promotion/Fall Prevention:   assistive device/personal items within reach   clutter free environment maintained   fall prevention program maintained   nonskid shoes/slippers when out of bed   room organization consistent   safety round/check completed     Problem: Pain Acute  Goal: Acceptable Pain Control and Functional Ability  Outcome: Adequate for Care Transition  Intervention: Prevent or Manage Pain  Recent Flowsheet Documentation  Taken 12/8/2023 1230 by Yuni Gooden RN  Medication Review/Management:   medications reviewed   high-risk medications identified  Taken 12/8/2023 1017 by Yuni Gooden RN  Medication Review/Management:   medications reviewed   high-risk medications identified  Taken 12/8/2023 0834 by Yuni Gooden RN  Sensory Stimulation Regulation: television on  Bowel Elimination Promotion: commode/bedpan at bedside  Sleep/Rest Enhancement: awakenings minimized  Medication Review/Management:   medications reviewed   high-risk medications identified  Intervention: Develop Pain Management Plan  Recent Flowsheet Documentation  Taken 12/8/2023 0834 by Yuni Gooden RN  Pain Management Interventions:   breathing exercises   diversional activity provided   care clustered  Intervention: Optimize Psychosocial Wellbeing  Recent Flowsheet Documentation  Taken 12/8/2023 0834 by Yuni Gooden RN  Supportive Measures: active  listening utilized  Diversional Activities: television     Problem: Adjustment to Illness (Sepsis/Septic Shock)  Goal: Optimal Coping  Outcome: Adequate for Care Transition  Intervention: Optimize Psychosocial Adjustment to Illness  Recent Flowsheet Documentation  Taken 12/8/2023 0834 by Yuni Gooden RN  Supportive Measures: active listening utilized  Family/Support System Care:   support provided   self-care encouraged     Problem: Bleeding (Sepsis/Septic Shock)  Goal: Absence of Bleeding  Outcome: Adequate for Care Transition  Intervention: Monitor and Manage Bleeding  Recent Flowsheet Documentation  Taken 12/8/2023 0834 by Yuni Gooden RN  Bleeding Precautions:   monitored for signs of bleeding   gentle oral care promoted     Problem: Glycemic Control Impaired (Sepsis/Septic Shock)  Goal: Blood Glucose Level Within Desired Range  Outcome: Adequate for Care Transition     Problem: Infection Progression (Sepsis/Septic Shock)  Goal: Absence of Infection Signs and Symptoms  Outcome: Adequate for Care Transition  Intervention: Initiate Sepsis Management  Recent Flowsheet Documentation  Taken 12/8/2023 1230 by Yuni Gooden RN  Infection Prevention:   environmental surveillance performed   hand hygiene promoted   personal protective equipment utilized   single patient room provided  Isolation Precautions:   precautions maintained   droplet   enhanced contact  Taken 12/8/2023 1017 by Yuni Gooden RN  Infection Prevention:   environmental surveillance performed   hand hygiene promoted   personal protective equipment utilized   single patient room provided  Isolation Precautions:   precautions maintained   droplet   enhanced contact  Taken 12/8/2023 0834 by Yuni oGoden RN  Infection Prevention:   environmental surveillance performed   hand hygiene promoted   personal protective equipment utilized   single patient room provided  Isolation Precautions:   precautions maintained   droplet   enhanced  contact  Intervention: Promote Recovery  Recent Flowsheet Documentation  Taken 12/8/2023 0834 by Yuni Gooden, RN  Activity Management: activity encouraged  Sleep/Rest Enhancement: awakenings minimized     Problem: Nutrition Impaired (Sepsis/Septic Shock)  Goal: Optimal Nutrition Intake  Outcome: Adequate for Care Transition     Problem: Skin Injury Risk Increased  Goal: Skin Health and Integrity  Outcome: Adequate for Care Transition  Intervention: Optimize Skin Protection  Recent Flowsheet Documentation  Taken 12/8/2023 0834 by Yuni Gooden RN  Pressure Reduction Techniques: frequent weight shift encouraged  Head of Bed (HOB) Positioning: HOB at 30-45 degrees  Pressure Reduction Devices: pressure-redistributing mattress utilized   Goal Outcome Evaluation:  Plan of Care Reviewed With: patient, father        Progress: no change     Alert and oriented x 4.able to verbalize needs and wants. Takes medication whole and tolerates well. Continent of bowel and bladder. Takes medication whole and tolerates well. Stand by assist for transfers. Does not require O2 therapy at this time. Tested positive for COVID 19 on 12/3. Cardiology has signed off at this time. No tracheal deviation/neck vein distention noted. No s/s of clubbing/cyanosis noted. No decrease in appetite noted. Called report to [facility]. Discharge orders in place, patient agreeable to plan of care. C/O abdominal pain, PRN Oxycodone administered with positive effect noted. Faxed discharge summary to [facility]. Currently in bed, awake. Call bell in reach. IV fluids discontinued.

## 2023-12-10 NOTE — PROGRESS NOTES
"Enter Query Response Below      Query Response: Severe malnutrition was not supported Electronically signed by Jonatan Bueno MD, 12/10/23, 12:57 PM EST.              If applicable, please update the problem list.     Patient: Ashley Lin        : 1950  Account: [id number]           Admit Date:         How to Respond to this query:       a. Click New Note     b. Answer query within the yellow box.                c. Update the Problem List, if applicable.      If you have any questions about this query contact me at: [email or web address]         73 year old woman with PMH of gastric bypass, COPD, admitted 12/3 with nausea/vomiting/diarrhea, found to have COVID 19 with symptoms possibly COVID induced/ infectious colitis.  A diagnosis of severe malnutrition is noted as an active problem from the H&P to the DC Summary.  Registered dietitian did not see patient this admission.  DC Summary notes: \"Appearance: She is well-developed.\"  There is no documented weight loss, loss of muscle mass or loss of subcutaneous fat.          After study, was severe malnutrition clinically supported during this admission?  - Severe malnutrition was supported with the following additional clinical indicators:____________  - Severe malnutrition was not supported  - Other- please specify _____________  - Unable to determine     ASPEN criteria for malnutrition requires the presence of at least two of the following: insufficient energy intake, weight loss, loss of muscle mass,  loss of subcutaneous fat, localized or generalized fluid accumulation, diminished functional status (measured by calibrated hand  strength).    By submitting this query, we are merely seeking further clarification of documentation to accurately reflect all conditions that you are monitoring, evaluating, treating or that extend the hospitalization or utilize additional resources of care. Please utilize your independent clinical " judgment when addressing the question(s) above.     This query and your response, once completed, will be entered into the legal medical record.    Sincerely,   Yuni Cody RN, BSN  [email or web address]  Clinical Documentation Integrity Program

## 2024-07-23 ENCOUNTER — OFFICE VISIT (OUTPATIENT)
Dept: PULMONOLOGY | Facility: HOSPITAL | Age: 74
End: 2024-07-23
Payer: MEDICARE

## 2024-07-23 VITALS
RESPIRATION RATE: 14 BRPM | WEIGHT: 100 LBS | SYSTOLIC BLOOD PRESSURE: 131 MMHG | HEIGHT: 64 IN | OXYGEN SATURATION: 96 % | BODY MASS INDEX: 17.07 KG/M2 | HEART RATE: 89 BPM | DIASTOLIC BLOOD PRESSURE: 80 MMHG

## 2024-07-23 DIAGNOSIS — R91.1 PULMONARY NODULE: ICD-10-CM

## 2024-07-23 DIAGNOSIS — G47.09 OTHER INSOMNIA: Primary | ICD-10-CM

## 2024-07-23 DIAGNOSIS — G47.33 OSA (OBSTRUCTIVE SLEEP APNEA): ICD-10-CM

## 2024-07-23 PROCEDURE — G0463 HOSPITAL OUTPT CLINIC VISIT: HCPCS

## 2024-07-23 RX ORDER — FUROSEMIDE 40 MG/1
40 TABLET ORAL DAILY
COMMUNITY
Start: 2024-06-04

## 2024-07-23 RX ORDER — ARIPIPRAZOLE 10 MG/1
1 TABLET ORAL DAILY
COMMUNITY
Start: 2024-07-03

## 2024-07-23 RX ORDER — BUSPIRONE HYDROCHLORIDE 10 MG/1
1 TABLET ORAL 3 TIMES DAILY
COMMUNITY
Start: 2024-06-07

## 2024-07-23 RX ORDER — VARENICLINE 0.03 MG/.05ML
1 SPRAY NASAL DAILY
COMMUNITY
Start: 2024-04-03

## 2024-07-23 NOTE — PROGRESS NOTES
PULMONARY/ CRITICAL CARE/ SLEEP MEDICINE OUTPATIENT CONSULT/ FOLLOW UP NOTE        Patient Name:  Ashley Lin    :  1950    Medical Record:  [id number]    PRIMARY CARE PHYSICIAN     Jamey Fairchild MD    REASON FOR CONSULTATION    Ashley Lin is a 74 y.o. female who is referred for consultation for [facility]  REVIEW OF SYSTEMS    Constitutional:  Denies fever or chills   Eyes:  Denies change in visual acuity   HENT:  Denies nasal congestion or sore throat   Respiratory:  Denies cough or shortness of breath   Cardiovascular:  Denies chest pain or edema   GI:  Denies abdominal pain, nausea, vomiting, bloody stools or diarrhea   :  Denies dysuria   Musculoskeletal:  Denies back pain or joint pain   Integument:  Denies rash   Neurologic:  Denies headache, focal weakness or sensory changes   Endocrine:  Denies polyuria or polydipsia   Lymphatic:  Denies swollen glands   Psychiatric:  Denies depression or anxiety     MEDICAL HISTORY    Past Medical History:   Diagnosis Date    Acute deep vein thrombosis (DVT) of calf muscle vein of left lower extremity 11/10/2023    Aftercare for healing traumatic closed fracture of right radius 11/10/2023    Asthma     Chronic anticoagulation 11/10/2023    Chronic back pain     Chronic headaches     Chronic obstructive pulmonary disease 2020    Chronic pain syndrome 2020    Depression 2020    Disease of thyroid gland     Estrogen deficiency 2020    GERD without esophagitis 2020    History of subdural hematoma 11/10/2023    Hyperlipidemia 11/10/2023    Hypothyroidism (acquired) 2020    Migraines 2020    Neuropathy     Sinus tachycardia 11/10/2023    Vomiting 2020        SURGICAL HISTORY    Past Surgical History:   Procedure Laterality Date    CHOLECYSTECTOMY      COLONOSCOPY N/A 2020    Procedure: COLONOSCOPY WITH RANDOM COLON BIOPSIES X4 AREAS, BIOPSY X 1 AREA;  Surgeon: Aguila Garcia MD;  Location:   [facility];  Service: Gastroenterology;  Laterality: N/A;  POST- colon erosion    ENDOSCOPY N/A 2020    Procedure: ESOPHAGOGASTRODUODENOSCOPY WITH DILATATION (BOUGIE #[id number]);  Surgeon: Benjamin Mike MD;  Location: [facility];  Service: Gastroenterology;  Laterality: N/A;  Post operative diagnosis:GASTROPARESIS AND DYSPHAGIA     ENDOSCOPY N/A 2020    Procedure: ESOPHAGOGASTRODUODENOSCOPY WITH BIOPSY X 1 AREA;  Surgeon: Aguila Garcia MD;  Location: [facility] END[facility]COPY;  Service: Gastroenterology;  Laterality: N/A;  POST    HYSTERECTOMY      NECK SURGERY      SPINE SURGERY  2020        FAMILY HISTORY    Family History   Problem Relation Age of Onset    Heart disease Mother     No Known Problems Father        SOCIAL HISTORY    Social History     Tobacco Use    Smoking status: Former     Current packs/day: 0.00     Types: Cigarettes     Quit date:      Years since quittin.5     Passive exposure: Current    Smokeless tobacco: Never   Substance Use Topics    Alcohol use: Never        ALLERGIES    Allergies   Allergen Reactions    Morphine Hives    Penicillins Hives and Shortness Of Breath     Can take iv form - but not po form           MEDICATIONS    Current Outpatient Medications on File Prior to Visit   Medication Sig Dispense Refill    albuterol sulfate  (90 Base) MCG/ACT inhaler Inhale 2 puffs Every 4 (Four) Hours As Needed for Wheezing or Shortness of Air. May use every 4 to 6 hours 6.7 g 0    ARIPiprazole (ABILIFY) 10 MG tablet Take 1 tablet by mouth Daily.      atorvastatin (LIPITOR) 20 MG tablet Take 0.5 tablets by mouth Every Night.      baclofen (LIORESAL) 10 MG tablet Take 1 tablet by mouth Daily.      busPIRone (BUSPAR) 10 MG tablet Take 1 tablet by mouth 3 times a day.      butalbital-acetaminophen-caffeine (FIORICET, ESGIC) -40 MG per tablet Take 1 tablet by mouth Every 4 (Four) Hours As Needed for Headache. 18 tablet 0    cetirizine (zyrTEC) 10 MG  tablet Take 1 tablet by mouth Daily.      Cholecalciferol (vitamin D3) 125 MCG (5000 UT) tablet tablet Take 1 tablet by mouth Daily.      colesevelam (Welchol) 625 MG tablet Take 3 tablets by mouth 2 (Two) Times a Day With Meals.      cycloSPORINE (RESTASIS) 0.05 % ophthalmic emulsion Administer 1 drop to both eyes 2 (Two) Times a Day. 8 each 0    dicyclomine (BENTYL) 10 MG capsule Take 1 capsule by mouth 3 times a day. 21 capsule 0    Eluxadoline (Viberzi) 100 MG tablet Take 1 tablet by mouth 2 (Two) Times a Day.      EPINEPHrine (EPIPEN) 0.3 MG/0.3ML solution auto-injector injection Inject 0.3 mL into the appropriate muscle as directed by prescriber 1 (One) Time.      estradiol (ESTRACE) 1 MG tablet Take 1 tablet by mouth Daily.      fluticasone (FLONASE) 50 MCG/ACT nasal spray 2 sprays into the nostril(s) as directed by provider Daily.      furosemide (LASIX) 40 MG tablet Take 1 tablet by mouth Daily.      gabapentin (NEURONTIN) 800 MG tablet Take 1 tablet by mouth 4 (Four) Times a Day. 12 tablet 0    hydrOXYzine (ATARAX) 25 MG tablet Take 1 tablet by mouth Every 6 (Six) Hours As Needed for Itching.      levothyroxine (SYNTHROID, LEVOTHROID) 50 MCG tablet Take 1 tablet by mouth Every Morning.      loperamide (IMODIUM) 2 MG capsule Take 1 capsule by mouth 4 (Four) Times a Day As Needed for Diarrhea. 90 capsule 0    metoclopramide (REGLAN) 10 MG tablet Take 1 tablet by mouth 4 (Four) Times a Day As Needed (abdominal pain).      montelukast (SINGULAIR) 10 MG tablet Take 1 tablet by mouth Every Night.      naloxone (NARCAN) 4 MG/0.1ML nasal spray Call 911. Don't prime. Parker in 1 nostril for overdose. Repeat in 2-3 minutes in other nostril if no or minimal breathing/responsiveness. 2 each 0    ondansetron (ZOFRAN) 4 MG tablet Take 1 tablet by mouth Every 4 (Four) Hours As Needed for Nausea or Vomiting. 30 tablet 0    oxyCODONE-acetaminophen (PERCOCET)  MG per tablet Take 1 tablet by mouth Every 6 (Six) Hours.  "12 tablet 0    pantoprazole (PROTONIX) 40 MG EC tablet Take 1 tablet by mouth 2 (Two) Times a Day.      polyethylene glycol (MIRALAX) 17 g packet Take 17 g by mouth Daily.      potassium chloride 10 MEQ CR tablet Take 1 tablet by mouth Daily.      prednisoLONE acetate (PRED FORTE) 1 % ophthalmic suspension Administer 1 drop to both eyes Every Night.      rizatriptan MLT (MAXALT-MLT) 10 MG disintegrating tablet Place 1 tablet on the tongue 1 (One) Time As Needed for Migraine. May repeat in 2 hours if needed      sertraline (ZOLOFT) 50 MG tablet Take 1 tablet by mouth Every Night.      Stiolto Respimat 2.5-2.5 MCG/ACT aerosol solution inhaler Inhale 2 puffs Daily.      topiramate (TOPAMAX) 100 MG tablet Take 1 tablet by mouth 2 (Two) Times a Day.      Tyrvaya 0.03 MG/ACT solution 1 spray into the nostril(s) as directed by provider Daily.      [DISCONTINUED] metoprolol succinate XL (TOPROL-XL) 25 MG 24 hr tablet Take 1 tablet by mouth Every 12 (Twelve) Hours.       No current facility-administered medications on file prior to visit.       PHYSICAL EXAM    Vitals:    07/23/24 1006   BP: 131/80   BP Location: Left arm   Patient Position: Sitting   Cuff Size: Adult   Pulse: 89   Resp: 14   SpO2: 96%   Weight: 45.4 kg (100 lb)   Height: 162.6 cm (64.02\")        Constitutional:  Well developed, well nourished, no acute distress, non-toxic appearance   Eyes:  PERRL, conjunctiva normal   HENT:  Atraumatic, external ears normal, nose normal, oropharynx moist, no pharyngeal exudates. mallampatti   Neck- normal range of motion, no tenderness, supple   Respiratory:  No respiratory distress, normal breath sounds, no rales, no wheezing   Cardiovascular:  Normal rate, normal rhythm, no murmurs, no gallops, no rubs   GI:  Soft, nondistended, normal bowel sounds, nontender, no organomegaly, no mass, no rebound, no guarding   :  No costovertebral angle tenderness   Musculoskeletal:  No edema, no tenderness, no deformities. Back- no " tenderness  Integument:  Well hydrated, no rash   Lymphatic:  No lymphadenopathy noted   Neurologic:  Alert & oriented x 3, CN 2-12 normal, normal motor function, normal sensory function, no focal deficits noted   Psychiatric:  Speech and behavior appropriate     No radiology results for the last 90 days.   Results for orders placed during the hospital encounter of 11/07/23    Adult Transthoracic Echo Complete W/ Cont if Necessary Per Protocol    Interpretation Summary    Left ventricular systolic function is normal. Left ventricular ejection fraction appears to be 56 - 60%.    Left ventricular diastolic function is consistent with (grade Ia w/high LAP) impaired relaxation.    Estimated right ventricular systolic pressure from tricuspid regurgitation is normal (<35 mmHg).      ASSESSMENT & PLAN:      75-year-old female quit smoking in 2006 with 20-pack-year history of smoking CT scan November 2023 showed    1. No evidence of pulmonary embolus.  2. Left lower lobe groundglass type airspace consistent with pneumonia  3. Small amount of ascites within the left upper quadrant of the abdomen    Repeat CT chest without contrast  Patient has symptoms of insomnia disrupted sleep occasional snoring need to rule out obstructive sleep apnea will perform home sleep study  Pulmonary function test    This document has been electronically signed by  Marisela Jones MD  10:12 EDT

## 2024-09-17 ENCOUNTER — HOSPITAL ENCOUNTER (OUTPATIENT)
Dept: RESPIRATORY THERAPY | Facility: HOSPITAL | Age: 74
Discharge: HOME | End: 2024-09-17
Payer: MEDICARE

## 2024-09-17 ENCOUNTER — HOSPITAL ENCOUNTER (OUTPATIENT)
Dept: CT IMAGING | Facility: HOSPITAL | Age: 74
Discharge: HOME | End: 2024-09-17
Payer: MEDICARE

## 2024-09-17 VITALS — OXYGEN SATURATION: 99 % | HEART RATE: 100 BPM

## 2024-09-17 DIAGNOSIS — R91.1 PULMONARY NODULE: ICD-10-CM

## 2024-09-17 PROCEDURE — 94729 DIFFUSING CAPACITY: CPT

## 2024-09-17 PROCEDURE — 94060 EVALUATION OF WHEEZING: CPT

## 2024-09-17 PROCEDURE — 94760 N-INVAS EAR/PLS OXIMETRY 1: CPT

## 2024-09-17 PROCEDURE — 71250 CT THORAX DX C-: CPT

## 2024-09-17 PROCEDURE — 94799 UNLISTED PULMONARY SVC/PX: CPT

## 2024-09-17 PROCEDURE — A9270 NON-COVERED ITEM OR SERVICE: HCPCS | Performed by: INTERNAL MEDICINE

## 2024-09-17 PROCEDURE — 63710000001 ALBUTEROL SULFATE HFA 108 (90 BASE) MCG/ACT AEROSOL SOLUTION 6.7 G INHALER: Performed by: INTERNAL MEDICINE

## 2024-09-17 PROCEDURE — 94726 PLETHYSMOGRAPHY LUNG VOLUMES: CPT

## 2024-09-17 RX ORDER — ALBUTEROL SULFATE 90 UG/1
2 AEROSOL, METERED RESPIRATORY (INHALATION) ONCE
Status: COMPLETED | OUTPATIENT
Start: 2024-09-17 | End: 2024-09-17

## 2024-09-17 RX ADMIN — ALBUTEROL SULFATE 2 PUFF: 108 AEROSOL, METERED RESPIRATORY (INHALATION) at 15:13

## 2024-09-30 ENCOUNTER — TELEPHONE (OUTPATIENT)
Dept: PULMONOLOGY | Facility: HOSPITAL | Age: 74
End: 2024-09-30
Payer: MEDICARE

## 2025-05-30 NOTE — CASE MANAGEMENT/SOCIAL WORK
Social Work Assessment  [facility]     Patient Name: Ashley Lin  MRN: [id number]  Today's Date: 12/6/2023    Admit Date: 12/3/2023     Discharge Plan       Row Name 12/06/23 1531       Plan    Plan Referral to [facility] for SNF pending. PASRR approved. No precert required.      RENE Matthews, MSW    Phone: [phone]  Fax: [phone]  Email: [email or web address]            details… normal/regular rate and rhythm/S1 S2 present/no gallops/no rub/no murmur

## (undated) DEVICE — BITEBLOCK ENDO W/STRAP 60F A/ LF DISP

## (undated) DEVICE — SINGLE-USE BIOPSY FORCEPS: Brand: RADIAL JAW 4

## (undated) DEVICE — PK ENDO GI 50

## (undated) DEVICE — PAPR PRNT PK SONY W RIBN UPC55